# Patient Record
Sex: FEMALE | Race: WHITE | NOT HISPANIC OR LATINO | Employment: OTHER | URBAN - METROPOLITAN AREA
[De-identification: names, ages, dates, MRNs, and addresses within clinical notes are randomized per-mention and may not be internally consistent; named-entity substitution may affect disease eponyms.]

---

## 2018-01-02 ENCOUNTER — APPOINTMENT (EMERGENCY)
Dept: RADIOLOGY | Facility: HOSPITAL | Age: 78
End: 2018-01-02
Payer: MEDICARE

## 2018-01-02 ENCOUNTER — HOSPITAL ENCOUNTER (EMERGENCY)
Facility: HOSPITAL | Age: 78
Discharge: HOME | End: 2018-01-02
Attending: EMERGENCY MEDICINE | Admitting: EMERGENCY MEDICINE
Payer: MEDICARE

## 2018-01-02 VITALS
WEIGHT: 148 LBS | OXYGEN SATURATION: 97 % | DIASTOLIC BLOOD PRESSURE: 70 MMHG | HEART RATE: 65 BPM | SYSTOLIC BLOOD PRESSURE: 154 MMHG | RESPIRATION RATE: 16 BRPM | TEMPERATURE: 97.4 F

## 2018-01-02 DIAGNOSIS — R07.9 CHEST PAIN: Primary | ICD-10-CM

## 2018-01-02 LAB
ALBUMIN SERPL BCP-MCNC: 4.2 G/DL (ref 3.5–5)
ALP SERPL-CCNC: 51 U/L (ref 46–116)
ALT SERPL W P-5'-P-CCNC: 26 U/L (ref 12–78)
ANION GAP SERPL CALCULATED.3IONS-SCNC: 9 MMOL/L (ref 4–13)
AST SERPL W P-5'-P-CCNC: 22 U/L (ref 5–45)
ATRIAL RATE: 76 BPM
BASOPHILS # BLD AUTO: 0.03 THOUSANDS/ΜL (ref 0–0.1)
BASOPHILS NFR BLD AUTO: 1 % (ref 0–1)
BILIRUB DIRECT SERPL-MCNC: 0.14 MG/DL (ref 0–0.2)
BILIRUB SERPL-MCNC: 0.54 MG/DL (ref 0.2–1)
BUN SERPL-MCNC: 14 MG/DL (ref 5–25)
CALCIUM SERPL-MCNC: 9.8 MG/DL (ref 8.3–10.1)
CHLORIDE SERPL-SCNC: 94 MMOL/L (ref 100–108)
CO2 SERPL-SCNC: 29 MMOL/L (ref 21–32)
CREAT SERPL-MCNC: 0.99 MG/DL (ref 0.6–1.3)
EOSINOPHIL # BLD AUTO: 0.05 THOUSAND/ΜL (ref 0–0.61)
EOSINOPHIL NFR BLD AUTO: 1 % (ref 0–6)
ERYTHROCYTE [DISTWIDTH] IN BLOOD BY AUTOMATED COUNT: 12.8 % (ref 11.6–15.1)
GFR SERPL CREATININE-BSD FRML MDRD: 55 ML/MIN/1.73SQ M
GLUCOSE SERPL-MCNC: 98 MG/DL (ref 65–140)
HCT VFR BLD AUTO: 38.2 % (ref 34.8–46.1)
HGB BLD-MCNC: 13 G/DL (ref 11.5–15.4)
LYMPHOCYTES # BLD AUTO: 1.67 THOUSANDS/ΜL (ref 0.6–4.47)
LYMPHOCYTES NFR BLD AUTO: 26 % (ref 14–44)
MCH RBC QN AUTO: 31 PG (ref 26.8–34.3)
MCHC RBC AUTO-ENTMCNC: 34 G/DL (ref 31.4–37.4)
MCV RBC AUTO: 91 FL (ref 82–98)
MONOCYTES # BLD AUTO: 0.41 THOUSAND/ΜL (ref 0.17–1.22)
MONOCYTES NFR BLD AUTO: 7 % (ref 4–12)
NEUTROPHILS # BLD AUTO: 4.19 THOUSANDS/ΜL (ref 1.85–7.62)
NEUTS SEG NFR BLD AUTO: 65 % (ref 43–75)
NRBC BLD AUTO-RTO: 0 /100 WBCS
P AXIS: 75 DEGREES
PLATELET # BLD AUTO: 245 THOUSANDS/UL (ref 149–390)
PMV BLD AUTO: 10.5 FL (ref 8.9–12.7)
POTASSIUM SERPL-SCNC: 3.8 MMOL/L (ref 3.5–5.3)
PR INTERVAL: 178 MS
PROT SERPL-MCNC: 7.9 G/DL (ref 6.4–8.2)
QRS AXIS: 60 DEGREES
QRSD INTERVAL: 92 MS
QT INTERVAL: 382 MS
QTC INTERVAL: 429 MS
RBC # BLD AUTO: 4.2 MILLION/UL (ref 3.81–5.12)
SODIUM SERPL-SCNC: 132 MMOL/L (ref 136–145)
SPECIMEN SOURCE: NORMAL
T WAVE AXIS: 76 DEGREES
TROPONIN I BLD-MCNC: 0 NG/ML (ref 0–0.08)
TSH SERPL DL<=0.05 MIU/L-ACNC: 3.92 UIU/ML (ref 0.36–3.74)
VENTRICULAR RATE: 76 BPM
WBC # BLD AUTO: 6.35 THOUSAND/UL (ref 4.31–10.16)

## 2018-01-02 PROCEDURE — 80048 BASIC METABOLIC PNL TOTAL CA: CPT | Performed by: PODIATRIST

## 2018-01-02 PROCEDURE — 99285 EMERGENCY DEPT VISIT HI MDM: CPT

## 2018-01-02 PROCEDURE — 85025 COMPLETE CBC W/AUTO DIFF WBC: CPT | Performed by: PODIATRIST

## 2018-01-02 PROCEDURE — 84484 ASSAY OF TROPONIN QUANT: CPT

## 2018-01-02 PROCEDURE — 93005 ELECTROCARDIOGRAM TRACING: CPT

## 2018-01-02 PROCEDURE — 36415 COLL VENOUS BLD VENIPUNCTURE: CPT | Performed by: PODIATRIST

## 2018-01-02 PROCEDURE — 71046 X-RAY EXAM CHEST 2 VIEWS: CPT

## 2018-01-02 PROCEDURE — 93005 ELECTROCARDIOGRAM TRACING: CPT | Performed by: PODIATRIST

## 2018-01-02 PROCEDURE — 80076 HEPATIC FUNCTION PANEL: CPT | Performed by: PODIATRIST

## 2018-01-02 PROCEDURE — 84443 ASSAY THYROID STIM HORMONE: CPT | Performed by: PODIATRIST

## 2018-01-02 RX ORDER — ROSUVASTATIN CALCIUM 10 MG/1
10 TABLET, COATED ORAL DAILY
COMMUNITY
End: 2019-02-26 | Stop reason: SDUPTHER

## 2018-01-02 RX ORDER — AMLODIPINE BESYLATE 5 MG/1
5 TABLET ORAL DAILY
COMMUNITY
End: 2019-06-18 | Stop reason: SDUPTHER

## 2018-01-02 RX ORDER — 0.9 % SODIUM CHLORIDE 0.9 %
3 VIAL (ML) INJECTION AS NEEDED
Status: DISCONTINUED | OUTPATIENT
Start: 2018-01-02 | End: 2018-01-02 | Stop reason: HOSPADM

## 2018-01-02 RX ORDER — PANTOPRAZOLE SODIUM 40 MG/1
40 TABLET, DELAYED RELEASE ORAL DAILY
COMMUNITY
End: 2019-04-11 | Stop reason: SDUPTHER

## 2018-01-02 RX ORDER — BUSPIRONE HYDROCHLORIDE 7.5 MG/1
7.5 TABLET ORAL 2 TIMES DAILY
COMMUNITY
End: 2019-04-12 | Stop reason: ALTCHOICE

## 2018-01-02 RX ORDER — DIAZEPAM 2 MG/1
5 TABLET ORAL EVERY 6 HOURS PRN
COMMUNITY
End: 2018-11-28 | Stop reason: SDUPTHER

## 2018-01-02 NOTE — ED ATTENDING ATTESTATION
Christin Talley MD, saw and evaluated the patient  I have discussed the patient with the resident/non-physician practitioner and agree with the resident's/non-physician practitioner's findings, Plan of Care, and MDM as documented in the resident's/non-physician practitioner's note, except where noted  All available labs and Radiology studies were reviewed  At this point I agree with the current assessment done in the Emergency Department  I have conducted an independent evaluation of this patient a history and physical is as follows:    64 YO female presents with tingling over the upper and lower extremities  States ~45 minutes ago she noticed tightness in the chest  Pt feels this may be 2/2 her anxiety, recently changed to buspirone for this  She does note some aching over the anterior chest that has been present with previous anxiety episodes  Pt states this is a little more intense than previous  She denies lightheadedness, palpitations, shortness of breath  Pt denies SOB/F/C/N/V/D/C, no dysuria, burning on urination or blood in urine  Gen: Pt is in NAD  HEENT: Head is atraumatic, EOM's intact, neck has FROM  Chest: CTAB, non-tender  Heart: RRR  Abdomen: Soft, NT/ND  Musculoskeletal: FROM in all extremities  Skin: No rash, no ecchymosis  Neuro: Awake, alert, oriented x4; Cranial nerves II-XII intact  Psych: Normal affect    MDM - Pt with tingling in the upper and lower extremities B/L without focal neurologic deficit  Will obtain electrolytes and CBC, ECG and troponin  As symptoms began recently prior to presentation, pt will require a delta ECG and troponin      Critical Care Time  CritCare Time    Procedures

## 2018-01-02 NOTE — ED PROVIDER NOTES
History  Chief Complaint   Patient presents with    Tingling     Tingling down bilateral arm and legs into feet  Reports that muscles feel like they are burning  Symptoms began approximately one hour prior to arrival     Chest Pain     Reports chest pressure/ tightening began approximately 30 minutes prior to arrival  Weakness  69-year-old female presents for tingling of all 4 extremities as well as slight discomfort of the left chest area  She relates that the symptoms of tingling to the extremities the can approximately 3 hours prior to arrival in the emergency department  She states that the sensation happened equally to all of the extremities at the same time  The symptoms continued to were sent in approximately 30 minutes before she departed for the emergency department she started to feel some slight pulling and tightness in her left chest   The patient denies any cardiac history  She does relate that a long time ago she had a cardiac stress test but she cannot remember the result a wire was performed  She states that she has not followed up with any cardiologist sense  She does relate an extensive history of anxiety for many years  She is currently on buspirone which she has been on for only a few months  She states she has been switched between anti exhibit demand medications frequently  Family prior to emergency department for concern that she was having a heart attack  However patient states that the symptoms are very consistent with her previous episodes of anxiety however they have never been this severe before  She denies any fever chills nausea vomiting chest shortness of breath  She denies any recent trauma or over exertion  She denies any injuries to the head or any history of neck or back pain              None       Past Medical History:   Diagnosis Date    Anxiety     Disease of thyroid gland     Hyperlipidemia     Hypertension        Past Surgical History:   Procedure Laterality Date    CHOLECYSTECTOMY      HYSTERECTOMY         History reviewed  No pertinent family history  I have reviewed and agree with the history as documented  Social History   Substance Use Topics    Smoking status: Never Smoker    Smokeless tobacco: Never Used    Alcohol use No        Review of Systems   Constitutional: Negative  HENT: Negative  Eyes: Negative  Negative for visual disturbance  Respiratory: Positive for chest tightness  Cardiovascular: Negative  Gastrointestinal: Negative  Genitourinary: Negative  Musculoskeletal: Negative  Skin: Negative  Neurological:        Tingling and numbness to all 4 extremities   Psychiatric/Behavioral: The patient is nervous/anxious  All other systems reviewed and are negative  Physical Exam  ED Triage Vitals [01/02/18 1647]   Temperature Pulse Respirations Blood Pressure SpO2   (!) 97 4 °F (36 3 °C) 75 18 (!) 175/76 100 %      Temp Source Heart Rate Source Patient Position - Orthostatic VS BP Location FiO2 (%)   Oral Monitor Sitting Right arm --      Pain Score       4           Orthostatic Vital Signs  Vitals:    01/02/18 1647   BP: (!) 175/76   Pulse: 75   Patient Position - Orthostatic VS: Sitting       Physical Exam   Constitutional: She is oriented to person, place, and time  She appears well-developed and well-nourished  HENT:   Head: Normocephalic and atraumatic  Eyes: EOM are normal  Pupils are equal, round, and reactive to light  Neck: Normal range of motion  Neck supple  Cardiovascular: Normal rate, regular rhythm and normal heart sounds  Pulmonary/Chest: Effort normal and breath sounds normal  No respiratory distress  Abdominal: Soft  There is no tenderness  Musculoskeletal: Normal range of motion  She exhibits no edema, tenderness or deformity  Neurological: She is alert and oriented to person, place, and time  She exhibits normal muscle tone     Increased tingling of the extremities upon palpation   Skin: Skin is warm  No erythema  Psychiatric: Her mood appears anxious  ED Medications  Medications - No data to display    Diagnostic Studies  Results Reviewed     None                 No orders to display         Procedures  Procedures      Phone Consults  ED Phone Contact    ED Course  ED Course                                MDM  Number of Diagnoses or Management Options  Chest pain: new and does not require workup  Diagnosis management comments: Patient is discharged to home  Likely attributable to history of anxiety as patient now reports that the symptoms are similar to her previous episodes  All vital signs and labs within normal limits  EKG and troponins show no indication of myocardial infarction  Amount and/or Complexity of Data Reviewed  Clinical lab tests: reviewed and ordered  Tests in the radiology section of CPT®: ordered and reviewed  Tests in the medicine section of CPT®: ordered and reviewed    Risk of Complications, Morbidity, and/or Mortality  Presenting problems: moderate  Diagnostic procedures: moderate  Management options: moderate    Patient Progress  Patient progress: stable    CritCare Time    Disposition  Final diagnoses:   None     ED Disposition     None      Follow-up Information    None       Patient's Medications    No medications on file     No discharge procedures on file  ED Provider  Attending physically available and evaluated Morris Foster I managed the patient along with the ED Attending      Electronically Signed by         Leonela Elizabeth DPM  Resident  01/03/18 5666

## 2018-01-02 NOTE — ED NOTES
Pt returned from x-ray  Ambulatory to bathroom with even, steady gait       Rina Rosenbaum RN  01/02/18 9143

## 2018-01-03 LAB
ATRIAL RATE: 66 BPM
P AXIS: 60 DEGREES
PR INTERVAL: 158 MS
QRS AXIS: 72 DEGREES
QRSD INTERVAL: 92 MS
QT INTERVAL: 382 MS
QTC INTERVAL: 400 MS
SPECIMEN SOURCE: NORMAL
T WAVE AXIS: 82 DEGREES
TROPONIN I BLD-MCNC: 0.01 NG/ML (ref 0–0.08)
VENTRICULAR RATE: 66 BPM

## 2018-01-03 NOTE — DISCHARGE INSTRUCTIONS

## 2018-04-04 ENCOUNTER — CONVERSION ENCOUNTER (OUTPATIENT)
Dept: MAMMOGRAPHY | Facility: CLINIC | Age: 78
End: 2018-04-04

## 2018-06-25 DIAGNOSIS — J30.2 SEASONAL ALLERGIC RHINITIS, UNSPECIFIED TRIGGER: Primary | ICD-10-CM

## 2018-08-23 LAB
25(OH)D3 SERPL-MCNC: 38 NG/ML (ref 30–100)
ALBUMIN SERPL-MCNC: 4.4 G/DL (ref 3.6–5.1)
ALBUMIN/GLOB SERPL: 1.6 (CALC) (ref 1–2.5)
ALP SERPL-CCNC: 53 U/L (ref 33–130)
ALT SERPL-CCNC: 16 U/L (ref 6–29)
APPEARANCE UR: CLEAR
AST SERPL-CCNC: 20 U/L (ref 10–35)
BACTERIA UR QL AUTO: ABNORMAL /HPF
BASOPHILS # BLD AUTO: 51 CELLS/UL (ref 0–200)
BASOPHILS NFR BLD AUTO: 1.1 %
BILIRUB DIRECT SERPL-MCNC: 0.1 MG/DL
BILIRUB INDIRECT SERPL-MCNC: 0.6 MG/DL (CALC) (ref 0.2–1.2)
BILIRUB SERPL-MCNC: 0.7 MG/DL (ref 0.2–1.2)
BILIRUB UR QL STRIP: NEGATIVE
BUN SERPL-MCNC: 15 MG/DL (ref 7–25)
BUN/CREAT SERPL: 14 (CALC) (ref 6–22)
CALCIUM SERPL-MCNC: 9.8 MG/DL (ref 8.6–10.4)
CHLORIDE SERPL-SCNC: 98 MMOL/L (ref 98–110)
CHOLEST SERPL-MCNC: 206 MG/DL
CHOLEST/HDLC SERPL: 2.9 (CALC)
CO2 SERPL-SCNC: 31 MMOL/L (ref 20–32)
COLOR UR: YELLOW
CREAT SERPL-MCNC: 1.1 MG/DL (ref 0.6–0.93)
EOSINOPHIL # BLD AUTO: 78 CELLS/UL (ref 15–500)
EOSINOPHIL NFR BLD AUTO: 1.7 %
ERYTHROCYTE [DISTWIDTH] IN BLOOD BY AUTOMATED COUNT: 12.4 % (ref 11–15)
EST. AVERAGE GLUCOSE BLD GHB EST-MCNC: 103 (CALC)
EST. AVERAGE GLUCOSE BLD GHB EST-SCNC: 5.7 (CALC)
GLOBULIN SER CALC-MCNC: 2.8 G/DL (CALC) (ref 1.9–3.7)
GLUCOSE SERPL-MCNC: 81 MG/DL (ref 65–99)
GLUCOSE UR QL STRIP: NEGATIVE
HBA1C MFR BLD: 5.2 % OF TOTAL HGB
HCT VFR BLD AUTO: 36.7 % (ref 35–45)
HDLC SERPL-MCNC: 72 MG/DL
HGB BLD-MCNC: 12.7 G/DL (ref 11.7–15.5)
HGB UR QL STRIP: NEGATIVE
HYALINE CASTS #/AREA URNS LPF: ABNORMAL /LPF
KETONES UR QL STRIP: NEGATIVE
LDLC SERPL CALC-MCNC: 114 MG/DL (CALC)
LEUKOCYTE ESTERASE UR QL STRIP: ABNORMAL
LYMPHOCYTES # BLD AUTO: 1638 CELLS/UL (ref 850–3900)
LYMPHOCYTES NFR BLD AUTO: 35.6 %
MAGNESIUM SERPL-MCNC: 2.2 MG/DL (ref 1.5–2.5)
MCH RBC QN AUTO: 32.1 PG (ref 27–33)
MCHC RBC AUTO-ENTMCNC: 34.6 G/DL (ref 32–36)
MCV RBC AUTO: 92.7 FL (ref 80–100)
MONOCYTES # BLD AUTO: 363 CELLS/UL (ref 200–950)
MONOCYTES NFR BLD AUTO: 7.9 %
NEUTROPHILS # BLD AUTO: 2470 CELLS/UL (ref 1500–7800)
NEUTROPHILS NFR BLD AUTO: 53.7 %
NITRITE UR QL STRIP: NEGATIVE
NONHDLC SERPL-MCNC: 134 MG/DL (CALC)
PH UR STRIP: 7.5 [PH] (ref 5–8)
PLATELET # BLD AUTO: 254 THOUSAND/UL (ref 140–400)
PMV BLD REES-ECKER: 11 FL (ref 7.5–12.5)
POTASSIUM SERPL-SCNC: 4.1 MMOL/L (ref 3.5–5.3)
PROT SERPL-MCNC: 7.2 G/DL (ref 6.1–8.1)
PROT UR QL STRIP: NEGATIVE
RBC # BLD AUTO: 3.96 MILLION/UL (ref 3.8–5.1)
RBC #/AREA URNS HPF: ABNORMAL /HPF
SL AMB EGFR AFRICAN AMERICAN: 56 ML/MIN/1.73M2
SL AMB EGFR NON AFRICAN AMERICAN: 48 ML/MIN/1.73M2
SODIUM SERPL-SCNC: 136 MMOL/L (ref 135–146)
SP GR UR STRIP: 1.01 (ref 1–1.03)
SQUAMOUS #/AREA URNS HPF: ABNORMAL /HPF
T4 FREE SERPL-MCNC: 1.3 NG/DL (ref 0.8–1.8)
TRIGL SERPL-MCNC: 101 MG/DL
TSH SERPL-ACNC: 4.62 MIU/L (ref 0.4–4.5)
VIT B12 SERPL-MCNC: 755 PG/ML (ref 200–1100)
WBC # BLD AUTO: 4.6 THOUSAND/UL (ref 3.8–10.8)
WBC #/AREA URNS HPF: ABNORMAL /HPF

## 2018-08-28 ENCOUNTER — OFFICE VISIT (OUTPATIENT)
Dept: FAMILY MEDICINE CLINIC | Facility: CLINIC | Age: 78
End: 2018-08-28
Payer: MEDICARE

## 2018-08-28 VITALS
OXYGEN SATURATION: 98 % | SYSTOLIC BLOOD PRESSURE: 120 MMHG | DIASTOLIC BLOOD PRESSURE: 82 MMHG | BODY MASS INDEX: 24.32 KG/M2 | RESPIRATION RATE: 14 BRPM | HEART RATE: 72 BPM | TEMPERATURE: 98.7 F | HEIGHT: 65 IN | WEIGHT: 146 LBS

## 2018-08-28 DIAGNOSIS — E78.49 OTHER HYPERLIPIDEMIA: ICD-10-CM

## 2018-08-28 DIAGNOSIS — I10 ESSENTIAL HYPERTENSION: Primary | ICD-10-CM

## 2018-08-28 DIAGNOSIS — K21.9 GASTROESOPHAGEAL REFLUX DISEASE WITHOUT ESOPHAGITIS: ICD-10-CM

## 2018-08-28 DIAGNOSIS — E03.9 HYPOTHYROIDISM, UNSPECIFIED TYPE: ICD-10-CM

## 2018-08-28 DIAGNOSIS — F41.9 ANXIETY: ICD-10-CM

## 2018-08-28 PROCEDURE — 99214 OFFICE O/P EST MOD 30 MIN: CPT | Performed by: INTERNAL MEDICINE

## 2018-08-28 NOTE — PROGRESS NOTES
Assessment/Plan:         Diagnoses and all orders for this visit:    Essential hypertension: Stable  Continue same  RTc in 3mos w blood work  -     Basic metabolic panel; Future    Other hyperlipidemia: Stable  Continue same  RTc in 3mos    Gastroesophageal reflux disease without esophagitis: Life style mOd  Stable  Cont same    Anxiety: Stable  Cont Valium 2 mg    Hypothyroidism, unspecified type: Clinically Stable at this Time  RTc in 2-3 mos w Blood work ;  -     TSH, 3rd generation; Future  -     T4, free; Future        Subjective:      Patient ID: Chetan Tolentino is a 65 y o  female  Nice 65 Y O lady with H/O HTN,Anxiety,  Is here today for regular check Up     No new Symptoms    Med List and Blood work Reviewed w pt in Detail           The following portions of the patient's history were reviewed and updated as appropriate: allergies, current medications, past family history, past medical history, past social history, past surgical history and problem list     Review of Systems   Constitutional: Negative for chills, fatigue and fever  HENT: Negative for congestion, facial swelling, sore throat, trouble swallowing and voice change  Eyes: Negative for pain, discharge and visual disturbance  Respiratory: Negative for cough, shortness of breath and wheezing  Cardiovascular: Negative for chest pain, palpitations and leg swelling  Gastrointestinal: Negative for abdominal pain, blood in stool, constipation, diarrhea and nausea  Endocrine: Negative for polydipsia, polyphagia and polyuria  Genitourinary: Negative for difficulty urinating, hematuria and urgency  Musculoskeletal: Negative for arthralgias and myalgias  Skin: Negative for rash  Neurological: Negative for dizziness, tremors, weakness and headaches  Hematological: Negative for adenopathy  Does not bruise/bleed easily  Psychiatric/Behavioral: Negative for dysphoric mood, sleep disturbance and suicidal ideas  Objective:      /82 (BP Location: Left arm, Patient Position: Sitting, Cuff Size: Standard)   Pulse 72   Temp 98 7 °F (37 1 °C) (Oral)   Resp 14   Ht 5' 5" (1 651 m)   Wt 66 2 kg (146 lb)   SpO2 98%   BMI 24 30 kg/m²          Physical Exam   Constitutional: She is oriented to person, place, and time  She appears well-nourished  No distress  HENT:   Head: Normocephalic  Mouth/Throat: Oropharynx is clear and moist  No oropharyngeal exudate  Eyes: Conjunctivae are normal  Pupils are equal, round, and reactive to light  No scleral icterus  Neck: Neck supple  No thyromegaly present  Cardiovascular: Normal rate, regular rhythm and normal heart sounds  No murmur heard  Pulmonary/Chest: Effort normal and breath sounds normal  No respiratory distress  She has no wheezes  She has no rales  Abdominal: Soft  Bowel sounds are normal  She exhibits no distension  There is no tenderness  There is no rebound and no guarding  Musculoskeletal: She exhibits no edema or tenderness  Lymphadenopathy:     She has no cervical adenopathy  Neurological: She is alert and oriented to person, place, and time  No cranial nerve deficit  Coordination normal    Skin: No rash noted  No erythema  Psychiatric: She has a normal mood and affect

## 2018-11-20 ENCOUNTER — TRANSCRIBE ORDERS (OUTPATIENT)
Dept: ADMINISTRATIVE | Facility: HOSPITAL | Age: 78
End: 2018-11-20

## 2018-11-20 ENCOUNTER — APPOINTMENT (OUTPATIENT)
Dept: LAB | Age: 78
End: 2018-11-20
Payer: MEDICARE

## 2018-11-20 DIAGNOSIS — I10 ESSENTIAL HYPERTENSION, MALIGNANT: Primary | ICD-10-CM

## 2018-11-20 DIAGNOSIS — I10 ESSENTIAL HYPERTENSION, MALIGNANT: ICD-10-CM

## 2018-11-20 DIAGNOSIS — E03.9 HYPOTHYROIDISM, UNSPECIFIED TYPE: ICD-10-CM

## 2018-11-20 LAB
ANION GAP SERPL CALCULATED.3IONS-SCNC: 6 MMOL/L (ref 4–13)
BUN SERPL-MCNC: 15 MG/DL (ref 5–25)
CALCIUM SERPL-MCNC: 9.2 MG/DL (ref 8.3–10.1)
CHLORIDE SERPL-SCNC: 100 MMOL/L (ref 100–108)
CO2 SERPL-SCNC: 29 MMOL/L (ref 21–32)
CREAT SERPL-MCNC: 0.99 MG/DL (ref 0.6–1.3)
GFR SERPL CREATININE-BSD FRML MDRD: 54 ML/MIN/1.73SQ M
GLUCOSE P FAST SERPL-MCNC: 79 MG/DL (ref 65–99)
POTASSIUM SERPL-SCNC: 4.1 MMOL/L (ref 3.5–5.3)
SODIUM SERPL-SCNC: 135 MMOL/L (ref 136–145)
T4 FREE SERPL-MCNC: 1.01 NG/DL (ref 0.76–1.46)
TSH SERPL DL<=0.05 MIU/L-ACNC: 3.02 UIU/ML (ref 0.36–3.74)

## 2018-11-20 PROCEDURE — 84439 ASSAY OF FREE THYROXINE: CPT

## 2018-11-20 PROCEDURE — 36415 COLL VENOUS BLD VENIPUNCTURE: CPT

## 2018-11-20 PROCEDURE — 84443 ASSAY THYROID STIM HORMONE: CPT

## 2018-11-20 PROCEDURE — 80048 BASIC METABOLIC PNL TOTAL CA: CPT

## 2018-11-28 ENCOUNTER — OFFICE VISIT (OUTPATIENT)
Dept: FAMILY MEDICINE CLINIC | Facility: CLINIC | Age: 78
End: 2018-11-28
Payer: MEDICARE

## 2018-11-28 VITALS
BODY MASS INDEX: 24.04 KG/M2 | RESPIRATION RATE: 14 BRPM | OXYGEN SATURATION: 98 % | DIASTOLIC BLOOD PRESSURE: 80 MMHG | TEMPERATURE: 97.6 F | SYSTOLIC BLOOD PRESSURE: 120 MMHG | WEIGHT: 144.3 LBS | HEIGHT: 65 IN | HEART RATE: 72 BPM

## 2018-11-28 DIAGNOSIS — F41.9 ANXIETY: Primary | ICD-10-CM

## 2018-11-28 DIAGNOSIS — I10 ESSENTIAL HYPERTENSION: ICD-10-CM

## 2018-11-28 DIAGNOSIS — E78.49 OTHER HYPERLIPIDEMIA: ICD-10-CM

## 2018-11-28 PROCEDURE — 99214 OFFICE O/P EST MOD 30 MIN: CPT | Performed by: INTERNAL MEDICINE

## 2018-11-28 RX ORDER — DIAZEPAM 2 MG/1
2 TABLET ORAL
Qty: 30 TABLET | Refills: 2 | Status: SHIPPED | OUTPATIENT
Start: 2018-11-28 | End: 2018-12-19 | Stop reason: SDUPTHER

## 2018-11-28 NOTE — PROGRESS NOTES
Assessment/Plan:         Diagnoses and all orders for this visit:    Anxiety : continue Buspar    And renew :  -     diazepam (VALIUM) 2 mg tablet; Take 1 tablet (2 mg total) by mouth daily at bedtime as needed for anxiety or sedation    Other hyperlipidemia : stable  Continue same  RTC in 3mos w Blood work    Essential hypertension: stable  Continue same  RTC in 3mos w Blood work        Subjective:      Patient ID: Annie Posey is a 77 y o  [facility],Anxiety,  Is here for Regular check up, recent blood work and med list reviewed w pt in Detail    No new symptoms,  The following portions of the patient's history were reviewed and updated as appropriate: allergies, current medications, past family history, past medical history, past social history, past surgical history and problem list     Review of Systems   Constitutional: Negative for chills, fatigue and fever  HENT: Negative for congestion, facial swelling, sore throat, trouble swallowing and voice change  Eyes: Negative for pain, discharge and visual disturbance  Respiratory: Negative for cough, shortness of breath and wheezing  Cardiovascular: Negative for chest pain, palpitations and leg swelling  Gastrointestinal: Negative for abdominal pain, blood in stool, constipation, diarrhea and nausea  Endocrine: Negative for polydipsia, polyphagia and polyuria  Genitourinary: Negative for difficulty urinating, hematuria and urgency  Musculoskeletal: Negative for arthralgias and myalgias  Skin: Negative for rash  Neurological: Negative for dizziness, tremors, weakness and headaches  Hematological: Negative for adenopathy  Does not bruise/bleed easily  Psychiatric/Behavioral: Negative for dysphoric mood, sleep disturbance and suicidal ideas           Objective:      /80 (BP Location: Left arm, Patient Position: Sitting, Cuff Size: Standard)   Pulse 72   Temp 97 6 °F (36 4 °C) (Oral)   Resp 14 Ht 5' 5" (1 651 m)   Wt 65 5 kg (144 lb 4 8 oz)   SpO2 98%   BMI 24 01 kg/m²          Physical Exam   Constitutional: She is oriented to person, place, and time  She appears well-nourished  No distress  HENT:   Head: Normocephalic  Mouth/Throat: Oropharynx is clear and moist  No oropharyngeal exudate  Eyes: Pupils are equal, round, and reactive to light  Conjunctivae are normal  No scleral icterus  Neck: Neck supple  No thyromegaly present  Cardiovascular: Normal rate, regular rhythm and normal heart sounds  No murmur heard  Pulmonary/Chest: Effort normal and breath sounds normal  No respiratory distress  She has no wheezes  She has no rales  Abdominal: Soft  Bowel sounds are normal  She exhibits no distension  There is no tenderness  There is no rebound and no guarding  Musculoskeletal: She exhibits no edema or tenderness  Lymphadenopathy:     She has no cervical adenopathy  Neurological: She is alert and oriented to person, place, and time  No cranial nerve deficit  Coordination normal    Skin: No rash noted  No erythema  No pallor  Psychiatric: She has a normal mood and affect

## 2018-12-19 ENCOUNTER — OFFICE VISIT (OUTPATIENT)
Dept: FAMILY MEDICINE CLINIC | Facility: CLINIC | Age: 78
End: 2018-12-19
Payer: MEDICARE

## 2018-12-19 VITALS
WEIGHT: 142.6 LBS | HEIGHT: 65 IN | SYSTOLIC BLOOD PRESSURE: 128 MMHG | RESPIRATION RATE: 14 BRPM | BODY MASS INDEX: 23.76 KG/M2 | DIASTOLIC BLOOD PRESSURE: 70 MMHG | OXYGEN SATURATION: 93 % | TEMPERATURE: 97.5 F | HEART RATE: 76 BPM

## 2018-12-19 DIAGNOSIS — I10 ESSENTIAL HYPERTENSION: ICD-10-CM

## 2018-12-19 DIAGNOSIS — E78.49 OTHER HYPERLIPIDEMIA: Primary | ICD-10-CM

## 2018-12-19 DIAGNOSIS — Z23 NEED FOR TDAP VACCINATION: ICD-10-CM

## 2018-12-19 DIAGNOSIS — F41.9 ANXIETY: ICD-10-CM

## 2018-12-19 PROCEDURE — 96372 THER/PROPH/DIAG INJ SC/IM: CPT | Performed by: INTERNAL MEDICINE

## 2018-12-19 PROCEDURE — 90715 TDAP VACCINE 7 YRS/> IM: CPT

## 2018-12-19 PROCEDURE — 99214 OFFICE O/P EST MOD 30 MIN: CPT | Performed by: INTERNAL MEDICINE

## 2018-12-19 PROCEDURE — 90471 IMMUNIZATION ADMIN: CPT

## 2018-12-19 RX ORDER — BUSPIRONE HYDROCHLORIDE 15 MG/1
15 TABLET ORAL 2 TIMES DAILY WITH MEALS
Qty: 60 TABLET | Refills: 3 | Status: SHIPPED | OUTPATIENT
Start: 2018-12-19 | End: 2019-02-26 | Stop reason: SDUPTHER

## 2018-12-19 RX ORDER — LEVOMEFOLATE CALCIUM 15 MG
1 TABLET ORAL DAILY
Qty: 90 TABLET | Refills: 3 | Status: SHIPPED | OUTPATIENT
Start: 2018-12-19 | End: 2019-07-08 | Stop reason: SDUPTHER

## 2018-12-19 RX ORDER — DIAZEPAM 2 MG/1
2 TABLET ORAL
Qty: 30 TABLET | Refills: 1 | Status: SHIPPED | OUTPATIENT
Start: 2018-12-19 | End: 2019-04-12 | Stop reason: SDUPTHER

## 2018-12-19 NOTE — PROGRESS NOTES
Assessment/Plan:         Diagnoses and all orders for this visit:    Other hyperlipidemia: life style Mod  Continue same  RTC in 3mos w Blood work    Need for Tdap vaccination  -     TDAP VACCINE GREATER THAN OR EQUAL TO 8YO IM    Anxiety: Increase Buspar to 15 mg BID food  -     diazepam (VALIUM) 2 mg tablet; Take 1 tablet (2 mg total) by mouth daily at bedtime as needed for anxiety or sedation  -     busPIRone (BUSPAR) 15 mg tablet; Take 1 tablet (15 mg total) by mouth 2 (two) times a day with meals  Add :  -     L-Methylfolate 15 MG TABS; Take 1 tablet (15 mg total) by mouth daily  RTC in 5 weeks    Essential hypertension: Stable  Continue same  RTC in 3mos        Subjective:      Patient ID: Annie Posey is a 77 y o  female  77 Y O lady with H/O HTn,Anxiety,  Is here for Regular check up, lately she has been feeling more Anxiety, and Frequent attacks,  No homocidal Or suicidal Ideas,  Med list reviewed w pt in detail    Anxiety   Symptoms include nervous/anxious behavior  Patient reports no chest pain, dizziness, nausea, palpitations, shortness of breath or suicidal ideas  The following portions of the patient's history were reviewed and updated as appropriate: allergies, current medications, past family history, past medical history, past social history, past surgical history and problem list     Review of Systems   Constitutional: Negative for chills, fatigue and fever  HENT: Negative for congestion, facial swelling, sore throat, trouble swallowing and voice change  Eyes: Negative for pain, discharge and visual disturbance  Respiratory: Negative for cough, shortness of breath and wheezing  Cardiovascular: Negative for chest pain, palpitations and leg swelling  Gastrointestinal: Negative for abdominal pain, blood in stool, constipation, diarrhea and nausea  Endocrine: Negative for polydipsia, polyphagia and polyuria     Genitourinary: Negative for difficulty urinating, hematuria and urgency  Musculoskeletal: Negative for arthralgias and myalgias  Skin: Negative for rash  Neurological: Negative for dizziness, tremors, weakness and headaches  Hematological: Negative for adenopathy  Does not bruise/bleed easily  Psychiatric/Behavioral: Negative for dysphoric mood, sleep disturbance and suicidal ideas  The patient is nervous/anxious  Objective:      /70 (BP Location: Left arm, Patient Position: Sitting, Cuff Size: Standard)   Pulse 76   Temp 97 5 °F (36 4 °C) (Tympanic)   Resp 14   Ht 5' 5" (1 651 m)   Wt 64 7 kg (142 lb 9 6 oz)   SpO2 93%   BMI 23 73 kg/m²          Physical Exam   Constitutional: She is oriented to person, place, and time  She appears well-nourished  No distress  HENT:   Head: Normocephalic  Mouth/Throat: Oropharynx is clear and moist  No oropharyngeal exudate  Eyes: Pupils are equal, round, and reactive to light  Conjunctivae are normal  No scleral icterus  Neck: Neck supple  No thyromegaly present  Cardiovascular: Normal rate, regular rhythm and normal heart sounds  No murmur heard  Pulmonary/Chest: Effort normal and breath sounds normal  No respiratory distress  She has no wheezes  She has no rales  Abdominal: Soft  Bowel sounds are normal  She exhibits no distension  There is no tenderness  There is no rebound and no guarding  Musculoskeletal: She exhibits no edema or tenderness  Lymphadenopathy:     She has no cervical adenopathy  Neurological: She is alert and oriented to person, place, and time  No cranial nerve deficit  Coordination normal    Skin: No rash noted  No erythema  No pallor  Psychiatric: She has a normal mood and affect

## 2019-02-26 ENCOUNTER — OFFICE VISIT (OUTPATIENT)
Dept: FAMILY MEDICINE CLINIC | Facility: CLINIC | Age: 79
End: 2019-02-26
Payer: MEDICARE

## 2019-02-26 VITALS
BODY MASS INDEX: 24.16 KG/M2 | TEMPERATURE: 98.2 F | WEIGHT: 145 LBS | SYSTOLIC BLOOD PRESSURE: 122 MMHG | HEIGHT: 65 IN | HEART RATE: 72 BPM | OXYGEN SATURATION: 99 % | RESPIRATION RATE: 13 BRPM | DIASTOLIC BLOOD PRESSURE: 68 MMHG

## 2019-02-26 DIAGNOSIS — E78.49 OTHER HYPERLIPIDEMIA: ICD-10-CM

## 2019-02-26 DIAGNOSIS — Z00.01 ENCOUNTER FOR GENERAL ADULT MEDICAL EXAMINATION WITH ABNORMAL FINDINGS: Primary | ICD-10-CM

## 2019-02-26 DIAGNOSIS — F41.9 ANXIETY: ICD-10-CM

## 2019-02-26 DIAGNOSIS — I10 ESSENTIAL HYPERTENSION: ICD-10-CM

## 2019-02-26 PROCEDURE — G0439 PPPS, SUBSEQ VISIT: HCPCS | Performed by: INTERNAL MEDICINE

## 2019-02-26 PROCEDURE — 99214 OFFICE O/P EST MOD 30 MIN: CPT | Performed by: INTERNAL MEDICINE

## 2019-02-26 RX ORDER — BUSPIRONE HYDROCHLORIDE 15 MG/1
15 TABLET ORAL 2 TIMES DAILY WITH MEALS
Qty: 180 TABLET | Refills: 3 | Status: SHIPPED | OUTPATIENT
Start: 2019-02-26 | End: 2019-07-08 | Stop reason: SDUPTHER

## 2019-02-26 RX ORDER — ROSUVASTATIN CALCIUM 10 MG/1
10 TABLET, COATED ORAL DAILY
Qty: 90 TABLET | Refills: 3 | Status: SHIPPED | OUTPATIENT
Start: 2019-02-26 | End: 2019-03-06 | Stop reason: SDUPTHER

## 2019-02-26 RX ORDER — ASPIRIN 81 MG/1
81 TABLET ORAL
COMMUNITY
End: 2020-04-15 | Stop reason: SDUPTHER

## 2019-02-26 NOTE — PROGRESS NOTES
Assessment and Plan:    Problem List Items Addressed This Visit     None        Health Maintenance Due   Topic Date Due    Medicare Annual Wellness Visit (AWV)  1940         HPI:  Talisha Boothe is a 77 y o  female here for her Subsequent Wellness Visit      Patient Active Problem List   Diagnosis    Gastroesophageal reflux disease without esophagitis    Essential hypertension    Other hyperlipidemia     Past Medical History:   Diagnosis Date    Anxiety     Disease of thyroid gland     Gastroesophageal reflux disease without esophagitis 8/28/2018    Hyperlipidemia     Hypertension     Hypothyroidism      Past Surgical History:   Procedure Laterality Date    CATARACT EXTRACTION      CHOLECYSTECTOMY      HYSTERECTOMY       Family History   Problem Relation Age of Onset    No Known Problems Mother     No Known Problems Father      Social History     Tobacco Use   Smoking Status Never Smoker   Smokeless Tobacco Never Used     Social History     Substance and Sexual Activity   Alcohol Use No      Social History     Substance and Sexual Activity   Drug Use No       Current Outpatient Medications   Medication Sig Dispense Refill    amLODIPine (NORVASC) 5 mg tablet Take 5 mg by mouth daily      aspirin (ASPIRIN ADULT LOW DOSE) 81 mg EC tablet Take 81 mg by mouth      azilsartan medoxomil (EDARBI) 40 MG tablet Take 40 mg by mouth daily      busPIRone (BUSPAR) 15 mg tablet Take 1 tablet (15 mg total) by mouth 2 (two) times a day with meals 60 tablet 3    busPIRone (BUSPAR) 7 5 mg tablet Take 7 5 mg by mouth 2 (two) times a day      diazepam (VALIUM) 2 mg tablet Take 1 tablet (2 mg total) by mouth daily at bedtime as needed for anxiety or sedation 30 tablet 1    L-Methylfolate 15 MG TABS Take 1 tablet (15 mg total) by mouth daily 90 tablet 3    loratadine (CLARITIN REDITABS) 10 MG dissolvable tablet Take 1 tablet (10 mg total) by mouth daily 30 tablet 1    pantoprazole (PROTONIX) 40 mg tablet Take 40 mg by mouth daily      PROCTOZONE-HC 2 5 % rectal cream   0    rosuvastatin (CRESTOR) 10 MG tablet Take 10 mg by mouth daily       No current facility-administered medications for this visit  No Known Allergies  Immunization History   Administered Date(s) Administered    INFLUENZA 09/21/2015, 11/01/2016, 10/24/2017, 10/12/2018    Influenza Split 09/03/2013, 09/02/2014    Influenza Split High Dose Preservative Free IM 11/01/2016, 10/24/2017    Influenza TIV (IM) 09/21/2015    Pneumococcal Conjugate 13-Valent 10/24/2017    Pneumococcal Polysaccharide PPV23 12/01/2008    Td (adult), adsorbed 01/01/2004    Tdap 12/19/2018       Patient Care Team:  Germain Khoury MD as PCP - General (Internal Medicine)    Medicare Screening Tests and Risk Assessments:  Bautista Guzman is here for her Subsequent Wellness visit  Health Risk Assessment:  Patient rates overall health as good  Patient feels that their physical health rating is Same  Eyesight was rated as Same  Hearing was rated as Same  Patient feels that their emotional and mental health rating is Same  Pain experienced by patient in the last 7 days has been None  Patient states that she has experienced no weight loss or gain in last 6 months  Emotional/Mental Health:  Patient has been feeling nervous/anxious  PHQ-9 Depression Screening:    Frequency of the following problems over the past two weeks:      1  Little interest or pleasure in doing things: 0 - not at all      2  Feeling down, depressed, or hopeless: 0 - not at all  PHQ-2 Score: 0          Broken Bones/Falls: Fall Risk Assessment:    In the past year, patient has experienced: No history of falling in past year          Bladder/Bowel:  Patient has not leaked urine accidently in the last six months  Patient reports no loss of bowel control  Immunizations:  Patient has had a flu vaccination within the last year  Patient has received a pneumonia shot    Patient has not received a shingles shot  Patient has received tetanus/diphtheria shot  Date of tetanus/diphtheria shot: 12/19/2018    Home Safety:  Patient does not have trouble with stairs inside or outside of their home  Patient currently reports that there are no safety hazards present in home, working smoke alarms, working carbon monoxide detectors  Preventative Screenings:   No breast cancer screening performed, no colon cancer screen completed, cholesterol screen completed, glaucoma eye exam completed,     Nutrition:  Current diet: Regular with servings of the following:    Medications:  Patient is not currently taking any over-the-counter supplements  Patient is able to manage medications  Lifestyle Choices:  Patient reports no tobacco use  Patient has not smoked or used tobacco in the past   Patient reports no alcohol use  Patient drives a vehicle  Patient wears seat belt  Current level of exercise of physical activity described by patient as: Active  (Additional Comments: Pt does all housekeeping tasks around her house )    Activities of Daily Living:  Can get out of bed by his or her self, able to dress self, able to make own meals, able to do own shopping, able to bathe self, can do own laundry/housekeeping, can manage own money, pay bills and track expenses    Previous Hospitalizations:  No hospitalization or ED visit in past 12 months        Advanced Directives:  Patient has decided on a power of   Patient has spoken to designated power of   Patient has completed advanced directive

## 2019-02-26 NOTE — PROGRESS NOTES
Assessment/Plan:         Diagnoses and all orders for this visit:    Encounter for general adult medical examination with abnormal findings: Done in Detail    Anxiety; stable  Renew :  -     busPIRone (BUSPAR) 15 mg tablet; Take 1 tablet (15 mg total) by mouth 2 (two) times a day with meals   continue valium PRN ONLY  Other hyperlipidemia: stable  Renew :  -     rosuvastatin (CRESTOR) 10 MG tablet; Take 1 tablet (10 mg total) by mouth daily  RTC in 3mos w Blood work ;  -     Basic metabolic panel; Future  -     CBC and differential; Future  -     Hemoglobin A1C; Future  -     TSH, 3rd generation; Future  -     Lipid panel; Future  -     Hepatic function panel; Future  -     Urinalysis with reflex to microscopic    Essential hypertension; stable  Continue same  RTC in 3mos    Other orders  -     aspirin (ASPIRIN ADULT LOW DOSE) 81 mg EC tablet; Take 81 mg by mouth  -     PROCTOZONE-HC 2 5 % rectal cream        Subjective:      Patient ID: Manuel Silver is a 77 y o  female  77 Y O lady is here For AWV and Regular check up, she feels Deirdre Malady Her Anxiety is Improving slowly, No recent Blood work, Med list reviewed w  Pt in detail    The following portions of the patient's history were reviewed and updated as appropriate: allergies, current medications, past family history, past medical history, past social history, past surgical history and problem list     Review of Systems   Constitutional: Negative for chills, fatigue and fever  HENT: Negative for congestion, facial swelling, sore throat, trouble swallowing and voice change  Eyes: Negative for pain, discharge and visual disturbance  Respiratory: Negative for cough, shortness of breath and wheezing  Cardiovascular: Negative for chest pain, palpitations and leg swelling  Gastrointestinal: Negative for abdominal pain, blood in stool, constipation, diarrhea and nausea  Endocrine: Negative for polydipsia, polyphagia and polyuria     Genitourinary: Negative for difficulty urinating, hematuria and urgency  Musculoskeletal: Negative for arthralgias and myalgias  Skin: Negative for rash  Neurological: Negative for dizziness, tremors, weakness and headaches  Hematological: Negative for adenopathy  Does not bruise/bleed easily  Psychiatric/Behavioral: Negative for dysphoric mood, sleep disturbance and suicidal ideas  Objective:      /68 (BP Location: Left arm, Patient Position: Sitting, Cuff Size: Standard)   Pulse 72   Temp 98 2 °F (36 8 °C) (Tympanic)   Resp 13   Ht 5' 5" (1 651 m)   Wt 65 8 kg (145 lb)   SpO2 99%   BMI 24 13 kg/m²          Physical Exam   Constitutional: She is oriented to person, place, and time  She appears well-nourished  No distress  HENT:   Head: Normocephalic  Mouth/Throat: Oropharynx is clear and moist  No oropharyngeal exudate  Eyes: Pupils are equal, round, and reactive to light  Conjunctivae are normal  No scleral icterus  Neck: Neck supple  No thyromegaly present  Cardiovascular: Normal rate, regular rhythm and normal heart sounds  No murmur heard  Pulmonary/Chest: Effort normal and breath sounds normal  No respiratory distress  She has no wheezes  She has no rales  Abdominal: Soft  Bowel sounds are normal  She exhibits no distension  There is no tenderness  There is no rebound and no guarding  Musculoskeletal: She exhibits no edema or tenderness  Lymphadenopathy:     She has no cervical adenopathy  Neurological: She is alert and oriented to person, place, and time  Skin: No rash noted  No erythema  Psychiatric: She has a normal mood and affect         Assessment and Plan:    Problem List Items Addressed This Visit        Cardiovascular and Mediastinum    Essential hypertension       Other    Other hyperlipidemia    Relevant Medications    rosuvastatin (CRESTOR) 10 MG tablet    Other Relevant Orders    Basic metabolic panel    CBC and differential    Hemoglobin A1C    TSH, 3rd generation    Lipid panel    Hepatic function panel    Urinalysis with reflex to microscopic      Other Visit Diagnoses     Encounter for general adult medical examination with abnormal findings    -  Primary    Anxiety        Relevant Medications    busPIRone (BUSPAR) 15 mg tablet        Health Maintenance Due   Topic Date Due    Medicare Annual Wellness Visit (AWV)  1940         HPI:  Nazario Horan is a 77 y o  female here for her Subsequent Wellness Visit      Patient Active Problem List   Diagnosis    Gastroesophageal reflux disease without esophagitis    Essential hypertension    Other hyperlipidemia     Past Medical History:   Diagnosis Date    Anxiety     Disease of thyroid gland     Gastroesophageal reflux disease without esophagitis 8/28/2018    Hyperlipidemia     Hypertension     Hypothyroidism      Past Surgical History:   Procedure Laterality Date    CATARACT EXTRACTION      CHOLECYSTECTOMY      HYSTERECTOMY       Family History   Problem Relation Age of Onset    No Known Problems Mother     No Known Problems Father      Social History     Tobacco Use   Smoking Status Never Smoker   Smokeless Tobacco Never Used     Social History     Substance and Sexual Activity   Alcohol Use No      Social History     Substance and Sexual Activity   Drug Use No       Current Outpatient Medications   Medication Sig Dispense Refill    amLODIPine (NORVASC) 5 mg tablet Take 5 mg by mouth daily      aspirin (ASPIRIN ADULT LOW DOSE) 81 mg EC tablet Take 81 mg by mouth      azilsartan medoxomil (EDARBI) 40 MG tablet Take 40 mg by mouth daily      busPIRone (BUSPAR) 15 mg tablet Take 1 tablet (15 mg total) by mouth 2 (two) times a day with meals 180 tablet 3    busPIRone (BUSPAR) 7 5 mg tablet Take 7 5 mg by mouth 2 (two) times a day      diazepam (VALIUM) 2 mg tablet Take 1 tablet (2 mg total) by mouth daily at bedtime as needed for anxiety or sedation 30 tablet 1    L-Methylfolate 15 MG TABS Take 1 tablet (15 mg total) by mouth daily 90 tablet 3    loratadine (CLARITIN REDITABS) 10 MG dissolvable tablet Take 1 tablet (10 mg total) by mouth daily 30 tablet 1    pantoprazole (PROTONIX) 40 mg tablet Take 40 mg by mouth daily      PROCTOZONE-HC 2 5 % rectal cream   0    rosuvastatin (CRESTOR) 10 MG tablet Take 1 tablet (10 mg total) by mouth daily 90 tablet 3     No current facility-administered medications for this visit  No Known Allergies  Immunization History   Administered Date(s) Administered    INFLUENZA 09/21/2015, 11/01/2016, 10/24/2017, 10/12/2018    Influenza Split 09/03/2013, 09/02/2014    Influenza Split High Dose Preservative Free IM 11/01/2016, 10/24/2017    Influenza TIV (IM) 09/21/2015    Pneumococcal Conjugate 13-Valent 10/24/2017    Pneumococcal Polysaccharide PPV23 12/01/2008    Td (adult), adsorbed 01/01/2004    Tdap 12/19/2018       Patient Care Team:  Saulo Roblero MD as PCP - General (Internal Medicine)    Medicare Screening Tests and Risk Assessments:  Thor Short is here for her Subsequent Wellness visit  Last Medicare Wellness visit information reviewed, patient interviewed and updates made to the record today  Broken Bones/Falls:     Fall Risk Assessment:    In the past year, patient has experienced: visual disturbance    Preventative Screening/Counseling:      Cardiovascular:      General: Risks and Benefits Discussed          Diabetes:      General: Risks and Benefits Discussed          Colorectal Cancer:      General: Risks and Benefits Discussed          Breast Cancer:      General: Risks and Benefits Discussed          Cervical Cancer:      General: Risks and Benefits Discussed          Osteoporosis:      General: Risks and Benefits Discussed          AAA:      General: Risks and Benefits Discussed          Glaucoma:      General: Risks and Benefits Discussed          HIV:      General: Screening Not Indicated          Hepatitis C:      General: Screening Not Indicated        Advanced Directives:   Patient has no living will for healthcare, does not have durable POA for healthcare, patient does not have an advanced directive  Information on ACP and/or AD not provided  No 5 wishes given  No end of life assessment reviewed with patient  Provider does not agree with end of life deisions  Other Preventative Counseling (Non-Medicare):   Fall Prevention, Increase physical activity, Skin self-exam and Sunscreen use

## 2019-03-06 DIAGNOSIS — E78.49 OTHER HYPERLIPIDEMIA: ICD-10-CM

## 2019-03-06 DIAGNOSIS — E78.49 OTHER HYPERLIPIDEMIA: Primary | ICD-10-CM

## 2019-03-06 RX ORDER — ROSUVASTATIN CALCIUM 10 MG/1
10 TABLET, COATED ORAL DAILY
Qty: 90 TABLET | Refills: 3 | Status: SHIPPED | OUTPATIENT
Start: 2019-03-06 | End: 2019-03-06 | Stop reason: ALTCHOICE

## 2019-03-06 RX ORDER — ROSUVASTATIN CALCIUM 10 MG/1
10 TABLET, COATED ORAL DAILY
Qty: 90 TABLET | Refills: 3 | Status: SHIPPED | OUTPATIENT
Start: 2019-03-06 | End: 2019-05-13 | Stop reason: ALTCHOICE

## 2019-04-11 DIAGNOSIS — K21.9 GASTROESOPHAGEAL REFLUX DISEASE WITHOUT ESOPHAGITIS: Primary | ICD-10-CM

## 2019-04-12 ENCOUNTER — OFFICE VISIT (OUTPATIENT)
Dept: FAMILY MEDICINE CLINIC | Facility: CLINIC | Age: 79
End: 2019-04-12
Payer: MEDICARE

## 2019-04-12 VITALS
HEIGHT: 65 IN | RESPIRATION RATE: 12 BRPM | HEART RATE: 74 BPM | OXYGEN SATURATION: 97 % | DIASTOLIC BLOOD PRESSURE: 80 MMHG | SYSTOLIC BLOOD PRESSURE: 134 MMHG | WEIGHT: 143 LBS | TEMPERATURE: 97.5 F | BODY MASS INDEX: 23.82 KG/M2

## 2019-04-12 DIAGNOSIS — F41.9 ANXIETY: ICD-10-CM

## 2019-04-12 DIAGNOSIS — G47.09 OTHER INSOMNIA: Primary | ICD-10-CM

## 2019-04-12 PROCEDURE — 99213 OFFICE O/P EST LOW 20 MIN: CPT | Performed by: INTERNAL MEDICINE

## 2019-04-12 RX ORDER — PHENOL 1.4 %
1 AEROSOL, SPRAY (ML) MUCOUS MEMBRANE
Qty: 30 TABLET | Refills: 5 | Status: SHIPPED | OUTPATIENT
Start: 2019-04-12 | End: 2019-07-08 | Stop reason: SDUPTHER

## 2019-04-12 RX ORDER — PANTOPRAZOLE SODIUM 40 MG/1
TABLET, DELAYED RELEASE ORAL
Qty: 90 TABLET | Refills: 2 | Status: SHIPPED | OUTPATIENT
Start: 2019-04-12 | End: 2019-11-13 | Stop reason: SDUPTHER

## 2019-04-12 RX ORDER — DIAZEPAM 2 MG/1
2 TABLET ORAL
Qty: 30 TABLET | Refills: 1 | Status: SHIPPED | OUTPATIENT
Start: 2019-04-12 | End: 2019-06-18 | Stop reason: ALTCHOICE

## 2019-05-09 ENCOUNTER — TELEPHONE (OUTPATIENT)
Dept: FAMILY MEDICINE CLINIC | Facility: CLINIC | Age: 79
End: 2019-05-09

## 2019-05-10 ENCOUNTER — HOSPITAL ENCOUNTER (EMERGENCY)
Facility: HOSPITAL | Age: 79
Discharge: HOME | End: 2019-05-10
Attending: EMERGENCY MEDICINE
Payer: MEDICARE

## 2019-05-10 ENCOUNTER — TELEPHONE (OUTPATIENT)
Dept: FAMILY MEDICINE CLINIC | Facility: CLINIC | Age: 79
End: 2019-05-10

## 2019-05-10 VITALS
WEIGHT: 151.68 LBS | SYSTOLIC BLOOD PRESSURE: 166 MMHG | HEART RATE: 76 BPM | BODY MASS INDEX: 25.24 KG/M2 | TEMPERATURE: 97.7 F | OXYGEN SATURATION: 98 % | DIASTOLIC BLOOD PRESSURE: 76 MMHG | RESPIRATION RATE: 18 BRPM

## 2019-05-10 DIAGNOSIS — M79.604 CHRONIC PAIN OF BOTH LOWER EXTREMITIES: Primary | ICD-10-CM

## 2019-05-10 DIAGNOSIS — M79.605 CHRONIC PAIN OF BOTH LOWER EXTREMITIES: Primary | ICD-10-CM

## 2019-05-10 DIAGNOSIS — G89.29 CHRONIC PAIN OF BOTH LOWER EXTREMITIES: Primary | ICD-10-CM

## 2019-05-10 PROCEDURE — 99282 EMERGENCY DEPT VISIT SF MDM: CPT | Performed by: EMERGENCY MEDICINE

## 2019-05-10 PROCEDURE — 99283 EMERGENCY DEPT VISIT LOW MDM: CPT

## 2019-05-10 RX ORDER — IBUPROFEN 400 MG/1
400 TABLET ORAL ONCE
Status: COMPLETED | OUTPATIENT
Start: 2019-05-10 | End: 2019-05-10

## 2019-05-10 RX ADMIN — IBUPROFEN 400 MG: 400 TABLET ORAL at 23:46

## 2019-05-11 ENCOUNTER — APPOINTMENT (OUTPATIENT)
Dept: LAB | Age: 79
End: 2019-05-11
Payer: MEDICARE

## 2019-05-11 DIAGNOSIS — E78.49 OTHER HYPERLIPIDEMIA: ICD-10-CM

## 2019-05-11 LAB
ALBUMIN SERPL BCP-MCNC: 3.9 G/DL (ref 3.5–5)
ALP SERPL-CCNC: 56 U/L (ref 46–116)
ALT SERPL W P-5'-P-CCNC: 23 U/L (ref 12–78)
ANION GAP SERPL CALCULATED.3IONS-SCNC: 6 MMOL/L (ref 4–13)
AST SERPL W P-5'-P-CCNC: 16 U/L (ref 5–45)
BACTERIA UR QL AUTO: ABNORMAL /HPF
BASOPHILS # BLD AUTO: 0.04 THOUSANDS/ΜL (ref 0–0.1)
BASOPHILS NFR BLD AUTO: 1 % (ref 0–1)
BILIRUB DIRECT SERPL-MCNC: 0.16 MG/DL (ref 0–0.2)
BILIRUB SERPL-MCNC: 0.69 MG/DL (ref 0.2–1)
BILIRUB UR QL STRIP: NEGATIVE
BUN SERPL-MCNC: 14 MG/DL (ref 5–25)
CALCIUM SERPL-MCNC: 9 MG/DL (ref 8.3–10.1)
CHLORIDE SERPL-SCNC: 98 MMOL/L (ref 100–108)
CHOLEST SERPL-MCNC: 183 MG/DL (ref 50–200)
CLARITY UR: CLEAR
CO2 SERPL-SCNC: 27 MMOL/L (ref 21–32)
COLOR UR: YELLOW
CREAT SERPL-MCNC: 1.02 MG/DL (ref 0.6–1.3)
EOSINOPHIL # BLD AUTO: 0.07 THOUSAND/ΜL (ref 0–0.61)
EOSINOPHIL NFR BLD AUTO: 1 % (ref 0–6)
ERYTHROCYTE [DISTWIDTH] IN BLOOD BY AUTOMATED COUNT: 12.4 % (ref 11.6–15.1)
EST. AVERAGE GLUCOSE BLD GHB EST-MCNC: 108 MG/DL
GFR SERPL CREATININE-BSD FRML MDRD: 52 ML/MIN/1.73SQ M
GLUCOSE P FAST SERPL-MCNC: 71 MG/DL (ref 65–99)
GLUCOSE UR STRIP-MCNC: NEGATIVE MG/DL
HBA1C MFR BLD: 5.4 % (ref 4.2–6.3)
HCT VFR BLD AUTO: 38 % (ref 34.8–46.1)
HDLC SERPL-MCNC: 79 MG/DL (ref 40–60)
HGB BLD-MCNC: 12.8 G/DL (ref 11.5–15.4)
HGB UR QL STRIP.AUTO: ABNORMAL
HYALINE CASTS #/AREA URNS LPF: ABNORMAL /LPF
IMM GRANULOCYTES # BLD AUTO: 0.01 THOUSAND/UL (ref 0–0.2)
IMM GRANULOCYTES NFR BLD AUTO: 0 % (ref 0–2)
KETONES UR STRIP-MCNC: NEGATIVE MG/DL
LDLC SERPL CALC-MCNC: 91 MG/DL (ref 0–100)
LEUKOCYTE ESTERASE UR QL STRIP: ABNORMAL
LYMPHOCYTES # BLD AUTO: 1.51 THOUSANDS/ΜL (ref 0.6–4.47)
LYMPHOCYTES NFR BLD AUTO: 31 % (ref 14–44)
MCH RBC QN AUTO: 31.4 PG (ref 26.8–34.3)
MCHC RBC AUTO-ENTMCNC: 33.7 G/DL (ref 31.4–37.4)
MCV RBC AUTO: 93 FL (ref 82–98)
MONOCYTES # BLD AUTO: 0.44 THOUSAND/ΜL (ref 0.17–1.22)
MONOCYTES NFR BLD AUTO: 9 % (ref 4–12)
NEUTROPHILS # BLD AUTO: 2.79 THOUSANDS/ΜL (ref 1.85–7.62)
NEUTS SEG NFR BLD AUTO: 58 % (ref 43–75)
NITRITE UR QL STRIP: NEGATIVE
NON-SQ EPI CELLS URNS QL MICRO: ABNORMAL /HPF
NONHDLC SERPL-MCNC: 104 MG/DL
NRBC BLD AUTO-RTO: 0 /100 WBCS
PH UR STRIP.AUTO: 7 [PH]
PLATELET # BLD AUTO: 258 THOUSANDS/UL (ref 149–390)
PMV BLD AUTO: 11.7 FL (ref 8.9–12.7)
POTASSIUM SERPL-SCNC: 4.1 MMOL/L (ref 3.5–5.3)
PROT SERPL-MCNC: 7.6 G/DL (ref 6.4–8.2)
PROT UR STRIP-MCNC: NEGATIVE MG/DL
RBC # BLD AUTO: 4.07 MILLION/UL (ref 3.81–5.12)
RBC #/AREA URNS AUTO: ABNORMAL /HPF
SODIUM SERPL-SCNC: 131 MMOL/L (ref 136–145)
SP GR UR STRIP.AUTO: 1.01 (ref 1–1.03)
TRIGL SERPL-MCNC: 64 MG/DL
TSH SERPL DL<=0.05 MIU/L-ACNC: 3.3 UIU/ML (ref 0.36–3.74)
UROBILINOGEN UR QL STRIP.AUTO: 0.2 E.U./DL
WBC # BLD AUTO: 4.86 THOUSAND/UL (ref 4.31–10.16)
WBC #/AREA URNS AUTO: ABNORMAL /HPF

## 2019-05-11 PROCEDURE — 85025 COMPLETE CBC W/AUTO DIFF WBC: CPT

## 2019-05-11 PROCEDURE — 84443 ASSAY THYROID STIM HORMONE: CPT

## 2019-05-11 PROCEDURE — 83036 HEMOGLOBIN GLYCOSYLATED A1C: CPT

## 2019-05-11 PROCEDURE — 80061 LIPID PANEL: CPT

## 2019-05-11 PROCEDURE — 36415 COLL VENOUS BLD VENIPUNCTURE: CPT

## 2019-05-11 PROCEDURE — 80048 BASIC METABOLIC PNL TOTAL CA: CPT

## 2019-05-11 PROCEDURE — 80076 HEPATIC FUNCTION PANEL: CPT

## 2019-05-11 PROCEDURE — 81001 URINALYSIS AUTO W/SCOPE: CPT | Performed by: INTERNAL MEDICINE

## 2019-05-13 ENCOUNTER — OFFICE VISIT (OUTPATIENT)
Dept: FAMILY MEDICINE CLINIC | Facility: CLINIC | Age: 79
End: 2019-05-13
Payer: MEDICARE

## 2019-05-13 VITALS
WEIGHT: 149.9 LBS | HEART RATE: 78 BPM | SYSTOLIC BLOOD PRESSURE: 134 MMHG | TEMPERATURE: 98.3 F | OXYGEN SATURATION: 98 % | BODY MASS INDEX: 24.97 KG/M2 | HEIGHT: 65 IN | RESPIRATION RATE: 14 BRPM | DIASTOLIC BLOOD PRESSURE: 82 MMHG

## 2019-05-13 DIAGNOSIS — E78.49 OTHER HYPERLIPIDEMIA: ICD-10-CM

## 2019-05-13 DIAGNOSIS — N39.0 ACUTE UTI: Primary | ICD-10-CM

## 2019-05-13 DIAGNOSIS — M79.10 MYALGIA: ICD-10-CM

## 2019-05-13 DIAGNOSIS — Z12.39 BREAST CANCER SCREENING: ICD-10-CM

## 2019-05-13 DIAGNOSIS — F41.9 ANXIETY: ICD-10-CM

## 2019-05-13 PROCEDURE — 99214 OFFICE O/P EST MOD 30 MIN: CPT | Performed by: INTERNAL MEDICINE

## 2019-05-13 RX ORDER — CIPROFLOXACIN 250 MG/1
250 TABLET, FILM COATED ORAL EVERY 12 HOURS SCHEDULED
Qty: 14 TABLET | Refills: 0 | Status: SHIPPED | OUTPATIENT
Start: 2019-05-13 | End: 2019-05-20

## 2019-05-13 RX ORDER — ROSUVASTATIN CALCIUM 5 MG/1
5 TABLET, COATED ORAL DAILY
Qty: 90 TABLET | Refills: 1 | Status: SHIPPED | OUTPATIENT
Start: 2019-05-13 | End: 2019-06-18 | Stop reason: ALTCHOICE

## 2019-05-13 RX ORDER — ROSUVASTATIN CALCIUM 5 MG/1
5 TABLET, COATED ORAL DAILY
Qty: 30 TABLET | Refills: 5 | Status: SHIPPED | OUTPATIENT
Start: 2019-05-13 | End: 2019-05-13 | Stop reason: SDUPTHER

## 2019-06-13 ENCOUNTER — TRANSCRIBE ORDERS (OUTPATIENT)
Dept: ADMINISTRATIVE | Facility: HOSPITAL | Age: 79
End: 2019-06-13

## 2019-06-13 ENCOUNTER — APPOINTMENT (OUTPATIENT)
Dept: LAB | Age: 79
End: 2019-06-13
Payer: MEDICARE

## 2019-06-13 DIAGNOSIS — M79.10 MYALGIA: ICD-10-CM

## 2019-06-13 DIAGNOSIS — F45.8 ANXIETY HYPERVENTILATION: Primary | ICD-10-CM

## 2019-06-13 DIAGNOSIS — F45.8 ANXIETY HYPERVENTILATION: ICD-10-CM

## 2019-06-13 DIAGNOSIS — F41.9 ANXIETY HYPERVENTILATION: ICD-10-CM

## 2019-06-13 DIAGNOSIS — E78.49 OTHER HYPERLIPIDEMIA: ICD-10-CM

## 2019-06-13 DIAGNOSIS — F41.9 ANXIETY HYPERVENTILATION: Primary | ICD-10-CM

## 2019-06-13 LAB
CK SERPL-CCNC: 85 U/L (ref 26–192)
CRP SERPL QL: <3 MG/L
ERYTHROCYTE [SEDIMENTATION RATE] IN BLOOD: 18 MM/HOUR (ref 0–20)
TSH SERPL DL<=0.05 MIU/L-ACNC: 4.07 UIU/ML (ref 0.36–3.74)

## 2019-06-13 PROCEDURE — 84443 ASSAY THYROID STIM HORMONE: CPT

## 2019-06-13 PROCEDURE — 36415 COLL VENOUS BLD VENIPUNCTURE: CPT

## 2019-06-13 PROCEDURE — 86140 C-REACTIVE PROTEIN: CPT

## 2019-06-13 PROCEDURE — 86038 ANTINUCLEAR ANTIBODIES: CPT

## 2019-06-13 PROCEDURE — 85652 RBC SED RATE AUTOMATED: CPT

## 2019-06-13 PROCEDURE — 82550 ASSAY OF CK (CPK): CPT

## 2019-06-14 LAB — RYE IGE QN: NEGATIVE

## 2019-06-18 ENCOUNTER — OFFICE VISIT (OUTPATIENT)
Dept: FAMILY MEDICINE CLINIC | Facility: CLINIC | Age: 79
End: 2019-06-18
Payer: MEDICARE

## 2019-06-18 VITALS
OXYGEN SATURATION: 96 % | RESPIRATION RATE: 14 BRPM | TEMPERATURE: 97.5 F | BODY MASS INDEX: 24.32 KG/M2 | HEIGHT: 65 IN | DIASTOLIC BLOOD PRESSURE: 64 MMHG | SYSTOLIC BLOOD PRESSURE: 134 MMHG | HEART RATE: 72 BPM | WEIGHT: 146 LBS

## 2019-06-18 DIAGNOSIS — E78.49 OTHER HYPERLIPIDEMIA: ICD-10-CM

## 2019-06-18 DIAGNOSIS — I10 ESSENTIAL HYPERTENSION: Primary | ICD-10-CM

## 2019-06-18 DIAGNOSIS — F41.9 ANXIETY: ICD-10-CM

## 2019-06-18 PROCEDURE — 99214 OFFICE O/P EST MOD 30 MIN: CPT | Performed by: INTERNAL MEDICINE

## 2019-06-18 RX ORDER — DIAZEPAM 2 MG/1
2 TABLET ORAL
Qty: 30 TABLET | Refills: 1 | Status: CANCELLED | OUTPATIENT
Start: 2019-06-18

## 2019-06-18 RX ORDER — SERTRALINE HYDROCHLORIDE 25 MG/1
25 TABLET, FILM COATED ORAL DAILY
Qty: 30 TABLET | Refills: 5 | Status: SHIPPED | OUTPATIENT
Start: 2019-06-18 | End: 2019-07-08 | Stop reason: ALTCHOICE

## 2019-06-18 RX ORDER — SERTRALINE HYDROCHLORIDE 25 MG/1
25 TABLET, FILM COATED ORAL DAILY
Qty: 30 TABLET | Refills: 5 | Status: SHIPPED | OUTPATIENT
Start: 2019-06-18 | End: 2019-06-18 | Stop reason: SDUPTHER

## 2019-06-18 RX ORDER — AMLODIPINE BESYLATE 5 MG/1
5 TABLET ORAL DAILY
Qty: 90 TABLET | Refills: 3 | Status: SHIPPED | OUTPATIENT
Start: 2019-06-18 | End: 2019-09-25

## 2019-07-08 ENCOUNTER — OFFICE VISIT (OUTPATIENT)
Dept: FAMILY MEDICINE CLINIC | Facility: CLINIC | Age: 79
End: 2019-07-08
Payer: MEDICARE

## 2019-07-08 VITALS
DIASTOLIC BLOOD PRESSURE: 80 MMHG | RESPIRATION RATE: 14 BRPM | HEIGHT: 65 IN | TEMPERATURE: 97.8 F | BODY MASS INDEX: 24.83 KG/M2 | WEIGHT: 149 LBS | HEART RATE: 78 BPM | SYSTOLIC BLOOD PRESSURE: 134 MMHG

## 2019-07-08 DIAGNOSIS — F41.9 ANXIETY: ICD-10-CM

## 2019-07-08 DIAGNOSIS — B00.9 HERPES SIMPLEX: Primary | ICD-10-CM

## 2019-07-08 DIAGNOSIS — G47.09 OTHER INSOMNIA: ICD-10-CM

## 2019-07-08 DIAGNOSIS — B00.9 HERPES SIMPLEX: ICD-10-CM

## 2019-07-08 PROCEDURE — 99213 OFFICE O/P EST LOW 20 MIN: CPT | Performed by: INTERNAL MEDICINE

## 2019-07-08 RX ORDER — PHENOL 1.4 %
1 AEROSOL, SPRAY (ML) MUCOUS MEMBRANE
Qty: 30 TABLET | Refills: 5 | Status: SHIPPED | OUTPATIENT
Start: 2019-07-08 | End: 2020-07-07

## 2019-07-08 RX ORDER — LEVOMEFOLATE CALCIUM 15 MG
1 TABLET ORAL DAILY
Qty: 90 TABLET | Refills: 3 | Status: SHIPPED | OUTPATIENT
Start: 2019-07-08 | End: 2019-12-16 | Stop reason: SDUPTHER

## 2019-07-08 RX ORDER — BUSPIRONE HYDROCHLORIDE 15 MG/1
15 TABLET ORAL 2 TIMES DAILY WITH MEALS
Qty: 180 TABLET | Refills: 3 | Status: SHIPPED | OUTPATIENT
Start: 2019-07-08 | End: 2019-09-25

## 2019-07-08 RX ORDER — DIAZEPAM 2 MG/1
TABLET ORAL
Qty: 60 TABLET | Refills: 1 | Status: SHIPPED | OUTPATIENT
Start: 2019-07-08 | End: 2019-08-20 | Stop reason: SDUPTHER

## 2019-07-08 RX ORDER — VALACYCLOVIR HYDROCHLORIDE 1 G/1
1000 TABLET, FILM COATED ORAL 2 TIMES DAILY
Qty: 20 TABLET | Refills: 0 | Status: SHIPPED | OUTPATIENT
Start: 2019-07-08 | End: 2019-07-08 | Stop reason: SDUPTHER

## 2019-07-08 NOTE — PROGRESS NOTES
Assessment/Plan:         Diagnoses and all orders for this visit:    Herpes simplex; Try ;  -     valACYclovir (VALTREX) 1,000 mg tablet; Take 1 tablet (1,000 mg total) by mouth 2 (two) times a day for 10 days  -     nystatin (MYCOSTATIN) 500,000 units/5 mL suspension; Apply 2 mL (200,000 Units total) to the mouth or throat 4 (four) times a day for 10 days    Other insomnia; TRY :  -     Melatonin 10 MG TABS; Take 1 tablet (10 mg total) by mouth daily at bedtime  -     diazepam (VALIUM) 2 mg tablet; Take one tab at 5 or 6 AM in the Morning and take one Tab at 1 or 2 PM, Daily    Anxiety  -     L-Methylfolate 15 MG TABS; Take 1 tablet (15 mg total) by mouth daily  -     busPIRone (BUSPAR) 15 mg tablet; Take 1 tablet (15 mg total) by mouth 2 (two) times a day with meals  -     diazepam (VALIUM) 2 mg tablet; Take one tab at 5 or 6 AM in the Morning and take one Tab at 1 or 2 PM, Daily    RTC in 1mo    Subjective:      Patient ID: Suzy Bryant is a 77 y o  female  77 Y O lady is here for increasing Anxiety, No Homicidal or Suicidal ideas, and oral lesion ,  The following portions of the patient's history were reviewed and updated as appropriate: allergies, current medications, past family history, past medical history, past social history, past surgical history and problem list     Review of Systems   Constitutional: Negative for chills, fatigue and fever  HENT: Negative for congestion, facial swelling, sore throat, trouble swallowing and voice change  Eyes: Negative for pain, discharge and visual disturbance  Respiratory: Negative for cough, shortness of breath and wheezing  Cardiovascular: Negative for chest pain, palpitations and leg swelling  Gastrointestinal: Negative for abdominal pain, blood in stool, constipation, diarrhea and nausea  Endocrine: Negative for polydipsia, polyphagia and polyuria  Genitourinary: Negative for difficulty urinating, hematuria and urgency  Musculoskeletal: Negative for arthralgias and myalgias  Skin: Negative for rash  Neurological: Negative for dizziness, tremors, weakness and headaches  Hematological: Negative for adenopathy  Does not bruise/bleed easily  Psychiatric/Behavioral: Negative for dysphoric mood, sleep disturbance and suicidal ideas  The patient is nervous/anxious  Objective:      /80 (BP Location: Left arm, Patient Position: Sitting, Cuff Size: Standard)   Pulse 78   Temp 97 8 °F (36 6 °C) (Tympanic)   Resp 14   Ht 5' 5" (1 651 m)   Wt 67 6 kg (149 lb)   BMI 24 79 kg/m²          Physical Exam   Constitutional: She is oriented to person, place, and time  She appears well-nourished  No distress  HENT:   Head: Normocephalic  Mouth/Throat: No oropharyngeal exudate  H simplex inside oral acvity left side      Eyes: Pupils are equal, round, and reactive to light  Conjunctivae are normal  No scleral icterus  Neck: Neck supple  No thyromegaly present  Cardiovascular: Normal rate, regular rhythm and normal heart sounds  No murmur heard  Pulmonary/Chest: Effort normal and breath sounds normal  No respiratory distress  She has no wheezes  She has no rales  Abdominal: Soft  Bowel sounds are normal  She exhibits no distension  There is no tenderness  There is no rebound and no guarding  Musculoskeletal: She exhibits no edema or tenderness  Lymphadenopathy:     She has no cervical adenopathy  Neurological: She is alert and oriented to person, place, and time  No cranial nerve deficit  Coordination normal    Psychiatric: She has a normal mood and affect

## 2019-07-09 RX ORDER — VALACYCLOVIR HYDROCHLORIDE 1 G/1
1000 TABLET, FILM COATED ORAL 2 TIMES DAILY
Qty: 20 TABLET | Refills: 0 | Status: SHIPPED | OUTPATIENT
Start: 2019-07-09 | End: 2019-08-06

## 2019-07-29 ENCOUNTER — TELEPHONE (OUTPATIENT)
Dept: FAMILY MEDICINE CLINIC | Facility: CLINIC | Age: 79
End: 2019-07-29

## 2019-07-29 ENCOUNTER — OFFICE VISIT (OUTPATIENT)
Dept: FAMILY MEDICINE CLINIC | Facility: CLINIC | Age: 79
End: 2019-07-29
Payer: MEDICARE

## 2019-07-29 VITALS
BODY MASS INDEX: 22.99 KG/M2 | HEIGHT: 65 IN | OXYGEN SATURATION: 97 % | DIASTOLIC BLOOD PRESSURE: 78 MMHG | TEMPERATURE: 97.5 F | HEART RATE: 76 BPM | WEIGHT: 138 LBS | RESPIRATION RATE: 14 BRPM | SYSTOLIC BLOOD PRESSURE: 128 MMHG

## 2019-07-29 DIAGNOSIS — E86.0 DEHYDRATION: ICD-10-CM

## 2019-07-29 DIAGNOSIS — K52.9 ACUTE GASTROENTERITIS: Primary | ICD-10-CM

## 2019-07-29 PROCEDURE — 99213 OFFICE O/P EST LOW 20 MIN: CPT | Performed by: INTERNAL MEDICINE

## 2019-07-29 RX ORDER — CIPROFLOXACIN 500 MG/1
500 TABLET, FILM COATED ORAL EVERY 12 HOURS SCHEDULED
Qty: 10 TABLET | Refills: 0 | Status: SHIPPED | OUTPATIENT
Start: 2019-07-29 | End: 2019-08-03

## 2019-07-29 NOTE — PROGRESS NOTES
Assessment/Plan:         Diagnoses and all orders for this visit:    Acute gastroenteritis ; Bed rest, increasing po fluids, Start ;  -     ciprofloxacin (CIPRO) 500 mg tablet; Take 1 tablet (500 mg total) by mouth every 12 (twelve) hours for 5 days With food  RTc in 3-4 days  Dehydration; as above  Use Immodium PRN        Subjective:      Patient ID: Katie Rosen is a 77 y o  female  77 Y O lady is here for Increasing abd pain generalized and Diarrhea 4-5 times daily since 3 days ago, No other issues,    The following portions of the patient's history were reviewed and updated as appropriate: allergies, current medications, past family history, past medical history, past social history, past surgical history and problem list     Review of Systems   Constitutional: Positive for fatigue  Negative for chills and fever  HENT: Negative for congestion, facial swelling, sore throat, trouble swallowing and voice change  Eyes: Negative for pain, discharge and visual disturbance  Respiratory: Negative for cough, shortness of breath and wheezing  Cardiovascular: Negative for chest pain, palpitations and leg swelling  Gastrointestinal: Positive for abdominal pain and diarrhea  Negative for blood in stool, constipation and nausea  Endocrine: Negative for polydipsia, polyphagia and polyuria  Genitourinary: Negative for difficulty urinating, hematuria and urgency  Musculoskeletal: Negative for arthralgias and myalgias  Skin: Negative for rash  Neurological: Negative for dizziness, tremors, weakness and headaches  Hematological: Negative for adenopathy  Does not bruise/bleed easily  Psychiatric/Behavioral: Negative for dysphoric mood, sleep disturbance and suicidal ideas           Objective:      /78 (BP Location: Left arm, Patient Position: Sitting, Cuff Size: Standard)   Pulse 76   Temp 97 5 °F (36 4 °C) (Tympanic)   Resp 14   Ht 5' 5" (1 651 m)   Wt 62 6 kg (138 lb)   SpO2 97% BMI 22 96 kg/m²          Physical Exam   Constitutional: She is oriented to person, place, and time  She appears well-nourished  No distress  HENT:   Head: Normocephalic  Mouth/Throat: Oropharynx is clear and moist  No oropharyngeal exudate  Eyes: Pupils are equal, round, and reactive to light  Conjunctivae are normal  No scleral icterus  Neck: Neck supple  No thyromegaly present  Cardiovascular: Normal rate, regular rhythm and normal heart sounds  No murmur heard  Pulmonary/Chest: Effort normal and breath sounds normal  No respiratory distress  She has no wheezes  She has no rales  Abdominal: Soft  Bowel sounds are normal  She exhibits no distension  There is tenderness  There is no rebound and no guarding  Musculoskeletal: She exhibits no edema or tenderness  Lymphadenopathy:     She has no cervical adenopathy  Neurological: She is alert and oriented to person, place, and time  Skin: No erythema  Psychiatric: She has a normal mood and affect

## 2019-08-05 ENCOUNTER — TELEPHONE (OUTPATIENT)
Dept: FAMILY MEDICINE CLINIC | Facility: CLINIC | Age: 79
End: 2019-08-05

## 2019-08-06 ENCOUNTER — OFFICE VISIT (OUTPATIENT)
Dept: FAMILY MEDICINE CLINIC | Facility: CLINIC | Age: 79
End: 2019-08-06
Payer: MEDICARE

## 2019-08-06 VITALS
RESPIRATION RATE: 14 BRPM | HEIGHT: 65 IN | BODY MASS INDEX: 22.42 KG/M2 | WEIGHT: 134.6 LBS | HEART RATE: 78 BPM | OXYGEN SATURATION: 97 % | SYSTOLIC BLOOD PRESSURE: 128 MMHG | DIASTOLIC BLOOD PRESSURE: 78 MMHG | TEMPERATURE: 98.2 F

## 2019-08-06 DIAGNOSIS — R63.4 WEIGHT LOSS: ICD-10-CM

## 2019-08-06 DIAGNOSIS — R63.0 LOSS OF APPETITE: ICD-10-CM

## 2019-08-06 DIAGNOSIS — R10.84 GENERALIZED ABDOMINAL PAIN: Primary | ICD-10-CM

## 2019-08-06 DIAGNOSIS — R19.7 DIARRHEA, UNSPECIFIED TYPE: ICD-10-CM

## 2019-08-06 PROCEDURE — 99214 OFFICE O/P EST MOD 30 MIN: CPT | Performed by: INTERNAL MEDICINE

## 2019-08-06 NOTE — PROGRESS NOTES
Assessment/Plan:         Diagnoses and all orders for this visit:    Generalized abdominal pain ; Cause ? ? : RTC in 2-3 weeks w :  -     Occult Blood, Fecal Immunochemical; Future  -     Stool Enteric Bacterial Panel by PCR; Future  -     Giardia antigen; Future  -     White Blood Cells, Stool by Gram Stain; Future  -     IgE; Future  -     Gluten IgE; Future  -     IgA; Future  -     Celiac Disease Panel; Future  -     Sedimentation rate, automated; Future  -     C-reactive protein; Future  -     Food Allergy Profile; Future  -     [facility] Panel, Adult; Future  -     CT abdomen pelvis w wo contrast; Future  -     Amylase; Future  -     Comprehensive metabolic panel; Future  -     Lipase; Future  TRY :  -     rifaximin (XIFAXAN) 550 mg tablet; Take 1 tablet (550 mg total) by mouth every 12 (twelve) hours for 10 days With food  Continue Probiotics     Diarrhea, unspecified type; as above,;  -     Occult Blood, Fecal Immunochemical; Future  -     Stool Enteric Bacterial Panel by PCR; Future  -     Giardia antigen; Future  -     White Blood Cells, Stool by Gram Stain; Future  -     IgE; Future  -     Gluten IgE; Future  -     IgA; Future  -     Celiac Disease Panel; Future  -     Sedimentation rate, automated; Future  -     C-reactive protein; Future  -     Food Allergy Profile; Future  -     [facility] Panel, Adult;   -     Amylase; Future  -     Comprehensive metabolic panel; Future  -     Lipase; Future  -     rifaximin (XIFAXAN) 550 mg tablet; Take 1 tablet (550 mg total) by mouth every 12 (twelve) hours for 10 days With food    Weight loss; R/O Malignancy, rtc in 3 weeks w ;  -     Gluten IgE; Future  -     IgA; Future  -     Celiac Disease Panel; Future  -     Sedimentation rate, automated; Future  -     C-reactive protein; Future  -     Food Allergy Profile; Future  -     [facility] Panel, Adult; Future  -     CT abdomen pelvis w wo contrast; Future  -     Amylase;  Future  - Comprehensive metabolic panel; Future  -     Lipase; Future    Loss of appetite; as above,   -     CT abdomen pelvis w wo contrast; Future        Subjective:      Patient ID: Gloria Mathias is a 77 y o  female  77 Y O lady is here for Re Check on her Diarrhea, which is Not Improving, and now has other symptoms, as per R O S ,         The following portions of the patient's history were reviewed and updated as appropriate: allergies, current medications, past family history, past medical history, past social history, past surgical history and problem list     Review of Systems   Constitutional: Positive for appetite change, fatigue and unexpected weight change  Negative for chills and fever  HENT: Negative for congestion, facial swelling, sore throat, trouble swallowing and voice change  Eyes: Negative for pain, discharge and visual disturbance  Respiratory: Negative for cough, shortness of breath and wheezing  Cardiovascular: Negative for chest pain, palpitations and leg swelling  Gastrointestinal: Positive for abdominal pain and diarrhea  Negative for blood in stool, constipation and nausea  Endocrine: Negative for polydipsia, polyphagia and polyuria  Genitourinary: Negative for difficulty urinating, hematuria and urgency  Musculoskeletal: Negative for arthralgias and myalgias  Skin: Negative for rash  Neurological: Negative for dizziness, tremors, weakness and headaches  Hematological: Negative for adenopathy  Does not bruise/bleed easily  Psychiatric/Behavioral: Negative for dysphoric mood, sleep disturbance and suicidal ideas  Objective:      /78 (BP Location: Right arm, Patient Position: Standing, Cuff Size: Standard)   Pulse 78   Temp 98 2 °F (36 8 °C) (Oral)   Resp 14   Ht 5' 5" (1 651 m)   Wt 61 1 kg (134 lb 9 6 oz)   SpO2 97%   BMI 22 40 kg/m²          Physical Exam   Constitutional: She is oriented to person, place, and time  She appears well-nourished  No distress  HENT:   Head: Normocephalic  Mouth/Throat: Oropharynx is clear and moist  No oropharyngeal exudate  Eyes: Pupils are equal, round, and reactive to light  Conjunctivae are normal  No scleral icterus  Neck: Neck supple  No thyromegaly present  Cardiovascular: Normal rate, regular rhythm and normal heart sounds  No murmur heard  Pulmonary/Chest: Effort normal and breath sounds normal  No respiratory distress  She has no wheezes  She has no rales  Abdominal: Soft  Bowel sounds are normal  She exhibits no distension  There is tenderness  There is no rebound and no guarding  Musculoskeletal: She exhibits no edema or tenderness  Lymphadenopathy:     She has no cervical adenopathy  Neurological: She is alert and oriented to person, place, and time  No cranial nerve deficit  Coordination normal    Skin: No erythema  Psychiatric: She has a normal mood and affect

## 2019-08-07 ENCOUNTER — APPOINTMENT (OUTPATIENT)
Dept: LAB | Age: 79
End: 2019-08-07
Payer: MEDICARE

## 2019-08-07 ENCOUNTER — TELEPHONE (OUTPATIENT)
Dept: FAMILY MEDICINE CLINIC | Facility: CLINIC | Age: 79
End: 2019-08-07

## 2019-08-07 DIAGNOSIS — R10.84 GENERALIZED ABDOMINAL PAIN: ICD-10-CM

## 2019-08-07 DIAGNOSIS — R63.4 WEIGHT LOSS: ICD-10-CM

## 2019-08-07 DIAGNOSIS — E87.6 HYPOKALEMIA: Primary | ICD-10-CM

## 2019-08-07 DIAGNOSIS — R19.7 DIARRHEA, UNSPECIFIED TYPE: ICD-10-CM

## 2019-08-07 DIAGNOSIS — E87.6 HYPOKALEMIA: ICD-10-CM

## 2019-08-07 LAB
ALBUMIN SERPL BCP-MCNC: 3.7 G/DL (ref 3.5–5)
ALP SERPL-CCNC: 48 U/L (ref 46–116)
ALT SERPL W P-5'-P-CCNC: 18 U/L (ref 12–78)
AMYLASE SERPL-CCNC: 39 IU/L (ref 25–115)
ANION GAP SERPL CALCULATED.3IONS-SCNC: 4 MMOL/L (ref 4–13)
AST SERPL W P-5'-P-CCNC: 13 U/L (ref 5–45)
BILIRUB SERPL-MCNC: 0.68 MG/DL (ref 0.2–1)
BUN SERPL-MCNC: 10 MG/DL (ref 5–25)
CALCIUM SERPL-MCNC: 9.2 MG/DL (ref 8.3–10.1)
CHLORIDE SERPL-SCNC: 103 MMOL/L (ref 100–108)
CO2 SERPL-SCNC: 29 MMOL/L (ref 21–32)
CREAT SERPL-MCNC: 1.06 MG/DL (ref 0.6–1.3)
CRP SERPL QL: <3 MG/L
ERYTHROCYTE [SEDIMENTATION RATE] IN BLOOD: 17 MM/HOUR (ref 0–20)
GFR SERPL CREATININE-BSD FRML MDRD: 50 ML/MIN/1.73SQ M
GLUCOSE P FAST SERPL-MCNC: 80 MG/DL (ref 65–99)
IGA SERPL-MCNC: 174 MG/DL (ref 70–400)
LIPASE SERPL-CCNC: 66 U/L (ref 73–393)
POTASSIUM SERPL-SCNC: 3.3 MMOL/L (ref 3.5–5.3)
PROT SERPL-MCNC: 7.1 G/DL (ref 6.4–8.2)
SODIUM SERPL-SCNC: 136 MMOL/L (ref 136–145)

## 2019-08-07 PROCEDURE — 85652 RBC SED RATE AUTOMATED: CPT

## 2019-08-07 PROCEDURE — 82785 ASSAY OF IGE: CPT

## 2019-08-07 PROCEDURE — 36415 COLL VENOUS BLD VENIPUNCTURE: CPT

## 2019-08-07 PROCEDURE — 82150 ASSAY OF AMYLASE: CPT

## 2019-08-07 PROCEDURE — 86140 C-REACTIVE PROTEIN: CPT

## 2019-08-07 PROCEDURE — 86255 FLUORESCENT ANTIBODY SCREEN: CPT

## 2019-08-07 PROCEDURE — 82784 ASSAY IGA/IGD/IGG/IGM EACH: CPT

## 2019-08-07 PROCEDURE — 80053 COMPREHEN METABOLIC PANEL: CPT

## 2019-08-07 PROCEDURE — 86003 ALLG SPEC IGE CRUDE XTRC EA: CPT

## 2019-08-07 PROCEDURE — 83690 ASSAY OF LIPASE: CPT

## 2019-08-07 PROCEDURE — 83516 IMMUNOASSAY NONANTIBODY: CPT

## 2019-08-07 RX ORDER — POTASSIUM CHLORIDE 750 MG/1
10 CAPSULE, EXTENDED RELEASE ORAL 2 TIMES DAILY
Qty: 20 CAPSULE | Refills: 0 | Status: SHIPPED | OUTPATIENT
Start: 2019-08-07 | End: 2019-09-25

## 2019-08-07 RX ORDER — POTASSIUM CHLORIDE 750 MG/1
10 CAPSULE, EXTENDED RELEASE ORAL 2 TIMES DAILY
Qty: 20 CAPSULE | Refills: 0 | Status: SHIPPED | OUTPATIENT
Start: 2019-08-07 | End: 2019-08-07 | Stop reason: SDUPTHER

## 2019-08-08 ENCOUNTER — APPOINTMENT (OUTPATIENT)
Dept: LAB | Age: 79
End: 2019-08-08
Payer: MEDICARE

## 2019-08-08 DIAGNOSIS — R19.7 DIARRHEA, UNSPECIFIED TYPE: ICD-10-CM

## 2019-08-08 DIAGNOSIS — R10.84 GENERALIZED ABDOMINAL PAIN: ICD-10-CM

## 2019-08-08 LAB
A ALTERNATA IGE QN: <0.1 KUA/I
A FUMIGATUS IGE QN: <0.1 KUA/I
ALLERGEN COMMENT: ABNORMAL
ALLERGEN COMMENT: NORMAL
ALMOND IGE QN: <0.1 KUA/I
BERMUDA GRASS IGE QN: <0.1 KUA/I
BOXELDER IGE QN: <0.1 KUA/I
C HERBARUM IGE QN: <0.1 KUA/I
CAMPYLOBACTER DNA SPEC NAA+PROBE: NORMAL
CASHEW NUT IGE QN: <0.1 KUA/I
CAT DANDER IGE QN: <0.1 KUA/I
CMN PIGWEED IGE QN: <0.1 KUA/I
CODFISH IGE QN: <0.1 KUA/I
COMMON RAGWEED IGE QN: <0.1 KUA/I
COTTONWOOD IGE QN: <0.1 KUA/I
D FARINAE IGE QN: 0.6 KUA/I
D PTERONYSS IGE QN: 0.16 KUA/I
DOG DANDER IGE QN: <0.1 KUA/I
EGG WHITE IGE QN: <0.1 KUA/I
ENDOMYSIUM IGA SER QL: NEGATIVE
GLIADIN PEPTIDE IGA SER-ACNC: 3 UNITS (ref 0–19)
GLIADIN PEPTIDE IGG SER-ACNC: 1 UNITS (ref 0–19)
GLUTEN IGE QN: <0.1 KUA/I
HAZELNUT IGE QN: <0.1 KUA/L
HEMOCCULT STL QL IA: POSITIVE
IGA SERPL-MCNC: 178 MG/DL (ref 64–422)
LONDON PLANE IGE QN: <0.1 KUA/I
MILK IGE QN: <0.1 KUA/I
MOUSE URINE PROT IGE QN: <0.1 KUA/I
MT JUNIPER IGE QN: <0.1 KUA/I
MUGWORT IGE QN: <0.1 KUA/I
P NOTATUM IGE QN: <0.1 KUA/I
PEANUT IGE QN: <0.1 KUA/I
ROACH IGE QN: <0.1 KUA/I
SALMON IGE QN: <0.1 KUA/I
SALMONELLA DNA SPEC QL NAA+PROBE: NORMAL
SCALLOP IGE QN: <0.1 KUA/L
SESAME SEED IGE QN: <0.1 KUA/I
SHEEP SORREL IGE QN: <0.1 KUA/I
SHIGA TOXIN STX GENE SPEC NAA+PROBE: NORMAL
SHIGELLA DNA SPEC QL NAA+PROBE: NORMAL
SHRIMP IGE QN: <0.1 KUA/L
SILVER BIRCH IGE QN: <0.1 KUA/I
SOYBEAN IGE QN: <0.1 KUA/I
TIMOTHY IGE QN: <0.1 KUA/I
TOTAL IGE SMQN RAST: 29.1 KU/L (ref 0–113)
TOTAL IGE SMQN RAST: 30.1 KU/L (ref 0–113)
TTG IGA SER-ACNC: <2 U/ML (ref 0–3)
TTG IGG SER-ACNC: <2 U/ML (ref 0–5)
TUNA IGE QN: <0.1 KUA/I
WALNUT IGE QN: <0.1 KUA/I
WALNUT IGE QN: <0.1 KUA/I
WBC STL QL MICRO: NORMAL
WHEAT IGE QN: <0.1 KUA/I
WHITE ASH IGE QN: <0.1 KUA/I
WHITE ELM IGE QN: <0.1 KUA/I
WHITE MULBERRY IGE QN: <0.1 KUA/I
WHITE OAK IGE QN: <0.1 KUA/I

## 2019-08-08 PROCEDURE — 87505 NFCT AGENT DETECTION GI: CPT

## 2019-08-08 PROCEDURE — 87329 GIARDIA AG IA: CPT

## 2019-08-08 PROCEDURE — 87205 SMEAR GRAM STAIN: CPT

## 2019-08-08 PROCEDURE — G0328 FECAL BLOOD SCRN IMMUNOASSAY: HCPCS

## 2019-08-09 ENCOUNTER — TELEPHONE (OUTPATIENT)
Dept: FAMILY MEDICINE CLINIC | Facility: CLINIC | Age: 79
End: 2019-08-09

## 2019-08-09 ENCOUNTER — HOSPITAL ENCOUNTER (OUTPATIENT)
Dept: CT IMAGING | Facility: HOSPITAL | Age: 79
Discharge: HOME | End: 2019-08-09
Payer: MEDICARE

## 2019-08-09 DIAGNOSIS — R19.7 DIARRHEA, UNSPECIFIED TYPE: Primary | ICD-10-CM

## 2019-08-09 DIAGNOSIS — R10.84 GENERALIZED ABDOMINAL PAIN: ICD-10-CM

## 2019-08-09 DIAGNOSIS — R63.0 LOSS OF APPETITE: ICD-10-CM

## 2019-08-09 DIAGNOSIS — R63.4 WEIGHT LOSS: ICD-10-CM

## 2019-08-09 PROCEDURE — 74177 CT ABD & PELVIS W/CONTRAST: CPT

## 2019-08-09 RX ADMIN — IOHEXOL 100 ML: 350 INJECTION, SOLUTION INTRAVENOUS at 10:41

## 2019-08-10 LAB — G LAMBLIA AG STL QL IA: NEGATIVE

## 2019-08-13 ENCOUNTER — TELEPHONE (OUTPATIENT)
Dept: FAMILY MEDICINE CLINIC | Facility: CLINIC | Age: 79
End: 2019-08-13

## 2019-08-13 DIAGNOSIS — K57.92 ACUTE DIVERTICULITIS: ICD-10-CM

## 2019-08-13 DIAGNOSIS — K57.92 ACUTE DIVERTICULITIS: Primary | ICD-10-CM

## 2019-08-13 DIAGNOSIS — R10.84 GENERALIZED ABDOMINAL PAIN: ICD-10-CM

## 2019-08-13 DIAGNOSIS — R19.7 DIARRHEA, UNSPECIFIED TYPE: ICD-10-CM

## 2019-08-13 RX ORDER — CIPROFLOXACIN 500 MG/1
500 TABLET, FILM COATED ORAL EVERY 12 HOURS SCHEDULED
Qty: 14 TABLET | Refills: 0 | Status: SHIPPED | OUTPATIENT
Start: 2019-08-13 | End: 2019-08-13 | Stop reason: SDUPTHER

## 2019-08-13 RX ORDER — CIPROFLOXACIN 500 MG/1
500 TABLET, FILM COATED ORAL EVERY 12 HOURS SCHEDULED
Qty: 14 TABLET | Refills: 0 | Status: SHIPPED | OUTPATIENT
Start: 2019-08-13 | End: 2019-08-20

## 2019-08-13 RX ORDER — METRONIDAZOLE 250 MG/1
250 TABLET ORAL EVERY 8 HOURS SCHEDULED
Qty: 21 TABLET | Refills: 0 | Status: SHIPPED | OUTPATIENT
Start: 2019-08-13 | End: 2019-08-13 | Stop reason: SDUPTHER

## 2019-08-13 RX ORDER — METRONIDAZOLE 250 MG/1
250 TABLET ORAL EVERY 8 HOURS SCHEDULED
Qty: 21 TABLET | Refills: 0 | Status: SHIPPED | OUTPATIENT
Start: 2019-08-13 | End: 2019-08-20

## 2019-08-14 ENCOUNTER — APPOINTMENT (OUTPATIENT)
Dept: LAB | Age: 79
End: 2019-08-14
Payer: MEDICARE

## 2019-08-14 DIAGNOSIS — F41.9 ANXIETY: ICD-10-CM

## 2019-08-14 DIAGNOSIS — E78.49 OTHER HYPERLIPIDEMIA: ICD-10-CM

## 2019-08-14 LAB
ALBUMIN SERPL BCP-MCNC: 3.9 G/DL (ref 3.5–5)
ALP SERPL-CCNC: 49 U/L (ref 46–116)
ALT SERPL W P-5'-P-CCNC: 21 U/L (ref 12–78)
AST SERPL W P-5'-P-CCNC: 12 U/L (ref 5–45)
BILIRUB DIRECT SERPL-MCNC: 0.15 MG/DL (ref 0–0.2)
BILIRUB SERPL-MCNC: 0.63 MG/DL (ref 0.2–1)
CHOLEST SERPL-MCNC: 290 MG/DL (ref 50–200)
HDLC SERPL-MCNC: 69 MG/DL (ref 40–60)
LDLC SERPL CALC-MCNC: 194 MG/DL (ref 0–100)
NONHDLC SERPL-MCNC: 221 MG/DL
PROT SERPL-MCNC: 7.6 G/DL (ref 6.4–8.2)
T4 FREE SERPL-MCNC: 1.13 NG/DL (ref 0.76–1.46)
TRIGL SERPL-MCNC: 135 MG/DL
TSH SERPL DL<=0.05 MIU/L-ACNC: 4.66 UIU/ML (ref 0.36–3.74)

## 2019-08-14 PROCEDURE — 86800 THYROGLOBULIN ANTIBODY: CPT

## 2019-08-14 PROCEDURE — 86376 MICROSOMAL ANTIBODY EACH: CPT

## 2019-08-14 PROCEDURE — 84439 ASSAY OF FREE THYROXINE: CPT

## 2019-08-14 PROCEDURE — 80076 HEPATIC FUNCTION PANEL: CPT

## 2019-08-14 PROCEDURE — 36415 COLL VENOUS BLD VENIPUNCTURE: CPT

## 2019-08-14 PROCEDURE — 84443 ASSAY THYROID STIM HORMONE: CPT

## 2019-08-14 PROCEDURE — 80061 LIPID PANEL: CPT

## 2019-08-15 LAB
THYROGLOB AB SERPL-ACNC: 46.6 IU/ML (ref 0–0.9)
THYROPEROXIDASE AB SERPL-ACNC: 14 IU/ML (ref 0–34)

## 2019-08-20 ENCOUNTER — OFFICE VISIT (OUTPATIENT)
Dept: FAMILY MEDICINE CLINIC | Facility: CLINIC | Age: 79
End: 2019-08-20
Payer: MEDICARE

## 2019-08-20 VITALS
HEART RATE: 78 BPM | BODY MASS INDEX: 21.81 KG/M2 | SYSTOLIC BLOOD PRESSURE: 128 MMHG | DIASTOLIC BLOOD PRESSURE: 76 MMHG | TEMPERATURE: 98.2 F | WEIGHT: 130.9 LBS | OXYGEN SATURATION: 98 % | HEIGHT: 65 IN | RESPIRATION RATE: 14 BRPM

## 2019-08-20 DIAGNOSIS — F41.9 ANXIETY: ICD-10-CM

## 2019-08-20 DIAGNOSIS — R63.4 WEIGHT LOSS: ICD-10-CM

## 2019-08-20 DIAGNOSIS — K57.92 ACUTE DIVERTICULITIS: Primary | ICD-10-CM

## 2019-08-20 DIAGNOSIS — G47.09 OTHER INSOMNIA: ICD-10-CM

## 2019-08-20 PROCEDURE — 99214 OFFICE O/P EST MOD 30 MIN: CPT | Performed by: INTERNAL MEDICINE

## 2019-08-20 RX ORDER — DIAZEPAM 2 MG/1
TABLET ORAL
Qty: 60 TABLET | Refills: 2 | Status: SHIPPED | OUTPATIENT
Start: 2019-08-20 | End: 2019-09-25

## 2019-08-20 RX ORDER — SODIUM, POTASSIUM,MAG SULFATES 17.5-3.13G
SOLUTION, RECONSTITUTED, ORAL ORAL
Refills: 0 | COMMUNITY
Start: 2019-08-19 | End: 2019-11-13

## 2019-08-20 RX ORDER — LACTOBACIL 2/BIFIDO 1/S.THERMO 450B CELL
1 PACKET (EA) ORAL 2 TIMES DAILY
Qty: 60 CAPSULE | Refills: 5 | Status: SHIPPED | OUTPATIENT
Start: 2019-08-20 | End: 2020-09-09

## 2019-08-21 ENCOUNTER — APPOINTMENT (EMERGENCY)
Dept: CT IMAGING | Facility: HOSPITAL | Age: 79
End: 2019-08-21
Payer: MEDICARE

## 2019-08-21 ENCOUNTER — HOSPITAL ENCOUNTER (EMERGENCY)
Facility: HOSPITAL | Age: 79
Discharge: HOME | End: 2019-08-21
Attending: EMERGENCY MEDICINE | Admitting: EMERGENCY MEDICINE
Payer: MEDICARE

## 2019-08-21 VITALS
SYSTOLIC BLOOD PRESSURE: 126 MMHG | DIASTOLIC BLOOD PRESSURE: 58 MMHG | RESPIRATION RATE: 18 BRPM | BODY MASS INDEX: 21.76 KG/M2 | OXYGEN SATURATION: 100 % | HEART RATE: 72 BPM | TEMPERATURE: 98.1 F | WEIGHT: 130.73 LBS

## 2019-08-21 DIAGNOSIS — R19.7 DIARRHEA: Primary | ICD-10-CM

## 2019-08-21 DIAGNOSIS — R10.9 ABDOMINAL PAIN: ICD-10-CM

## 2019-08-21 LAB
ALBUMIN SERPL BCP-MCNC: 3.6 G/DL (ref 3.5–5)
ALP SERPL-CCNC: 46 U/L (ref 46–116)
ALT SERPL W P-5'-P-CCNC: 21 U/L (ref 12–78)
ANION GAP SERPL CALCULATED.3IONS-SCNC: 8 MMOL/L (ref 4–13)
AST SERPL W P-5'-P-CCNC: 24 U/L (ref 5–45)
BACTERIA UR QL AUTO: ABNORMAL /HPF
BASOPHILS # BLD AUTO: 0.05 THOUSANDS/ΜL (ref 0–0.1)
BASOPHILS NFR BLD AUTO: 1 % (ref 0–1)
BILIRUB SERPL-MCNC: 0.51 MG/DL (ref 0.2–1)
BILIRUB UR QL STRIP: NEGATIVE
BUN SERPL-MCNC: 6 MG/DL (ref 5–25)
CALCIUM SERPL-MCNC: 9.1 MG/DL (ref 8.3–10.1)
CHLORIDE SERPL-SCNC: 99 MMOL/L (ref 100–108)
CLARITY UR: CLEAR
CO2 SERPL-SCNC: 27 MMOL/L (ref 21–32)
COLOR UR: YELLOW
COLOR, POC: YELLOW
CREAT SERPL-MCNC: 1.23 MG/DL (ref 0.6–1.3)
EOSINOPHIL # BLD AUTO: 0.03 THOUSAND/ΜL (ref 0–0.61)
EOSINOPHIL NFR BLD AUTO: 1 % (ref 0–6)
ERYTHROCYTE [DISTWIDTH] IN BLOOD BY AUTOMATED COUNT: 13.6 % (ref 11.6–15.1)
GFR SERPL CREATININE-BSD FRML MDRD: 42 ML/MIN/1.73SQ M
GLUCOSE SERPL-MCNC: 103 MG/DL (ref 65–140)
GLUCOSE UR STRIP-MCNC: NEGATIVE MG/DL
HCT VFR BLD AUTO: 37.1 % (ref 34.8–46.1)
HGB BLD-MCNC: 12.4 G/DL (ref 11.5–15.4)
HGB UR QL STRIP.AUTO: ABNORMAL
IMM GRANULOCYTES # BLD AUTO: 0.01 THOUSAND/UL (ref 0–0.2)
IMM GRANULOCYTES NFR BLD AUTO: 0 % (ref 0–2)
KETONES UR STRIP-MCNC: NEGATIVE MG/DL
LEUKOCYTE ESTERASE UR QL STRIP: ABNORMAL
LYMPHOCYTES # BLD AUTO: 0.91 THOUSANDS/ΜL (ref 0.6–4.47)
LYMPHOCYTES NFR BLD AUTO: 23 % (ref 14–44)
MCH RBC QN AUTO: 31.2 PG (ref 26.8–34.3)
MCHC RBC AUTO-ENTMCNC: 33.4 G/DL (ref 31.4–37.4)
MCV RBC AUTO: 93 FL (ref 82–98)
MONOCYTES # BLD AUTO: 0.48 THOUSAND/ΜL (ref 0.17–1.22)
MONOCYTES NFR BLD AUTO: 12 % (ref 4–12)
NEUTROPHILS # BLD AUTO: 2.43 THOUSANDS/ΜL (ref 1.85–7.62)
NEUTS SEG NFR BLD AUTO: 63 % (ref 43–75)
NITRITE UR QL STRIP: NEGATIVE
NON-SQ EPI CELLS URNS QL MICRO: ABNORMAL /HPF
NRBC BLD AUTO-RTO: 0 /100 WBCS
PH UR STRIP.AUTO: 6.5 [PH] (ref 4.5–8)
PLATELET # BLD AUTO: 286 THOUSANDS/UL (ref 149–390)
PMV BLD AUTO: 9.9 FL (ref 8.9–12.7)
POTASSIUM SERPL-SCNC: 3.3 MMOL/L (ref 3.5–5.3)
PROT SERPL-MCNC: 7 G/DL (ref 6.4–8.2)
PROT UR STRIP-MCNC: NEGATIVE MG/DL
RBC # BLD AUTO: 3.98 MILLION/UL (ref 3.81–5.12)
RBC #/AREA URNS AUTO: ABNORMAL /HPF
SODIUM SERPL-SCNC: 134 MMOL/L (ref 136–145)
SP GR UR STRIP.AUTO: 1.01 (ref 1–1.03)
UROBILINOGEN UR QL STRIP.AUTO: 0.2 E.U./DL
WBC # BLD AUTO: 3.91 THOUSAND/UL (ref 4.31–10.16)
WBC #/AREA URNS AUTO: ABNORMAL /HPF

## 2019-08-21 PROCEDURE — 36415 COLL VENOUS BLD VENIPUNCTURE: CPT | Performed by: EMERGENCY MEDICINE

## 2019-08-21 PROCEDURE — 99284 EMERGENCY DEPT VISIT MOD MDM: CPT | Performed by: EMERGENCY MEDICINE

## 2019-08-21 PROCEDURE — 81001 URINALYSIS AUTO W/SCOPE: CPT

## 2019-08-21 PROCEDURE — 74177 CT ABD & PELVIS W/CONTRAST: CPT

## 2019-08-21 PROCEDURE — 80053 COMPREHEN METABOLIC PANEL: CPT | Performed by: EMERGENCY MEDICINE

## 2019-08-21 PROCEDURE — 96361 HYDRATE IV INFUSION ADD-ON: CPT

## 2019-08-21 PROCEDURE — 96360 HYDRATION IV INFUSION INIT: CPT

## 2019-08-21 PROCEDURE — 99284 EMERGENCY DEPT VISIT MOD MDM: CPT

## 2019-08-21 PROCEDURE — 85025 COMPLETE CBC W/AUTO DIFF WBC: CPT | Performed by: EMERGENCY MEDICINE

## 2019-08-21 RX ADMIN — IOHEXOL 100 ML: 350 INJECTION, SOLUTION INTRAVENOUS at 10:32

## 2019-08-21 RX ADMIN — SODIUM CHLORIDE 1000 ML: 0.9 INJECTION, SOLUTION INTRAVENOUS at 09:51

## 2019-08-21 NOTE — DISCHARGE INSTRUCTIONS
ED testing showed the diverticulitis has resolved  Use at least 1 diet supplement shake per meal until you are taking in normal calories  Finish the antibiotics as prescribed, and continue the probiotic until you have adequate formed stools  Return to the for new concerns

## 2019-08-21 NOTE — ED PROVIDER NOTES
History  Chief Complaint   Patient presents with    Abdominal Pain     pt c/o RLQ abd  pain for the past x12 hrs; pt states she has diarrhea but denies n/v; pt denies cp/sob     76 yo female c/o ongoing constant abdominal pain, most localizing to lower abdomen, described as soreness, and associated with frequent, nonbloody loose stools, no fever, no N/V  Onset was about 6 weeks at this point, and she followed up with PCP near the onset, diagnosed with sigmoid diverticulitis by CT 8/9/19, prescribed Cipro/Flagyl, which she has been on nearly continuously since then  She was referred to GI, added a probiotic, with the goal of follow up colonoscopy when this resolves  However, she admits her po intake has been poor and she is down about 14# over the past several weeks, and is not resolving the abdominal pain  That is why they decided to come to the ED today  History provided by:  Patient  Abdominal Pain   Pain location:  Generalized  Duration:  6 weeks  Timing:  Constant  Progression:  Waxing and waning  Chronicity:  New  Context: recent illness    Relieved by:  Nothing  Worsened by:  Eating  Ineffective treatments: outpatient abx  Associated symptoms: diarrhea    Associated symptoms: no chest pain, no chills, no cough, no dysuria, no fever, no hematuria, no nausea, no shortness of breath, no sore throat and no vomiting    Risk factors: multiple surgeries (previous h/o appendectomy, cholecystectomy)        Prior to Admission Medications   Prescriptions Last Dose Informant Patient Reported? Taking?    L-Methylfolate 15 MG TABS   No No   Sig: Take 1 tablet (15 mg total) by mouth daily   Melatonin 10 MG TABS   No No   Sig: Take 1 tablet (10 mg total) by mouth daily at bedtime   Probiotic Product (VSL#3) CAPS   No No   Sig: Take 1 capsule by mouth 2 (two) times a day   SUPREP BOWEL PREP KIT 17 5-3 13-1 6 GM/177ML SOLN   Yes No   Sig: Take as directed by prescriber   amLODIPine (NORVASC) 5 mg tablet   No No Sig: Take 1 tablet (5 mg total) by mouth daily   aspirin (ASPIRIN ADULT LOW DOSE) 81 mg EC tablet   Yes No   Sig: Take 81 mg by mouth   azilsartan medoxomil (EDARBI) 40 MG tablet   No No   Sig: Take 1 tablet (40 mg total) by mouth daily   busPIRone (BUSPAR) 15 mg tablet   No No   Sig: Take 1 tablet (15 mg total) by mouth 2 (two) times a day with meals   diazepam (VALIUM) 2 mg tablet   No No   Sig: Take one tab at 5 or 6 AM in the Morning and take one Tab at 1 or 2 PM, Daily   loratadine (CLARITIN REDITABS) 10 MG dissolvable tablet   No No   Sig: Take 1 tablet (10 mg total) by mouth daily   pantoprazole (PROTONIX) 40 mg tablet   No No   Sig: TAKE ONE BY MOUTH EVERY MORNING   potassium chloride (MICRO-K) 10 MEQ CR capsule   No No   Sig: Take 1 capsule (10 mEq total) by mouth 2 (two) times a day for 10 days      Facility-Administered Medications: None       Past Medical History:   Diagnosis Date    Anxiety     Disease of thyroid gland     Gastroesophageal reflux disease without esophagitis 8/28/2018    Hyperlipidemia     Hypertension     Hypothyroidism        Past Surgical History:   Procedure Laterality Date    CATARACT EXTRACTION      CHOLECYSTECTOMY      HYSTERECTOMY         Family History   Problem Relation Age of Onset    No Known Problems Mother     No Known Problems Father      I have reviewed and agree with the history as documented  Social History     Tobacco Use    Smoking status: Never Smoker    Smokeless tobacco: Never Used   Substance Use Topics    Alcohol use: No    Drug use: No        Review of Systems   Constitutional: Positive for appetite change  Negative for chills and fever  HENT: Negative for sore throat  Respiratory: Negative for cough, shortness of breath and wheezing  Cardiovascular: Negative for chest pain and palpitations  Gastrointestinal: Positive for abdominal pain and diarrhea  Negative for nausea and vomiting     Genitourinary: Negative for dysuria and hematuria  Musculoskeletal: Negative for neck pain  Skin: Negative for rash  Neurological: Negative for dizziness, weakness and headaches  Psychiatric/Behavioral: Negative for suicidal ideas  All other systems reviewed and are negative  Physical Exam  Physical Exam   Constitutional: She is oriented to person, place, and time  Vital signs are normal  She appears well-developed and well-nourished  Non-toxic appearance  HENT:   Head: Normocephalic and atraumatic  Right Ear: Tympanic membrane and external ear normal    Left Ear: Tympanic membrane and external ear normal    Nose: Nose normal    Mouth/Throat: Oropharynx is clear and moist    Eyes: Pupils are equal, round, and reactive to light  Conjunctivae and EOM are normal    Neck: Normal range of motion and full passive range of motion without pain  Neck supple  No Brudzinski's sign and no Kernig's sign noted  Cardiovascular: Normal rate, regular rhythm, normal heart sounds, intact distal pulses and normal pulses  No murmur heard  Pulmonary/Chest: Effort normal and breath sounds normal  No tachypnea  No respiratory distress  She has no wheezes  Abdominal: Soft  She exhibits no distension  Bowel sounds are increased  There is generalized tenderness  There is no rigidity, no rebound and no guarding  Musculoskeletal: Normal range of motion  Right lower leg: She exhibits no swelling  Left lower leg: She exhibits no swelling  Lymphadenopathy:     She has no cervical adenopathy  Neurological: She is alert and oriented to person, place, and time  She has normal strength and normal reflexes  No cranial nerve deficit or sensory deficit  Coordination and gait normal  GCS eye subscore is 4  GCS verbal subscore is 5  GCS motor subscore is 6  Skin: Skin is warm and dry  No rash noted  She is not diaphoretic  No pallor  Psychiatric: She has a normal mood and affect   Her speech is normal and behavior is normal  Judgment and thought content normal  Cognition and memory are normal    Nursing note and vitals reviewed  Vital Signs  ED Triage Vitals [08/21/19 0825]   Temperature Pulse Respirations Blood Pressure SpO2   98 1 °F (36 7 °C) 82 18 139/66 98 %      Temp Source Heart Rate Source Patient Position - Orthostatic VS BP Location FiO2 (%)   Oral Monitor Sitting Right arm --      Pain Score       6           Vitals:    08/21/19 0825   BP: 139/66   Pulse: 82   Patient Position - Orthostatic VS: Sitting         Visual Acuity      ED Medications  Medications - No data to display    Diagnostic Studies  Results Reviewed     None                 No orders to display              Procedures  Procedures       ED Course  ED Course as of Aug 21 1433   Wed Aug 21, 2019   1047 Previously seen diverticulitis is resolved from 8/9/19   CT abdomen pelvis with contrast   1103 Reviewed results with patient at bedside and updated on the plan  With reassuring ED workup, her symptoms of ongoing diarrhea, abdominal soreness, most likely this is the resolving diverticuitis, and antibiotic effects  She finishes abx in a few days, has been put on probiotic, so we went over diet and supplementation with Ensure shakes, GI follow up as planned  MDM    Disposition  Final diagnoses:   None     ED Disposition     None      Follow-up Information    None         Patient's Medications   Discharge Prescriptions    No medications on file     No discharge procedures on file      ED Provider  Electronically Signed by           Ayana De La Rosa MD  08/21/19 9735

## 2019-09-10 DIAGNOSIS — F41.9 ANXIETY: ICD-10-CM

## 2019-09-10 DIAGNOSIS — G47.09 OTHER INSOMNIA: ICD-10-CM

## 2019-09-10 RX ORDER — DIAZEPAM 2 MG/1
TABLET ORAL
Qty: 60 TABLET | Refills: 2 | Status: CANCELLED | OUTPATIENT
Start: 2019-09-10

## 2019-09-10 NOTE — TELEPHONE ENCOUNTER
Patient is still having some discomfort from her diverticulitis  She is maintaining weight, but not gaining  She comes back to see you in 2 weeks  Is there anything you would want to do before she comes back?

## 2019-09-18 ENCOUNTER — APPOINTMENT (OUTPATIENT)
Dept: LAB | Age: 79
End: 2019-09-18
Payer: MEDICARE

## 2019-09-18 DIAGNOSIS — R63.4 WEIGHT LOSS: ICD-10-CM

## 2019-09-18 DIAGNOSIS — K57.92 ACUTE DIVERTICULITIS: ICD-10-CM

## 2019-09-18 LAB
ALBUMIN SERPL BCP-MCNC: 4.2 G/DL (ref 3.5–5)
ALP SERPL-CCNC: 52 U/L (ref 46–116)
ALT SERPL W P-5'-P-CCNC: 17 U/L (ref 12–78)
ANION GAP SERPL CALCULATED.3IONS-SCNC: 6 MMOL/L (ref 4–13)
AST SERPL W P-5'-P-CCNC: 11 U/L (ref 5–45)
BASOPHILS # BLD AUTO: 0.05 THOUSANDS/ΜL (ref 0–0.1)
BASOPHILS NFR BLD AUTO: 1 % (ref 0–1)
BILIRUB SERPL-MCNC: 0.68 MG/DL (ref 0.2–1)
BUN SERPL-MCNC: 12 MG/DL (ref 5–25)
CALCIUM SERPL-MCNC: 10 MG/DL (ref 8.3–10.1)
CHLORIDE SERPL-SCNC: 99 MMOL/L (ref 100–108)
CO2 SERPL-SCNC: 29 MMOL/L (ref 21–32)
CREAT SERPL-MCNC: 1.05 MG/DL (ref 0.6–1.3)
CRP SERPL QL: <3 MG/L
EOSINOPHIL # BLD AUTO: 0.06 THOUSAND/ΜL (ref 0–0.61)
EOSINOPHIL NFR BLD AUTO: 1 % (ref 0–6)
ERYTHROCYTE [DISTWIDTH] IN BLOOD BY AUTOMATED COUNT: 13.2 % (ref 11.6–15.1)
ERYTHROCYTE [SEDIMENTATION RATE] IN BLOOD: 14 MM/HOUR (ref 0–20)
GFR SERPL CREATININE-BSD FRML MDRD: 50 ML/MIN/1.73SQ M
GLUCOSE P FAST SERPL-MCNC: 81 MG/DL (ref 65–99)
HCT VFR BLD AUTO: 40 % (ref 34.8–46.1)
HGB BLD-MCNC: 13.3 G/DL (ref 11.5–15.4)
IMM GRANULOCYTES # BLD AUTO: 0.02 THOUSAND/UL (ref 0–0.2)
IMM GRANULOCYTES NFR BLD AUTO: 0 % (ref 0–2)
LYMPHOCYTES # BLD AUTO: 1.89 THOUSANDS/ΜL (ref 0.6–4.47)
LYMPHOCYTES NFR BLD AUTO: 27 % (ref 14–44)
MCH RBC QN AUTO: 31.4 PG (ref 26.8–34.3)
MCHC RBC AUTO-ENTMCNC: 33.3 G/DL (ref 31.4–37.4)
MCV RBC AUTO: 94 FL (ref 82–98)
MONOCYTES # BLD AUTO: 0.65 THOUSAND/ΜL (ref 0.17–1.22)
MONOCYTES NFR BLD AUTO: 9 % (ref 4–12)
NEUTROPHILS # BLD AUTO: 4.33 THOUSANDS/ΜL (ref 1.85–7.62)
NEUTS SEG NFR BLD AUTO: 62 % (ref 43–75)
NRBC BLD AUTO-RTO: 0 /100 WBCS
PLATELET # BLD AUTO: 325 THOUSANDS/UL (ref 149–390)
PMV BLD AUTO: 11.4 FL (ref 8.9–12.7)
POTASSIUM SERPL-SCNC: 4 MMOL/L (ref 3.5–5.3)
PROT SERPL-MCNC: 7.8 G/DL (ref 6.4–8.2)
RBC # BLD AUTO: 4.24 MILLION/UL (ref 3.81–5.12)
SODIUM SERPL-SCNC: 134 MMOL/L (ref 136–145)
WBC # BLD AUTO: 7 THOUSAND/UL (ref 4.31–10.16)

## 2019-09-18 PROCEDURE — 80053 COMPREHEN METABOLIC PANEL: CPT

## 2019-09-18 PROCEDURE — 86140 C-REACTIVE PROTEIN: CPT

## 2019-09-18 PROCEDURE — 85025 COMPLETE CBC W/AUTO DIFF WBC: CPT

## 2019-09-18 PROCEDURE — 36415 COLL VENOUS BLD VENIPUNCTURE: CPT

## 2019-09-18 PROCEDURE — 85652 RBC SED RATE AUTOMATED: CPT

## 2019-09-25 ENCOUNTER — OFFICE VISIT (OUTPATIENT)
Dept: FAMILY MEDICINE CLINIC | Facility: CLINIC | Age: 79
End: 2019-09-25
Payer: MEDICARE

## 2019-09-25 VITALS
HEIGHT: 65 IN | DIASTOLIC BLOOD PRESSURE: 76 MMHG | RESPIRATION RATE: 16 BRPM | TEMPERATURE: 97.9 F | WEIGHT: 130.4 LBS | SYSTOLIC BLOOD PRESSURE: 110 MMHG | HEART RATE: 54 BPM | OXYGEN SATURATION: 98 % | BODY MASS INDEX: 21.73 KG/M2

## 2019-09-25 DIAGNOSIS — R00.2 PALPITATIONS: ICD-10-CM

## 2019-09-25 DIAGNOSIS — I10 ESSENTIAL HYPERTENSION: Primary | ICD-10-CM

## 2019-09-25 DIAGNOSIS — F41.9 ANXIETY: ICD-10-CM

## 2019-09-25 DIAGNOSIS — Z23 NEED FOR INFLUENZA VACCINATION: ICD-10-CM

## 2019-09-25 PROCEDURE — 93000 ELECTROCARDIOGRAM COMPLETE: CPT | Performed by: INTERNAL MEDICINE

## 2019-09-25 PROCEDURE — G0008 ADMIN INFLUENZA VIRUS VAC: HCPCS

## 2019-09-25 PROCEDURE — 90662 IIV NO PRSV INCREASED AG IM: CPT

## 2019-09-25 PROCEDURE — 99214 OFFICE O/P EST MOD 30 MIN: CPT | Performed by: INTERNAL MEDICINE

## 2019-09-25 RX ORDER — PROPRANOLOL HCL 60 MG
60 CAPSULE, EXTENDED RELEASE 24HR ORAL DAILY
Qty: 90 CAPSULE | Refills: 3 | Status: SHIPPED | OUTPATIENT
Start: 2019-09-25 | End: 2019-11-13 | Stop reason: SDUPTHER

## 2019-09-25 RX ORDER — CLONAZEPAM 0.25 MG/1
0.25 TABLET, ORALLY DISINTEGRATING ORAL 2 TIMES DAILY
Qty: 60 TABLET | Refills: 1 | Status: SHIPPED | OUTPATIENT
Start: 2019-09-25 | End: 2019-11-13 | Stop reason: SDUPTHER

## 2019-09-25 NOTE — PROGRESS NOTES
Assessment/Plan:         Diagnoses and all orders for this visit:    Essential hypertension; TRY :  -     azilsartan medoxomil (EDARBI) 40 MG tablet; Take 1 tablet (40 mg total) by mouth daily  Add :  -     propranolol (INDERAL LA) 60 mg 24 hr capsule; Take 1 capsule (60 mg total) by mouth daily Please stop Norvasc    RTC in 1mo w :  -     Catecholamines, Fractionated, and VMA, 24-Hour Urine  -     Cortisol, Free 24-Hour Urine LC/MS/MS  -     5 HIAA, urine, quantitative, 24 hour; Future  -     clonazePAM (KlonoPIN) 0 25 MG disintegrating tablet; Take 1 tablet (0 25 mg total) by mouth 2 (two) times a day As needed, stop Valium and stop  Buspar Please     -     Holter monitor - 48 hour; Future  -     Echo complete with contrast if indicated; Future  -     POCT ECG    Need for influenza vaccination  -     influenza vaccine, 7159-2864, high-dose, PF 0 5 mL (FLUZONE HIGH-DOSE)    Palpitations; TRY :  -     propranolol (INDERAL LA) 60 mg 24 hr capsule; Take 1 capsule (60 mg total) by mouth daily Please stop Norvasc     -     Catecholamines, Fractionated, and VMA, 24-Hour Urine  -     Cortisol, Free 24-Hour Urine LC/MS/MS  -     5 HIAA, urine, quantitative, 24 hour; Future  -     clonazePAM (KlonoPIN) 0 25 MG disintegrating tablet; Take 1 tablet (0 25 mg total) by mouth 2 (two) times a day As needed, stop Valium and stop  Buspar Please     -     Holter monitor - 48 hour; Future  -     Echo complete with contrast if indicated; Future  -     Thyroid Antibodies Panel; Future  -     TSH, 3rd generation; Future  -     T4, free; Future  -     POCT ECG    Anxiety; TRY :  -     clonazePAM (KlonoPIN) 0 25 MG disintegrating tablet; Take 1 tablet (0 25 mg total) by mouth 2 (two) times a day As needed, stop Valium and stop  Buspar Please     And : Inderal LA 60 mg daily PM  RTC in 1mo w :   -     Thyroid Antibodies Panel; Future  -     TSH, 3rd generation; Future  -     T4, free;  Future        Subjective:      Patient ID: Lateshamansoor Joyce Sudhakar Kilpatrick is a [de-identified] y o  female  Nomi Tanner 53 is here for Regular check up,she still Not feeling well with her Anxiety Mostly, and still refusing psychiatry Consult, No Homicidal nor Suicidal ideas,    The following portions of the patient's history were reviewed and updated as appropriate: allergies, current medications, past family history, past medical history, past social history, past surgical history and problem list     Review of Systems   Constitutional: Negative for chills, fatigue and fever  HENT: Negative for congestion, facial swelling, sore throat, trouble swallowing and voice change  Eyes: Negative for pain, discharge and visual disturbance  Respiratory: Negative for cough, shortness of breath and wheezing  Cardiovascular: Positive for palpitations  Negative for chest pain and leg swelling  Gastrointestinal: Negative for abdominal pain, blood in stool, constipation, diarrhea and nausea  Endocrine: Negative for polydipsia, polyphagia and polyuria  Genitourinary: Negative for difficulty urinating, hematuria and urgency  Musculoskeletal: Negative for arthralgias and myalgias  Skin: Negative for rash  Neurological: Negative for dizziness, tremors, weakness and headaches  Hematological: Negative for adenopathy  Does not bruise/bleed easily  Psychiatric/Behavioral: Negative for dysphoric mood, sleep disturbance and suicidal ideas  The patient is nervous/anxious  Objective:      /76 (BP Location: Right arm, Patient Position: Standing, Cuff Size: Standard)   Pulse (!) 54   Temp 97 9 °F (36 6 °C) (Oral)   Resp 16   Ht 5' 5" (1 651 m)   Wt 59 1 kg (130 lb 6 4 oz)   SpO2 98%   BMI 21 70 kg/m²          Physical Exam   Constitutional: She is oriented to person, place, and time  She appears well-nourished  No distress  HENT:   Head: Normocephalic  Mouth/Throat: Oropharynx is clear and moist  No oropharyngeal exudate     Eyes: Pupils are equal, round, and reactive to light  Conjunctivae are normal  No scleral icterus  Neck: Neck supple  No thyromegaly present  Cardiovascular: Normal rate and regular rhythm  Murmur heard  Pulmonary/Chest: Effort normal and breath sounds normal  No respiratory distress  She has no wheezes  She has no rales  Abdominal: Soft  Bowel sounds are normal  She exhibits no distension  There is no tenderness  There is no rebound and no guarding  Musculoskeletal: She exhibits no edema or tenderness  Lymphadenopathy:     She has no cervical adenopathy  Neurological: She is alert and oriented to person, place, and time  No cranial nerve deficit  Coordination normal    Skin: No erythema  Psychiatric: She has a normal mood and affect

## 2019-10-10 ENCOUNTER — OFFICE VISIT (OUTPATIENT)
Dept: FAMILY MEDICINE CLINIC | Facility: CLINIC | Age: 79
End: 2019-10-10
Payer: MEDICARE

## 2019-10-10 VITALS
OXYGEN SATURATION: 97 % | TEMPERATURE: 98.6 F | WEIGHT: 134.2 LBS | DIASTOLIC BLOOD PRESSURE: 78 MMHG | HEART RATE: 66 BPM | SYSTOLIC BLOOD PRESSURE: 140 MMHG | HEIGHT: 65 IN | BODY MASS INDEX: 22.36 KG/M2 | RESPIRATION RATE: 16 BRPM

## 2019-10-10 DIAGNOSIS — R21 RASH: Primary | ICD-10-CM

## 2019-10-10 DIAGNOSIS — F41.9 ANXIETY: ICD-10-CM

## 2019-10-10 DIAGNOSIS — I10 ESSENTIAL HYPERTENSION: ICD-10-CM

## 2019-10-10 PROCEDURE — 99213 OFFICE O/P EST LOW 20 MIN: CPT | Performed by: INTERNAL MEDICINE

## 2019-10-10 RX ORDER — TRIAMCINOLONE ACETONIDE 1 MG/G
CREAM TOPICAL 2 TIMES DAILY
Qty: 30 G | Refills: 1 | Status: SHIPPED | OUTPATIENT
Start: 2019-10-10 | End: 2019-10-10 | Stop reason: SDUPTHER

## 2019-10-10 RX ORDER — TRIAMCINOLONE ACETONIDE 1 MG/G
CREAM TOPICAL 2 TIMES DAILY
Qty: 30 G | Refills: 1 | Status: SHIPPED | OUTPATIENT
Start: 2019-10-10 | End: 2019-11-13

## 2019-10-10 NOTE — PROGRESS NOTES
Assessment/Plan:         Diagnoses and all orders for this visit:    Rash : right thigh : ??cause ? ? :   -   TRY :  -     triamcinolone (KENALOG) 0 1 % cream; Apply topically 2 (two) times a day    Essential hypertension; Improving Nicely  RTC in 1-2 mos w Blood work    Anxiety; stable  Continue same        Subjective:      Patient ID: Yanelis Callahan is a [de-identified] y o  female  [de-identified] Y O lady is here for Rash Right leg/thigh since Sunday 4 days ago, No Other issues, No Other symptoms,  Otherwise she has been doing better,  The following portions of the patient's history were reviewed and updated as appropriate: allergies, current medications, past family history, past medical history, past social history, past surgical history and problem list     Review of Systems   Constitutional: Negative for chills, fatigue and fever  HENT: Negative for congestion, facial swelling, sore throat, trouble swallowing and voice change  Eyes: Negative for pain, discharge and visual disturbance  Respiratory: Negative for cough, shortness of breath and wheezing  Cardiovascular: Negative for chest pain, palpitations and leg swelling  Gastrointestinal: Negative for abdominal pain, blood in stool, constipation, diarrhea and nausea  Endocrine: Negative for polydipsia, polyphagia and polyuria  Genitourinary: Negative for difficulty urinating, hematuria and urgency  Musculoskeletal: Negative for arthralgias and myalgias  Skin: Positive for rash  Neurological: Negative for dizziness, tremors, weakness and headaches  Hematological: Negative for adenopathy  Does not bruise/bleed easily  Psychiatric/Behavioral: Negative for dysphoric mood, sleep disturbance and suicidal ideas           Objective:      /78 (BP Location: Left arm, Patient Position: Sitting, Cuff Size: Standard)   Pulse 66   Temp 98 6 °F (37 °C) (Oral)   Resp 16   Ht 5' 5" (1 651 m)   Wt 60 9 kg (134 lb 3 2 oz)   SpO2 97%   BMI 22 33 kg/m² Physical Exam   Constitutional: She is oriented to person, place, and time  She appears well-nourished  No distress  HENT:   Head: Normocephalic  Mouth/Throat: Oropharynx is clear and moist  No oropharyngeal exudate  Eyes: Pupils are equal, round, and reactive to light  Conjunctivae are normal  No scleral icterus  Neck: Neck supple  No thyromegaly present  Cardiovascular: Normal rate and regular rhythm  Murmur heard  BP : 138/84 Improved Nicely   Pulmonary/Chest: Effort normal and breath sounds normal  No respiratory distress  She has no wheezes  She has no rales  Abdominal: Soft  Bowel sounds are normal  She exhibits no distension  There is no tenderness  There is no rebound and no guarding  Musculoskeletal: She exhibits no edema or tenderness  Lymphadenopathy:     She has no cervical adenopathy  Neurological: She is alert and oriented to person, place, and time  No cranial nerve deficit  Coordination normal    Skin: No erythema  Psychiatric: She has a normal mood and affect

## 2019-10-18 ENCOUNTER — APPOINTMENT (OUTPATIENT)
Dept: LAB | Age: 79
End: 2019-10-18
Payer: MEDICARE

## 2019-10-18 DIAGNOSIS — F41.9 ANXIETY: ICD-10-CM

## 2019-10-18 DIAGNOSIS — R00.2 PALPITATIONS: ICD-10-CM

## 2019-10-18 LAB
T4 FREE SERPL-MCNC: 1.14 NG/DL (ref 0.76–1.46)
TSH SERPL DL<=0.05 MIU/L-ACNC: 3.75 UIU/ML (ref 0.36–3.74)

## 2019-10-18 PROCEDURE — 86800 THYROGLOBULIN ANTIBODY: CPT

## 2019-10-18 PROCEDURE — 84439 ASSAY OF FREE THYROXINE: CPT

## 2019-10-18 PROCEDURE — 84443 ASSAY THYROID STIM HORMONE: CPT

## 2019-10-18 PROCEDURE — 36415 COLL VENOUS BLD VENIPUNCTURE: CPT

## 2019-10-18 PROCEDURE — 86376 MICROSOMAL ANTIBODY EACH: CPT

## 2019-10-19 LAB
THYROGLOB AB SERPL-ACNC: 78.2 IU/ML (ref 0–0.9)
THYROPEROXIDASE AB SERPL-ACNC: 11 IU/ML (ref 0–34)

## 2019-10-23 ENCOUNTER — APPOINTMENT (OUTPATIENT)
Dept: LAB | Age: 79
End: 2019-10-23
Payer: MEDICARE

## 2019-10-23 ENCOUNTER — OFFICE VISIT (OUTPATIENT)
Dept: FAMILY MEDICINE CLINIC | Facility: CLINIC | Age: 79
End: 2019-10-23
Payer: MEDICARE

## 2019-10-23 VITALS
BODY MASS INDEX: 22.16 KG/M2 | WEIGHT: 133 LBS | HEIGHT: 65 IN | OXYGEN SATURATION: 98 % | SYSTOLIC BLOOD PRESSURE: 136 MMHG | TEMPERATURE: 97.7 F | DIASTOLIC BLOOD PRESSURE: 72 MMHG | HEART RATE: 62 BPM | RESPIRATION RATE: 14 BRPM

## 2019-10-23 DIAGNOSIS — F41.9 ANXIETY: ICD-10-CM

## 2019-10-23 DIAGNOSIS — R21 RASH: ICD-10-CM

## 2019-10-23 DIAGNOSIS — J01.90 ACUTE SINUSITIS, RECURRENCE NOT SPECIFIED, UNSPECIFIED LOCATION: Primary | ICD-10-CM

## 2019-10-23 DIAGNOSIS — I10 ESSENTIAL HYPERTENSION: ICD-10-CM

## 2019-10-23 DIAGNOSIS — R00.2 PALPITATIONS: ICD-10-CM

## 2019-10-23 DIAGNOSIS — E06.9 THYROIDITIS: ICD-10-CM

## 2019-10-23 PROCEDURE — 82530 CORTISOL FREE: CPT

## 2019-10-23 PROCEDURE — 84585 ASSAY OF URINE VMA: CPT

## 2019-10-23 PROCEDURE — 83497 ASSAY OF 5-HIAA: CPT

## 2019-10-23 PROCEDURE — 99214 OFFICE O/P EST MOD 30 MIN: CPT | Performed by: INTERNAL MEDICINE

## 2019-10-23 PROCEDURE — 82384 ASSAY THREE CATECHOLAMINES: CPT

## 2019-10-23 RX ORDER — GUAIFENESIN/DEXTROMETHORPHAN 100-10MG/5
5 SYRUP ORAL 3 TIMES DAILY PRN
Qty: 118 ML | Refills: 1 | Status: SHIPPED | OUTPATIENT
Start: 2019-10-23 | End: 2019-11-13

## 2019-10-23 RX ORDER — AMOXICILLIN 500 MG/1
500 CAPSULE ORAL EVERY 8 HOURS SCHEDULED
Qty: 30 CAPSULE | Refills: 0 | Status: SHIPPED | OUTPATIENT
Start: 2019-10-23 | End: 2019-11-02

## 2019-10-23 RX ORDER — FLUTICASONE PROPIONATE 50 MCG
2 SPRAY, SUSPENSION (ML) NASAL DAILY
Qty: 1 BOTTLE | Refills: 3 | Status: SHIPPED | OUTPATIENT
Start: 2019-10-23 | End: 2020-09-09

## 2019-10-23 NOTE — PROGRESS NOTES
Assessment/Plan:         Diagnoses and all orders for this visit:    Acute sinusitis, recurrence not specified, unspecified location; Bed Rest, Increase Po Fluids, TRY :  -     amoxicillin (AMOXIL) 500 mg capsule; Take 1 capsule (500 mg total) by mouth every 8 (eight) hours for 10 days  -     dextromethorphan-guaiFENesin (ROBITUSSIN DM)  mg/5 mL syrup; Take 5 mL by mouth 3 (three) times a day as needed for cough or congestion  -     fluticasone (FLONASE) 50 mcg/act nasal spray; 2 sprays into each nostril daily    Rash; Improving  Continue Triamcinolone 0 1% c  RTC in 1mo    Thyroiditis; RTC in 1-2 mos w :  -     Thyroid Antibodies Panel; Future  -     T4, free; Future  -     TSH, 3rd generation; Future  -     US thyroid; Future    Anxiety; Improving   Continue same    Essential hypertension; stable  Continue same  RTC in 2mos        Subjective:      Patient ID: Mamie Mitchell is a [de-identified] y o  female  [de-identified] Y O lady is here for Increasing Cold symptoms for 1-2 weeks and regular Check up, recent Blood work and med list reviewed w  Pt in detail         The following portions of the patient's history were reviewed and updated as appropriate: allergies, current medications, past family history, past medical history, past social history, past surgical history and problem list     Review of Systems   Constitutional: Negative for chills, fatigue and fever  HENT: Positive for postnasal drip, sinus pressure, sinus pain and sore throat  Negative for congestion, facial swelling, trouble swallowing and voice change  Eyes: Negative for pain, discharge and visual disturbance  Respiratory: Negative for cough, shortness of breath and wheezing  Cardiovascular: Negative for chest pain, palpitations and leg swelling  Gastrointestinal: Negative for abdominal pain, blood in stool, constipation, diarrhea and nausea  Endocrine: Negative for polydipsia, polyphagia and polyuria     Genitourinary: Negative for difficulty urinating, hematuria and urgency  Musculoskeletal: Negative for arthralgias and myalgias  Skin: Positive for rash  Neurological: Negative for dizziness, tremors, weakness and headaches  Hematological: Negative for adenopathy  Does not bruise/bleed easily  Psychiatric/Behavioral: Negative for dysphoric mood, sleep disturbance and suicidal ideas  Objective:      /72 (BP Location: Left arm, Patient Position: Sitting, Cuff Size: Standard)   Pulse 62   Temp 97 7 °F (36 5 °C) (Tympanic)   Resp 14   Ht 5' 5" (1 651 m)   Wt 60 3 kg (133 lb)   SpO2 98%   BMI 22 13 kg/m²          Physical Exam   Constitutional: She is oriented to person, place, and time  She appears well-nourished  No distress  HENT:   Head: Normocephalic  Mouth/Throat: No oropharyngeal exudate  Acute sinusitis/URI   Eyes: Pupils are equal, round, and reactive to light  Conjunctivae are normal  No scleral icterus  Neck: Neck supple  Thyromegaly present  Cardiovascular: Normal rate and regular rhythm  Murmur heard  Pulmonary/Chest: Effort normal and breath sounds normal  No respiratory distress  She has no wheezes  She has no rales  Abdominal: Soft  Bowel sounds are normal  She exhibits no distension  There is no tenderness  There is no rebound and no guarding  Musculoskeletal: She exhibits no edema or tenderness  Lymphadenopathy:     She has no cervical adenopathy  Neurological: She is alert and oriented to person, place, and time  No cranial nerve deficit  Coordination normal    Skin: Rash noted  No erythema  Improving itchy rash lower exts    Psychiatric: She has a normal mood and affect

## 2019-10-26 LAB
CORTIS F 24H UR-MRATE: 13 UG/24 HR (ref 6–42)
CORTIS F UR-MCNC: 7 UG/L

## 2019-10-28 LAB
5OH-INDOLEACETATE 24H UR-MCNC: 1.2 MG/L
5OH-INDOLEACETATE 24H UR-MRATE: 2.2 MG/24 HR (ref 0–14.9)
VMA 24H UR-MRATE: 1.8 MG/24 HR (ref 0–7.5)
VMA UR-MCNC: 1 MG/L

## 2019-10-29 LAB
DOPAMINE 24H UR-MRATE: 61 UG/24 HR (ref 0–510)
DOPAMINE UR-MCNC: 34 UG/L
EPINEPH 24H UR-MRATE: <2 UG/24 HR (ref 0–20)
EPINEPH UR-MCNC: <1 UG/L
NOREPINEPH 24H UR-MRATE: <9 UG/24 HR (ref 0–135)
NOREPINEPH UR-MCNC: <5 UG/L

## 2019-10-30 ENCOUNTER — HOSPITAL ENCOUNTER (OUTPATIENT)
Dept: ULTRASOUND IMAGING | Facility: MEDICAL CENTER | Age: 79
Discharge: HOME | End: 2019-10-30
Payer: MEDICARE

## 2019-10-30 DIAGNOSIS — E06.9 THYROIDITIS: ICD-10-CM

## 2019-10-30 PROCEDURE — 76536 US EXAM OF HEAD AND NECK: CPT

## 2019-10-31 ENCOUNTER — TELEPHONE (OUTPATIENT)
Dept: FAMILY MEDICINE CLINIC | Facility: CLINIC | Age: 79
End: 2019-10-31

## 2019-10-31 NOTE — TELEPHONE ENCOUNTER
Patient's daughter called asking if she can take Tylenol, as she still has a pain in the back of her neck  Also, she's feeling better, and has 2 days of antibiotics left, but she wants to know if she can have a refill  She still is a little congested  Per Dr Armaan Salazar, spoke to Nelida, and told her she can take Tylenol and use Ocean Nasal spray if she needs to  Finish the course of antibiotics, and she how she feels

## 2019-11-03 DIAGNOSIS — E05.90 HYPERTHYROIDISM: Primary | ICD-10-CM

## 2019-11-13 ENCOUNTER — OFFICE VISIT (OUTPATIENT)
Dept: FAMILY MEDICINE CLINIC | Facility: CLINIC | Age: 79
End: 2019-11-13
Payer: MEDICARE

## 2019-11-13 VITALS
HEART RATE: 58 BPM | SYSTOLIC BLOOD PRESSURE: 112 MMHG | DIASTOLIC BLOOD PRESSURE: 84 MMHG | TEMPERATURE: 97.7 F | OXYGEN SATURATION: 98 % | HEIGHT: 65 IN | WEIGHT: 133 LBS | RESPIRATION RATE: 14 BRPM | BODY MASS INDEX: 22.16 KG/M2

## 2019-11-13 DIAGNOSIS — M25.552 PAIN IN JOINT OF LEFT HIP: ICD-10-CM

## 2019-11-13 DIAGNOSIS — R00.2 PALPITATIONS: ICD-10-CM

## 2019-11-13 DIAGNOSIS — E06.3 HASHIMOTO'S THYROIDITIS: ICD-10-CM

## 2019-11-13 DIAGNOSIS — E04.2 MULTIPLE THYROID NODULES: ICD-10-CM

## 2019-11-13 DIAGNOSIS — F41.9 ANXIETY: ICD-10-CM

## 2019-11-13 DIAGNOSIS — I10 ESSENTIAL HYPERTENSION: ICD-10-CM

## 2019-11-13 DIAGNOSIS — K21.9 GASTROESOPHAGEAL REFLUX DISEASE WITHOUT ESOPHAGITIS: ICD-10-CM

## 2019-11-13 DIAGNOSIS — G47.09 OTHER INSOMNIA: Primary | ICD-10-CM

## 2019-11-13 PROCEDURE — 99214 OFFICE O/P EST MOD 30 MIN: CPT | Performed by: INTERNAL MEDICINE

## 2019-11-13 RX ORDER — AMITRIPTYLINE HYDROCHLORIDE 10 MG/1
10 TABLET, FILM COATED ORAL
Qty: 30 TABLET | Refills: 2 | Status: SHIPPED | OUTPATIENT
Start: 2019-11-13 | End: 2020-07-07

## 2019-11-13 RX ORDER — PANTOPRAZOLE SODIUM 40 MG/1
40 TABLET, DELAYED RELEASE ORAL EVERY MORNING
Qty: 30 TABLET | Refills: 3 | Status: SHIPPED | OUTPATIENT
Start: 2019-11-13 | End: 2019-12-16 | Stop reason: SDUPTHER

## 2019-11-13 RX ORDER — PROPRANOLOL HCL 60 MG
60 CAPSULE, EXTENDED RELEASE 24HR ORAL DAILY
Qty: 90 CAPSULE | Refills: 3 | Status: SHIPPED | OUTPATIENT
Start: 2019-11-13 | End: 2020-09-09 | Stop reason: SDUPTHER

## 2019-11-13 NOTE — TELEPHONE ENCOUNTER
Dr Karo Rangel wants pt to do Xrays asap  I called pt and she said she'll go tomorrow to [facility]  She seemed a little confused but said that she'll go tomorrow with her daughter for Xrays

## 2019-11-13 NOTE — PROGRESS NOTES
Assessment/Plan:         Diagnoses and all orders for this visit:    Other insomnia; Try :  -     amitriptyline (ELAVIL) 10 mg tablet; Take 1 tablet (10 mg total) by mouth daily at bedtime  Continue melatonin  RTC in 2-3 mos  Essential hypertension; stable On :  -     propranolol (INDERAL LA) 60 mg 24 hr capsule; Take 1 capsule (60 mg total) by mouth daily Please stop Norvasc     -     azilsartan medoxomil (EDARBI) 40 MG tablet; Take 1 tablet (40 mg total) by mouth daily    Palpitations; Improving on :  -     propranolol (INDERAL LA) 60 mg 24 hr capsule; Take 1 capsule (60 mg total) by mouth daily Please stop Norvasc    Gastroesophageal reflux disease without esophagitis  -     pantoprazole (PROTONIX) 40 mg tablet; Take 1 tablet (40 mg total) by mouth every morning    Multiple thyroid nodules; Thyroid Scan    Hashimoto's thyroiditis; RTc in 2mos w blood work    Pain in joint of left hip;Moist heat, ASAP ;  -     XR hip/pelv 2-3 vws left if performed; Future  -     XR femur 2 vw left; Future        Subjective:      Patient ID: Ashlee Gillespie is a [de-identified] y o  female  [facility] 53 is here for Regular Check up, and increasing left hip pain , her anxiety and palpitation are getting better,  Recent blood work and med List reviewed w pt in Detail    The following portions of the patient's history were reviewed and updated as appropriate: allergies, current medications, past family history, past medical history, past social history, past surgical history and problem list     Review of Systems   Constitutional: Negative for chills, fatigue and fever  HENT: Negative for congestion, facial swelling, sore throat, trouble swallowing and voice change  Eyes: Negative for pain, discharge and visual disturbance  Respiratory: Negative for cough, shortness of breath and wheezing  Cardiovascular: Positive for palpitations  Negative for chest pain and leg swelling     Gastrointestinal: Negative for abdominal pain, blood in stool, constipation, diarrhea and nausea  Endocrine: Negative for polydipsia, polyphagia and polyuria  Genitourinary: Negative for difficulty urinating, hematuria and urgency  Musculoskeletal: Positive for arthralgias  Negative for myalgias  Skin: Negative for rash  Neurological: Negative for dizziness, tremors, weakness and headaches  Hematological: Negative for adenopathy  Does not bruise/bleed easily  Psychiatric/Behavioral: Positive for sleep disturbance  Negative for dysphoric mood and suicidal ideas  The patient is nervous/anxious  Objective:      /84 (BP Location: Left arm, Patient Position: Sitting, Cuff Size: Standard)   Pulse 58   Temp 97 7 °F (36 5 °C) (Tympanic)   Resp 14   Ht 5' 5" (1 651 m)   Wt 60 3 kg (133 lb)   SpO2 98%   BMI 22 13 kg/m²          Physical Exam   Constitutional: She is oriented to person, place, and time  She appears well-nourished  No distress  HENT:   Head: Normocephalic  Mouth/Throat: Oropharynx is clear and moist  No oropharyngeal exudate  Eyes: Pupils are equal, round, and reactive to light  Conjunctivae are normal  No scleral icterus  Neck: Neck supple  Thyromegaly present  Cardiovascular: Normal rate and regular rhythm  Murmur heard  Pulmonary/Chest: Effort normal and breath sounds normal  No respiratory distress  She has no wheezes  She has no rales  Abdominal: Soft  Bowel sounds are normal  She exhibits no distension  There is no tenderness  There is no rebound and no guarding  Musculoskeletal: She exhibits tenderness  She exhibits no edema  Left hip Tenderness with LOM   Lymphadenopathy:     She has no cervical adenopathy  Neurological: She is alert and oriented to person, place, and time  No cranial nerve deficit  Coordination normal    Skin: No rash noted  No erythema  Psychiatric: She has a normal mood and affect

## 2019-11-14 ENCOUNTER — HOSPITAL ENCOUNTER (OUTPATIENT)
Dept: RADIOLOGY | Facility: HOSPITAL | Age: 79
Discharge: HOME | End: 2019-11-14
Payer: MEDICARE

## 2019-11-14 DIAGNOSIS — M25.552 PAIN IN JOINT OF LEFT HIP: ICD-10-CM

## 2019-11-14 PROCEDURE — 73502 X-RAY EXAM HIP UNI 2-3 VIEWS: CPT

## 2019-11-14 PROCEDURE — 73552 X-RAY EXAM OF FEMUR 2/>: CPT

## 2019-11-15 RX ORDER — CLONAZEPAM 0.25 MG/1
0.25 TABLET, ORALLY DISINTEGRATING ORAL 2 TIMES DAILY
Qty: 60 TABLET | Refills: 1 | Status: SHIPPED | OUTPATIENT
Start: 2019-11-15 | End: 2020-01-08 | Stop reason: SDUPTHER

## 2019-11-18 ENCOUNTER — HOSPITAL ENCOUNTER (OUTPATIENT)
Dept: NON INVASIVE DIAGNOSTICS | Facility: CLINIC | Age: 79
Discharge: HOME | End: 2019-11-18
Payer: MEDICARE

## 2019-11-18 DIAGNOSIS — R00.2 PALPITATIONS: ICD-10-CM

## 2019-11-18 DIAGNOSIS — I10 ESSENTIAL HYPERTENSION: ICD-10-CM

## 2019-11-18 PROCEDURE — 93306 TTE W/DOPPLER COMPLETE: CPT | Performed by: INTERNAL MEDICINE

## 2019-11-18 PROCEDURE — 93306 TTE W/DOPPLER COMPLETE: CPT

## 2019-11-18 PROCEDURE — 93225 XTRNL ECG REC<48 HRS REC: CPT

## 2019-11-18 PROCEDURE — 93226 XTRNL ECG REC<48 HR SCAN A/R: CPT

## 2019-11-25 PROCEDURE — 93227 XTRNL ECG REC<48 HR R&I: CPT

## 2019-12-12 ENCOUNTER — APPOINTMENT (OUTPATIENT)
Dept: LAB | Age: 79
End: 2019-12-12
Payer: MEDICARE

## 2019-12-12 DIAGNOSIS — E06.9 THYROIDITIS: ICD-10-CM

## 2019-12-12 LAB
T4 FREE SERPL-MCNC: 1.37 NG/DL (ref 0.76–1.46)
TSH SERPL DL<=0.05 MIU/L-ACNC: 3.98 UIU/ML (ref 0.36–3.74)

## 2019-12-12 PROCEDURE — 84439 ASSAY OF FREE THYROXINE: CPT

## 2019-12-12 PROCEDURE — 36415 COLL VENOUS BLD VENIPUNCTURE: CPT

## 2019-12-12 PROCEDURE — 86376 MICROSOMAL ANTIBODY EACH: CPT

## 2019-12-12 PROCEDURE — 84443 ASSAY THYROID STIM HORMONE: CPT

## 2019-12-12 PROCEDURE — 86800 THYROGLOBULIN ANTIBODY: CPT

## 2019-12-13 LAB
THYROGLOB AB SERPL-ACNC: 191.8 IU/ML (ref 0–0.9)
THYROPEROXIDASE AB SERPL-ACNC: 12 IU/ML (ref 0–34)

## 2019-12-16 ENCOUNTER — OFFICE VISIT (OUTPATIENT)
Dept: FAMILY MEDICINE CLINIC | Facility: CLINIC | Age: 79
End: 2019-12-16
Payer: MEDICARE

## 2019-12-16 ENCOUNTER — APPOINTMENT (OUTPATIENT)
Dept: LAB | Age: 79
End: 2019-12-16
Payer: MEDICARE

## 2019-12-16 VITALS
SYSTOLIC BLOOD PRESSURE: 130 MMHG | OXYGEN SATURATION: 99 % | HEIGHT: 65 IN | TEMPERATURE: 97.9 F | HEART RATE: 64 BPM | BODY MASS INDEX: 21.86 KG/M2 | WEIGHT: 131.2 LBS | DIASTOLIC BLOOD PRESSURE: 72 MMHG | RESPIRATION RATE: 14 BRPM

## 2019-12-16 DIAGNOSIS — K21.9 GASTROESOPHAGEAL REFLUX DISEASE WITHOUT ESOPHAGITIS: ICD-10-CM

## 2019-12-16 DIAGNOSIS — F41.9 ANXIETY: ICD-10-CM

## 2019-12-16 DIAGNOSIS — M35.3 PMR (POLYMYALGIA RHEUMATICA) (HCC): ICD-10-CM

## 2019-12-16 DIAGNOSIS — M31.6 TEMPORAL ARTERITIS (HCC): Primary | ICD-10-CM

## 2019-12-16 DIAGNOSIS — M35.3 PMR (POLYMYALGIA RHEUMATICA) (HCC): Primary | ICD-10-CM

## 2019-12-16 LAB
CRP SERPL QL: 54.4 MG/L
ERYTHROCYTE [SEDIMENTATION RATE] IN BLOOD: 92 MM/HOUR (ref 0–20)

## 2019-12-16 PROCEDURE — 86140 C-REACTIVE PROTEIN: CPT

## 2019-12-16 PROCEDURE — 85652 RBC SED RATE AUTOMATED: CPT

## 2019-12-16 PROCEDURE — 36415 COLL VENOUS BLD VENIPUNCTURE: CPT

## 2019-12-16 PROCEDURE — 99213 OFFICE O/P EST LOW 20 MIN: CPT | Performed by: INTERNAL MEDICINE

## 2019-12-16 RX ORDER — PREDNISONE 1 MG/1
5 TABLET ORAL DAILY
Qty: 21 TABLET | Refills: 0 | Status: SHIPPED | OUTPATIENT
Start: 2019-12-16 | End: 2019-12-16

## 2019-12-16 RX ORDER — PREDNISONE 10 MG/1
TABLET ORAL
Qty: 40 TABLET | Refills: 0 | Status: SHIPPED | OUTPATIENT
Start: 2019-12-16 | End: 2020-01-07

## 2019-12-16 RX ORDER — PANTOPRAZOLE SODIUM 40 MG/1
40 TABLET, DELAYED RELEASE ORAL EVERY MORNING
Qty: 90 TABLET | Refills: 3 | Status: SHIPPED | OUTPATIENT
Start: 2019-12-16 | End: 2020-11-10 | Stop reason: SDUPTHER

## 2019-12-16 RX ORDER — LEVOMEFOLATE CALCIUM 15 MG
1 TABLET ORAL DAILY
Qty: 90 TABLET | Refills: 3 | Status: SHIPPED | OUTPATIENT
Start: 2019-12-16 | End: 2020-11-10

## 2019-12-17 NOTE — PROGRESS NOTES
Assessment/Plan:         Diagnoses and all orders for this visit:    PMR (polymyalgia rheumatica) ([facility] [address] ); TRY : R/O Temporal Artritis:  -     predniSONE 5 mg tablet; Take 1 tablet (5 mg total) by mouth daily With food/lunch 21/0  -     Sedimentation rate, automated;STAT  Darletta Pac Future  -     C-reactive protein; Future  RTC in 2-3 weeks  Gastroesophageal reflux disease without esophagitis  -     pantoprazole (PROTONIX) 40 mg tablet; Take 1 tablet (40 mg total) by mouth every morning    Anxiety; stable,    -     L-Methylfolate 15 MG TABS; Take 1 tablet (15 mg total) by mouth daily        Subjective:      Patient ID: Anny Delgado is a [de-identified] y o  female  [de-identified] Y O lady is here for worsening joint ache and stiffness, for 2-3 weeks, No other issues,    No recent Blood work, Med list reviewed w pt in Detail    The following portions of the patient's history were reviewed and updated as appropriate: allergies, current medications, past family history, past social history, past surgical history and problem list     Review of Systems   Constitutional: Negative for chills, fatigue and fever  HENT: Negative for congestion, facial swelling, sore throat, trouble swallowing and voice change  Eyes: Negative for pain, discharge and visual disturbance  Respiratory: Negative for cough, shortness of breath and wheezing  Cardiovascular: Negative for chest pain, palpitations and leg swelling  Gastrointestinal: Negative for abdominal pain, blood in stool, constipation, diarrhea and nausea  Endocrine: Negative for polydipsia, polyphagia and polyuria  Genitourinary: Negative for difficulty urinating, hematuria and urgency  Musculoskeletal: Positive for arthralgias, back pain, myalgias and neck pain  Skin: Negative for rash  Neurological: Negative for dizziness, tremors, weakness and headaches  Hematological: Negative for adenopathy  Does not bruise/bleed easily     Psychiatric/Behavioral: Negative for dysphoric mood, sleep disturbance and suicidal ideas  Objective:      /72 (BP Location: Left arm, Patient Position: Sitting, Cuff Size: Standard)   Pulse 64   Temp 97 9 °F (36 6 °C) (Probe)   Resp 14   Ht 5' 5" (1 651 m)   Wt 59 5 kg (131 lb 3 2 oz)   SpO2 99%   BMI 21 83 kg/m²          Physical Exam   Constitutional: She is oriented to person, place, and time  She appears well-nourished  No distress  HENT:   Head: Normocephalic  Mouth/Throat: Oropharynx is clear and moist  No oropharyngeal exudate  Eyes: Pupils are equal, round, and reactive to light  Conjunctivae are normal  No scleral icterus  Neck: Neck supple  No thyromegaly present  Cardiovascular: Normal rate, regular rhythm and normal heart sounds  No murmur heard  Pulmonary/Chest: Effort normal and breath sounds normal  No respiratory distress  She has no wheezes  She has no rales  Abdominal: Soft  Bowel sounds are normal  She exhibits no distension  There is no tenderness  There is no rebound and no guarding  Musculoskeletal: She exhibits no edema or tenderness  Lymphadenopathy:     She has no cervical adenopathy  Neurological: She is alert and oriented to person, place, and time  Skin: No rash noted  No erythema  Psychiatric: She has a normal mood and affect

## 2020-01-08 ENCOUNTER — OFFICE VISIT (OUTPATIENT)
Dept: FAMILY MEDICINE CLINIC | Facility: CLINIC | Age: 80
End: 2020-01-08
Payer: COMMERCIAL

## 2020-01-08 VITALS
SYSTOLIC BLOOD PRESSURE: 134 MMHG | BODY MASS INDEX: 22.16 KG/M2 | WEIGHT: 133 LBS | OXYGEN SATURATION: 96 % | DIASTOLIC BLOOD PRESSURE: 70 MMHG | HEIGHT: 65 IN | RESPIRATION RATE: 14 BRPM | TEMPERATURE: 97.5 F | HEART RATE: 64 BPM

## 2020-01-08 DIAGNOSIS — I10 ESSENTIAL HYPERTENSION: ICD-10-CM

## 2020-01-08 DIAGNOSIS — F41.9 ANXIETY: ICD-10-CM

## 2020-01-08 DIAGNOSIS — R00.2 PALPITATIONS: ICD-10-CM

## 2020-01-08 DIAGNOSIS — M31.6 TEMPORAL ARTERITIS (HCC): Primary | ICD-10-CM

## 2020-01-08 LAB
CRP SERPL QL: 13 MG/L
ERYTHROCYTE [SEDIMENTATION RATE] IN BLOOD: 48 MM/HOUR (ref 0–20)

## 2020-01-08 PROCEDURE — 85652 RBC SED RATE AUTOMATED: CPT | Performed by: INTERNAL MEDICINE

## 2020-01-08 PROCEDURE — 3078F DIAST BP <80 MM HG: CPT | Performed by: INTERNAL MEDICINE

## 2020-01-08 PROCEDURE — 3075F SYST BP GE 130 - 139MM HG: CPT | Performed by: INTERNAL MEDICINE

## 2020-01-08 PROCEDURE — 1160F RVW MEDS BY RX/DR IN RCRD: CPT | Performed by: INTERNAL MEDICINE

## 2020-01-08 PROCEDURE — 99214 OFFICE O/P EST MOD 30 MIN: CPT | Performed by: INTERNAL MEDICINE

## 2020-01-08 PROCEDURE — 36415 COLL VENOUS BLD VENIPUNCTURE: CPT | Performed by: INTERNAL MEDICINE

## 2020-01-08 PROCEDURE — 86140 C-REACTIVE PROTEIN: CPT | Performed by: INTERNAL MEDICINE

## 2020-01-08 RX ORDER — CLONAZEPAM 0.25 MG/1
0.25 TABLET, ORALLY DISINTEGRATING ORAL 2 TIMES DAILY
Qty: 60 TABLET | Refills: 1 | Status: SHIPPED | OUTPATIENT
Start: 2020-01-08 | End: 2020-03-19

## 2020-01-08 RX ORDER — PREDNISONE 1 MG/1
5 TABLET ORAL DAILY
Qty: 30 TABLET | Refills: 0 | Status: SHIPPED | OUTPATIENT
Start: 2020-01-08 | End: 2020-02-12

## 2020-01-08 NOTE — PROGRESS NOTES
Assessment/Plan:         Diagnoses and all orders for this visit:    Temporal arteritis ([facility] [address] ); Improving Nicely  RTC in 2mos w :  -     Sedimentation rate, automated  -     C-reactive protein  Reduce :  -     predniSONE TO  5 mg tablet; Take 1 tablet (5 mg total) by mouth daily With food/Breakfast 30/1    Essential hypertension; Improving nicely  Continue Inderal LA and Renew :  -     clonazePAM (KlonoPIN) 0 25 MG disintegrating tablet; Take 1 tablet (0 25 mg total) by mouth 2 (two) times a day As needed, stop Valium and stop  Buspar Please    RTC in 1-2 mos  Palpitations; as above,  -     clonazePAM (KlonoPIN) 0 25 MG disintegrating tablet; Take 1 tablet (0 25 mg total) by mouth 2 (two) times a day As needed, stop Valium and stop  Buspar Please    PDMP was reviewed    Anxiety  -     clonazePAM (KlonoPIN) 0 25 MG disintegrating tablet; Take 1 tablet (0 25 mg total) by mouth 2 (two) times a day As needed, stop Valium and stop  Buspar Please    Subjective:      Patient ID: Fadia Brownlee is a [de-identified] y o  female  Moreno Jackman is here for Regular Check up and re check from last visit, she feels better, No more arthritis, her ESR is almost Normal now, and CRP is Improving nicely, Med list reviewed w pt in detail    The following portions of the patient's history were reviewed and updated as appropriate: allergies, current medications, past family history, past medical history, past surgical history and problem list     Review of Systems   Constitutional: Negative for chills, fatigue and fever  HENT: Negative for congestion, facial swelling, sore throat, trouble swallowing and voice change  Eyes: Negative for pain, discharge and visual disturbance  Respiratory: Negative for cough, shortness of breath and wheezing  Cardiovascular: Negative for chest pain, palpitations and leg swelling  Gastrointestinal: Negative for abdominal pain, blood in stool, constipation, diarrhea and nausea     Endocrine: Negative for polydipsia, polyphagia and polyuria  Genitourinary: Negative for difficulty urinating, hematuria and urgency  Musculoskeletal: Negative for arthralgias and myalgias  Skin: Negative for rash  Neurological: Negative for dizziness, tremors, weakness and headaches  Hematological: Negative for adenopathy  Does not bruise/bleed easily  Psychiatric/Behavioral: Negative for dysphoric mood, sleep disturbance and suicidal ideas  Objective:      /70 (BP Location: Left arm, Patient Position: Sitting, Cuff Size: Standard)   Pulse 64   Temp 97 5 °F (36 4 °C) (Oral)   Resp 14   Ht 5' 5" (1 651 m)   Wt 60 3 kg (133 lb)   SpO2 96%   BMI 22 13 kg/m²          Physical Exam   Constitutional: She is oriented to person, place, and time  She appears well-nourished  No distress  HENT:   Head: Normocephalic  Mouth/Throat: Oropharynx is clear and moist  No oropharyngeal exudate  Eyes: Pupils are equal, round, and reactive to light  Conjunctivae are normal  No scleral icterus  Neck: Neck supple  No thyromegaly present  Cardiovascular: Normal rate and regular rhythm  Murmur heard  Pulmonary/Chest: Effort normal and breath sounds normal  No respiratory distress  She has no wheezes  She has no rales  Abdominal: Soft  Bowel sounds are normal  She exhibits no distension  There is no tenderness  There is no rebound and no guarding  Musculoskeletal: She exhibits no edema or tenderness  Lymphadenopathy:     She has no cervical adenopathy  Neurological: She is alert and oriented to person, place, and time  No cranial nerve deficit  Coordination normal    Skin: No rash noted  No erythema  Psychiatric: She has a normal mood and affect

## 2020-02-12 ENCOUNTER — OFFICE VISIT (OUTPATIENT)
Dept: FAMILY MEDICINE CLINIC | Facility: CLINIC | Age: 80
End: 2020-02-12
Payer: COMMERCIAL

## 2020-02-12 VITALS
SYSTOLIC BLOOD PRESSURE: 140 MMHG | WEIGHT: 133.6 LBS | HEART RATE: 74 BPM | RESPIRATION RATE: 14 BRPM | OXYGEN SATURATION: 96 % | HEIGHT: 65 IN | DIASTOLIC BLOOD PRESSURE: 88 MMHG | BODY MASS INDEX: 22.26 KG/M2 | TEMPERATURE: 98 F

## 2020-02-12 DIAGNOSIS — I10 ESSENTIAL HYPERTENSION: ICD-10-CM

## 2020-02-12 DIAGNOSIS — F41.9 ANXIETY: ICD-10-CM

## 2020-02-12 DIAGNOSIS — M35.3 PMR (POLYMYALGIA RHEUMATICA) (HCC): Primary | ICD-10-CM

## 2020-02-12 DIAGNOSIS — E04.2 MULTIPLE THYROID NODULES: ICD-10-CM

## 2020-02-12 PROBLEM — R93.0 ABNORMAL CT SCAN, HEAD: Status: ACTIVE | Noted: 2018-01-05

## 2020-02-12 PROCEDURE — 1036F TOBACCO NON-USER: CPT | Performed by: INTERNAL MEDICINE

## 2020-02-12 PROCEDURE — 4040F PNEUMOC VAC/ADMIN/RCVD: CPT | Performed by: INTERNAL MEDICINE

## 2020-02-12 PROCEDURE — 1160F RVW MEDS BY RX/DR IN RCRD: CPT | Performed by: INTERNAL MEDICINE

## 2020-02-12 PROCEDURE — 3008F BODY MASS INDEX DOCD: CPT | Performed by: INTERNAL MEDICINE

## 2020-02-12 PROCEDURE — 3079F DIAST BP 80-89 MM HG: CPT | Performed by: INTERNAL MEDICINE

## 2020-02-12 PROCEDURE — 3077F SYST BP >= 140 MM HG: CPT | Performed by: INTERNAL MEDICINE

## 2020-02-12 PROCEDURE — 99214 OFFICE O/P EST MOD 30 MIN: CPT | Performed by: INTERNAL MEDICINE

## 2020-02-12 RX ORDER — PREDNISONE 2.5 MG
2.5 TABLET ORAL DAILY
Qty: 30 TABLET | Refills: 1 | Status: SHIPPED | OUTPATIENT
Start: 2020-02-12 | End: 2020-03-30 | Stop reason: SDUPTHER

## 2020-02-12 NOTE — PROGRESS NOTES
Assessment/Plan:         Diagnoses and all orders for this visit:    PMR (polymyalgia rheumatica) ([facility] [address] ); Improving Nicely  Reduce :  -     predniSONE 2 5 mg tablet; Take 1 tablet (2 5 mg total) by mouth daily With food/Breakfast 30/1  RTC in 2 mos w :  -     Sedimentation rate, automated; Future  -     C-reactive protein; Future  -     Comprehensive metabolic panel; Future  -     CBC and differential; Future  -     Magnesium; Future  -     UA (URINE) with reflex to Scope    Essential hypertension; stable on Propranolol  RTC in 2-3 mos    Anxiety; stable    Multiple thyroid nodules; RTC in 2-3 mosw  :  -     Thyroid Antibodies Panel; Future  -     T4, free; Future  -     TSH, 3rd generation; Future        Subjective:      Patient ID: Gloria Grossman is a [de-identified] y o  female  [de-identified] Y O lady is here for Regular check up, she feels a Lot better, No new symptoms, her ESR and CRP have Improved Nicely    The following portions of the patient's history were reviewed and updated as appropriate: allergies, current medications, past family history, past medical history, past social history, past surgical history and problem list     Review of Systems   Constitutional: Negative for chills, fatigue and fever  HENT: Negative for congestion, facial swelling, sore throat, trouble swallowing and voice change  Eyes: Negative for pain, discharge and visual disturbance  Respiratory: Negative for cough, shortness of breath and wheezing  Cardiovascular: Negative for chest pain, palpitations and leg swelling  Gastrointestinal: Negative for abdominal pain, blood in stool, constipation, diarrhea and nausea  Endocrine: Negative for polydipsia, polyphagia and polyuria  Genitourinary: Negative for difficulty urinating, hematuria and urgency  Musculoskeletal: Positive for arthralgias  Negative for myalgias  Skin: Negative for rash  Neurological: Negative for dizziness, tremors, weakness and headaches     Hematological: Negative for adenopathy  Does not bruise/bleed easily  Psychiatric/Behavioral: Negative for dysphoric mood, sleep disturbance and suicidal ideas  Objective:      /88 (BP Location: Left arm, Patient Position: Sitting, Cuff Size: Standard)   Pulse 74   Temp 98 °F (36 7 °C) (Probe)   Resp 14   Ht 5' 5" (1 651 m)   Wt 60 6 kg (133 lb 9 6 oz)   SpO2 96%   BMI 22 23 kg/m²          Physical Exam   Constitutional: She is oriented to person, place, and time  She appears well-nourished  No distress  HENT:   Head: Normocephalic  Mouth/Throat: Oropharynx is clear and moist  No oropharyngeal exudate  Eyes: Pupils are equal, round, and reactive to light  Conjunctivae are normal  No scleral icterus  Neck: Neck supple  Thyromegaly present  Cardiovascular: Normal rate and regular rhythm  Murmur heard  Pulmonary/Chest: Effort normal and breath sounds normal  No respiratory distress  She has no wheezes  She has no rales  Abdominal: Soft  Bowel sounds are normal  She exhibits no distension  There is no tenderness  There is no rebound and no guarding  Musculoskeletal: She exhibits tenderness  She exhibits no edema  Pt describes her Arthritis pain improved at least 90%    Lymphadenopathy:     She has no cervical adenopathy  Neurological: She is alert and oriented to person, place, and time  No cranial nerve deficit  Coordination normal    Skin: No rash noted  No erythema  Psychiatric: She has a normal mood and affect

## 2020-03-19 DIAGNOSIS — I10 ESSENTIAL HYPERTENSION: ICD-10-CM

## 2020-03-19 DIAGNOSIS — R00.2 PALPITATIONS: ICD-10-CM

## 2020-03-19 DIAGNOSIS — F41.9 ANXIETY: ICD-10-CM

## 2020-03-19 RX ORDER — CLONAZEPAM 0.25 MG/1
TABLET, ORALLY DISINTEGRATING ORAL
Qty: 60 TABLET | Refills: 1 | Status: SHIPPED | OUTPATIENT
Start: 2020-03-19 | End: 2020-05-20 | Stop reason: SDUPTHER

## 2020-03-30 DIAGNOSIS — M35.3 PMR (POLYMYALGIA RHEUMATICA) (HCC): ICD-10-CM

## 2020-03-30 RX ORDER — PREDNISONE 2.5 MG
2.5 TABLET ORAL DAILY
Qty: 30 TABLET | Refills: 1 | Status: SHIPPED | OUTPATIENT
Start: 2020-03-30 | End: 2020-04-15

## 2020-04-15 ENCOUNTER — TELEMEDICINE (OUTPATIENT)
Dept: FAMILY MEDICINE CLINIC | Facility: CLINIC | Age: 80
End: 2020-04-15
Payer: COMMERCIAL

## 2020-04-15 DIAGNOSIS — M31.6 TEMPORAL ARTERITIS (HCC): Primary | ICD-10-CM

## 2020-04-15 DIAGNOSIS — E78.49 OTHER HYPERLIPIDEMIA: ICD-10-CM

## 2020-04-15 DIAGNOSIS — E04.2 MULTIPLE THYROID NODULES: ICD-10-CM

## 2020-04-15 DIAGNOSIS — I10 ESSENTIAL HYPERTENSION: ICD-10-CM

## 2020-04-15 PROCEDURE — 1036F TOBACCO NON-USER: CPT | Performed by: INTERNAL MEDICINE

## 2020-04-15 PROCEDURE — 3079F DIAST BP 80-89 MM HG: CPT | Performed by: INTERNAL MEDICINE

## 2020-04-15 PROCEDURE — 4040F PNEUMOC VAC/ADMIN/RCVD: CPT | Performed by: INTERNAL MEDICINE

## 2020-04-15 PROCEDURE — 99214 OFFICE O/P EST MOD 30 MIN: CPT | Performed by: INTERNAL MEDICINE

## 2020-04-15 PROCEDURE — 3077F SYST BP >= 140 MM HG: CPT | Performed by: INTERNAL MEDICINE

## 2020-04-15 PROCEDURE — 1160F RVW MEDS BY RX/DR IN RCRD: CPT | Performed by: INTERNAL MEDICINE

## 2020-04-15 RX ORDER — ASPIRIN 81 MG/1
81 TABLET ORAL EVERY OTHER DAY
Qty: 45 TABLET | Refills: 3 | Status: SHIPPED | OUTPATIENT
Start: 2020-04-15 | End: 2021-03-23

## 2020-04-15 RX ORDER — PREDNISONE 1 MG/1
5 TABLET ORAL DAILY
Qty: 30 TABLET | Refills: 1 | Status: SHIPPED | OUTPATIENT
Start: 2020-04-15 | End: 2020-05-20

## 2020-05-14 ENCOUNTER — TRANSCRIBE ORDERS (OUTPATIENT)
Dept: ADMINISTRATIVE | Facility: HOSPITAL | Age: 80
End: 2020-05-14

## 2020-05-14 ENCOUNTER — APPOINTMENT (OUTPATIENT)
Dept: LAB | Age: 80
End: 2020-05-14
Payer: COMMERCIAL

## 2020-05-14 DIAGNOSIS — E04.2 MULTIPLE THYROID NODULES: ICD-10-CM

## 2020-05-14 DIAGNOSIS — M35.3 POLYMYALGIA RHEUMATICA (HCC): ICD-10-CM

## 2020-05-14 DIAGNOSIS — M35.3 PMR (POLYMYALGIA RHEUMATICA) (HCC): ICD-10-CM

## 2020-05-14 DIAGNOSIS — M35.3 POLYMYALGIA RHEUMATICA (HCC): Primary | ICD-10-CM

## 2020-05-14 LAB
ALBUMIN SERPL BCP-MCNC: 3.7 G/DL (ref 3.5–5)
ALP SERPL-CCNC: 52 U/L (ref 46–116)
ALT SERPL W P-5'-P-CCNC: 20 U/L (ref 12–78)
ANION GAP SERPL CALCULATED.3IONS-SCNC: 5 MMOL/L (ref 4–13)
AST SERPL W P-5'-P-CCNC: 13 U/L (ref 5–45)
BACTERIA UR QL AUTO: ABNORMAL /HPF
BASOPHILS # BLD AUTO: 0.07 THOUSANDS/ΜL (ref 0–0.1)
BASOPHILS NFR BLD AUTO: 1 % (ref 0–1)
BILIRUB SERPL-MCNC: 0.69 MG/DL (ref 0.2–1)
BILIRUB UR QL STRIP: NEGATIVE
BUN SERPL-MCNC: 18 MG/DL (ref 5–25)
CALCIUM SERPL-MCNC: 9.3 MG/DL (ref 8.3–10.1)
CHLORIDE SERPL-SCNC: 99 MMOL/L (ref 100–108)
CLARITY UR: CLEAR
CO2 SERPL-SCNC: 29 MMOL/L (ref 21–32)
COLOR UR: YELLOW
CREAT SERPL-MCNC: 1.02 MG/DL (ref 0.6–1.3)
CRP SERPL QL: <3 MG/L
EOSINOPHIL # BLD AUTO: 0.07 THOUSAND/ΜL (ref 0–0.61)
EOSINOPHIL NFR BLD AUTO: 1 % (ref 0–6)
ERYTHROCYTE [DISTWIDTH] IN BLOOD BY AUTOMATED COUNT: 14 % (ref 11.6–15.1)
ERYTHROCYTE [SEDIMENTATION RATE] IN BLOOD: 22 MM/HOUR (ref 0–20)
GFR SERPL CREATININE-BSD FRML MDRD: 52 ML/MIN/1.73SQ M
GLUCOSE P FAST SERPL-MCNC: 80 MG/DL (ref 65–99)
GLUCOSE UR STRIP-MCNC: NEGATIVE MG/DL
HCT VFR BLD AUTO: 39.2 % (ref 34.8–46.1)
HGB BLD-MCNC: 12.7 G/DL (ref 11.5–15.4)
HGB UR QL STRIP.AUTO: ABNORMAL
HYALINE CASTS #/AREA URNS LPF: ABNORMAL /LPF
IMM GRANULOCYTES # BLD AUTO: 0.01 THOUSAND/UL (ref 0–0.2)
IMM GRANULOCYTES NFR BLD AUTO: 0 % (ref 0–2)
KETONES UR STRIP-MCNC: NEGATIVE MG/DL
LEUKOCYTE ESTERASE UR QL STRIP: ABNORMAL
LYMPHOCYTES # BLD AUTO: 2.23 THOUSANDS/ΜL (ref 0.6–4.47)
LYMPHOCYTES NFR BLD AUTO: 31 % (ref 14–44)
MAGNESIUM SERPL-MCNC: 2.3 MG/DL (ref 1.6–2.6)
MCH RBC QN AUTO: 30.8 PG (ref 26.8–34.3)
MCHC RBC AUTO-ENTMCNC: 32.4 G/DL (ref 31.4–37.4)
MCV RBC AUTO: 95 FL (ref 82–98)
MONOCYTES # BLD AUTO: 0.57 THOUSAND/ΜL (ref 0.17–1.22)
MONOCYTES NFR BLD AUTO: 8 % (ref 4–12)
NEUTROPHILS # BLD AUTO: 4.18 THOUSANDS/ΜL (ref 1.85–7.62)
NEUTS SEG NFR BLD AUTO: 59 % (ref 43–75)
NITRITE UR QL STRIP: NEGATIVE
NON-SQ EPI CELLS URNS QL MICRO: ABNORMAL /HPF
NRBC BLD AUTO-RTO: 0 /100 WBCS
PH UR STRIP.AUTO: 7 [PH]
PLATELET # BLD AUTO: 304 THOUSANDS/UL (ref 149–390)
PMV BLD AUTO: 11.5 FL (ref 8.9–12.7)
POTASSIUM SERPL-SCNC: 3.9 MMOL/L (ref 3.5–5.3)
PROT SERPL-MCNC: 7.6 G/DL (ref 6.4–8.2)
PROT UR STRIP-MCNC: NEGATIVE MG/DL
RBC # BLD AUTO: 4.12 MILLION/UL (ref 3.81–5.12)
RBC #/AREA URNS AUTO: ABNORMAL /HPF
SODIUM SERPL-SCNC: 133 MMOL/L (ref 136–145)
SP GR UR STRIP.AUTO: 1.01 (ref 1–1.03)
T4 FREE SERPL-MCNC: 1.13 NG/DL (ref 0.76–1.46)
TSH SERPL DL<=0.05 MIU/L-ACNC: 3.17 UIU/ML (ref 0.36–3.74)
UROBILINOGEN UR QL STRIP.AUTO: 0.2 E.U./DL
WBC # BLD AUTO: 7.13 THOUSAND/UL (ref 4.31–10.16)
WBC #/AREA URNS AUTO: ABNORMAL /HPF

## 2020-05-14 PROCEDURE — 81001 URINALYSIS AUTO W/SCOPE: CPT | Performed by: INTERNAL MEDICINE

## 2020-05-14 PROCEDURE — 80053 COMPREHEN METABOLIC PANEL: CPT

## 2020-05-14 PROCEDURE — 84439 ASSAY OF FREE THYROXINE: CPT

## 2020-05-14 PROCEDURE — 86800 THYROGLOBULIN ANTIBODY: CPT

## 2020-05-14 PROCEDURE — 86140 C-REACTIVE PROTEIN: CPT

## 2020-05-14 PROCEDURE — 86376 MICROSOMAL ANTIBODY EACH: CPT

## 2020-05-14 PROCEDURE — 85025 COMPLETE CBC W/AUTO DIFF WBC: CPT

## 2020-05-14 PROCEDURE — 84443 ASSAY THYROID STIM HORMONE: CPT

## 2020-05-14 PROCEDURE — 83735 ASSAY OF MAGNESIUM: CPT

## 2020-05-14 PROCEDURE — 85652 RBC SED RATE AUTOMATED: CPT

## 2020-05-14 PROCEDURE — 36415 COLL VENOUS BLD VENIPUNCTURE: CPT

## 2020-05-15 LAB
THYROGLOB AB SERPL-ACNC: 35.1 IU/ML (ref 0–0.9)
THYROPEROXIDASE AB SERPL-ACNC: <9 IU/ML (ref 0–34)

## 2020-05-20 ENCOUNTER — OFFICE VISIT (OUTPATIENT)
Dept: FAMILY MEDICINE CLINIC | Facility: CLINIC | Age: 80
End: 2020-05-20
Payer: COMMERCIAL

## 2020-05-20 VITALS
BODY MASS INDEX: 22.66 KG/M2 | SYSTOLIC BLOOD PRESSURE: 138 MMHG | DIASTOLIC BLOOD PRESSURE: 82 MMHG | RESPIRATION RATE: 14 BRPM | HEART RATE: 76 BPM | TEMPERATURE: 97.6 F | OXYGEN SATURATION: 98 % | WEIGHT: 136 LBS | HEIGHT: 65 IN

## 2020-05-20 DIAGNOSIS — F41.9 ANXIETY: ICD-10-CM

## 2020-05-20 DIAGNOSIS — R00.2 PALPITATIONS: ICD-10-CM

## 2020-05-20 DIAGNOSIS — Z00.01 ENCOUNTER FOR GENERAL ADULT MEDICAL EXAMINATION WITH ABNORMAL FINDINGS: Primary | ICD-10-CM

## 2020-05-20 DIAGNOSIS — I10 ESSENTIAL HYPERTENSION: ICD-10-CM

## 2020-05-20 DIAGNOSIS — M31.6 TEMPORAL ARTERITIS (HCC): ICD-10-CM

## 2020-05-20 PROCEDURE — 3075F SYST BP GE 130 - 139MM HG: CPT | Performed by: INTERNAL MEDICINE

## 2020-05-20 PROCEDURE — 3079F DIAST BP 80-89 MM HG: CPT | Performed by: INTERNAL MEDICINE

## 2020-05-20 PROCEDURE — G0439 PPPS, SUBSEQ VISIT: HCPCS | Performed by: INTERNAL MEDICINE

## 2020-05-20 PROCEDURE — 1160F RVW MEDS BY RX/DR IN RCRD: CPT | Performed by: INTERNAL MEDICINE

## 2020-05-20 PROCEDURE — 4040F PNEUMOC VAC/ADMIN/RCVD: CPT | Performed by: INTERNAL MEDICINE

## 2020-05-20 PROCEDURE — 3288F FALL RISK ASSESSMENT DOCD: CPT | Performed by: INTERNAL MEDICINE

## 2020-05-20 PROCEDURE — 1036F TOBACCO NON-USER: CPT | Performed by: INTERNAL MEDICINE

## 2020-05-20 PROCEDURE — 1101F PT FALLS ASSESS-DOCD LE1/YR: CPT | Performed by: INTERNAL MEDICINE

## 2020-05-20 PROCEDURE — 1125F AMNT PAIN NOTED PAIN PRSNT: CPT | Performed by: INTERNAL MEDICINE

## 2020-05-20 PROCEDURE — 1170F FXNL STATUS ASSESSED: CPT | Performed by: INTERNAL MEDICINE

## 2020-05-20 PROCEDURE — 3008F BODY MASS INDEX DOCD: CPT | Performed by: INTERNAL MEDICINE

## 2020-05-20 PROCEDURE — 99214 OFFICE O/P EST MOD 30 MIN: CPT | Performed by: INTERNAL MEDICINE

## 2020-05-20 RX ORDER — PREDNISONE 1 MG/1
5 TABLET ORAL DAILY
Qty: 30 TABLET | Refills: 1 | Status: CANCELLED | OUTPATIENT
Start: 2020-05-20

## 2020-05-20 RX ORDER — CLONAZEPAM 0.25 MG/1
0.25 TABLET, ORALLY DISINTEGRATING ORAL 2 TIMES DAILY
Qty: 60 TABLET | Refills: 1 | Status: SHIPPED | OUTPATIENT
Start: 2020-05-20 | End: 2020-07-07 | Stop reason: SDUPTHER

## 2020-05-20 RX ORDER — PREDNISONE 2.5 MG
2.5 TABLET ORAL DAILY
Qty: 30 TABLET | Refills: 2 | Status: SHIPPED | OUTPATIENT
Start: 2020-05-20 | End: 2020-07-07

## 2020-06-23 ENCOUNTER — TELEPHONE (OUTPATIENT)
Dept: FAMILY MEDICINE CLINIC | Facility: CLINIC | Age: 80
End: 2020-06-23

## 2020-06-24 ENCOUNTER — APPOINTMENT (EMERGENCY)
Dept: CT IMAGING | Facility: HOSPITAL | Age: 80
End: 2020-06-24
Payer: COMMERCIAL

## 2020-06-24 ENCOUNTER — APPOINTMENT (EMERGENCY)
Dept: RADIOLOGY | Facility: HOSPITAL | Age: 80
End: 2020-06-24
Payer: COMMERCIAL

## 2020-06-24 ENCOUNTER — TELEPHONE (OUTPATIENT)
Dept: FAMILY MEDICINE CLINIC | Facility: CLINIC | Age: 80
End: 2020-06-24

## 2020-06-24 ENCOUNTER — HOSPITAL ENCOUNTER (EMERGENCY)
Facility: HOSPITAL | Age: 80
Discharge: HOME | End: 2020-06-24
Attending: EMERGENCY MEDICINE
Payer: COMMERCIAL

## 2020-06-24 VITALS
OXYGEN SATURATION: 98 % | RESPIRATION RATE: 18 BRPM | BODY MASS INDEX: 23.48 KG/M2 | DIASTOLIC BLOOD PRESSURE: 86 MMHG | HEART RATE: 64 BPM | WEIGHT: 141.09 LBS | SYSTOLIC BLOOD PRESSURE: 180 MMHG | TEMPERATURE: 97.4 F

## 2020-06-24 DIAGNOSIS — I10 HYPERTENSION: ICD-10-CM

## 2020-06-24 DIAGNOSIS — R41.3 MEMORY LOSS: Primary | ICD-10-CM

## 2020-06-24 DIAGNOSIS — E87.1 HYPONATREMIA: ICD-10-CM

## 2020-06-24 LAB
ALBUMIN SERPL BCP-MCNC: 3.9 G/DL (ref 3.5–5)
ALP SERPL-CCNC: 56 U/L (ref 46–116)
ALT SERPL W P-5'-P-CCNC: 21 U/L (ref 12–78)
ANION GAP SERPL CALCULATED.3IONS-SCNC: 4 MMOL/L (ref 4–13)
APTT PPP: 28 SECONDS (ref 23–37)
AST SERPL W P-5'-P-CCNC: 16 U/L (ref 5–45)
BACTERIA UR QL AUTO: ABNORMAL /HPF
BASOPHILS # BLD AUTO: 0.04 THOUSANDS/ΜL (ref 0–0.1)
BASOPHILS NFR BLD AUTO: 1 % (ref 0–1)
BILIRUB SERPL-MCNC: 0.48 MG/DL (ref 0.2–1)
BILIRUB UR QL STRIP: NEGATIVE
BUN SERPL-MCNC: 16 MG/DL (ref 5–25)
CALCIUM SERPL-MCNC: 9.3 MG/DL (ref 8.3–10.1)
CHLORIDE SERPL-SCNC: 95 MMOL/L (ref 100–108)
CLARITY UR: CLEAR
CO2 SERPL-SCNC: 33 MMOL/L (ref 21–32)
COLOR UR: YELLOW
CREAT SERPL-MCNC: 1.01 MG/DL (ref 0.6–1.3)
EOSINOPHIL # BLD AUTO: 0.06 THOUSAND/ΜL (ref 0–0.61)
EOSINOPHIL NFR BLD AUTO: 1 % (ref 0–6)
ERYTHROCYTE [DISTWIDTH] IN BLOOD BY AUTOMATED COUNT: 13.3 % (ref 11.6–15.1)
GFR SERPL CREATININE-BSD FRML MDRD: 53 ML/MIN/1.73SQ M
GLUCOSE SERPL-MCNC: 99 MG/DL (ref 65–140)
GLUCOSE UR STRIP-MCNC: NEGATIVE MG/DL
HCT VFR BLD AUTO: 39.2 % (ref 34.8–46.1)
HGB BLD-MCNC: 12.9 G/DL (ref 11.5–15.4)
HGB UR QL STRIP.AUTO: ABNORMAL
IMM GRANULOCYTES # BLD AUTO: 0.03 THOUSAND/UL (ref 0–0.2)
IMM GRANULOCYTES NFR BLD AUTO: 0 % (ref 0–2)
INR PPP: 0.96 (ref 0.84–1.19)
KETONES UR STRIP-MCNC: NEGATIVE MG/DL
LEUKOCYTE ESTERASE UR QL STRIP: ABNORMAL
LYMPHOCYTES # BLD AUTO: 1.1 THOUSANDS/ΜL (ref 0.6–4.47)
LYMPHOCYTES NFR BLD AUTO: 14 % (ref 14–44)
MAGNESIUM SERPL-MCNC: 1.9 MG/DL (ref 1.6–2.6)
MCH RBC QN AUTO: 31.3 PG (ref 26.8–34.3)
MCHC RBC AUTO-ENTMCNC: 32.9 G/DL (ref 31.4–37.4)
MCV RBC AUTO: 95 FL (ref 82–98)
MONOCYTES # BLD AUTO: 0.41 THOUSAND/ΜL (ref 0.17–1.22)
MONOCYTES NFR BLD AUTO: 5 % (ref 4–12)
NEUTROPHILS # BLD AUTO: 6.33 THOUSANDS/ΜL (ref 1.85–7.62)
NEUTS SEG NFR BLD AUTO: 79 % (ref 43–75)
NITRITE UR QL STRIP: NEGATIVE
NON-SQ EPI CELLS URNS QL MICRO: ABNORMAL /HPF
NRBC BLD AUTO-RTO: 0 /100 WBCS
NT-PROBNP SERPL-MCNC: 369 PG/ML
PH UR STRIP.AUTO: 7.5 [PH] (ref 4.5–8)
PLATELET # BLD AUTO: 285 THOUSANDS/UL (ref 149–390)
PMV BLD AUTO: 9.9 FL (ref 8.9–12.7)
POTASSIUM SERPL-SCNC: 3.5 MMOL/L (ref 3.5–5.3)
PROT SERPL-MCNC: 7.8 G/DL (ref 6.4–8.2)
PROT UR STRIP-MCNC: NEGATIVE MG/DL
PROTHROMBIN TIME: 12.9 SECONDS (ref 11.6–14.5)
RBC # BLD AUTO: 4.12 MILLION/UL (ref 3.81–5.12)
RBC #/AREA URNS AUTO: ABNORMAL /HPF
SODIUM SERPL-SCNC: 132 MMOL/L (ref 136–145)
SP GR UR STRIP.AUTO: 1.01 (ref 1–1.03)
TROPONIN I SERPL-MCNC: <0.02 NG/ML
TSH SERPL DL<=0.05 MIU/L-ACNC: 2 UIU/ML (ref 0.36–3.74)
UROBILINOGEN UR QL STRIP.AUTO: 0.2 E.U./DL
WBC # BLD AUTO: 7.97 THOUSAND/UL (ref 4.31–10.16)
WBC #/AREA URNS AUTO: ABNORMAL /HPF

## 2020-06-24 PROCEDURE — 71045 X-RAY EXAM CHEST 1 VIEW: CPT

## 2020-06-24 PROCEDURE — 85610 PROTHROMBIN TIME: CPT | Performed by: EMERGENCY MEDICINE

## 2020-06-24 PROCEDURE — 83880 ASSAY OF NATRIURETIC PEPTIDE: CPT | Performed by: EMERGENCY MEDICINE

## 2020-06-24 PROCEDURE — 85730 THROMBOPLASTIN TIME PARTIAL: CPT | Performed by: EMERGENCY MEDICINE

## 2020-06-24 PROCEDURE — 99285 EMERGENCY DEPT VISIT HI MDM: CPT

## 2020-06-24 PROCEDURE — 93005 ELECTROCARDIOGRAM TRACING: CPT

## 2020-06-24 PROCEDURE — 84443 ASSAY THYROID STIM HORMONE: CPT | Performed by: EMERGENCY MEDICINE

## 2020-06-24 PROCEDURE — 36415 COLL VENOUS BLD VENIPUNCTURE: CPT | Performed by: EMERGENCY MEDICINE

## 2020-06-24 PROCEDURE — 99285 EMERGENCY DEPT VISIT HI MDM: CPT | Performed by: EMERGENCY MEDICINE

## 2020-06-24 PROCEDURE — 81001 URINALYSIS AUTO W/SCOPE: CPT

## 2020-06-24 PROCEDURE — 70450 CT HEAD/BRAIN W/O DYE: CPT

## 2020-06-24 PROCEDURE — 85025 COMPLETE CBC W/AUTO DIFF WBC: CPT | Performed by: EMERGENCY MEDICINE

## 2020-06-24 PROCEDURE — 84484 ASSAY OF TROPONIN QUANT: CPT | Performed by: EMERGENCY MEDICINE

## 2020-06-24 PROCEDURE — 80053 COMPREHEN METABOLIC PANEL: CPT | Performed by: EMERGENCY MEDICINE

## 2020-06-24 PROCEDURE — 83735 ASSAY OF MAGNESIUM: CPT | Performed by: EMERGENCY MEDICINE

## 2020-06-25 LAB
ATRIAL RATE: 61 BPM
P AXIS: 62 DEGREES
PR INTERVAL: 140 MS
QRS AXIS: 77 DEGREES
QRSD INTERVAL: 88 MS
QT INTERVAL: 382 MS
QTC INTERVAL: 384 MS
T WAVE AXIS: 90 DEGREES
VENTRICULAR RATE: 61 BPM

## 2020-06-25 PROCEDURE — 93010 ELECTROCARDIOGRAM REPORT: CPT | Performed by: INTERNAL MEDICINE

## 2020-07-07 ENCOUNTER — OFFICE VISIT (OUTPATIENT)
Dept: FAMILY MEDICINE CLINIC | Facility: CLINIC | Age: 80
End: 2020-07-07
Payer: COMMERCIAL

## 2020-07-07 ENCOUNTER — TELEPHONE (OUTPATIENT)
Dept: NEUROLOGY | Facility: CLINIC | Age: 80
End: 2020-07-07

## 2020-07-07 VITALS
WEIGHT: 140.2 LBS | DIASTOLIC BLOOD PRESSURE: 82 MMHG | OXYGEN SATURATION: 98 % | SYSTOLIC BLOOD PRESSURE: 136 MMHG | HEIGHT: 65 IN | RESPIRATION RATE: 14 BRPM | HEART RATE: 68 BPM | BODY MASS INDEX: 23.36 KG/M2 | TEMPERATURE: 98.2 F

## 2020-07-07 DIAGNOSIS — F41.9 ANXIETY: ICD-10-CM

## 2020-07-07 DIAGNOSIS — R00.2 PALPITATIONS: ICD-10-CM

## 2020-07-07 DIAGNOSIS — R41.3 MEMORY LOSS: Primary | ICD-10-CM

## 2020-07-07 DIAGNOSIS — M31.6 TEMPORAL ARTERITIS (HCC): ICD-10-CM

## 2020-07-07 DIAGNOSIS — I10 ESSENTIAL HYPERTENSION: ICD-10-CM

## 2020-07-07 PROCEDURE — 3079F DIAST BP 80-89 MM HG: CPT | Performed by: INTERNAL MEDICINE

## 2020-07-07 PROCEDURE — 3008F BODY MASS INDEX DOCD: CPT | Performed by: INTERNAL MEDICINE

## 2020-07-07 PROCEDURE — 1036F TOBACCO NON-USER: CPT | Performed by: INTERNAL MEDICINE

## 2020-07-07 PROCEDURE — 3075F SYST BP GE 130 - 139MM HG: CPT | Performed by: INTERNAL MEDICINE

## 2020-07-07 PROCEDURE — 1160F RVW MEDS BY RX/DR IN RCRD: CPT | Performed by: INTERNAL MEDICINE

## 2020-07-07 PROCEDURE — 4040F PNEUMOC VAC/ADMIN/RCVD: CPT | Performed by: INTERNAL MEDICINE

## 2020-07-07 PROCEDURE — 99214 OFFICE O/P EST MOD 30 MIN: CPT | Performed by: INTERNAL MEDICINE

## 2020-07-07 RX ORDER — CLONAZEPAM 0.25 MG/1
0.25 TABLET, ORALLY DISINTEGRATING ORAL 2 TIMES DAILY
Qty: 60 TABLET | Refills: 1 | Status: SHIPPED | OUTPATIENT
Start: 2020-07-07 | End: 2020-07-23

## 2020-07-07 RX ORDER — PREDNISONE 2.5 MG
2.5 TABLET ORAL DAILY
Qty: 30 TABLET | Refills: 2 | Status: SHIPPED | OUTPATIENT
Start: 2020-07-07 | End: 2020-08-06 | Stop reason: SDUPTHER

## 2020-07-07 NOTE — PROGRESS NOTES
Assessment/Plan:         Diagnoses and all orders for this visit:    Memory loss;   -     Ambulatory referral to Neurology; ASAP  RTC in 1-2 mos w :  -     Comprehensive metabolic panel; Future  -     RPR; Future  -     Folate; Future  -     Sedimentation rate, automated; Future  -     C-reactive protein; Future  -     Lipid panel; Future  -     CBC and differential; Future  -     Ferritin; Future  -     Vitamin B12; Future    Temporal arteritis ([address] ); stable on ;  -     predniSONE 2 5 mg tablet; Take 1 tablet (2 5 mg total) by mouth daily    Essential hypertension; Improved  Continue same  Renew :  -     clonazePAM (KlonoPIN) 0 25 MG disintegrating tablet; Take 1 tablet (0 25 mg total) by mouth 2 (two) times a day    Palpitations; as above     -     clonazePAM (KlonoPIN) 0 25 MG disintegrating tablet; Take 1 tablet (0 25 mg total) by mouth 2 (two) times a day  PDMP was reviewed    Anxiety; renew :  -     clonazePAM (KlonoPIN) 0 25 MG disintegrating tablet; Take 1 tablet (0 25 mg total) by mouth 2 (two) times a day        Subjective:      Patient ID: Yonatan Wahl is a [de-identified] y o  female  [de-identified] Y O lady is here for Regular check up, her daughter is concerned about her memory, short term, recent Blood work, [employer], and Med list all reviewed w pt in detail    The following portions of the patient's history were reviewed and updated as appropriate: allergies, current medications, past family history, past medical history, past social history, past surgical history and problem list     Review of Systems   Constitutional: Negative for chills, fatigue and fever  HENT: Negative for congestion, facial swelling, sore throat, trouble swallowing and voice change  Eyes: Negative for pain, discharge and visual disturbance  Respiratory: Negative for cough, shortness of breath and wheezing  Cardiovascular: Negative for chest pain, palpitations and leg swelling     Gastrointestinal: Negative for abdominal pain, blood in stool, constipation, diarrhea and nausea  Endocrine: Negative for polydipsia, polyphagia and polyuria  Genitourinary: Negative for difficulty urinating, hematuria and urgency  Musculoskeletal: Negative for arthralgias and myalgias  Skin: Negative for rash  Neurological: Negative for dizziness, tremors, weakness and headaches  Hematological: Negative for adenopathy  Does not bruise/bleed easily  Psychiatric/Behavioral: Negative for dysphoric mood, sleep disturbance and suicidal ideas  The patient is nervous/anxious  Objective:      /82 (BP Location: Left arm, Patient Position: Sitting, Cuff Size: Standard)   Pulse 68   Temp 98 2 °F (36 8 °C) (Probe)   Resp 14   Ht 5' 5" (1 651 m)   Wt 63 6 kg (140 lb 3 2 oz)   SpO2 98%   BMI 23 33 kg/m²          Physical Exam   Constitutional: She is oriented to person, place, and time  She appears well-nourished  No distress  HENT:   Head: Normocephalic  Mouth/Throat: Oropharynx is clear and moist  No oropharyngeal exudate  Eyes: Pupils are equal, round, and reactive to light  Conjunctivae are normal  No scleral icterus  Neck: Neck supple  No thyromegaly present  Cardiovascular: Normal rate and regular rhythm  Murmur heard  Pulmonary/Chest: Effort normal and breath sounds normal  No respiratory distress  She has no wheezes  She has no rales  Abdominal: Soft  Bowel sounds are normal  She exhibits no distension  There is no tenderness  There is no rebound and no guarding  Musculoskeletal: She exhibits no edema or tenderness  Lymphadenopathy:     She has no cervical adenopathy  Neurological: She is alert and oriented to person, place, and time  No cranial nerve deficit  Coordination normal    Skin: No rash noted  No erythema  Psychiatric: She has a normal mood and affect

## 2020-07-07 NOTE — TELEPHONE ENCOUNTER
Best contact number for [facility]:[phone]    Emergency Contact name and number:[phone]    Referring provider and telephone number: Oskar Guzmán     Primary Care Provider Name and if affiliated with [facility]: Parag Nick     Reason for Appointment/Dx:Memory     Neurology Location patient would like to be seen:[place]    Order received? Yes                                                Records Received? Yes in Epic [facility]    Have you ever seen another Neurologist?       [address] [facility] med comp    ID/Policy #:    Secondary Insurance:    ID/Policy#:       Workman's Comp/ Accident/ School  Information      Workman's Comp/Accident/School related?       no    If yes name of Insurance company:    Date of Injury:    Type of Injury:    [address] Name and Telephone Number:    Notes:Appointment schedule with patient daughter new patient pack sent                    Appointment date: 10-1-2020 11:00am with Dr Nkechi Johnson

## 2020-07-14 ENCOUNTER — TELEPHONE (OUTPATIENT)
Dept: FAMILY MEDICINE CLINIC | Facility: CLINIC | Age: 80
End: 2020-07-14

## 2020-07-14 ENCOUNTER — HOSPITAL ENCOUNTER (OUTPATIENT)
Facility: HOSPITAL | Age: 80
Setting detail: OBSERVATION
Discharge: HOME | End: 2020-07-15
Attending: EMERGENCY MEDICINE | Admitting: HOSPITALIST
Payer: COMMERCIAL

## 2020-07-14 ENCOUNTER — APPOINTMENT (EMERGENCY)
Dept: RADIOLOGY | Facility: HOSPITAL | Age: 80
End: 2020-07-14
Payer: COMMERCIAL

## 2020-07-14 DIAGNOSIS — R07.9 CHEST PAIN: Primary | ICD-10-CM

## 2020-07-14 PROBLEM — R07.89 CHEST PRESSURE: Status: ACTIVE | Noted: 2020-07-14

## 2020-07-14 PROBLEM — I16.0 HYPERTENSIVE URGENCY: Status: ACTIVE | Noted: 2020-07-14

## 2020-07-14 PROBLEM — E87.1 HYPONATREMIA: Status: ACTIVE | Noted: 2020-07-14

## 2020-07-14 PROBLEM — M35.3 POLYMYALGIA RHEUMATICA (HCC): Status: ACTIVE | Noted: 2020-07-14

## 2020-07-14 LAB
ALBUMIN SERPL BCP-MCNC: 3.9 G/DL (ref 3.5–5)
ALP SERPL-CCNC: 51 U/L (ref 46–116)
ALT SERPL W P-5'-P-CCNC: 18 U/L (ref 12–78)
ANION GAP SERPL CALCULATED.3IONS-SCNC: 7 MMOL/L (ref 4–13)
AST SERPL W P-5'-P-CCNC: 13 U/L (ref 5–45)
ATRIAL RATE: 64 BPM
BASOPHILS # BLD AUTO: 0.04 THOUSANDS/ΜL (ref 0–0.1)
BASOPHILS NFR BLD AUTO: 1 % (ref 0–1)
BILIRUB SERPL-MCNC: 0.44 MG/DL (ref 0.2–1)
BUN SERPL-MCNC: 17 MG/DL (ref 5–25)
CALCIUM SERPL-MCNC: 9.1 MG/DL (ref 8.3–10.1)
CHLORIDE SERPL-SCNC: 95 MMOL/L (ref 100–108)
CO2 SERPL-SCNC: 31 MMOL/L (ref 21–32)
CREAT SERPL-MCNC: 0.9 MG/DL (ref 0.6–1.3)
EOSINOPHIL # BLD AUTO: 0.03 THOUSAND/ΜL (ref 0–0.61)
EOSINOPHIL NFR BLD AUTO: 0 % (ref 0–6)
ERYTHROCYTE [DISTWIDTH] IN BLOOD BY AUTOMATED COUNT: 13.1 % (ref 11.6–15.1)
GFR SERPL CREATININE-BSD FRML MDRD: 61 ML/MIN/1.73SQ M
GLUCOSE SERPL-MCNC: 115 MG/DL (ref 65–140)
HCT VFR BLD AUTO: 39 % (ref 34.8–46.1)
HGB BLD-MCNC: 12.9 G/DL (ref 11.5–15.4)
IMM GRANULOCYTES # BLD AUTO: 0.02 THOUSAND/UL (ref 0–0.2)
IMM GRANULOCYTES NFR BLD AUTO: 0 % (ref 0–2)
LYMPHOCYTES # BLD AUTO: 1.43 THOUSANDS/ΜL (ref 0.6–4.47)
LYMPHOCYTES NFR BLD AUTO: 21 % (ref 14–44)
MCH RBC QN AUTO: 31.5 PG (ref 26.8–34.3)
MCHC RBC AUTO-ENTMCNC: 33.1 G/DL (ref 31.4–37.4)
MCV RBC AUTO: 95 FL (ref 82–98)
MONOCYTES # BLD AUTO: 0.5 THOUSAND/ΜL (ref 0.17–1.22)
MONOCYTES NFR BLD AUTO: 7 % (ref 4–12)
NEUTROPHILS # BLD AUTO: 4.82 THOUSANDS/ΜL (ref 1.85–7.62)
NEUTS SEG NFR BLD AUTO: 71 % (ref 43–75)
NRBC BLD AUTO-RTO: 0 /100 WBCS
P AXIS: 51 DEGREES
PLATELET # BLD AUTO: 298 THOUSANDS/UL (ref 149–390)
PMV BLD AUTO: 10.1 FL (ref 8.9–12.7)
POTASSIUM SERPL-SCNC: 4.1 MMOL/L (ref 3.5–5.3)
PR INTERVAL: 152 MS
PROT SERPL-MCNC: 7.8 G/DL (ref 6.4–8.2)
QRS AXIS: 58 DEGREES
QRSD INTERVAL: 84 MS
QT INTERVAL: 380 MS
QTC INTERVAL: 392 MS
RBC # BLD AUTO: 4.1 MILLION/UL (ref 3.81–5.12)
SODIUM SERPL-SCNC: 133 MMOL/L (ref 136–145)
T WAVE AXIS: 83 DEGREES
TROPONIN I SERPL-MCNC: <0.02 NG/ML
VENTRICULAR RATE: 64 BPM
WBC # BLD AUTO: 6.84 THOUSAND/UL (ref 4.31–10.16)

## 2020-07-14 PROCEDURE — 80053 COMPREHEN METABOLIC PANEL: CPT | Performed by: EMERGENCY MEDICINE

## 2020-07-14 PROCEDURE — 71046 X-RAY EXAM CHEST 2 VIEWS: CPT

## 2020-07-14 PROCEDURE — 99285 EMERGENCY DEPT VISIT HI MDM: CPT

## 2020-07-14 PROCEDURE — 99285 EMERGENCY DEPT VISIT HI MDM: CPT | Performed by: EMERGENCY MEDICINE

## 2020-07-14 PROCEDURE — 84484 ASSAY OF TROPONIN QUANT: CPT | Performed by: PHYSICIAN ASSISTANT

## 2020-07-14 PROCEDURE — 96374 THER/PROPH/DIAG INJ IV PUSH: CPT

## 2020-07-14 PROCEDURE — 93010 ELECTROCARDIOGRAM REPORT: CPT | Performed by: INTERNAL MEDICINE

## 2020-07-14 PROCEDURE — 99220 PR INITIAL OBSERVATION CARE/DAY 70 MINUTES: CPT | Performed by: INTERNAL MEDICINE

## 2020-07-14 PROCEDURE — 84484 ASSAY OF TROPONIN QUANT: CPT | Performed by: EMERGENCY MEDICINE

## 2020-07-14 PROCEDURE — 36415 COLL VENOUS BLD VENIPUNCTURE: CPT | Performed by: EMERGENCY MEDICINE

## 2020-07-14 PROCEDURE — 85025 COMPLETE CBC W/AUTO DIFF WBC: CPT | Performed by: EMERGENCY MEDICINE

## 2020-07-14 PROCEDURE — 93005 ELECTROCARDIOGRAM TRACING: CPT

## 2020-07-14 RX ORDER — LOSARTAN POTASSIUM 50 MG/1
50 TABLET ORAL DAILY
Status: DISCONTINUED | OUTPATIENT
Start: 2020-07-15 | End: 2020-07-15 | Stop reason: HOSPADM

## 2020-07-14 RX ORDER — HYDRALAZINE HYDROCHLORIDE 20 MG/ML
5 INJECTION INTRAMUSCULAR; INTRAVENOUS ONCE
Status: DISCONTINUED | OUTPATIENT
Start: 2020-07-14 | End: 2020-07-14

## 2020-07-14 RX ORDER — ASPIRIN 325 MG
325 TABLET ORAL ONCE
Status: DISCONTINUED | OUTPATIENT
Start: 2020-07-14 | End: 2020-07-15 | Stop reason: HOSPADM

## 2020-07-14 RX ORDER — PROPRANOLOL HCL 60 MG
60 CAPSULE, EXTENDED RELEASE 24HR ORAL DAILY
Status: DISCONTINUED | OUTPATIENT
Start: 2020-07-15 | End: 2020-07-15 | Stop reason: HOSPADM

## 2020-07-14 RX ORDER — HYDRALAZINE HYDROCHLORIDE 20 MG/ML
5 INJECTION INTRAMUSCULAR; INTRAVENOUS ONCE AS NEEDED
Status: DISCONTINUED | OUTPATIENT
Start: 2020-07-14 | End: 2020-07-15 | Stop reason: HOSPADM

## 2020-07-14 RX ORDER — PANTOPRAZOLE SODIUM 40 MG/1
40 TABLET, DELAYED RELEASE ORAL
Status: DISCONTINUED | OUTPATIENT
Start: 2020-07-15 | End: 2020-07-15 | Stop reason: HOSPADM

## 2020-07-14 RX ORDER — CLONAZEPAM 0.5 MG/1
0.25 TABLET ORAL 2 TIMES DAILY
Status: DISCONTINUED | OUTPATIENT
Start: 2020-07-14 | End: 2020-07-15 | Stop reason: HOSPADM

## 2020-07-14 RX ORDER — PREDNISONE 2.5 MG
2.5 TABLET ORAL DAILY
Status: DISCONTINUED | OUTPATIENT
Start: 2020-07-15 | End: 2020-07-15 | Stop reason: HOSPADM

## 2020-07-14 RX ORDER — ONDANSETRON 2 MG/ML
4 INJECTION INTRAMUSCULAR; INTRAVENOUS EVERY 6 HOURS PRN
Status: DISCONTINUED | OUTPATIENT
Start: 2020-07-14 | End: 2020-07-15 | Stop reason: HOSPADM

## 2020-07-14 RX ORDER — ACETAMINOPHEN 325 MG/1
650 TABLET ORAL EVERY 4 HOURS PRN
Status: DISCONTINUED | OUTPATIENT
Start: 2020-07-14 | End: 2020-07-15 | Stop reason: HOSPADM

## 2020-07-14 RX ORDER — ASPIRIN 81 MG/1
81 TABLET ORAL EVERY OTHER DAY
Status: DISCONTINUED | OUTPATIENT
Start: 2020-07-15 | End: 2020-07-15 | Stop reason: HOSPADM

## 2020-07-14 RX ORDER — HYDRALAZINE HYDROCHLORIDE 20 MG/ML
5 INJECTION INTRAMUSCULAR; INTRAVENOUS ONCE AS NEEDED
Status: COMPLETED | OUTPATIENT
Start: 2020-07-14 | End: 2020-07-14

## 2020-07-14 RX ADMIN — CLONAZEPAM 0.25 MG: 0.5 TABLET ORAL at 20:42

## 2020-07-14 RX ADMIN — HYDRALAZINE HYDROCHLORIDE 5 MG: 20 INJECTION INTRAMUSCULAR; INTRAVENOUS at 16:48

## 2020-07-14 NOTE — ASSESSMENT & PLAN NOTE
Presenting with a complaint of left-sided chest pressure and nausea  Denies history of CAD, stents, prior cardiac catheterization  Initial troponin less than 0 02  Trend x3  Initial EKG sinus rhythm with inverted T-waves in leads AVR, aVL, V1, V2  CXR without widened mediastinum  Positive family history- father with MI at age 52  No hx of CAD or prior cardiac caths  Give  now   Continue ASA 81 daily  If trops remain negative would benefit from inpatient stress test

## 2020-07-14 NOTE — ED ATTENDING ATTESTATION
7/14/2020  I, Sean Maldonado MD, saw and evaluated the patient  I have discussed the patient with the resident/non-physician practitioner and agree with the resident's/non-physician practitioner's findings, Plan of Care, and MDM as documented in the resident's/non-physician practitioner's note, except where noted  All available labs and Radiology studies were reviewed  I was present for key portions of any procedure(s) performed by the resident/non-physician practitioner and I was immediately available to provide assistance  At this point I agree with the current assessment done in the Emergency Department  I have conducted an independent evaluation of this patient a history and physical is as follows:  Patient with left sided chest pain, started about 1 hour back, nonradiating, intermittent, has had similar symptoms in past intermittently, on [address]; no fever or cough, no sick contacts, no travel history  No acute distress, VSS, Lungs CTA, CVA, Abd soft, NTND, no calf tenderness or swelling  Impression: Nonspecific left sided chest pain, non-radiating, no hx of prior DVT/PE, we will check cardiac labs, EKG, CXR  ED Course  ED Course as of Jul 14 2150 Tue Jul 14, 2020   1640 WBC: 6 84   1640 Hemoglobin: 12 9   1640 Platelet Count: 337   1640 Sodium(!): 133   1640 Potassium: 4 1   1640 BUN: 17   1640 Creatinine: 0 90   1640 Labs, EKG, CXR reviewed, no significant acute abnormality  Troponin I: <0 02   1641 Blood Pressure(!): 208/97   1641 BP checked in both arms, no significant acute abnormality, will give order dose of Hydralazine PRN for SBP more than 180  Blood Pressure(!): 205/91     Note:  Patient admitted for ACS rule out, and accelerated hypertension requiring IV meds      Critical Care Time  CriticalCare Time  Performed by: Sean Maldonado MD  Authorized by: Sean Maldonado MD     Critical care provider statement:     Critical care time (minutes):  30    Critical care time was exclusive of: Separately billable procedures and treating other patients and teaching time    Critical care was necessary to treat or prevent imminent or life-threatening deterioration of the following conditions:  Circulatory failure (Accelerated hypertension, requiring IV meds)    Critical care was time spent personally by me on the following activities:  Blood draw for specimens, obtaining history from patient or surrogate, development of treatment plan with patient or surrogate, discussions with consultants, evaluation of patient's response to treatment, examination of patient, interpretation of cardiac output measurements, ordering and performing treatments and interventions, ordering and review of laboratory studies, ordering and review of radiographic studies, re-evaluation of patient's condition and review of old charts    I assumed direction of critical care for this patient from another provider in my specialty: no

## 2020-07-14 NOTE — ASSESSMENT & PLAN NOTE
Lipid panel from 08/14/2019:  Cholesterol 290, triglycerides 135, HDL 69,   History of hyperlipidemia but was taken off of statin a few months ago  Recheck here in the setting of ACS rule out

## 2020-07-14 NOTE — ASSESSMENT & PLAN NOTE
Symptomatic with left sided chest pressure and nausea  Blood pressures initially 222/101  Received IV hydralazine 5 mg in ED  Most recent blood pressure 192/87  Caution not to drop the blood pressure too quickly  Home regimen propanolol 60 mg daily, azilsartan medoxomil 40 mg daily  Continue propanolol and provide formulary ARB - losartan 50 mg daily  Add IV hydralazine 5 mg for SBP greater than 180

## 2020-07-14 NOTE — H&P
H&P- [address]ni [address] 1940, [de-identified] y o  female MRN: [id number]    Unit/Bed#: E4 -01 Encounter: [id number]    Primary Care Provider: Ap Felipe MD   Date and time admitted to hospital: 7/14/2020  3:15 PM        * Hypertensive urgency  Assessment & Plan  Symptomatic with left sided chest pressure and nausea  Blood pressures initially 222/101  Received IV hydralazine 5 mg in ED  Most recent blood pressure 192/87  Caution not to drop the blood pressure too quickly  Home regimen propanolol 60 mg daily, azilsartan medoxomil 40 mg daily  Continue propanolol and provide formulary ARB - losartan 50 mg daily  Add IV hydralazine 5 mg for SBP greater than 180  Chest pressure  Assessment & Plan  Presenting with a complaint of left-sided chest pressure and nausea  Denies history of CAD, stents, prior cardiac catheterization  Initial troponin less than 0 02  Trend x3  Initial EKG sinus rhythm with inverted T-waves in leads AVR, aVL, V1, V2  CXR without widened mediastinum  Positive family history- father with MI at age 52  No hx of CAD or prior cardiac caths  Give  now  Continue ASA 81 daily  If trops remain negative would benefit from inpatient stress test      Polymyalgia rheumatica (HCC)  Assessment & Plan  Maintained on prednisone 2 5 mg daily    Hyponatremia  Assessment & Plan  Sodium level at 133  Continue to trend    Hyperlipidemia  Assessment & Plan  Lipid panel from 08/14/2019:  Cholesterol 290, triglycerides 135, HDL 69,   History of hyperlipidemia but was taken off of statin a few months ago  Recheck here in the setting of ACS rule out    Gastroesophageal reflux disease without esophagitis  Assessment & Plan  Continue Protonix      VTE Prophylaxis: Enoxaparin (Lovenox)  / sequential compression device   Code Status:  Full code  Anticipated Length of Stay:  Patient will be admitted on an Observation basis with an anticipated length of stay of  less than 2 midnights  Justification for Hospital Stay:  ACS rule out / hypertensive urgency with need for further blood pressure management    Chief Complaint:   Chest pressure    History of Present Illness:    Jaylen Sewell is a [de-identified] y o  female past medical history of hypertension, hyperlipidemia, GERD  Pt is a poor historian and has trouble remembering details  She presents with the complaint of left sided chest discomfort  Pt was at home with her   Ate cheeze-its for lunch and then sat outside  Shortly after she complained of not feeling well  Decided to go inside to lie down   states she returned 20 mins later with chest pain  She describes it as an achy sensation and felt nauseous  Denies associated shortness of breath, diaphoresis, jaw, neck, arm pain  States it felt like indegestion however this was worse than what she normally experiences  Denies any lightheadedness, dizziness  At that time  states they attempted to take her blood pressure however cuff was not functioning and batteries needed to be changed  Initial reading was 187/102   called son and then pt's PCP who advised to come to the hospital  Denies noncompliance with BP medications at home- takes     Patient herself denies any history of CAD, stents, or prior cardiac catheterizations  She had a stress test however is unable to recall when this was  Patient lives at home with   Patient does not use any assistive devices to ambulate  Review of Systems:    General:   No Fever or chills;    EENT:   No ear pain, facial swelling; No sneezing, sore throat  Skin:   No rashes, color changes  Respiratory:     No shortness of breath, cough, wheezing, stridor  Cardiovascular:     + chest pressure/ache, palpitations  Gastrointestinal:    No nausea, vomiting, diarrhea; No abdominal pain  Musculoskeletal:     No arthralgias, myalgias, swelling  Neurologic:   No dizziness, numbness, weakness  No speech difficulties     Psych: No agitation, suicidal ideations  Otherwise, All other twelve-point review of systems normal      Past Medical and Surgical History:     Past Medical History:   Diagnosis Date    Anxiety     Gastroesophageal reflux disease without esophagitis 8/28/2018    Hyperlipidemia     Hypertension     Hypothyroidism     Osteoarthritis 10/5/2012       Past Surgical History:   Procedure Laterality Date    CATARACT EXTRACTION      CHOLECYSTECTOMY      HYSTERECTOMY         Meds/Allergies:    Current Facility-Administered Medications   Medication Dose Route Frequency Provider Last Rate Last Dose    acetaminophen (TYLENOL) tablet 650 mg  650 mg Oral Q4H PRN Elie Macias PA-C        [START ON 7/15/2020] aspirin (ECOTRIN LOW STRENGTH) EC tablet 81 mg  81 mg Oral Every Other Day Elie Macias PA-C        aspirin tablet 325 mg  325 mg Oral Once Elie Macias PA-C        clonazePAM (KlonoPIN) tablet 0 25 mg  0 25 mg Oral BID Mira Gant PA-C        [START ON 7/15/2020] enoxaparin (LOVENOX) subcutaneous injection 40 mg  40 mg Subcutaneous Daily Mira Gant PA-C        hydrALAZINE (APRESOLINE) injection 5 mg  5 mg Intravenous Once PRN Elie Macias PA-C        [START ON 7/15/2020] losartan (COZAAR) tablet 50 mg  50 mg Oral Daily Mira Gant PA-C        ondansetron (ZOFRAN) injection 4 mg  4 mg Intravenous Q6H PRN Elie Macias PA-C        [START ON 7/15/2020] pantoprazole (PROTONIX) EC tablet 40 mg  40 mg Oral Early Morning Mira Gant PA-C        [START ON 7/15/2020] predniSONE tablet 2 5 mg  2 5 mg Oral Daily BESSIE Grider-LEVY        [START ON 7/15/2020] propranolol (INDERAL LA) 24 hr capsule 60 mg  60 mg Oral Daily Mira Gant PA-C           Allergies   Allergen Reactions    No Known Allergies        Allergies:    Allergies   Allergen Reactions    No Known Allergies        Social History:     Marital Status: /Civil Union     Substance Use History:   Social History     Substance and Sexual Activity   Alcohol Use No     Social History     Tobacco Use   Smoking Status Never Smoker   Smokeless Tobacco Never Used     Social History     Substance and Sexual Activity   Drug Use No       Family History:    father- MI age 52  son- CAD    Physical Exam:     Vitals:   Blood Pressure: (!) 174/72 (07/14/20 1903)  Pulse: 71 (07/14/20 1903)  Temperature: 98 1 °F (36 7 °C) (07/14/20 1903)  Temp Source: Temporal (07/14/20 1903)  Respirations: 18 (07/14/20 1903)  Weight - Scale: 64 kg (141 lb 1 5 oz) (07/14/20 1520)  SpO2: 98 % (07/14/20 1903)    Physical Exam   Constitutional: She is oriented to person, place, and time  She appears well-developed and well-nourished  No distress  HENT:   Head: Normocephalic and atraumatic  Eyes: No scleral icterus  Cardiovascular: Normal rate, regular rhythm and normal heart sounds  Pulmonary/Chest: Effort normal and breath sounds normal  No stridor  No respiratory distress  She has no wheezes  She has no rales  She exhibits no tenderness  Abdominal: Soft  Bowel sounds are normal  She exhibits no distension and no mass  There is no tenderness  There is no rebound and no guarding  No hernia  Musculoskeletal: She exhibits no deformity  Neurological: She is alert and oriented to person, place, and time  Skin: Skin is warm and dry  She is not diaphoretic  Psychiatric: She has a normal mood and affect  Her behavior is normal    Nursing note and vitals reviewed  Additional Data:     Lab Results: I have personally reviewed pertinent reports        Results from last 7 days   Lab Units 07/14/20  1524   WBC Thousand/uL 6 84   HEMOGLOBIN g/dL 12 9   HEMATOCRIT % 39 0   PLATELETS Thousands/uL 298   NEUTROS PCT % 71   LYMPHS PCT % 21   MONOS PCT % 7   EOS PCT % 0     Results from last 7 days   Lab Units 07/14/20  1524   POTASSIUM mmol/L 4 1   CHLORIDE mmol/L 95*   CO2 mmol/L 31   BUN mg/dL 17   CREATININE mg/dL 0 90   CALCIUM mg/dL 9 1   ALK PHOS U/L 51   ALT U/L 18 AST U/L 13           Imaging: I have personally reviewed pertinent reports  Xr Chest 1 View Portable    Result Date: 6/24/2020  Narrative: CHEST INDICATION:   altered mental status  COMPARISON:  Chest radiograph from 1/2/2018  EXAM PERFORMED/VIEWS:  XR CHEST PORTABLE FINDINGS: Cardiomediastinal silhouette appears unremarkable  The lungs are clear  No pneumothorax or pleural effusion  Osseous structures appear within normal limits for patient age  Impression: No acute cardiopulmonary disease  Workstation performed: [id number]     Xr Chest Pa & Lateral    Result Date: 7/14/2020  Narrative: CHEST INDICATION:   chest pain  COMPARISON:  None EXAM PERFORMED/VIEWS:  XR CHEST PA & LATERAL FINDINGS: Cardiomediastinal silhouette appears unremarkable  The lungs are clear  No pneumothorax or pleural effusion  Osseous structures appear within normal limits for patient age  Impression: No acute cardiopulmonary disease  Workstation performed: [id number]     Ct Head Without Contrast    Result Date: 6/24/2020  Narrative: CT BRAIN - WITHOUT CONTRAST INDICATION:   Altered mental status  COMPARISON:  8/10/2018 TECHNIQUE:  CT examination of the brain was performed  In addition to axial images, coronal 2D reformatted images were created and submitted for interpretation  Radiation dose length product (DLP) for this visit:  792 mGy-cm   This examination, like all CT scans performed in the [facility], was performed utilizing techniques to minimize radiation dose exposure, including the use of iterative reconstruction and automated exposure control  IMAGE QUALITY:  Diagnostic  FINDINGS: PARENCHYMA: Decreased attenuation is noted in periventricular and subcortical white matter demonstrating an appearance that is statistically most likely to represent moderate microangiopathic change; this appearance is similar when compared to most recent prior examination  No CT signs of acute infarction    No intracranial mass, mass effect or midline shift  No acute parenchymal hemorrhage  VENTRICLES AND EXTRA-AXIAL SPACES:  Ventricles and extra-axial CSF spaces are prominent commensurate with the degree of volume loss  No hydrocephalus  No acute extra-axial hemorrhage  VISUALIZED ORBITS AND PARANASAL SINUSES:  Unremarkable  CALVARIUM AND EXTRACRANIAL SOFT TISSUES:  Normal      Impression: No acute intracranial abnormality  Microangiopathic changes  Workstation performed: [id number]       EKG, Pathology, and Other Studies Reviewed on Admission:   · EK sinus, inverted t wave lead AVR, AVL, V1, V2    [employer] Records Reviewed: Yes     Total Time for Visit, including Counseling / Coordination of Care: 45 minutes  Greater than 50% of this total time spent on direct patient counseling and coordination of care  ** Please Note: This note has been constructed using a voice recognition system   ** Graft Donor Site Bandage (Optional-Leave Blank If You Don't Want In Note): Steri-strips and a pressure bandage were applied to the donor site.

## 2020-07-14 NOTE — ED PROVIDER NOTES
History  Chief Complaint   Patient presents with    Chest Pain     left sided chest pain x 1 hour  Pt reports pain is not radiating  Pt reports nausea  Pt denies covid contatcs      79-year-old female with history of hypertension presenting with chest pain  Patient states chest pain started around 1 hour prior to arrival this is this is left-sided chest pressure that has been constant since then  Unaware of any aggravating or relieving factors  States chest pain was accompanied with nausea but no vomiting  Denies any radiation of the pain or shortness of breath  Denies any fever, chills, vomiting, cough, known sick contacts, swelling in extremities  Patient states she took baby aspirin today and normally takes it every other day  Prior to Admission Medications   Prescriptions Last Dose Informant Patient Reported? Taking?    L-Methylfolate 15 MG TABS   No Yes   Sig: Take 1 tablet (15 mg total) by mouth daily   Probiotic Product (VSL#3) CAPS Unknown at Unknown time  No No   Sig: Take 1 capsule by mouth 2 (two) times a day   aspirin (Aspirin Adult Low Dose) 81 mg EC tablet   No Yes   Sig: Take 1 tablet (81 mg total) by mouth every other day   azilsartan medoxomil (Edarbi) 40 MG tablet   No Yes   Sig: Take 1 tablet (40 mg total) by mouth daily   clonazePAM (KlonoPIN) 0 25 MG disintegrating tablet   No Yes   Sig: Take 1 tablet (0 25 mg total) by mouth 2 (two) times a day   fluticasone (FLONASE) 50 mcg/act nasal spray   No Yes   Si sprays into each nostril daily   loratadine (CLARITIN REDITABS) 10 MG dissolvable tablet Unknown at Unknown time  No No   Sig: Take 1 tablet (10 mg total) by mouth daily   pantoprazole (PROTONIX) 40 mg tablet   No Yes   Sig: Take 1 tablet (40 mg total) by mouth every morning   predniSONE 2 5 mg tablet   No Yes   Sig: Take 1 tablet (2 5 mg total) by mouth daily   propranolol (INDERAL LA) 60 mg 24 hr capsule   No Yes   Sig: Take 1 capsule (60 mg total) by mouth daily Please stop [facility]    Facility-Administered Medications: None       Past Medical History:   Diagnosis Date    Anxiety     Gastroesophageal reflux disease without esophagitis 8/28/2018    Hyperlipidemia     Hypertension     Hypothyroidism     Osteoarthritis 10/5/2012       Past Surgical History:   Procedure Laterality Date    CATARACT EXTRACTION      CHOLECYSTECTOMY      HYSTERECTOMY         Family History   Problem Relation Age of Onset    No Known Problems Mother     No Known Problems Father      I have reviewed and agree with the history as documented  E-Cigarette/Vaping    E-Cigarette Use Never User      E-Cigarette/Vaping Substances    Nicotine No     THC No     CBD No     Flavoring No      Social History     Tobacco Use    Smoking status: Never Smoker    Smokeless tobacco: Never Used   Substance Use Topics    Alcohol use: No    Drug use: No        Review of Systems   Constitutional: Negative for activity change, appetite change, chills, fatigue and fever  HENT: Negative for ear pain, rhinorrhea and tinnitus  Eyes: Negative for pain and visual disturbance  Respiratory: Negative for apnea, cough, chest tightness, shortness of breath and wheezing  Cardiovascular: Positive for chest pain  Negative for palpitations and leg swelling  Gastrointestinal: Positive for nausea  Negative for abdominal pain, diarrhea and vomiting  Genitourinary: Negative for dysuria, flank pain and pelvic pain  Musculoskeletal: Negative for arthralgias and myalgias  Skin: Negative for rash and wound  Neurological: Negative for dizziness, syncope, weakness, numbness and headaches  All other systems reviewed and are negative        Physical Exam  ED Triage Vitals   Temperature Pulse Respirations Blood Pressure SpO2   07/14/20 1520 07/14/20 1520 07/14/20 1520 07/14/20 1520 07/14/20 1520   97 8 °F (36 6 °C) 67 18 (!) 222/101 98 %      Temp Source Heart Rate Source Patient Position - Orthostatic VS BP Location FiO2 (%)   07/14/20 1520 07/14/20 1520 07/14/20 1520 07/14/20 1520 --   Temporal Monitor Sitting Right arm       Pain Score       07/14/20 1821       No Pain             Orthostatic Vital Signs  Vitals:    07/14/20 1739 07/14/20 1807 07/14/20 1903 07/15/20 0025   BP: (!) 181/84 (!) 192/87 (!) 174/72 169/75   Pulse: 66 69 71 66   Patient Position - Orthostatic VS:  Lying Lying Lying       Physical Exam   Constitutional: She is oriented to person, place, and time  She appears well-developed and well-nourished  No distress  HENT:   Head: Normocephalic and atraumatic  Eyes: Conjunctivae and EOM are normal  Right eye exhibits no discharge  Left eye exhibits no discharge  No scleral icterus  Neck: Normal range of motion  Neck supple  No JVD present  No tracheal deviation present  Cardiovascular: Normal rate, regular rhythm, normal heart sounds and intact distal pulses  Exam reveals no gallop and no friction rub  No murmur heard  Pulmonary/Chest: Effort normal and breath sounds normal  No stridor  No respiratory distress  She has no wheezes  She has no rales  She exhibits no tenderness  Abdominal: Soft  There is no tenderness  Musculoskeletal: Normal range of motion  She exhibits no edema, tenderness or deformity  Neurological: She is alert and oriented to person, place, and time  No sensory deficit  She exhibits normal muscle tone  Skin: Skin is warm  Capillary refill takes less than 2 seconds  No rash noted  She is not diaphoretic  No erythema  Psychiatric: She has a normal mood and affect  Her behavior is normal  Judgment and thought content normal    Nursing note and vitals reviewed        ED Medications  Medications   aspirin (ECOTRIN LOW STRENGTH) EC tablet 81 mg (has no administration in time range)   losartan (COZAAR) tablet 50 mg (has no administration in time range)   clonazePAM (KlonoPIN) tablet 0 25 mg (0 25 mg Oral Given 7/14/20 2042)   predniSONE tablet 2 5 mg (has no administration in time range)   pantoprazole (PROTONIX) EC tablet 40 mg (has no administration in time range)   propranolol (INDERAL LA) 24 hr capsule 60 mg (has no administration in time range)   ondansetron (ZOFRAN) injection 4 mg (has no administration in time range)   enoxaparin (LOVENOX) subcutaneous injection 40 mg (has no administration in time range)   acetaminophen (TYLENOL) tablet 650 mg (has no administration in time range)   aspirin tablet 325 mg (325 mg Oral Not Given 7/14/20 2030)   hydrALAZINE (APRESOLINE) injection 5 mg (has no administration in time range)   hydrALAZINE (APRESOLINE) injection 5 mg (5 mg Intravenous Given 7/14/20 1648)       Diagnostic Studies  Results Reviewed     Procedure Component Value Units Date/Time    Troponin I [[id number]]  (Normal) Collected:  07/14/20 1831    Lab Status:  Final result Specimen:  Blood from Arm, Right Updated:  07/14/20 1911     Troponin I <0 02 ng/mL     Troponin I [[id number]]  (Normal) Collected:  07/14/20 1524    Lab Status:  Final result Specimen:  Blood from Arm, Right Updated:  07/14/20 1614     Troponin I <0 02 ng/mL     Comprehensive metabolic panel [[id number]]  (Abnormal) Collected:  07/14/20 1524    Lab Status:  Final result Specimen:  Blood from Arm, Right Updated:  07/14/20 1612     Sodium 133 mmol/L      Potassium 4 1 mmol/L      Chloride 95 mmol/L      CO2 31 mmol/L      ANION GAP 7 mmol/L      BUN 17 mg/dL      Creatinine 0 90 mg/dL      Glucose 115 mg/dL      Calcium 9 1 mg/dL      AST 13 U/L      ALT 18 U/L      Alkaline Phosphatase 51 U/L      Total Protein 7 8 g/dL      Albumin 3 9 g/dL      Total Bilirubin 0 44 mg/dL      eGFR 61 ml/min/1 73sq m     Narrative:       [place] guidelines for Chronic Kidney Disease (CKD):     Stage 1 with normal or high GFR (GFR > 90 mL/min/1 73 square meters)    Stage 2 Mild CKD (GFR = 60-89 mL/min/1 73 square meters)    Stage 3A Moderate CKD (GFR = 45-59 mL/min/1 73 square meters)    Stage 3B Moderate CKD (GFR = 30-44 mL/min/1 73 square meters)    Stage 4 Severe CKD (GFR = 15-29 mL/min/1 73 square meters)    Stage 5 End Stage CKD (GFR <15 mL/min/1 73 square meters)  Note: GFR calculation is accurate only with a steady state creatinine    CBC and differential [[id number]] Collected:  07/14/20 1524    Lab Status:  Final result Specimen:  Blood from Arm, Right Updated:  07/14/20 1549     WBC 6 84 Thousand/uL      RBC 4 10 Million/uL      Hemoglobin 12 9 g/dL      Hematocrit 39 0 %      MCV 95 fL      MCH 31 5 pg      MCHC 33 1 g/dL      RDW 13 1 %      MPV 10 1 fL      Platelets 822 Thousands/uL      nRBC 0 /100 WBCs      Neutrophils Relative 71 %      Immat GRANS % 0 %      Lymphocytes Relative 21 %      Monocytes Relative 7 %      Eosinophils Relative 0 %      Basophils Relative 1 %      Neutrophils Absolute 4 82 Thousands/µL      Immature Grans Absolute 0 02 Thousand/uL      Lymphocytes Absolute 1 43 Thousands/µL      Monocytes Absolute 0 50 Thousand/µL      Eosinophils Absolute 0 03 Thousand/µL      Basophils Absolute 0 04 Thousands/µL                  XR chest pa & lateral   Final Result by Sumit Jennings MD (07/14 1705)      No acute cardiopulmonary disease              Workstation performed: [id number]               Procedures  ECG 12 Lead Documentation Only  Date/Time: 7/14/2020 4:59 PM  Performed by: Ravi Julio DO  Authorized by: Ravi Julio DO     Indications / Diagnosis:  Chest pain  ECG reviewed by me, the ED Provider: yes    Patient location:  ED  Previous ECG:     Previous ECG:  Unavailable  Interpretation:     Interpretation: non-specific    Rate:     ECG rate:  64    ECG rate assessment: normal    Rhythm:     Rhythm: sinus rhythm    Ectopy:     Ectopy: none    QRS:     QRS axis:  Normal  Conduction:     Conduction: normal    ST segments:     ST segments:  Normal  T waves:     T waves: non-specific    Comments:      Normal sinus rhythm with non specific t wave changes          ED Course       US AUDIT      Most Recent Value   Initial Alcohol Screen: US AUDIT-C    1  How often do you have a drink containing alcohol?  0 Filed at: 07/14/2020 1523   2  How many drinks containing alcohol do you have on a typical day you are drinking? 0 Filed at: 07/14/2020 1523   3a  Male UNDER 65: How often do you have five or more drinks on one occasion? 0 Filed at: 07/14/2020 1523   3b  FEMALE Any Age, or MALE 65+: How often do you have 4 or more drinks on one occassion? 0 Filed at: 07/14/2020 1523   Audit-C Score  0 Filed at: 07/14/2020 1523            HEART Risk Score      Most Recent Value   Heart Score Risk Calculator   History  1 Filed at: 07/14/2020 1615   ECG  1 Filed at: 07/14/2020 1615   Age  2 Filed at: 07/14/2020 1615   Risk Factors  1 Filed at: 07/14/2020 1615   Troponin  0 Filed at: 07/14/2020 1615   HEART Score  5 Filed at: 07/14/2020 1615            DEBORAH/DAST-10      Most Recent Value   How many times in the past year have you    Used an illegal drug or used a prescription medication for non-medical reasons?   Never Filed at: 07/14/2020 1523                              MDM  Number of Diagnoses or Management Options  Chest pain:   Diagnosis management comments: ACS workup unremarkable emergency department  Due to elevated heart score and significantly elevated blood pressure systolic over 813 patient was admitted to medicine for observation  By did IV hydralazine emergency department for hypertensive urgency        Disposition  Final diagnoses:   Chest pain     Time reflects when diagnosis was documented in both MDM as applicable and the Disposition within this note     Time User Action Codes Description Comment    7/14/2020  5:23 PM Sony Sam Add [R07 9] Chest pain       ED Disposition     ED Disposition Condition Date/Time Comment    Admit Stable Tue Jul 14, 2020  5:23 PM Case was discussed with Dr Rashawn Torres and the patient's admission status was agreed to be Admission Status: observation status to the service of Dr Gianna Ray   Follow-up Information    None         Current Discharge Medication List      CONTINUE these medications which have NOT CHANGED    Details   aspirin (Aspirin Adult Low Dose) 81 mg EC tablet Take 1 tablet (81 mg total) by mouth every other day  Qty: 45 tablet, Refills: 3    Associated Diagnoses: Other hyperlipidemia; Essential hypertension      azilsartan medoxomil (Edarbi) 40 MG tablet Take 1 tablet (40 mg total) by mouth daily  Qty: 90 tablet, Refills: 3    Associated Diagnoses: Essential hypertension      clonazePAM (KlonoPIN) 0 25 MG disintegrating tablet Take 1 tablet (0 25 mg total) by mouth 2 (two) times a day  Qty: 60 tablet, Refills: 1    Associated Diagnoses: Essential hypertension; Palpitations; Anxiety      fluticasone (FLONASE) 50 mcg/act nasal spray 2 sprays into each nostril daily  Qty: 1 Bottle, Refills: 3    Associated Diagnoses: Acute sinusitis, recurrence not specified, unspecified location      L-Methylfolate 15 MG TABS Take 1 tablet (15 mg total) by mouth daily  Qty: 90 tablet, Refills: 3    Associated Diagnoses: Anxiety      pantoprazole (PROTONIX) 40 mg tablet Take 1 tablet (40 mg total) by mouth every morning  Qty: 90 tablet, Refills: 3    Associated Diagnoses: Gastroesophageal reflux disease without esophagitis      predniSONE 2 5 mg tablet Take 1 tablet (2 5 mg total) by mouth daily  Qty: 30 tablet, Refills: 2    Associated Diagnoses: Temporal arteritis (HCC)      propranolol (INDERAL LA) 60 mg 24 hr capsule Take 1 capsule (60 mg total) by mouth daily Please stop Norvasc      Qty: 90 capsule, Refills: 3    Associated Diagnoses: Essential hypertension; Palpitations      loratadine (CLARITIN REDITABS) 10 MG dissolvable tablet Take 1 tablet (10 mg total) by mouth daily  Qty: 30 tablet, Refills: 1    Associated Diagnoses: Seasonal allergic rhinitis, unspecified trigger      Probiotic Product (VSL#3) CAPS Take 1 capsule by mouth 2 (two) times a day  Qty: 60 capsule, Refills: 5    Associated Diagnoses: Acute diverticulitis           No discharge procedures on file  PDMP Review       Value Time User    PDMP Reviewed  Yes 5/20/2020 11:09 AM Hosea Chang MD           ED Provider  Attending physically available and evaluated Eddie Patch  I managed the patient along with the ED Attending      Electronically Signed by         Rachel Dennis DO  07/15/20 0127

## 2020-07-14 NOTE — QUICK NOTE
[facility] Hospitalist Service Attending Physician Attestation Note - Admission    I have seen and examined Sean Marcial personally and have reviewed the medical record independently  Date Evaluated: July 14th   Time Evaluated: 7 pm      Subjective / HPI:   This is a [de-identified]year old female with atypical chest pain, not a smoker, however father had MI at 52  Reporting indigestion like symptoms  Currently pain is well controlled  EKG non ischemic initial troponin negative  Exam:   HS regular  No MRG  Chest - no crackles, no wheeze  Abdomen - SNT, BS present  Neuro - nil focal  Psych - AAOx3    Assessment and Plan:  · Atypical CP  · ASA statin  · Nuclear stress tomorrow  · Tele and serial trop  · ? ACS versus HTN urgency  · Check Dimer  Education and Discussions with Family / Patient: Discussed with patient and with  at bedside  Epic / Care Everywhere Records Reviewed Independently: Yes     For detailed history, assessment, and plan of care, please review the statements below by [facility] ERIN Chavarria MD

## 2020-07-15 ENCOUNTER — APPOINTMENT (OUTPATIENT)
Dept: NUCLEAR MEDICINE | Facility: HOSPITAL | Age: 80
End: 2020-07-15
Payer: COMMERCIAL

## 2020-07-15 ENCOUNTER — APPOINTMENT (OUTPATIENT)
Dept: NON INVASIVE DIAGNOSTICS | Facility: HOSPITAL | Age: 80
End: 2020-07-15
Payer: COMMERCIAL

## 2020-07-15 VITALS
OXYGEN SATURATION: 98 % | RESPIRATION RATE: 18 BRPM | BODY MASS INDEX: 23.51 KG/M2 | SYSTOLIC BLOOD PRESSURE: 164 MMHG | HEIGHT: 65 IN | DIASTOLIC BLOOD PRESSURE: 77 MMHG | HEART RATE: 68 BPM | WEIGHT: 141.09 LBS | TEMPERATURE: 98.6 F

## 2020-07-15 LAB
ATRIAL RATE: 66 BPM
CHEST PAIN STATEMENT: NORMAL
MAX DIASTOLIC BP: 80 MMHG
MAX HEART RATE: 104 BPM
MAX PREDICTED HEART RATE: 140 BPM
MAX. SYSTOLIC BP: 152 MMHG
P AXIS: 44 DEGREES
PR INTERVAL: 140 MS
PROTOCOL NAME: NORMAL
QRS AXIS: 32 DEGREES
QRSD INTERVAL: 86 MS
QT INTERVAL: 390 MS
QTC INTERVAL: 408 MS
REASON FOR TERMINATION: NORMAL
T WAVE AXIS: 72 DEGREES
TARGET HR FORMULA: NORMAL
TEST INDICATION: NORMAL
TIME IN EXERCISE PHASE: NORMAL
TROPONIN I SERPL-MCNC: <0.02 NG/ML
VENTRICULAR RATE: 66 BPM

## 2020-07-15 PROCEDURE — A9502 TC99M TETROFOSMIN: HCPCS

## 2020-07-15 PROCEDURE — 93017 CV STRESS TEST TRACING ONLY: CPT

## 2020-07-15 PROCEDURE — 93016 CV STRESS TEST SUPVJ ONLY: CPT

## 2020-07-15 PROCEDURE — 78452 HT MUSCLE IMAGE SPECT MULT: CPT

## 2020-07-15 PROCEDURE — 93010 ELECTROCARDIOGRAM REPORT: CPT | Performed by: INTERNAL MEDICINE

## 2020-07-15 PROCEDURE — 93005 ELECTROCARDIOGRAM TRACING: CPT

## 2020-07-15 PROCEDURE — 99217 PR OBSERVATION CARE DISCHARGE MANAGEMENT: CPT | Performed by: PHYSICIAN ASSISTANT

## 2020-07-15 PROCEDURE — 84484 ASSAY OF TROPONIN QUANT: CPT | Performed by: PHYSICIAN ASSISTANT

## 2020-07-15 PROCEDURE — 93018 CV STRESS TEST I&R ONLY: CPT

## 2020-07-15 RX ADMIN — REGADENOSON 0.4 MG: 0.08 INJECTION, SOLUTION INTRAVENOUS at 12:52

## 2020-07-15 RX ADMIN — CLONAZEPAM 0.25 MG: 0.5 TABLET ORAL at 08:08

## 2020-07-15 RX ADMIN — PANTOPRAZOLE SODIUM 40 MG: 40 TABLET, DELAYED RELEASE ORAL at 05:41

## 2020-07-15 RX ADMIN — LOSARTAN POTASSIUM 50 MG: 50 TABLET, FILM COATED ORAL at 08:08

## 2020-07-15 RX ADMIN — PREDNISONE 2.5 MG: 2.5 TABLET ORAL at 08:09

## 2020-07-15 RX ADMIN — PROPRANOLOL HYDROCHLORIDE 60 MG: 60 CAPSULE, EXTENDED RELEASE ORAL at 08:09

## 2020-07-15 RX ADMIN — ENOXAPARIN SODIUM 40 MG: 40 INJECTION SUBCUTANEOUS at 08:09

## 2020-07-15 RX ADMIN — ASPIRIN 81 MG: 81 TABLET, COATED ORAL at 08:08

## 2020-07-15 NOTE — ASSESSMENT & PLAN NOTE
Lipid panel from 08/14/2019:  Cholesterol 290, triglycerides 135, HDL 69,   History of hyperlipidemia but was taken off of statin a few months ago

## 2020-07-15 NOTE — PROGRESS NOTES
Progress Note - Nasim Vaca 1940, [de-identified] y o  female MRN: [id number]    Unit/Bed#: E4 -01 Encounter: [id number]    Primary Care Provider: Daina Essex, MD   Date and time admitted to hospital: 7/14/2020  3:15 PM        * Hypertensive urgency  Assessment & Plan  Symptomatic with left sided chest pressure and nausea  Blood pressures initially 222/101  Received IV hydralazine 5 mg in ED  Most recent blood pressure 192/87  Caution not to drop the blood pressure too quickly  Home regimen propanolol 60 mg daily, azilsartan medoxomil 40 mg daily  Continue propanolol and provide formulary ARB - losartan 50 mg daily  Add IV hydralazine 5 mg for SBP greater than 180  -Appears patient did not require additional hydralazine overnight    -BP now improved and most recent 170/80    Chest pressure  Assessment & Plan  Presenting with a complaint of left-sided chest pressure and nausea  Denies history of CAD, stents, prior cardiac catheterization  Initial troponin less than 0 02  Trend x3  Initial EKG sinus rhythm with inverted T-waves in leads AVR, aVL, V1, V2  CXR without widened mediastinum  Positive family history- father with MI at age 52  No hx of CAD or prior cardiac caths  Given   Continue ASA 81 daily   -Troponins have remained negative      -For inpatient stress test today  Polymyalgia rheumatica (HCC)  Assessment & Plan  Maintained on prednisone 2 5 mg daily    Hyponatremia  Assessment & Plan  Sodium level at 133  Continue to trend    Hyperlipidemia  Assessment & Plan  Lipid panel from 08/14/2019:  Cholesterol 290, triglycerides 135, HDL 69,   History of hyperlipidemia but was taken off of statin a few months ago  Recheck lipid panel    Gastroesophageal reflux disease without esophagitis  Assessment & Plan  Continue Protonix      VTE Pharmacologic Prophylaxis:   Pharmacologic: Enoxaparin (Lovenox)  Mechanical VTE Prophylaxis in Place: Yes    Discharge Plan:   For inpatient stress test today    Discussions with Specialists or Other Care Team Provider:  Nursing at bedside    Education and Discussions with Family / Patient:  Patient,     Time Spent for Care: 15 minutes  More than 50% of total time spent on counseling and coordination of care as described above  Current Length of Stay: 0 day(s)  Current Patient Status: Observation   Code Status: Level 1 - Full Code    Subjective:   Patient in bathroom upon arrival   She reports feeling constipated and having difficulty having a bowel movement  She denies any recurrent episodes of chest pain while hospitalized  For inpatient stress test today  Objective:     Vitals:   Temp (24hrs), Av 8 °F (36 6 °C), Min:96 5 °F (35 8 °C), Max:98 3 °F (36 8 °C)    Temp:  [96 5 °F (35 8 °C)-98 3 °F (36 8 °C)] 98 3 °F (36 8 °C)  HR:  [60-79] 79  Resp:  [16-22] 20  BP: (162-222)/() 170/80  SpO2:  [96 %-100 %] 98 %  Body mass index is 23 48 kg/m²  Input and Output Summary (last 24 hours):     No intake or output data in the 24 hours ending 07/15/20 1248    Physical Exam:     Physical Exam   Constitutional: She is oriented to person, place, and time  She appears well-developed and well-nourished  No distress  HENT:   Head: Normocephalic and atraumatic  Eyes: No scleral icterus  Cardiovascular: Normal rate, regular rhythm and normal heart sounds  Pulmonary/Chest: Effort normal and breath sounds normal  No stridor  No respiratory distress  She has no wheezes  She has no rales  She exhibits no tenderness  Abdominal: Soft  Bowel sounds are normal  She exhibits no distension and no mass  There is no tenderness  There is no rebound and no guarding  No hernia  Neurological: She is alert and oriented to person, place, and time  Skin: Skin is warm and dry  She is not diaphoretic  Psychiatric: She has a normal mood and affect  Her behavior is normal    Nursing note and vitals reviewed        Additional Data: Labs:    Results from last 7 days   Lab Units 07/14/20  1524   WBC Thousand/uL 6 84   HEMOGLOBIN g/dL 12 9   HEMATOCRIT % 39 0   PLATELETS Thousands/uL 298   NEUTROS PCT % 71   LYMPHS PCT % 21   MONOS PCT % 7   EOS PCT % 0     Results from last 7 days   Lab Units 07/14/20  1524   POTASSIUM mmol/L 4 1   CHLORIDE mmol/L 95*   CO2 mmol/L 31   BUN mg/dL 17   CREATININE mg/dL 0 90   CALCIUM mg/dL 9 1   ALK PHOS U/L 51   ALT U/L 18   AST U/L 13           * I Have Reviewed All Lab Data Listed Above  * Additional Pertinent Lab Tests Reviewed: Tarun 66 Admission Reviewed    Imaging:    Imaging Reports Reviewed Today Include:   Imaging Personally Reviewed by Myself Includes:      Recent Cultures (last 7 days):           Last 24 Hours Medication List:     Current Facility-Administered Medications:  acetaminophen 650 mg Oral Q4H PRN Abdifatah Huerta PA-C   aspirin 81 mg Oral Every Other Day Abdifatah Huerta, ERIN   aspirin 325 mg Oral Once Abdifatah Huerta, ERIN   clonazePAM 0 25 mg Oral BID Mira Pedersenr, PA-LEVY   enoxaparin 40 mg Subcutaneous Daily Mira Pedersenr, PA-LEVY   hydrALAZINE 5 mg Intravenous Once PRN Abdifatah Huerta, PA-LEVY   losartan 50 mg Oral Daily Mira Gant, PA-LEVY   ondansetron 4 mg Intravenous Q6H PRN Mira Pedersenr, PA-LEVY   pantoprazole 40 mg Oral Early Morning Mira Pedersenr, PA-LEVY   predniSONE 2 5 mg Oral Daily Mira Pedersenr, PA-LEVY   propranolol 60 mg Oral Daily Abdifatah Huerta PA-C        Today, Patient Was Seen By: Abdifatah Huerta PA-C    ** Please Note: This note has been constructed using a voice recognition system   **

## 2020-07-15 NOTE — PLAN OF CARE
Problem: Potential for Falls  Goal: Patient will remain free of falls  Description  INTERVENTIONS:  - Assess patient frequently for physical needs  -  Identify cognitive and physical deficits and behaviors that affect risk of falls    -  [place] fall precautions as indicated by assessment   - Educate patient/family on patient safety including physical limitations  - Instruct patient to call for assistance with activity based on assessment  - Modify environment to reduce risk of injury  - Consider OT/PT consult to assist with strengthening/mobility  Outcome: Progressing     Problem: PAIN - ADULT  Goal: Verbalizes/displays adequate comfort level or baseline comfort level  Description  Interventions:  - Encourage patient to monitor pain and request assistance  - Assess pain using appropriate pain scale  - Administer analgesics based on type and severity of pain and evaluate response  - Implement non-pharmacological measures as appropriate and evaluate response  - Consider cultural and social influences on pain and pain management  - Notify physician/advanced practitioner if interventions unsuccessful or patient reports new pain  Outcome: Progressing     Problem: INFECTION - ADULT  Goal: Absence or prevention of progression during hospitalization  Description  INTERVENTIONS:  - Assess and monitor for signs and symptoms of infection  - Monitor lab/diagnostic results  - Monitor all insertion sites, i e  indwelling lines, tubes, and drains  - Monitor endotracheal if appropriate and nasal secretions for changes in amount and color  - [place] appropriate cooling/warming therapies per order  - Administer medications as ordered  - Instruct and encourage patient and family to use good hand hygiene technique  - Identify and instruct in appropriate isolation precautions for identified infection/condition  Outcome: Progressing  Goal: Absence of fever/infection during neutropenic period  Description  INTERVENTIONS:  - Monitor WBC    Outcome: Progressing     Problem: SAFETY ADULT  Goal: Maintain or return to baseline ADL function  Description  INTERVENTIONS:  -  Assess patient's ability to carry out ADLs; assess patient's baseline for ADL function and identify physical deficits which impact ability to perform ADLs (bathing, care of mouth/teeth, toileting, grooming, dressing, etc )  - Assess/evaluate cause of self-care deficits   - Assess range of motion  - Assess patient's mobility; develop plan if impaired  - Assess patient's need for assistive devices and provide as appropriate  - Encourage maximum independence but intervene and supervise when necessary  - Involve family in performance of ADLs  - Assess for home care needs following discharge   - Consider OT consult to assist with ADL evaluation and planning for discharge  - Provide patient education as appropriate  Outcome: Progressing  Goal: Maintain or return mobility status to optimal level  Description  INTERVENTIONS:  - Assess patient's baseline mobility status (ambulation, transfers, stairs, etc )    - Identify cognitive and physical deficits and behaviors that affect mobility  - Identify mobility aids required to assist with transfers and/or ambulation (gait belt, sit-to-stand, lift, walker, cane, etc )  - [place] fall precautions as indicated by assessment  - Record patient progress and toleration of activity level on Mobility SBAR; progress patient to next Phase/Stage  - Instruct patient to call for assistance with activity based on assessment  - Consider rehabilitation consult to assist with strengthening/weightbearing, etc   Outcome: Progressing     Problem: DISCHARGE PLANNING  Goal: Discharge to home or other facility with appropriate resources  Description  INTERVENTIONS:  - Identify barriers to discharge w/patient and caregiver  - Arrange for needed discharge resources and transportation as appropriate  - Identify discharge learning needs (meds, wound care, etc )  - Arrange for interpretive services to assist at discharge as needed  - Refer to Case Management Department for coordinating discharge planning if the patient needs post-hospital services based on physician/advanced practitioner order or complex needs related to functional status, cognitive ability, or social support system  Outcome: Progressing     Problem: Knowledge Deficit  Goal: Patient/family/caregiver demonstrates understanding of disease process, treatment plan, medications, and discharge instructions  Description  Complete learning assessment and assess knowledge base    Interventions:  - Provide teaching at level of understanding  - Provide teaching via preferred learning methods  Outcome: Progressing

## 2020-07-15 NOTE — DISCHARGE SUMMARY
Discharge- Nasim Vaca 1940, [de-identified] y o  female MRN: [id number]    Unit/Bed#: E4 -01 Encounter: [id number]    Primary Care Provider: Daina Essex, MD   Date and time admitted to hospital: 7/14/2020  3:15 PM      Discharge date: 7/15/20      * Hypertensive urgency  Assessment & Plan  Symptomatic with left sided chest pressure and nausea  Blood pressures initially 222/101  Received IV hydralazine 5 mg in ED  Most recent blood pressure 192/87  Caution not to drop the blood pressure too quickly  Home regimen propanolol 60 mg daily, azilsartan medoxomil 40 mg daily  Continue propanolol and provide formulary ARB - losartan 50 mg daily  Add IV hydralazine 5 mg for SBP greater than 180  -Appears patient did not require additional hydralazine overnight    -BP now improved and most recent 164/77   -Continue home BP meds  -Keep BP log and followup with PCP on 7/20/20    Chest pressure  Assessment & Plan  Presenting with a complaint of left-sided chest pressure and nausea  Denies history of CAD, stents, prior cardiac catheterization  Initial troponin less than 0 02  Trend x3  Initial EKG sinus rhythm with inverted T-waves in leads AVR, aVL, V1, V2  CXR without widened mediastinum  Positive family history- father with MI at age 52  No hx of CAD or prior cardiac caths  Given   Continue ASA 81 daily   -Troponins have remained negative      -For inpatient stress test today  Nuclear stress test: Normal study after pharmacological vasodilation  Image artifact was present without diagnostic evidence for perfusion abnormality    Left ventricular systolic function was normal    -Patient stable for discharge at this time with PCP followup    Polymyalgia rheumatica ([facility] [address] )  Assessment & Plan  Maintained on prednisone 2 5 mg daily    Hyponatremia  Assessment & Plan  Sodium level at 133 on admission    Hyperlipidemia  Assessment & Plan  Lipid panel from 08/14/2019:  Cholesterol 290, triglycerides 135, HDL 69,   History of hyperlipidemia but was taken off of statin a few months ago    Gastroesophageal reflux disease without esophagitis  Assessment & Plan  Continue Protonix      Consultations During Hospital Stay:  · None     Procedures Performed:   · 07/14/2020:  Chest x-ray:  No acute cardiopulmonary abnormality    · 07/15/2020:  Nuclear stress test:  SUMMARY:  -  Stress results: There was no chest pain during stress  -  ECG conclusions: The stress ECG was negative for ischemia and normal   -  Perfusion imaging: There was a small, mild in severity, fixed myocardial perfusion defect of the apex  There was normal wall motion on gated imaging indicating likely artifact from breast attenuation   -  Gated SPECT: The calculated left ventricular ejection fraction was 72 %  Left ventricular ejection fraction was within normal limits by visual estimate  There was no left ventricular regional abnormality      IMPRESSIONS: Normal study after pharmacologic vasodilation  There was image artifact, without diagnostic evidence for perfusion abnormality  Left ventricular systolic function was normal     Significant Findings / Test Results:   · Normal nuclear stress test  · Hypertensive urgency    Incidental Findings:   · None    Test Results Pending at Discharge (will require follow up): · None     Outpatient follow-up Requested:  · PCP-1 week    Complications:  none    Hospital Course:     Nazario Horan is a [de-identified] y o  female patient who originally presented to the hospital on 7/14/2020 due to left-sided chest pain/pressure  Her troponins remain negative and she underwent a nuclear stress test   Her blood pressure subsequently improved  She will be asked to continue her home medications and keep a blood pressure log  Instructed to follow-up with PCP on 07/20/2020  Results of stress test were normal and patient was discharged home      Condition at Discharge: stable     Discharge Day Visit / Exam:     * Please refer to separate progress note for these details *    Discussion with Family:   at bedside      Discharge instructions/Information to patient and family:   See after visit summary for information provided to patient and family  Provisions for Follow-Up Care:  See after visit summary for information related to follow-up care and any pertinent home health orders  Planned Readmission:  No    Discharge Statement:  I spent 40 minutes discharging the patient  This time was spent on the day of discharge  I had direct contact with the patient on the day of discharge  Greater than 50% of the total time was spent examining patient, answering all patient questions, arranging and discussing plan of care with patient as well as directly providing post-discharge instructions  Additional time then spent on discharge activities  Discharge Medications:  See after visit summary for reconciled discharge medications provided to patient and family  ** Please Note: This note has been constructed using a voice recognition system   **

## 2020-07-15 NOTE — PLAN OF CARE
Problem: Potential for Falls  Goal: Patient will remain free of falls  Description  INTERVENTIONS:  - Assess patient frequently for physical needs  -  Identify cognitive and physical deficits and behaviors that affect risk of falls    -  [place] fall precautions as indicated by assessment   - Educate patient/family on patient safety including physical limitations  - Instruct patient to call for assistance with activity based on assessment  - Modify environment to reduce risk of injury  - Consider OT/PT consult to assist with strengthening/mobility  Outcome: Progressing

## 2020-07-15 NOTE — DISCHARGE INSTRUCTIONS
You had a nuclear stress test which was normal   Your blood pressure or are elevated when you 1st got to the hospital   Your blood pressures improved here with your home medications    Continue to take your home medications    Keep a blood pressure log- take blood pressure in a m  and p m - record  Take record to PCP  Call PCP tomorrow to get appointment for hospital followup and blood pressure check on 7/20/20

## 2020-07-15 NOTE — UTILIZATION REVIEW
Initial Clinical Review    Admission: Date/Time/Statement: Admission Orders (From admission, onward)     Ordered        07/14/20 1724  Place in Observation (expected length of stay for this patient is less than two midnights)  Once                   Orders Placed This Encounter   Procedures    Place in Observation (expected length of stay for this patient is less than two midnights)     Standing Status:   Standing     Number of Occurrences:   1     Order Specific Question:   Admitting Physician     Answer:   Yael Ryan     Order Specific Question:   Level of Care     Answer:   Med Surg [16]     ED Arrival Information     Expected Arrival Acuity Means of Arrival Escorted By Service Admission Type    - 7/14/2020 15:11 Emergent Walk-In Family Member Hospitalist Emergency    Arrival Complaint    chest pain        Chief Complaint   Patient presents with    Chest Pain     left sided chest pain x 1 hour  Pt reports pain is not radiating  Pt reports nausea  Pt denies covid contatcs      Assessment/Plan:  [de-identified] yo female with PMH HTN, HLD, GERD presents to ED from home with Left sided chest pressure x 1 hour associated with nausea  Hypertensive on arrival  Na 133, EKG NSR, Trop negative  REc'd IV Hydralazine in ED  Admitted to Observation with Chest Pain,  Hypertensive Urgency  Plan: cont home BP meds, prn Hydralazine, ASA, ACS r/o,  Trend na  Nuclear Stress 7/15     7/15 Denies any further chest pain  Stress Test  IMPRESSIONS: Normal study after pharmacologic vasodilation  There was image artifact, without diagnostic evidence for perfusion abnormality   Left ventricular systolic function was normal        ED Triage Vitals   Temperature Pulse Respirations Blood Pressure SpO2   07/14/20 1520 07/14/20 1520 07/14/20 1520 07/14/20 1520 07/14/20 1520   97 8 °F (36 6 °C) 67 18 (!) 222/101 98 %      Temp Source Heart Rate Source Patient Position - Orthostatic VS BP Location FiO2 (%)   07/14/20 1520 07/14/20 1520 07/14/20 1520 07/14/20 1520 --   Temporal Monitor Sitting Right arm       Pain Score       07/14/20 1821       No Pain        Wt Readings from Last 1 Encounters:   07/14/20 64 kg (141 lb 1 5 oz)     Additional Vital Signs:   07/15/20 1628  98 6 °F (37 °C)  68  18  164/77 98 % None (Room air) Lying   07/15/20 1134  98 3 °F (36 8 °C)  79  20  170/80 98 % None (Room air) Lying     07/15/20 0756  98 3 °F (36 8 °C)  77  18  162/75 100 %  None (Room air) Lying   07/15/20 0438  97 5 °F (36 4 °C)  66  18  165/76 96 %  None (Room air) Lying   07/15/20 0025  96 5 °F (35 8 °C)  66  20  169/75 97 %  None (Room air) Lying   07/14/20 1903  98 1 °F (36 7 °C)  71  18  174/72Abnormal  98 %  None (Room air) Lying   07/14/20 1807  97 9 °F (36 6 °C)  69  18  192/87Abnormal  98 %  None (Room air) Lying   07/14/20 1739    66  16  181/84Abnormal  98 %  None (Room air)    07/14/20 1652    60  20  193/88Abnormal  98 %  None (Room air) Lying   07/14/20 1648        194/88Abnormal        07/14/20 1601        208/97Abnormal     Lying   07/14/20 1600    60  22  205/91Abnormal  99 %   Lying       Pertinent Labs/Diagnostic Test Results:   Results from last 7 days   Lab Units 07/14/20  1524   WBC Thousand/uL 6 84   HEMOGLOBIN g/dL 12 9   HEMATOCRIT % 39 0   PLATELETS Thousands/uL 298   NEUTROS ABS Thousands/µL 4 82     Results from last 7 days   Lab Units 07/14/20  1524   SODIUM mmol/L 133*   POTASSIUM mmol/L 4 1   CHLORIDE mmol/L 95*   CO2 mmol/L 31   ANION GAP mmol/L 7   BUN mg/dL 17   CREATININE mg/dL 0 90   EGFR ml/min/1 73sq m 61   CALCIUM mg/dL 9 1     Results from last 7 days   Lab Units 07/14/20  1524   AST U/L 13   ALT U/L 18   ALK PHOS U/L 51   TOTAL PROTEIN g/dL 7 8   ALBUMIN g/dL 3 9   TOTAL BILIRUBIN mg/dL 0 44     Results from last 7 days   Lab Units 07/14/20  1524   GLUCOSE RANDOM mg/dL 115     Results from last 7 days   Lab Units 07/15/20  0054 07/14/20  2210 07/14/20  1831 07/14/20  1524   TROPONIN I ng/mL <0 02 <0 02 <0 02 <0 02     7/14 CXR: No acute cardiopulmonary disease  7/14 EKG: Normal sinus rhythm with non specific t wave changes    ED Treatment:   Medication Administration from 07/14/2020 1511 to 07/14/2020 1809       Date/Time Order Dose Route Action     07/14/2020 1648 hydrALAZINE (APRESOLINE) injection 5 mg 5 mg Intravenous Given        Past Medical History:   Diagnosis Date    Anxiety     Gastroesophageal reflux disease without esophagitis 8/28/2018    Hyperlipidemia     Hypertension     Hypothyroidism     Osteoarthritis 10/5/2012     Present on Admission:   Hyperlipidemia   Hypertensive urgency   Chest pressure   Hyponatremia   Polymyalgia rheumatica (HCC)   Gastroesophageal reflux disease without esophagitis      Admitting Diagnosis: Chest pain [R07 9]  Age/Sex: [de-identified] y o  female  Admission Orders:  Scheduled Medications:  Medications:  aspirin 81 mg Oral Every Other Day   aspirin 325 mg Oral Once   clonazePAM 0 25 mg Oral BID   enoxaparin 40 mg Subcutaneous Daily   losartan 50 mg Oral Daily   pantoprazole 40 mg Oral Early Morning   predniSONE 2 5 mg Oral Daily   propranolol 60 mg Oral Daily     Continuous IV Infusions:     PRN Meds:  acetaminophen 650 mg Oral Q4H PRN   hydrALAZINE 5 mg Intravenous Once PRN   ondansetron 4 mg Intravenous Q6H PRN     TELE  SCDs    Network Utilization Review Department  [email or web address]  ATTENTION: Please call with any questions or concerns to [phone] and carefully listen to the prompts so that you are directed to the right person  All voicemails are confidential   Farrel Bodily all requests for admission clinical reviews, approved or denied determinations and any other requests to dedicated fax number below belonging to the campus where the patient is receiving treatment   List of dedicated fax numbers for the Facilities:  [address] [place] [address] DENIALS (Administrative/Medical Necessity) [phone]   [address] [place] [address] (Maternity/NICU/Pediatrics) [phone]   [facility] [phone]   [facility] [phone]   [address] [place] [address] [phone]   [address] [place] [address] [address] [facility] [phone]   [facility] [phone]   [address]  [address] [facility] [address] [place] [address] [phone]

## 2020-07-15 NOTE — ASSESSMENT & PLAN NOTE
Presenting with a complaint of left-sided chest pressure and nausea  Denies history of CAD, stents, prior cardiac catheterization  Initial troponin less than 0 02  Trend x3  Initial EKG sinus rhythm with inverted T-waves in leads AVR, aVL, V1, V2  CXR without widened mediastinum  Positive family history- father with MI at age 52  No hx of CAD or prior cardiac caths  Given   Continue ASA 81 daily   -Troponins have remained negative      -For inpatient stress test today

## 2020-07-15 NOTE — ASSESSMENT & PLAN NOTE
Symptomatic with left sided chest pressure and nausea  Blood pressures initially 222/101  Received IV hydralazine 5 mg in ED  Most recent blood pressure 192/87  Caution not to drop the blood pressure too quickly  Home regimen propanolol 60 mg daily, azilsartan medoxomil 40 mg daily  Continue propanolol and provide formulary ARB - losartan 50 mg daily  Add IV hydralazine 5 mg for SBP greater than 180      -Appears patient did not require additional hydralazine overnight    -BP now improved and most recent 170/80

## 2020-07-15 NOTE — ASSESSMENT & PLAN NOTE
Symptomatic with left sided chest pressure and nausea  Blood pressures initially 222/101  Received IV hydralazine 5 mg in ED  Most recent blood pressure 192/87  Caution not to drop the blood pressure too quickly  Home regimen propanolol 60 mg daily, azilsartan medoxomil 40 mg daily  Continue propanolol and provide formulary ARB - losartan 50 mg daily  Add IV hydralazine 5 mg for SBP greater than 180  -Appears patient did not require additional hydralazine overnight    -BP now improved and most recent 164/77   -Continue home BP meds     -Keep BP log and followup with PCP on 7/20/20

## 2020-07-15 NOTE — ASSESSMENT & PLAN NOTE
Presenting with a complaint of left-sided chest pressure and nausea  Denies history of CAD, stents, prior cardiac catheterization  Initial troponin less than 0 02  Trend x3  Initial EKG sinus rhythm with inverted T-waves in leads AVR, aVL, V1, V2  CXR without widened mediastinum  Positive family history- father with MI at age 52  No hx of CAD or prior cardiac caths  Given   Continue ASA 81 daily   -Troponins have remained negative      -For inpatient stress test today  Nuclear stress test: Normal study after pharmacological vasodilation  Image artifact was present without diagnostic evidence for perfusion abnormality    Left ventricular systolic function was normal    -Patient stable for discharge at this time with PCP followup

## 2020-07-15 NOTE — ASSESSMENT & PLAN NOTE
Lipid panel from 08/14/2019:  Cholesterol 290, triglycerides 135, HDL 69,   History of hyperlipidemia but was taken off of statin a few months ago  Recheck lipid panel

## 2020-07-16 ENCOUNTER — TRANSITIONAL CARE MANAGEMENT (OUTPATIENT)
Dept: FAMILY MEDICINE CLINIC | Facility: CLINIC | Age: 80
End: 2020-07-16

## 2020-07-22 ENCOUNTER — TELEMEDICINE (OUTPATIENT)
Dept: FAMILY MEDICINE CLINIC | Facility: CLINIC | Age: 80
End: 2020-07-22
Payer: COMMERCIAL

## 2020-07-22 DIAGNOSIS — I10 ESSENTIAL HYPERTENSION: ICD-10-CM

## 2020-07-22 DIAGNOSIS — F41.9 ANXIETY: ICD-10-CM

## 2020-07-22 DIAGNOSIS — R07.89 OTHER CHEST PAIN: Primary | ICD-10-CM

## 2020-07-22 DIAGNOSIS — M35.3 PMR (POLYMYALGIA RHEUMATICA) (HCC): ICD-10-CM

## 2020-07-22 PROCEDURE — 99495 TRANSJ CARE MGMT MOD F2F 14D: CPT | Performed by: INTERNAL MEDICINE

## 2020-07-22 NOTE — PROGRESS NOTES
Assessment/Plan:        Problem List Items Addressed This Visit        Cardiovascular and Mediastinum    Essential hypertension     Continue Inderal LA, and Edarbi 40 mg   RTC in 1-2 mos w Blood work            Other    Other chest pain - Primary     Most Likely was due to Anxiety and PMR  Acute MI was r/O  Continue same  RTC in 1-2 mos w Blood work         PMR (polymyalgia rheumatica) (HCC)     Continue Prednisone 2 5 mg Daily Breakfast  RTC in 1-2 mos w Blood work         Anxiety     Continue Inderal and Clonazepam  RTC in 1-2 mos w Blood work                  Reason for visit is here for [employer] Visit as Below :    Encounter provider Kiersten Duarte MD       Provider located at [facility] AT [facility]  [address] [address]  [address] [address]      Recent Visits  No visits were found meeting these conditions  Showing recent visits within past 7 days and meeting all other requirements     Today's Visits  Date Type Provider Dept   07/22/20 Tico Hutton MD [facility]   Showing today's visits and meeting all other requirements     Future Appointments  No visits were found meeting these conditions  Showing future appointments within next 150 days and meeting all other requirements        After connecting through [employer], the patient was identified by name and date of birth  Annie Posey was informed that this is a telemedicine visit and that the visit is being conducted through [employer] and patient was informed that this is not a secure, HIPAA-complaint platform  She agrees to proceed     My office door was closed  No one else was in the room  She acknowledged consent and understanding of privacy and security of the video platform  The patient has agreed to participate and understands they can discontinue the visit at any time  Patient is aware this is a billable service        Subjective:     Patient ID: Mariano Ríos Luci Robertson is a [de-identified] y o  female  Nomi Tanner 53 is here for [employer], She feels better, D/C Summary and Med List reviewed w pt and Her daughter in detail, No New symptoms,    Review of Systems   Constitutional: Negative for chills, fatigue and fever  HENT: Negative for congestion, facial swelling, sore throat, trouble swallowing and voice change  Eyes: Negative for pain, discharge and visual disturbance  Respiratory: Negative for cough, shortness of breath and wheezing  Cardiovascular: Negative for chest pain, palpitations and leg swelling  Gastrointestinal: Negative for abdominal pain, blood in stool, constipation, diarrhea and nausea  Endocrine: Negative for polydipsia, polyphagia and polyuria  Genitourinary: Negative for difficulty urinating, hematuria and urgency  Musculoskeletal: Positive for arthralgias  Negative for myalgias  Skin: Negative for rash  Neurological: Negative for dizziness, tremors, weakness and headaches  Hematological: Negative for adenopathy  Does not bruise/bleed easily  Psychiatric/Behavioral: Negative for dysphoric mood, sleep disturbance and suicidal ideas  Objective: There were no vitals filed for this visit  Physical Exam   Constitutional: She is oriented to person, place, and time  She appears well-nourished  No distress  HENT:   Head: Normocephalic  Mouth/Throat: Oropharynx is clear and moist  No oropharyngeal exudate  Eyes: Pupils are equal, round, and reactive to light  Conjunctivae are normal  No scleral icterus  Neck: Neck supple  No thyromegaly present  Cardiovascular: Normal rate and regular rhythm  Murmur heard  Pulmonary/Chest: Effort normal and breath sounds normal  No respiratory distress  She has no wheezes  She has no rales  Abdominal: Soft  Bowel sounds are normal  She exhibits no distension  There is no tenderness  There is no rebound and no guarding     Musculoskeletal: She exhibits tenderness  She exhibits no edema  Lymphadenopathy:     She has no cervical adenopathy  Neurological: She is alert and oriented to person, place, and time  No cranial nerve deficit  Coordination normal    Skin: No rash noted  No erythema  Psychiatric: She has a normal mood and affect  Transitional Care Management Review:  Eddie Sloan is a [de-identified] y o  female here for TCM follow up  During the TCM phone call patient stated:    TCM Call (since 6/21/2020)     Date and time call was made  7/16/2020 11:01 AM    Patient was hospitialized at  [address] [place] [address]    Date of Admission  07/14/20    Date of discharge  07/15/20    Diagnosis  BP    Disposition  Home      TCM Call (since 6/21/2020)     I have advised the patient to call PCP with any new or worsening symptoms  kojo Chang          I spent 25  minutes with the patient during this visit      Hosea Chang MD

## 2020-07-22 NOTE — ASSESSMENT & PLAN NOTE
Most Likely was due to Anxiety and PMR  Acute MI was r/O  Continue same  RTC in 1-2 mos w Blood work

## 2020-07-23 ENCOUNTER — TELEPHONE (OUTPATIENT)
Dept: FAMILY MEDICINE CLINIC | Facility: CLINIC | Age: 80
End: 2020-07-23

## 2020-07-23 DIAGNOSIS — F41.9 ANXIETY: Primary | ICD-10-CM

## 2020-07-23 RX ORDER — CLONAZEPAM 0.5 MG/1
0.5 TABLET, ORALLY DISINTEGRATING ORAL 2 TIMES DAILY
Qty: 60 TABLET | Refills: 1 | Status: SHIPPED | OUTPATIENT
Start: 2020-07-23 | End: 2020-09-09

## 2020-08-06 DIAGNOSIS — M31.6 TEMPORAL ARTERITIS (HCC): ICD-10-CM

## 2020-08-06 RX ORDER — PREDNISONE 2.5 MG
2.5 TABLET ORAL DAILY
Qty: 30 TABLET | Refills: 2 | Status: SHIPPED | OUTPATIENT
Start: 2020-08-06 | End: 2020-09-09 | Stop reason: SDUPTHER

## 2020-08-07 ENCOUNTER — TELEPHONE (OUTPATIENT)
Dept: FAMILY MEDICINE CLINIC | Facility: CLINIC | Age: 80
End: 2020-08-07

## 2020-08-07 NOTE — TELEPHONE ENCOUNTER
Message left for Dorothy Odom ASA 81 mg ( once or twice a week is enough per Dr Juanis Conner ) any questions call the office

## 2020-08-23 NOTE — PROGRESS NOTES
DEPARTMENT OF NEUROLOGICAL SCIENCES  [address] and [facility]        NEW PATIENT EVALUATION NOTE    Patient: Caren Maya  Medical Record Number: # [id number]  YOB: 1940  Date of visit: 8/24/2020    Referring provider: Warden Yadira MD    ASSESSMENT     Diagnoses for this encounter:  1  Dementia with behavioral disturbance, unspecified dementia type (HCC)  mirtazapine (REMERON) 15 mg tablet    MRI brain NeuroQuant wo and w contrast    TSH, 3rd generation with Free T4 reflex    Lipid panel    Vitamin B12   2  Anxiety disorder, unspecified type  mirtazapine (REMERON) 15 mg tablet    TSH, 3rd generation with Free T4 reflex     Impression of this [de-identified] yo lady with up to 1 yr hx of memory loss per daughter  MOCA today was 14/30 with 2/5 word recall and she requires assistance with her own medications, voluntarily relinquished driving herself, and engages in repetitive activities without ultimately greater benefit (I e  writing down the same list repeatedly and losing the notes)  She had a more recent worsening of her memory a few months ago without a known trigger  She is taking clonazepam for years  Given her prior possible small stroke and her sudden change, it would benefit her to have a repeat MRI scan for comparison  She does have a high level of anxiety amplifying her indecisiveness and disorganization  Clonazepam, though previously helpful, is no longer as effective and she tried multiple SSRIs  Consider mirtazapine or trazodone as alternative  PLAN     · Relevant laboratory results in last 3 months and relevant neuroimaging results reviewed in HPI  · MRI Brain NeuroQuant  · Check TSH / free T4, lipid panel, repeat vitamin B12  · Continue stroke risk factor management (baby aspirin, and blood pressure medications)  · She does not drive  [employer] form to be filled out    · Recommend weaning down clonazepam as higher doses are risky for mentation and current dose is not effective for anxiety  Starting on mirtazapine 15mg qHS  · At this point, will hold off starting any new medications on her, including Aricept, given the etiology is unclear for now  · Encouraged to remain both physically and mentally active, including crossword puzzles, brain teasers  Online resources at [employer] or [employer] for cognitive training games for free  · Encouraged enrollment in day program for social interaction and stimulation  · Recommend reading The Dementia Care-Partner's Workbook printed by [employer]: [phone] by Constantino Soulier Bartholome Barrs, M D , M A  For families struggling with dementia  · Thank you very much for sending me this interesting patient  · The patient has been instructed to call us about any new neurological problems or medication side effects  · Return to Clinic in 6 months    A total of 60 minutes were spent face-to-face with this patient, of which 25% was spent on counseling and coordination of care  We discussed the natural history of the patient's condition, differential diagnosis, level of diagnostic certainty, treatment alternatives and their side effects and possible complications  HISTORY OF PRESENT ILLNESS:     Ms Desi Sotelo is a [de-identified] y o  right handed female with hx of polymyalgia rheumatica on daily prednisone, who has been referred to the [facility] [address] [place] [address] for evaluation of memory loss and confusion  The patient was accompanied today  History was obtained from patient and daughter Irais Duran  Pt referred by PCP    She had been followed at [facility] with Dr Paz Barrientos regarding gait disturbance, anxiety and dizziness, last seen July 2018  Per accounts, she has been taking diazepam nightly for anxiety control and at that time the dizziness had resolved  No particular cause for her subjective gait disturbance was found and she had normal examination  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        Within the past three months she has been seen in ER 6/24/20 for memory loss, and 7/14/20 for chest pain  Per records, daughter reported acute memory loss since one year ago when she had diverticulitis, significantly worsened in May 2020 with "periods of forgetfulness", denying numbness or weakness  She has been on [place] for years, clonazepam unchanged for 2 years  She started prednisone for polymyalgia rheumatica one year ago, but she thinks it was after the start of memory symptoms  Pt called her daughter at 3am stating that she could not turn off her alarm  She had no other major changes to her routine at the time and sepsis was ruled out  No red flags were found in the evaluation of her chest pain, including a normal nuclear stress test      Her daughter says she is confusing dates and times (she is retired) over the past year, very disoriented in the morning, confusion when she wakes up from a nap, anxiety during the evenings  She has trouble keeping track of doctor's appointments and medications, often writing and re-writing the same list over and over again  She does sometimes repeat herself  They deny any hallucinations or acting out dreams  Pt subjectively feels her mentation has improved since she had her diverticulitis  She says her anxiety level is very high  Previously she could not tolerate sertraline, buspirone  She has not had any recent COVID testing, but also no major risk factors for exposure  Workup:   - Blood testing within last four months showing persistent mild hyponatremia and low chloride, normal magnesium, normal CMP otherwise, normal CBC, PT/INR/PTT/TSH, abnormally elevated thyroglobulin antibody  Historically normal vit B12, positive JOSE, neg anti DS DNA ab     - 7/14/20 EKG normal sinus rhythm  - 6/24/20 CT head for AMS showing moderate microangiopathic change similar to 2018   No acute changes  - 8/4/17 CTA head was reviewed by me personally and scanned with the head tilted at an oblique angle  No report available  L lateral ventricular horn appears dilated despite accounting for this, in setting of diffuse cerebral atrophy  Current Relevant Medications: prednisone, clonazepam, aspirin 81mg, azilsartan, L-methylfolate, protonix, Propranolol, flonase, probiotic, lortadine  Living Situation + ADLs: She has no issues with ADLs  She lives at home with her   Daughter has started to manage her medications  She does cook, but along with her   She can do household chores  She has never been the one to manage finances since marriage -  does this  She has not driven in the past year, voluntarily  Daughter has been helping with shopping trips  She still possesses her 's license      Durable Power of : yes (her )  Living Will: yes       Parkinson's Disease Motor Symptoms:   Rigidity:  none  Tremor:  none  Freezing of gait:  none   Family History: Number of First Degree Relatives With Parkinsonism/Dementia: none known    REVIEW OF PAST MEDICAL, SOCIAL AND FAMILY HISTORY:  This is the list of problems as per our Medical Records:    Patient Active Problem List    Diagnosis Date Noted    Other chest pain 07/22/2020    PMR (polymyalgia rheumatica) ([facility] ) 07/22/2020    Anxiety 07/22/2020    Hypertensive urgency 07/14/2020    Chest pressure 07/14/2020    Hyponatremia 07/14/2020    Polymyalgia rheumatica ([facility] ) 07/14/2020    Disease of thyroid gland     Hyperlipidemia     Hypertension     Hypothyroidism     Multiple thyroid nodules 04/15/2020    Temporal arteritis ([facility] ) 01/08/2020    Gastroesophageal reflux disease without esophagitis 08/28/2018    Essential hypertension 08/28/2018    Abnormal CT scan, head 01/05/2018    Goiter 10/05/2012    Osteoarthritis 10/05/2012     Past Medical History:   Diagnosis Date    Anxiety     Gastroesophageal reflux disease without esophagitis 8/28/2018    Hyperlipidemia     Hypertension     Hypothyroidism     Osteoarthritis 10/5/2012      Past Surgical History:   Procedure Laterality Date    CATARACT EXTRACTION      CHOLECYSTECTOMY      HYSTERECTOMY        Allergies   Allergen Reactions    No Known Allergies       Outpatient Encounter Medications as of 8/24/2020   Medication Sig Dispense Refill    aspirin (Aspirin Adult Low Dose) 81 mg EC tablet Take 1 tablet (81 mg total) by mouth every other day 45 tablet 3    azilsartan medoxomil (Edarbi) 40 MG tablet Take 1 tablet (40 mg total) by mouth daily 90 tablet 3    clonazePAM (KlonoPIN) 0 5 MG disintegrating tablet Take 1 tablet (0 5 mg total) by mouth 2 (two) times a day 60 tablet 1    L-Methylfolate 15 MG TABS Take 1 tablet (15 mg total) by mouth daily 90 tablet 3    pantoprazole (PROTONIX) 40 mg tablet Take 1 tablet (40 mg total) by mouth every morning 90 tablet 3    predniSONE 2 5 mg tablet Take 1 tablet (2 5 mg total) by mouth daily 30 tablet 2    propranolol (INDERAL LA) 60 mg 24 hr capsule Take 1 capsule (60 mg total) by mouth daily Please stop Norvasc    90 capsule 3    fluticasone (FLONASE) 50 mcg/act nasal spray 2 sprays into each nostril daily (Patient not taking: Reported on 8/24/2020) 1 Bottle 3    loratadine (CLARITIN REDITABS) 10 MG dissolvable tablet Take 1 tablet (10 mg total) by mouth daily (Patient not taking: Reported on 8/24/2020) 30 tablet 1    mirtazapine (REMERON) 15 mg tablet Start with 0 5 tablet at bedtime for a week, then to 1 full tablet at bedtime afterwards  Call in a month 30 tablet 2    Probiotic Product (VSL#3) CAPS Take 1 capsule by mouth 2 (two) times a day (Patient not taking: Reported on 8/24/2020) 60 capsule 5     No facility-administered encounter medications on file as of 8/24/2020        Social History     Tobacco Use    Smoking status: Never Smoker    Smokeless tobacco: Never Used   Substance Use Topics    Alcohol use: No      Family History   Problem Relation Age of Onset    No Known Problems Mother     No Known Problems Father         REVIEW OF SYSTEMS:  The patient has entered data on an intake form regarding present illness, past medical and surgical history, medications, allergies, family and social history, and a full review of 14 systems  I have reviewed this form with the patient, and all the relevant information has been included on this note  The full review of systems was negative except as stated in HPI and below  Constitutional: Negative  Negative for appetite change and fever  HENT: Positive for sinus pressure and sinus pain  Negative for hearing loss, tinnitus, trouble swallowing and voice change  Eyes: Negative  Negative for photophobia and pain  Respiratory: Negative  Negative for shortness of breath  Cardiovascular: Negative  Negative for palpitations  Gastrointestinal: Negative  Negative for nausea and vomiting  Endocrine: Negative  Negative for cold intolerance  Genitourinary: Negative  Negative for dysuria, frequency and urgency  Musculoskeletal: Positive for gait problem  Negative for myalgias and neck pain  Skin: Negative  Negative for rash  Neurological: Negative for dizziness, tremors, seizures, syncope, facial asymmetry, speech difficulty, weakness, light-headedness, numbness and headaches  Forgetful  Memory loss   Hematological: Negative  Does not bruise/bleed easily  Psychiatric/Behavioral: Positive for confusion  Negative for hallucinations and sleep disturbance  The patient is nervous/anxious  Mood swings    PHYSICAL EXAMINATION:     Vital signs:  /78   Temp 97 7 °F (36 5 °C)   Ht 5' 5" (1 651 m)   Wt 64 7 kg (142 lb 11 2 oz)   BMI 23 75 kg/m²     General:  Well-appearing, well nourished, pleasant patient in no acute distress  Mood appropriate  Head:  Normocephalic, atraumatic  Oropharynx and conjunctiva are clear  Speech  No hypophonia, no bradylalia  No scanning speech     Language: Comprehension intact  Neck:  Supple, strong 5/5 forward flexion and retroflexion  Extremities: Range of motion is normal       Cognitive and Mental Exam:    MOCA version 1 0    Points MAX   Visuospatial  ----------   Trails A 0 1   Cube Drawing 0 1   Clock Drawing 1 3   Naming Objects 3 3   Attention  ----------   Digit Span 2 2   Letter Reading 1 1   Serial 7s 1 3   Language  ----------   Repetition 2 2   Fluency 0 1   Abstraction 0 2   Delayed Recall 2 5   Orientation               2           6              13 30   + 1 for HS education = 14    Cranial Nerves:  CN II:  Direct and consensual light reflexes were equally reactive to light symmetrically  No afferent pupillary defect  Visual fields are full to confrontation  CN III / IV / VI: Extraocular movements were full, with normal pursuit and saccades  CN V:   Facial sensation to light touch was intact  CN VII: Face is symmetric with normal strength  CN VIII: Hearing was assessed using the Calibrated Finger Rub Auditory Screening Test (CALFRAST) and was not abnormal (Better than CALFRAST-Strong-70)  CN X:   Palate is up going bilaterally and symmetrically  CN XI:  Neck muscles are strong  CN XII: Tongue protrusion is at midline with normal movements  No dysarthria  Motor:    Dystonia: none  Dyskinesia: none  Myoclonus: none  Chorea: none  Tics: none       UPDRSIII                Time since last dose:   8/24/20       Speech  0     Facial Expression  1    Postural Tremor (Right) 0    Postural Tremor (Left) 0    Kinetic Tremor (Right)  0    Kinetic Tremor (Left)  0    Rest tremor amplitude RUE 0    Rest tremor amplitude LUE 0    Rest tremor amplitude RLE 0    Rest tremor amplitude LLE 0    Lip/Jaw Tremor  0    Consistency of tremor 0    Finger Taps (Right)   1    Finger Taps (Left)  1    Hand Movement (Right)  0    Hand Movement (Left)   0    Pronation/Supination (Right)  0    Pronation/Supination (Left)   0    Toe Tapping (Right) 0    Toe Tapping (Left) 0    Leg Agility (Right)  0 Leg Agility (Left)   0     Rigidity - Neck  0     Rigidity - Upper Extremity (Right)  0      Rigidity - Upper Extremity (Left)   0     Rigidity - Lower Extremity (Right)  0    Rigidity - Lower Extremity (Left)   0    Arising from Chair   0      Gait   1     Freezing of Gait 0     Postural Stability  -     Posture 0     Global spontaneity of movement 0       -------------------------------------------------------------------------------------    Muscle Strength Right Left  Muscle Strength Right Left   Deltoid 5/5 5/5  Hip Adductors 5/5 5/5   Biceps 5/5 5/5  Hip Abductors 5/5 5/5   Triceps 5/5 5/5  Knee Extensors 5/5 5/5   Wrist Extensors 5/5 5/5  Knee Flexors 5/5 5/5   Wrist Flexors 5/5 5/5  Ankle Extensors 5/5 5/5    5/5 5/5  Ankle Flexors 5/5 5/5   Finger Abductors 5/5 5/5       Hip Flexors 5/5 5/5   Hip Extensors 5/5 5/5     Coordination: Finger-to-nose-finger: normal  Gait: Normal rise from chair, stance, stride speed and length, and turns  Difficulty with tandem gait  Reflexes:    Right Left   Biceps 2/4 2/4   Brachioradialis 2/4 2/4   Triceps 2/4 2/4   Knee 2/4 2/4   Ankle 2/4 2/4      REVIEW OF ANCILLARY TESTS:   Results for orders placed or performed during the hospital encounter of 06/24/20   CT head without contrast    Narrative    CT BRAIN - WITHOUT CONTRAST    INDICATION:   Altered mental status  COMPARISON:  8/10/2018    TECHNIQUE:  CT examination of the brain was performed  In addition to axial images, coronal 2D reformatted images were created and submitted for interpretation  Radiation dose length product (DLP) for this visit:  792 mGy-cm   This examination, like all CT scans performed in the [facility], was performed utilizing techniques to minimize radiation dose exposure, including the use of iterative   reconstruction and automated exposure control  IMAGE QUALITY:  Diagnostic      FINDINGS:    PARENCHYMA: Decreased attenuation is noted in periventricular and subcortical white matter demonstrating an appearance that is statistically most likely to represent moderate microangiopathic change; this appearance is similar when compared to most   recent prior examination  No CT signs of acute infarction  No intracranial mass, mass effect or midline shift  No acute parenchymal hemorrhage  VENTRICLES AND EXTRA-AXIAL SPACES:  Ventricles and extra-axial CSF spaces are prominent commensurate with the degree of volume loss  No hydrocephalus  No acute extra-axial hemorrhage  VISUALIZED ORBITS AND PARANASAL SINUSES:  Unremarkable  CALVARIUM AND EXTRACRANIAL SOFT TISSUES:  Normal       Impression    No acute intracranial abnormality  Microangiopathic changes     Results for orders placed or performed in visit on 04/23/19   CT head w wo contrast    [facility]    [phone] [id number] [phone] [id number]

## 2020-08-24 ENCOUNTER — CONSULT (OUTPATIENT)
Dept: NEUROLOGY | Facility: CLINIC | Age: 80
End: 2020-08-24
Payer: COMMERCIAL

## 2020-08-24 VITALS
DIASTOLIC BLOOD PRESSURE: 78 MMHG | BODY MASS INDEX: 23.78 KG/M2 | TEMPERATURE: 97.7 F | SYSTOLIC BLOOD PRESSURE: 142 MMHG | WEIGHT: 142.7 LBS | HEIGHT: 65 IN

## 2020-08-24 DIAGNOSIS — F41.9 ANXIETY DISORDER, UNSPECIFIED TYPE: ICD-10-CM

## 2020-08-24 DIAGNOSIS — F03.91 DEMENTIA WITH BEHAVIORAL DISTURBANCE, UNSPECIFIED DEMENTIA TYPE (HCC): Primary | ICD-10-CM

## 2020-08-24 PROBLEM — F03.918 DEMENTIA WITH BEHAVIORAL DISTURBANCE: Status: ACTIVE | Noted: 2020-08-24

## 2020-08-24 PROCEDURE — 99204 OFFICE O/P NEW MOD 45 MIN: CPT | Performed by: PSYCHIATRY & NEUROLOGY

## 2020-08-24 PROCEDURE — 1036F TOBACCO NON-USER: CPT | Performed by: PSYCHIATRY & NEUROLOGY

## 2020-08-24 PROCEDURE — 3077F SYST BP >= 140 MM HG: CPT | Performed by: PSYCHIATRY & NEUROLOGY

## 2020-08-24 PROCEDURE — 1160F RVW MEDS BY RX/DR IN RCRD: CPT | Performed by: PSYCHIATRY & NEUROLOGY

## 2020-08-24 PROCEDURE — 3078F DIAST BP <80 MM HG: CPT | Performed by: PSYCHIATRY & NEUROLOGY

## 2020-08-24 RX ORDER — MIRTAZAPINE 15 MG/1
TABLET, FILM COATED ORAL
Qty: 30 TABLET | Refills: 2 | Status: SHIPPED | OUTPATIENT
Start: 2020-08-24 | End: 2020-09-29

## 2020-08-24 NOTE — LETTER
August 24, 2020     Jhon Roca, [address]    Patient: David Santiago   YOB: 1940   Date of Visit: 8/24/2020       Dear Dr Dante Genao: Thank you for referring David Santiago to me for evaluation  Below are my notes for this consultation  If you have questions, please do not hesitate to call me  I look forward to following your patient along with you  Sincerely,        Malina Fields MD        CC: No Recipients  Malina Fields MD  8/24/2020 11:44 PM  Sign when Signing Visit  [facility]        NEW PATIENT EVALUATION NOTE    Patient: David Santiago  Medical Record Number: # [id number]  YOB: 1940  Date of visit: 8/24/2020    Referring provider: Agnieszka Cortez MD    ASSESSMENT     Diagnoses for this encounter:  1  Dementia with behavioral disturbance, unspecified dementia type (HCC)  mirtazapine (REMERON) 15 mg tablet    MRI brain NeuroQuant wo and w contrast    TSH, 3rd generation with Free T4 reflex    Lipid panel    Vitamin B12   2  Anxiety disorder, unspecified type  mirtazapine (REMERON) 15 mg tablet    TSH, 3rd generation with Free T4 reflex     Impression of this [de-identified] yo lady with up to 1 yr hx of memory loss per daughter  MOCA today was 14/30 with 2/5 word recall and she requires assistance with her own medications, voluntarily relinquished driving herself, and engages in repetitive activities without ultimately greater benefit (I e  writing down the same list repeatedly and losing the notes)  She had a more recent worsening of her memory a few months ago without a known trigger  She is taking clonazepam for years  Given her prior possible small stroke and her sudden change, it would benefit her to have a repeat MRI scan for comparison  She does have a high level of anxiety amplifying her indecisiveness and disorganization   Clonazepam, though previously helpful, is no longer as effective and she tried multiple SSRIs  Consider mirtazapine or trazodone as alternative  PLAN     · Relevant laboratory results in last 3 months and relevant neuroimaging results reviewed in HPI  · MRI Brain NeuroQuant  · Check TSH / free T4, lipid panel, repeat vitamin B12  · Continue stroke risk factor management (baby aspirin, and blood pressure medications)  · She does not drive  [employer] form to be filled out  · Recommend weaning down clonazepam as higher doses are risky for mentation and current dose is not effective for anxiety  Starting on mirtazapine 15mg qHS  · At this point, will hold off starting any new medications on her, including Aricept, given the etiology is unclear for now  · Encouraged to remain both physically and mentally active, including crossword puzzles, brain teasers  Online resources at [employer] or [employer] for cognitive training games for free  · Encouraged enrollment in day program for social interaction and stimulation  · Recommend reading The Dementia Care-Partner's Workbook printed by [email or web address] Ph: [phone] by MADELIN Dempsey , M A  For families struggling with dementia  · Thank you very much for sending me this interesting patient  · The patient has been instructed to call us about any new neurological problems or medication side effects  · Return to Clinic in 6 months    A total of 60 minutes were spent face-to-face with this patient, of which 25% was spent on counseling and coordination of care  We discussed the natural history of the patient's condition, differential diagnosis, level of diagnostic certainty, treatment alternatives and their side effects and possible complications       HISTORY OF PRESENT ILLNESS:     Ms Danny Suarez is a [de-identified] y o  right handed female with hx of polymyalgia rheumatica on daily prednisone, who has been referred to the [facility] for evaluation of memory loss and confusion  The patient was accompanied today  History was obtained from patient and daughter Dannielle Torres  Pt referred by PCP    She had been followed at [facility] with Dr Roya Santacruz regarding gait disturbance, anxiety and dizziness, last seen July 2018  Per accounts, she has been taking diazepam nightly for anxiety control and at that time the dizziness had resolved  No particular cause for her subjective gait disturbance was found and she had normal examination  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  Within the past three months she has been seen in ER 6/24/20 for memory loss, and 7/14/20 for chest pain  Per records, daughter reported acute memory loss since one year ago when she had diverticulitis, significantly worsened in May 2020 with "periods of forgetfulness", denying numbness or weakness  She has been on [place] for years, clonazepam unchanged for 2 years  She started prednisone for polymyalgia rheumatica one year ago, but she thinks it was after the start of memory symptoms  Pt called her daughter at 3am stating that she could not turn off her alarm  She had no other major changes to her routine at the time and sepsis was ruled out  No red flags were found in the evaluation of her chest pain, including a normal nuclear stress test      Her daughter says she is confusing dates and times (she is retired) over the past year, very disoriented in the morning, confusion when she wakes up from a nap, anxiety during the evenings  She has trouble keeping track of doctor's appointments and medications, often writing and re-writing the same list over and over again  She does sometimes repeat herself  They deny any hallucinations or acting out dreams  Pt subjectively feels her mentation has improved since she had her diverticulitis  She says her anxiety level is very high  Previously she could not tolerate sertraline, buspirone       She has not had any recent COVID testing, but also no major risk factors for exposure  Workup:   - Blood testing within last four months showing persistent mild hyponatremia and low chloride, normal magnesium, normal CMP otherwise, normal CBC, PT/INR/PTT/TSH, abnormally elevated thyroglobulin antibody  Historically normal vit B12, positive JOSE, neg anti DS DNA ab     - 7/14/20 EKG normal sinus rhythm  - 6/24/20 CT head for AMS showing moderate microangiopathic change similar to 2018  No acute changes  - 8/4/17 CTA head was reviewed by me personally and scanned with the head tilted at an oblique angle  No report available  L lateral ventricular horn appears dilated despite accounting for this, in setting of diffuse cerebral atrophy  Current Relevant Medications: prednisone, clonazepam, aspirin 81mg, azilsartan, L-methylfolate, protonix, Propranolol, flonase, probiotic, lortadine  Living Situation + ADLs: She has no issues with ADLs  She lives at home with her   Daughter has started to manage her medications  She does cook, but along with her   She can do household chores  She has never been the one to manage finances since marriage -  does this  She has not driven in the past year, voluntarily  Daughter has been helping with shopping trips  She still possesses her 's license      Durable Power of : yes (her )  Living Will: yes       Parkinson's Disease Motor Symptoms:   Rigidity:  none  Tremor:  none  Freezing of gait:  none   Family History: Number of First Degree Relatives With Parkinsonism/Dementia: none known    REVIEW OF PAST MEDICAL, SOCIAL AND FAMILY HISTORY:  This is the list of problems as per our Medical Records:    Patient Active Problem List    Diagnosis Date Noted    Other chest pain 07/22/2020    PM[facility]ymyalgia rhe[facility]a) [facility] [place] [facility] ) 07/22/2020    Anxiety 07/22/2020    Hypertensive urgency 07/14/2020    Chest pressure 07/14/2020    Hyponatremia 07/14/2020    Polymyalgia rheumatica [facility] [address] ) 07/14/2020    Disease of thyroid gland     Hyperlipidemia     Hypertension     Hypothyroidism     Multiple thyroid nodules 04/15/2020    Temporal arteritis ([address] ) 01/08/2020    Gastroesophageal reflux disease without esophagitis 08/28/2018    Essential hypertension 08/28/2018    Abnormal CT scan, head 01/05/2018    Goiter 10/05/2012    Osteoarthritis 10/05/2012     Past Medical History:   Diagnosis Date    Anxiety     Gastroesophageal reflux disease without esophagitis 8/28/2018    Hyperlipidemia     Hypertension     Hypothyroidism     Osteoarthritis 10/5/2012      Past Surgical History:   Procedure Laterality Date    CATARACT EXTRACTION      CHOLECYSTECTOMY      HYSTERECTOMY        Allergies   Allergen Reactions    No Known Allergies       Outpatient Encounter Medications as of 8/24/2020   Medication Sig Dispense Refill    aspirin (Aspirin Adult Low Dose) 81 mg EC tablet Take 1 tablet (81 mg total) by mouth every other day 45 tablet 3    azilsartan medoxomil (Edarbi) 40 MG tablet Take 1 tablet (40 mg total) by mouth daily 90 tablet 3    clonazePAM (KlonoPIN) 0 5 MG disintegrating tablet Take 1 tablet (0 5 mg total) by mouth 2 (two) times a day 60 tablet 1    L-Methylfolate 15 MG TABS Take 1 tablet (15 mg total) by mouth daily 90 tablet 3    pantoprazole (PROTONIX) 40 mg tablet Take 1 tablet (40 mg total) by mouth every morning 90 tablet 3    predniSONE 2 5 mg tablet Take 1 tablet (2 5 mg total) by mouth daily 30 tablet 2    propranolol (INDERAL LA) 60 mg 24 hr capsule Take 1 capsule (60 mg total) by mouth daily Please stop Norvasc    90 capsule 3    fluticasone (FLONASE) 50 mcg/act nasal spray 2 sprays into each nostril daily (Patient not taking: Reported on 8/24/2020) 1 Bottle 3    loratadine (CLARITIN REDITABS) 10 MG dissolvable tablet Take 1 tablet (10 mg total) by mouth daily (Patient not taking: Reported on 8/24/2020) 30 tablet 1    mirtazapine (REMERON) 15 mg tablet Start with 0 5 tablet at bedtime for a week, then to 1 full tablet at bedtime afterwards  Call in a month 30 tablet 2    Probiotic Product (VSL#3) CAPS Take 1 capsule by mouth 2 (two) times a day (Patient not taking: Reported on 8/24/2020) 60 capsule 5     No facility-administered encounter medications on file as of 8/24/2020  Social History     Tobacco Use    Smoking status: Never Smoker    Smokeless tobacco: Never Used   Substance Use Topics    Alcohol use: No      Family History   Problem Relation Age of Onset    No Known Problems Mother     No Known Problems Father         REVIEW OF SYSTEMS:  The patient has entered data on an intake form regarding present illness, past medical and surgical history, medications, allergies, family and social history, and a full review of 14 systems  I have reviewed this form with the patient, and all the relevant information has been included on this note  The full review of systems was negative except as stated in HPI and below  Constitutional: Negative  Negative for appetite change and fever  HENT: Positive for sinus pressure and sinus pain  Negative for hearing loss, tinnitus, trouble swallowing and voice change  Eyes: Negative  Negative for photophobia and pain  Respiratory: Negative  Negative for shortness of breath  Cardiovascular: Negative  Negative for palpitations  Gastrointestinal: Negative  Negative for nausea and vomiting  Endocrine: Negative  Negative for cold intolerance  Genitourinary: Negative  Negative for dysuria, frequency and urgency  Musculoskeletal: Positive for gait problem  Negative for myalgias and neck pain  Skin: Negative  Negative for rash  Neurological: Negative for dizziness, tremors, seizures, syncope, facial asymmetry, speech difficulty, weakness, light-headedness, numbness and headaches  Forgetful  Memory loss   Hematological: Negative  Does not bruise/bleed easily  Psychiatric/Behavioral: Positive for confusion   Negative for hallucinations and sleep disturbance  The patient is nervous/anxious  Mood swings    PHYSICAL EXAMINATION:     Vital signs:  /78   Temp 97 7 °F (36 5 °C)   Ht 5' 5" (1 651 m)   Wt 64 7 kg (142 lb 11 2 oz)   BMI 23 75 kg/m²     General:  Well-appearing, well nourished, pleasant patient in no acute distress  Mood appropriate  Head:  Normocephalic, atraumatic  Oropharynx and conjunctiva are clear  Speech  No hypophonia, no bradylalia  No scanning speech  Language: Comprehension intact  Neck:  Supple, strong 5/5 forward flexion and retroflexion  Extremities: Range of motion is normal       Cognitive and Mental Exam:    MOCA version 1 0    Points MAX   Visuospatial  ----------   Trails A 0 1   Cube Drawing 0 1   Clock Drawing 1 3   Naming Objects 3 3   Attention  ----------   Digit Span 2 2   Letter Reading 1 1   Serial 7s 1 3   Language  ----------   Repetition 2 2   Fluency 0 1   Abstraction 0 2   Delayed Recall 2 5   Orientation               2           6              13 30   + 1 for  education = 14    Cranial Nerves:  CN II:  Direct and consensual light reflexes were equally reactive to light symmetrically  No afferent pupillary defect  Visual fields are full to confrontation  CN III / IV / VI: Extraocular movements were full, with normal pursuit and saccades  CN V:   Facial sensation to light touch was intact  CN VII: Face is symmetric with normal strength  CN VIII: Hearing was assessed using the Calibrated Finger Rub Auditory Screening Test (CALFRAST) and was not abnormal (Better than CALFRAST-Strong-70)  CN X:   Palate is up going bilaterally and symmetrically  CN XI:  Neck muscles are strong  CN XII: Tongue protrusion is at midline with normal movements  No dysarthria  Motor:    Dystonia: none  Dyskinesia: none  Myoclonus: none  Chorea: none  Tics: none       UPDRSIII                Time since last dose:   8/24/20       Speech  0     Facial Expression  1 Postural Tremor (Right) 0    Postural Tremor (Left) 0    Kinetic Tremor (Right)  0    Kinetic Tremor (Left)  0    Rest tremor amplitude RUE 0    Rest tremor amplitude LUE 0    Rest tremor amplitude RLE 0    Rest tremor amplitude LLE 0    Lip/Jaw Tremor  0    Consistency of tremor 0    Finger Taps (Right)   1    Finger Taps (Left)  1    Hand Movement (Right)  0    Hand Movement (Left)   0    Pronation/Supination (Right)  0    Pronation/Supination (Left)   0    Toe Tapping (Right) 0    Toe Tapping (Left) 0    Leg Agility (Right)  0    Leg Agility (Left)   0     Rigidity - Neck  0     Rigidity - Upper Extremity (Right)  0      Rigidity - Upper Extremity (Left)   0     Rigidity - Lower Extremity (Right)  0    Rigidity - Lower Extremity (Left)   0    Arising from Chair   0      Gait   1     Freezing of Gait 0     Postural Stability  -     Posture 0     Global spontaneity of movement 0       -------------------------------------------------------------------------------------    Muscle Strength Right Left  Muscle Strength Right Left   Deltoid 5/5 5/5  Hip Adductors 5/5 5/5   Biceps 5/5 5/5  Hip Abductors 5/5 5/5   Triceps 5/5 5/5  Knee Extensors 5/5 5/5   Wrist Extensors 5/5 5/5  Knee Flexors 5/5 5/5   Wrist Flexors 5/5 5/5  Ankle Extensors 5/5 5/5    5/5 5/5  Ankle Flexors 5/5 5/5   Finger Abductors 5/5 5/5       Hip Flexors 5/5 5/5   Hip Extensors 5/5 5/5     Coordination: Finger-to-nose-finger: normal  Gait: Normal rise from chair, stance, stride speed and length, and turns  Difficulty with tandem gait  Reflexes:    Right Left   Biceps 2/4 2/4   Brachioradialis 2/4 2/4   Triceps 2/4 2/4   Knee 2/4 2/4   Ankle 2/4 2/4      REVIEW OF ANCILLARY TESTS:   Results for orders placed or performed during the hospital encounter of 06/24/20   CT head without contrast    Narrative    CT BRAIN - WITHOUT CONTRAST    INDICATION:   Altered mental status      COMPARISON:  8/10/2018    TECHNIQUE:  CT examination of the brain was performed  In addition to axial images, coronal 2D reformatted images were created and submitted for interpretation  Radiation dose length product (DLP) for this visit:  792 mGy-cm   This examination, like all CT scans performed in the [facility], was performed utilizing techniques to minimize radiation dose exposure, including the use of iterative   reconstruction and automated exposure control  IMAGE QUALITY:  Diagnostic  FINDINGS:    PARENCHYMA: Decreased attenuation is noted in periventricular and subcortical white matter demonstrating an appearance that is statistically most likely to represent moderate microangiopathic change; this appearance is similar when compared to most   recent prior examination  No CT signs of acute infarction  No intracranial mass, mass effect or midline shift  No acute parenchymal hemorrhage  VENTRICLES AND EXTRA-AXIAL SPACES:  Ventricles and extra-axial CSF spaces are prominent commensurate with the degree of volume loss  No hydrocephalus  No acute extra-axial hemorrhage  VISUALIZED ORBITS AND PARANASAL SINUSES:  Unremarkable  CALVARIUM AND EXTRACRANIAL SOFT TISSUES:  Normal       Impression    No acute intracranial abnormality  Microangiopathic changes     Results for orders placed or performed in visit on 04/23/19   CT head w wo contrast    Narrative    [phone] [id number] [phone] [id number]

## 2020-08-24 NOTE — PROGRESS NOTES
Patient ID: Nasim Vaca is a [de-identified] y o  female  Assessment/Plan:    No problem-specific Assessment & Plan notes found for this encounter  {Assess/PlanSmartLinks:45132}       Subjective:    HPI    {[facility] HPI texts:99597}    {Common ambulatory SmartLinks:87804}         Objective:    Temperature 97 7 °F (36 5 °C), height 5' 5" (1 651 m)  Physical Exam    Neurological Exam      ROS:    Review of Systems   Constitutional: Negative  Negative for appetite change and fever  HENT: Positive for sinus pressure and sinus pain  Negative for hearing loss, tinnitus, trouble swallowing and voice change  Eyes: Negative  Negative for photophobia and pain  Respiratory: Negative  Negative for shortness of breath  Cardiovascular: Negative  Negative for palpitations  Gastrointestinal: Negative  Negative for nausea and vomiting  Endocrine: Negative  Negative for cold intolerance  Genitourinary: Negative  Negative for dysuria, frequency and urgency  Musculoskeletal: Positive for gait problem  Negative for myalgias and neck pain  Skin: Negative  Negative for rash  Neurological: Negative for dizziness, tremors, seizures, syncope, facial asymmetry, speech difficulty, weakness, light-headedness, numbness and headaches  Forgetful  Memory loss   Hematological: Negative  Does not bruise/bleed easily  Psychiatric/Behavioral: Positive for confusion  Negative for hallucinations and sleep disturbance  The patient is nervous/anxious           Mood swings

## 2020-08-24 NOTE — PATIENT INSTRUCTIONS
· Relevant laboratory results in last 3 months and relevant neuroimaging results reviewed in HPI  · MRI Brain NeuroQuant  · Check TSH / free T4, lipid panel, repeat vitamin B12  · Continue stroke risk factor management (baby aspirin, and blood pressure medications)  · Recommend weaning down clonazepam as higher doses are risky for mentation and current dose is not effective for anxiety  Starting on mirtazapine 15mg qHS  · Encouraged to remain both physically and mentally active, including crossword puzzles, brain teasers  Online resources at [employer] or [employer] for cognitive training games for free  · Encouraged enrollment in day program for social interaction and stimulation  · Recommend reading The Dementia Care-Partner's Workbook printed by [employer] Ph: [phone] by MADELIN Julio , M A  For families struggling with dementia  · Thank you very much for sending me this interesting patient  · The patient has been instructed to call us about any new neurological problems or medication side effects    · Return to Clinic in 6 months

## 2020-08-25 ENCOUNTER — APPOINTMENT (OUTPATIENT)
Dept: LAB | Age: 80
End: 2020-08-25
Payer: COMMERCIAL

## 2020-08-25 ENCOUNTER — TELEPHONE (OUTPATIENT)
Dept: FAMILY MEDICINE CLINIC | Facility: CLINIC | Age: 80
End: 2020-08-25

## 2020-08-25 DIAGNOSIS — R41.3 MEMORY LOSS: ICD-10-CM

## 2020-08-25 DIAGNOSIS — F03.91 DEMENTIA WITH BEHAVIORAL DISTURBANCE, UNSPECIFIED DEMENTIA TYPE (HCC): ICD-10-CM

## 2020-08-25 DIAGNOSIS — F41.9 ANXIETY DISORDER, UNSPECIFIED TYPE: ICD-10-CM

## 2020-08-25 LAB
ALBUMIN SERPL BCP-MCNC: 3.8 G/DL (ref 3.5–5)
ALP SERPL-CCNC: 43 U/L (ref 46–116)
ALT SERPL W P-5'-P-CCNC: 19 U/L (ref 12–78)
ANION GAP SERPL CALCULATED.3IONS-SCNC: 3 MMOL/L (ref 4–13)
AST SERPL W P-5'-P-CCNC: 19 U/L (ref 5–45)
BASOPHILS # BLD AUTO: 0.06 THOUSANDS/ΜL (ref 0–0.1)
BASOPHILS NFR BLD AUTO: 1 % (ref 0–1)
BILIRUB SERPL-MCNC: 0.66 MG/DL (ref 0.2–1)
BUN SERPL-MCNC: 16 MG/DL (ref 5–25)
CALCIUM SERPL-MCNC: 9.5 MG/DL (ref 8.3–10.1)
CHLORIDE SERPL-SCNC: 99 MMOL/L (ref 100–108)
CHOLEST SERPL-MCNC: 302 MG/DL (ref 50–200)
CO2 SERPL-SCNC: 32 MMOL/L (ref 21–32)
CREAT SERPL-MCNC: 0.94 MG/DL (ref 0.6–1.3)
CRP SERPL QL: <3 MG/L
EOSINOPHIL # BLD AUTO: 0.11 THOUSAND/ΜL (ref 0–0.61)
EOSINOPHIL NFR BLD AUTO: 2 % (ref 0–6)
ERYTHROCYTE [DISTWIDTH] IN BLOOD BY AUTOMATED COUNT: 13.2 % (ref 11.6–15.1)
ERYTHROCYTE [SEDIMENTATION RATE] IN BLOOD: 20 MM/HOUR (ref 0–29)
FERRITIN SERPL-MCNC: 87 NG/ML (ref 8–388)
FOLATE SERPL-MCNC: >20 NG/ML (ref 3.1–17.5)
GFR SERPL CREATININE-BSD FRML MDRD: 57 ML/MIN/1.73SQ M
GLUCOSE P FAST SERPL-MCNC: 76 MG/DL (ref 65–99)
HCT VFR BLD AUTO: 38.4 % (ref 34.8–46.1)
HDLC SERPL-MCNC: 73 MG/DL
HGB BLD-MCNC: 12.6 G/DL (ref 11.5–15.4)
IMM GRANULOCYTES # BLD AUTO: 0.03 THOUSAND/UL (ref 0–0.2)
IMM GRANULOCYTES NFR BLD AUTO: 1 % (ref 0–2)
LDLC SERPL CALC-MCNC: 204 MG/DL (ref 0–100)
LYMPHOCYTES # BLD AUTO: 1.94 THOUSANDS/ΜL (ref 0.6–4.47)
LYMPHOCYTES NFR BLD AUTO: 32 % (ref 14–44)
MCH RBC QN AUTO: 31.9 PG (ref 26.8–34.3)
MCHC RBC AUTO-ENTMCNC: 32.8 G/DL (ref 31.4–37.4)
MCV RBC AUTO: 97 FL (ref 82–98)
MONOCYTES # BLD AUTO: 0.57 THOUSAND/ΜL (ref 0.17–1.22)
MONOCYTES NFR BLD AUTO: 10 % (ref 4–12)
NEUTROPHILS # BLD AUTO: 3.32 THOUSANDS/ΜL (ref 1.85–7.62)
NEUTS SEG NFR BLD AUTO: 54 % (ref 43–75)
NONHDLC SERPL-MCNC: 229 MG/DL
NRBC BLD AUTO-RTO: 0 /100 WBCS
PLATELET # BLD AUTO: 278 THOUSANDS/UL (ref 149–390)
PMV BLD AUTO: 11 FL (ref 8.9–12.7)
POTASSIUM SERPL-SCNC: 4.3 MMOL/L (ref 3.5–5.3)
PROT SERPL-MCNC: 7.3 G/DL (ref 6.4–8.2)
RBC # BLD AUTO: 3.95 MILLION/UL (ref 3.81–5.12)
RPR SER QL: NORMAL
SODIUM SERPL-SCNC: 134 MMOL/L (ref 136–145)
T4 FREE SERPL-MCNC: 1.06 NG/DL (ref 0.76–1.46)
TRIGL SERPL-MCNC: 127 MG/DL
TSH SERPL DL<=0.05 MIU/L-ACNC: 3.76 UIU/ML (ref 0.36–3.74)
VIT B12 SERPL-MCNC: 568 PG/ML (ref 100–900)
WBC # BLD AUTO: 6.03 THOUSAND/UL (ref 4.31–10.16)

## 2020-08-25 PROCEDURE — 82607 VITAMIN B-12: CPT

## 2020-08-25 PROCEDURE — 80061 LIPID PANEL: CPT

## 2020-08-25 PROCEDURE — 86592 SYPHILIS TEST NON-TREP QUAL: CPT

## 2020-08-25 PROCEDURE — 36415 COLL VENOUS BLD VENIPUNCTURE: CPT

## 2020-08-25 PROCEDURE — 85652 RBC SED RATE AUTOMATED: CPT

## 2020-08-25 PROCEDURE — 82746 ASSAY OF FOLIC ACID SERUM: CPT

## 2020-08-25 PROCEDURE — 80053 COMPREHEN METABOLIC PANEL: CPT

## 2020-08-25 PROCEDURE — 82728 ASSAY OF FERRITIN: CPT

## 2020-08-25 PROCEDURE — 84443 ASSAY THYROID STIM HORMONE: CPT

## 2020-08-25 PROCEDURE — 86140 C-REACTIVE PROTEIN: CPT

## 2020-08-25 PROCEDURE — 85025 COMPLETE CBC W/AUTO DIFF WBC: CPT

## 2020-08-25 PROCEDURE — 84439 ASSAY OF FREE THYROXINE: CPT

## 2020-08-26 ENCOUNTER — TELEPHONE (OUTPATIENT)
Dept: NEUROLOGY | Facility: CLINIC | Age: 80
End: 2020-08-26

## 2020-08-26 NOTE — TELEPHONE ENCOUNTER
pt's daughter yuriy called and states that pt had labs done yesterday  i see that you reviewed these but no message  she states that you would review results prior to you leaving the practice    please advise  [phone] to leave a detailed message

## 2020-08-26 NOTE — TELEPHONE ENCOUNTER
The vitamin b12 was normal  Her thyroid hormone is only borderline elevated but not to a significant degree to require correction  However her lipid panel shows her cholesterol at over 300 now, higher than last year and I don't believe she is on anything for cholesterol at the moment  I see she was on rosuvastatin before but it was discontinued  I think for stroke prevention purposes it may be best to reconsider a statin for her unless there is some major contraindication  I would like to defer to Dr Chanell Burns on the appropriate choice  Suggest atorvastatin is possible, but red yeast rice, flaxseed oil if not   Thank you

## 2020-08-27 NOTE — TELEPHONE ENCOUNTER
Spoke to Nelida  Informed of previous  Verbalized understanding with suggestions  Nothing further at this time  Labs faxed to [facility]

## 2020-09-09 ENCOUNTER — OFFICE VISIT (OUTPATIENT)
Dept: FAMILY MEDICINE CLINIC | Facility: CLINIC | Age: 80
End: 2020-09-09
Payer: COMMERCIAL

## 2020-09-09 VITALS
TEMPERATURE: 97.8 F | DIASTOLIC BLOOD PRESSURE: 82 MMHG | OXYGEN SATURATION: 99 % | RESPIRATION RATE: 15 BRPM | SYSTOLIC BLOOD PRESSURE: 138 MMHG | HEIGHT: 65 IN | WEIGHT: 142 LBS | BODY MASS INDEX: 23.66 KG/M2 | HEART RATE: 70 BPM

## 2020-09-09 DIAGNOSIS — F41.9 ANXIETY: ICD-10-CM

## 2020-09-09 DIAGNOSIS — E78.49 OTHER HYPERLIPIDEMIA: Primary | ICD-10-CM

## 2020-09-09 DIAGNOSIS — M31.6 TEMPORAL ARTERITIS (HCC): ICD-10-CM

## 2020-09-09 DIAGNOSIS — R00.2 PALPITATIONS: ICD-10-CM

## 2020-09-09 DIAGNOSIS — I10 ESSENTIAL HYPERTENSION: ICD-10-CM

## 2020-09-09 PROCEDURE — 3079F DIAST BP 80-89 MM HG: CPT | Performed by: INTERNAL MEDICINE

## 2020-09-09 PROCEDURE — 1160F RVW MEDS BY RX/DR IN RCRD: CPT | Performed by: INTERNAL MEDICINE

## 2020-09-09 PROCEDURE — 1036F TOBACCO NON-USER: CPT | Performed by: INTERNAL MEDICINE

## 2020-09-09 PROCEDURE — 99214 OFFICE O/P EST MOD 30 MIN: CPT | Performed by: INTERNAL MEDICINE

## 2020-09-09 RX ORDER — PROPRANOLOL HCL 60 MG
60 CAPSULE, EXTENDED RELEASE 24HR ORAL DAILY
Qty: 90 CAPSULE | Refills: 3 | Status: SHIPPED | OUTPATIENT
Start: 2020-09-09 | End: 2020-11-10 | Stop reason: SDUPTHER

## 2020-09-09 RX ORDER — AZILSARTAN KAMEDOXOMIL 40 MG/1
40 TABLET ORAL DAILY
Qty: 90 TABLET | Refills: 3 | Status: SHIPPED | OUTPATIENT
Start: 2020-09-09 | End: 2020-11-10 | Stop reason: SDUPTHER

## 2020-09-09 RX ORDER — ROSUVASTATIN CALCIUM 10 MG/1
10 TABLET, COATED ORAL DAILY
Qty: 90 TABLET | Refills: 3 | Status: SHIPPED | OUTPATIENT
Start: 2020-09-09 | End: 2020-11-10 | Stop reason: SDUPTHER

## 2020-09-09 RX ORDER — PREDNISONE 2.5 MG
2.5 TABLET ORAL DAILY
Qty: 30 TABLET | Refills: 3 | Status: SHIPPED | OUTPATIENT
Start: 2020-09-09 | End: 2020-11-10 | Stop reason: SDUPTHER

## 2020-09-09 RX ORDER — PROPRANOLOL HCL 60 MG
60 CAPSULE, EXTENDED RELEASE 24HR ORAL DAILY
Qty: 90 CAPSULE | Refills: 3 | Status: CANCELLED | OUTPATIENT
Start: 2020-09-09

## 2020-09-09 RX ORDER — CLONAZEPAM 0.5 MG/1
0.5 TABLET, ORALLY DISINTEGRATING ORAL 2 TIMES DAILY
Qty: 60 TABLET | Refills: 1 | Status: CANCELLED | OUTPATIENT
Start: 2020-09-09

## 2020-09-09 RX ORDER — AZILSARTAN KAMEDOXOMIL 40 MG/1
40 TABLET ORAL DAILY
Qty: 90 TABLET | Refills: 3 | Status: SHIPPED | OUTPATIENT
Start: 2020-09-09 | End: 2020-09-09 | Stop reason: SDUPTHER

## 2020-09-09 NOTE — PROGRESS NOTES
Assessment/Plan:         Diagnoses and all orders for this visit:    Other hyperlipidemia; start :  -     rosuvastatin (CRESTOR) 10 MG tablet; Take 1 tablet (10 mg total) by mouth daily  RTC in 2-3 mos w :  -     Comprehensive metabolic panel; Future  -     CK (with reflex to MB); Future  -     Lipid panel; Future  -     TSH, 3rd generation; Future  -     T4, free; Future  -     Thyroid Antibodies Panel; Future  -     UA (URINE) with reflex to Scope; Future    Anxiety; stable    Essential hypertension; stable  Renew :  -     propranolol (INDERAL LA) 60 mg 24 hr capsule; Take 1 capsule (60 mg total) by mouth daily Please stop Norvasc    And   -     azilsartan medoxomil (Edarbi) 40 MG tablet; Take 1 tablet (40 mg total) by mouth daily    Palpitations; stable on :  -     propranolol (INDERAL LA) 60 mg 24 hr capsule; Take 1 capsule (60 mg total) by mouth daily Please stop Norvasc    RTC in 3mos w :  -     Comprehensive metabolic panel; Future  -     CK (with reflex to MB); Future  -     Lipid panel; Future  -     TSH, 3rd generation; Future  -     T4, free; Future  -     Thyroid Antibodies Panel; Future  -     UA (URINE) with reflex to Scope; Future    Temporal arteritis ([facility] [address] ); stable on :  -     predniSONE 2 5 mg tablet; Take 1 tablet (2 5 mg total) by mouth daily  -     Sedimentation rate, automated; Future  -     C-reactive protein; Future    Other orders  -     Cancel: clonazePAM (KlonoPIN) 0 5 MG disintegrating tablet; Take 1 tablet (0 5 mg total) by mouth 2 (two) times a day  -     Cancel: propranolol (INDERAL LA) 60 mg 24 hr capsule; Take 1 capsule (60 mg total) by mouth daily Please stop Norvasc    Subjective:      Patient ID: Brandyn Acevedo is a [de-identified] y o  female  Nomi Tanner 53 is here for Regular Check up, she feels better, No New symptoms, recent Blood work and med List reviewed w  Pt in detail          The following portions of the patient's history were reviewed and updated as appropriate: allergies, past family history, past medical history, past social history, past surgical history and problem list     Review of Systems   Constitutional: Negative for chills, fatigue and fever  HENT: Negative for congestion, facial swelling, sore throat, trouble swallowing and voice change  Eyes: Negative for pain, discharge and visual disturbance  Respiratory: Negative for cough, shortness of breath and wheezing  Cardiovascular: Negative for chest pain, palpitations and leg swelling  Gastrointestinal: Negative for abdominal pain, blood in stool, constipation, diarrhea and nausea  Endocrine: Negative for polydipsia, polyphagia and polyuria  Genitourinary: Negative for difficulty urinating, hematuria and urgency  Musculoskeletal: Negative for arthralgias and myalgias  Skin: Negative for rash  Neurological: Negative for dizziness, tremors, weakness and headaches  Hematological: Negative for adenopathy  Does not bruise/bleed easily  Psychiatric/Behavioral: Negative for dysphoric mood, sleep disturbance and suicidal ideas  Objective:      /82 (BP Location: Left arm, Patient Position: Sitting, Cuff Size: Standard)   Pulse 70   Temp 97 8 °F (36 6 °C) (Tympanic)   Resp 15   Ht 5' 5" (1 651 m)   Wt 64 4 kg (142 lb)   SpO2 99%   BMI 23 63 kg/m²          Physical Exam  Constitutional:       General: She is not in acute distress  HENT:      Head: Normocephalic  Mouth/Throat:      Pharynx: No oropharyngeal exudate  Eyes:      General: No scleral icterus  Conjunctiva/sclera: Conjunctivae normal       Pupils: Pupils are equal, round, and reactive to light  Neck:      Musculoskeletal: Neck supple  Thyroid: No thyromegaly  Cardiovascular:      Rate and Rhythm: Normal rate and regular rhythm  Heart sounds: Murmur present  Pulmonary:      Effort: Pulmonary effort is normal  No respiratory distress  Breath sounds: Normal breath sounds   No wheezing or rales    Abdominal:      General: Bowel sounds are normal  There is no distension  Palpations: Abdomen is soft  Tenderness: There is no abdominal tenderness  There is no guarding or rebound  Musculoskeletal:         General: No tenderness  Left lower leg: No edema  Lymphadenopathy:      Cervical: No cervical adenopathy  Skin:     Coloration: Skin is not pale  Findings: No rash  Neurological:      Mental Status: She is alert and oriented to person, place, and time  Sensory: No sensory deficit  Motor: No weakness

## 2020-09-21 DIAGNOSIS — Z12.31 SCREENING MAMMOGRAM FOR HIGH-RISK PATIENT: Primary | ICD-10-CM

## 2020-09-22 ENCOUNTER — TELEPHONE (OUTPATIENT)
Dept: FAMILY MEDICINE CLINIC | Facility: CLINIC | Age: 80
End: 2020-09-22

## 2020-09-22 DIAGNOSIS — N39.0 ACUTE UTI: Primary | ICD-10-CM

## 2020-09-22 RX ORDER — CIPROFLOXACIN 250 MG/1
250 TABLET, FILM COATED ORAL EVERY 12 HOURS SCHEDULED
Qty: 10 TABLET | Refills: 0 | Status: SHIPPED | OUTPATIENT
Start: 2020-09-22 | End: 2020-09-27

## 2020-09-26 ENCOUNTER — HOSPITAL ENCOUNTER (OUTPATIENT)
Dept: MRI IMAGING | Facility: HOSPITAL | Age: 80
Discharge: HOME | End: 2020-09-26
Attending: PSYCHIATRY & NEUROLOGY
Payer: COMMERCIAL

## 2020-09-26 DIAGNOSIS — F03.91 DEMENTIA WITH BEHAVIORAL DISTURBANCE, UNSPECIFIED DEMENTIA TYPE (HCC): ICD-10-CM

## 2020-09-26 PROCEDURE — 70553 MRI BRAIN STEM W/O & W/DYE: CPT

## 2020-09-26 PROCEDURE — A9585 GADOBUTROL INJECTION: HCPCS | Performed by: PSYCHIATRY & NEUROLOGY

## 2020-09-26 PROCEDURE — G1004 CDSM NDSC: HCPCS

## 2020-09-26 RX ADMIN — GADOBUTROL 6 ML: 604.72 INJECTION INTRAVENOUS at 15:01

## 2020-09-28 ENCOUNTER — TELEPHONE (OUTPATIENT)
Dept: NEUROLOGY | Facility: CLINIC | Age: 80
End: 2020-09-28

## 2020-09-28 NOTE — TELEPHONE ENCOUNTER
Esther patient  Patient's daughter Unknown Colon (on communication consent) calling to report that she's notice worsening cognitive decline and more agitation since starting the mirtazapine  She's been taking it for about 6 weeks  Patient also c/o feeling nauseated every morning  Confirms she's taking 15 mg mirtazapine at bed  Off the clonazepam     Per Dr Addis Hope office note: Clonazepam, though previously helpful, is no longer as effective and she tried multiple SSRIs  Consider mirtazapine or trazodone as alternative  Daughter stated she is okay with trying trazodone if appropriate    (patient did have a UTI and is on cipro, however daughter reports the patient's sxs started back in August)  Please advise  Okay to leave vm

## 2020-09-29 DIAGNOSIS — F03.91 DEMENTIA WITH BEHAVIORAL DISTURBANCE, UNSPECIFIED DEMENTIA TYPE (HCC): Primary | ICD-10-CM

## 2020-09-29 RX ORDER — TRAZODONE HYDROCHLORIDE 50 MG/1
TABLET ORAL
Qty: 30 TABLET | Refills: 3 | Status: SHIPPED | OUTPATIENT
Start: 2020-09-29 | End: 2021-01-19 | Stop reason: SDUPTHER

## 2020-09-29 NOTE — TELEPHONE ENCOUNTER
Okay, lets have her take Remeron 1/2 tab for a week then stop  She can then start Trazadone  Will start with 25mg before bed for a week, if no benefit AND no side effects then can increase to 50mg before bed  Thanks!

## 2020-10-07 ENCOUNTER — TELEPHONE (OUTPATIENT)
Dept: NEUROLOGY | Facility: CLINIC | Age: 80
End: 2020-10-07

## 2020-10-28 ENCOUNTER — TELEPHONE (OUTPATIENT)
Dept: NEUROLOGY | Facility: CLINIC | Age: 80
End: 2020-10-28

## 2020-11-06 ENCOUNTER — CLINICAL SUPPORT (OUTPATIENT)
Dept: FAMILY MEDICINE CLINIC | Facility: CLINIC | Age: 80
End: 2020-11-06
Payer: COMMERCIAL

## 2020-11-06 DIAGNOSIS — I10 HYPERTENSION, UNSPECIFIED TYPE: ICD-10-CM

## 2020-11-06 DIAGNOSIS — E78.5 HYPERLIPIDEMIA, UNSPECIFIED HYPERLIPIDEMIA TYPE: ICD-10-CM

## 2020-11-06 DIAGNOSIS — Z23 NEED FOR INFLUENZA VACCINATION: ICD-10-CM

## 2020-11-06 DIAGNOSIS — E03.9 HYPOTHYROIDISM, UNSPECIFIED TYPE: Primary | ICD-10-CM

## 2020-11-06 DIAGNOSIS — M35.3 POLYMYALGIA RHEUMATICA (HCC): ICD-10-CM

## 2020-11-06 LAB
ALBUMIN SERPL BCP-MCNC: 3.8 G/DL (ref 3.5–5)
ALP SERPL-CCNC: 45 U/L (ref 46–116)
ALT SERPL W P-5'-P-CCNC: 23 U/L (ref 12–78)
ANION GAP SERPL CALCULATED.3IONS-SCNC: 4 MMOL/L (ref 4–13)
AST SERPL W P-5'-P-CCNC: 12 U/L (ref 5–45)
BILIRUB SERPL-MCNC: 0.5 MG/DL (ref 0.2–1)
BUN SERPL-MCNC: 19 MG/DL (ref 5–25)
CALCIUM SERPL-MCNC: 9.7 MG/DL (ref 8.3–10.1)
CHLORIDE SERPL-SCNC: 106 MMOL/L (ref 100–108)
CHOLEST SERPL-MCNC: 209 MG/DL (ref 50–200)
CK SERPL-CCNC: 62 U/L (ref 26–192)
CO2 SERPL-SCNC: 30 MMOL/L (ref 21–32)
CREAT SERPL-MCNC: 1.15 MG/DL (ref 0.6–1.3)
CRP SERPL QL: <3 MG/L
ERYTHROCYTE [SEDIMENTATION RATE] IN BLOOD: 15 MM/HOUR (ref 0–29)
GFR SERPL CREATININE-BSD FRML MDRD: 45 ML/MIN/1.73SQ M
GLUCOSE P FAST SERPL-MCNC: 92 MG/DL (ref 65–99)
HDLC SERPL-MCNC: 98 MG/DL
LDLC SERPL CALC-MCNC: 95 MG/DL (ref 0–100)
NONHDLC SERPL-MCNC: 111 MG/DL
POTASSIUM SERPL-SCNC: 4.2 MMOL/L (ref 3.5–5.3)
PROT SERPL-MCNC: 7.2 G/DL (ref 6.4–8.2)
SODIUM SERPL-SCNC: 140 MMOL/L (ref 136–145)
T4 FREE SERPL-MCNC: 1.14 NG/DL (ref 0.76–1.46)
TRIGL SERPL-MCNC: 80 MG/DL
TSH SERPL DL<=0.05 MIU/L-ACNC: 2.63 UIU/ML (ref 0.36–3.74)

## 2020-11-06 PROCEDURE — 86800 THYROGLOBULIN ANTIBODY: CPT | Performed by: INTERNAL MEDICINE

## 2020-11-06 PROCEDURE — 86140 C-REACTIVE PROTEIN: CPT | Performed by: INTERNAL MEDICINE

## 2020-11-06 PROCEDURE — 84443 ASSAY THYROID STIM HORMONE: CPT | Performed by: INTERNAL MEDICINE

## 2020-11-06 PROCEDURE — 84439 ASSAY OF FREE THYROXINE: CPT | Performed by: INTERNAL MEDICINE

## 2020-11-06 PROCEDURE — 80061 LIPID PANEL: CPT | Performed by: INTERNAL MEDICINE

## 2020-11-06 PROCEDURE — 85652 RBC SED RATE AUTOMATED: CPT | Performed by: INTERNAL MEDICINE

## 2020-11-06 PROCEDURE — 36415 COLL VENOUS BLD VENIPUNCTURE: CPT | Performed by: INTERNAL MEDICINE

## 2020-11-06 PROCEDURE — 80053 COMPREHEN METABOLIC PANEL: CPT | Performed by: INTERNAL MEDICINE

## 2020-11-06 PROCEDURE — 86376 MICROSOMAL ANTIBODY EACH: CPT | Performed by: INTERNAL MEDICINE

## 2020-11-06 PROCEDURE — 90662 IIV NO PRSV INCREASED AG IM: CPT

## 2020-11-06 PROCEDURE — G0008 ADMIN INFLUENZA VIRUS VAC: HCPCS

## 2020-11-06 PROCEDURE — 82550 ASSAY OF CK (CPK): CPT | Performed by: INTERNAL MEDICINE

## 2020-11-07 LAB
THYROGLOB AB SERPL-ACNC: 39 IU/ML (ref 0–0.9)
THYROPEROXIDASE AB SERPL-ACNC: <9 IU/ML (ref 0–34)

## 2020-11-10 ENCOUNTER — TELEMEDICINE (OUTPATIENT)
Dept: FAMILY MEDICINE CLINIC | Facility: CLINIC | Age: 80
End: 2020-11-10
Payer: COMMERCIAL

## 2020-11-10 DIAGNOSIS — M31.6 TEMPORAL ARTERITIS (HCC): ICD-10-CM

## 2020-11-10 DIAGNOSIS — E78.49 OTHER HYPERLIPIDEMIA: ICD-10-CM

## 2020-11-10 DIAGNOSIS — I10 ESSENTIAL HYPERTENSION: ICD-10-CM

## 2020-11-10 DIAGNOSIS — K59.04 CHRONIC IDIOPATHIC CONSTIPATION: Primary | ICD-10-CM

## 2020-11-10 DIAGNOSIS — R00.2 PALPITATIONS: ICD-10-CM

## 2020-11-10 DIAGNOSIS — K21.9 GASTROESOPHAGEAL REFLUX DISEASE WITHOUT ESOPHAGITIS: ICD-10-CM

## 2020-11-10 PROCEDURE — 3725F SCREEN DEPRESSION PERFORMED: CPT | Performed by: INTERNAL MEDICINE

## 2020-11-10 PROCEDURE — 1160F RVW MEDS BY RX/DR IN RCRD: CPT | Performed by: INTERNAL MEDICINE

## 2020-11-10 PROCEDURE — 99214 OFFICE O/P EST MOD 30 MIN: CPT | Performed by: INTERNAL MEDICINE

## 2020-11-10 PROCEDURE — 1036F TOBACCO NON-USER: CPT | Performed by: INTERNAL MEDICINE

## 2020-11-10 RX ORDER — PREDNISONE 2.5 MG
2.5 TABLET ORAL DAILY
Qty: 30 TABLET | Refills: 3 | Status: SHIPPED | OUTPATIENT
Start: 2020-11-10 | End: 2020-12-22 | Stop reason: SDUPTHER

## 2020-11-10 RX ORDER — PANTOPRAZOLE SODIUM 40 MG/1
40 TABLET, DELAYED RELEASE ORAL EVERY MORNING
Qty: 90 TABLET | Refills: 3 | Status: SHIPPED | OUTPATIENT
Start: 2020-11-10 | End: 2020-12-01 | Stop reason: SDUPTHER

## 2020-11-10 RX ORDER — ROSUVASTATIN CALCIUM 10 MG/1
10 TABLET, COATED ORAL DAILY
Qty: 90 TABLET | Refills: 3 | Status: SHIPPED | OUTPATIENT
Start: 2020-11-10 | End: 2021-01-14

## 2020-11-10 RX ORDER — AZILSARTAN KAMEDOXOMIL 40 MG/1
40 TABLET ORAL DAILY
Qty: 90 TABLET | Refills: 3 | Status: SHIPPED | OUTPATIENT
Start: 2020-11-10 | End: 2021-04-14 | Stop reason: SDUPTHER

## 2020-11-10 RX ORDER — PROPRANOLOL HCL 60 MG
60 CAPSULE, EXTENDED RELEASE 24HR ORAL DAILY
Qty: 90 CAPSULE | Refills: 3 | Status: SHIPPED | OUTPATIENT
Start: 2020-11-10 | End: 2021-04-14 | Stop reason: SDUPTHER

## 2020-11-10 RX ORDER — FOLIC ACID 1 MG/1
2 TABLET ORAL DAILY
Qty: 180 TABLET | Refills: 3 | Status: SHIPPED | OUTPATIENT
Start: 2020-11-10 | End: 2021-04-14 | Stop reason: SDUPTHER

## 2020-11-12 ENCOUNTER — TELEPHONE (OUTPATIENT)
Dept: FAMILY MEDICINE CLINIC | Facility: CLINIC | Age: 80
End: 2020-11-12

## 2020-11-16 ENCOUNTER — TELEPHONE (OUTPATIENT)
Dept: FAMILY MEDICINE CLINIC | Facility: CLINIC | Age: 80
End: 2020-11-16

## 2020-11-17 ENCOUNTER — TELEPHONE (OUTPATIENT)
Dept: FAMILY MEDICINE CLINIC | Facility: CLINIC | Age: 80
End: 2020-11-17

## 2020-12-01 DIAGNOSIS — K21.9 GASTROESOPHAGEAL REFLUX DISEASE WITHOUT ESOPHAGITIS: ICD-10-CM

## 2020-12-01 RX ORDER — PANTOPRAZOLE SODIUM 40 MG/1
40 TABLET, DELAYED RELEASE ORAL EVERY MORNING
Qty: 14 TABLET | Refills: 0 | Status: SHIPPED | OUTPATIENT
Start: 2020-12-01 | End: 2020-12-06

## 2020-12-06 DIAGNOSIS — K21.9 GASTROESOPHAGEAL REFLUX DISEASE WITHOUT ESOPHAGITIS: ICD-10-CM

## 2020-12-06 RX ORDER — PANTOPRAZOLE SODIUM 40 MG/1
TABLET, DELAYED RELEASE ORAL
Qty: 90 TABLET | Refills: 2 | Status: SHIPPED | OUTPATIENT
Start: 2020-12-06 | End: 2020-12-10

## 2020-12-10 DIAGNOSIS — K21.9 GASTROESOPHAGEAL REFLUX DISEASE WITHOUT ESOPHAGITIS: ICD-10-CM

## 2020-12-10 RX ORDER — PANTOPRAZOLE SODIUM 40 MG/1
TABLET, DELAYED RELEASE ORAL
Qty: 30 TABLET | Refills: 3 | Status: SHIPPED | OUTPATIENT
Start: 2020-12-10 | End: 2021-04-14 | Stop reason: SDUPTHER

## 2020-12-22 DIAGNOSIS — M31.6 TEMPORAL ARTERITIS (HCC): ICD-10-CM

## 2020-12-22 RX ORDER — PREDNISONE 2.5 MG
2.5 TABLET ORAL DAILY
Qty: 90 TABLET | Refills: 1 | Status: SHIPPED | OUTPATIENT
Start: 2020-12-22 | End: 2021-01-01

## 2021-01-01 DIAGNOSIS — M31.6 TEMPORAL ARTERITIS (HCC): ICD-10-CM

## 2021-01-01 RX ORDER — PREDNISONE 2.5 MG
TABLET ORAL
Qty: 30 TABLET | Refills: 3 | Status: SHIPPED | OUTPATIENT
Start: 2021-01-01 | End: 2021-04-14 | Stop reason: SDUPTHER

## 2021-01-14 ENCOUNTER — OFFICE VISIT (OUTPATIENT)
Dept: FAMILY MEDICINE CLINIC | Facility: CLINIC | Age: 81
End: 2021-01-14
Payer: COMMERCIAL

## 2021-01-14 VITALS
DIASTOLIC BLOOD PRESSURE: 80 MMHG | SYSTOLIC BLOOD PRESSURE: 132 MMHG | HEIGHT: 65 IN | TEMPERATURE: 97.5 F | WEIGHT: 146 LBS | BODY MASS INDEX: 24.32 KG/M2 | OXYGEN SATURATION: 97 % | RESPIRATION RATE: 14 BRPM | HEART RATE: 60 BPM

## 2021-01-14 DIAGNOSIS — E78.49 OTHER HYPERLIPIDEMIA: ICD-10-CM

## 2021-01-14 DIAGNOSIS — I10 ESSENTIAL HYPERTENSION: ICD-10-CM

## 2021-01-14 DIAGNOSIS — J30.9 ALLERGIC RHINITIS, UNSPECIFIED SEASONALITY, UNSPECIFIED TRIGGER: Primary | ICD-10-CM

## 2021-01-14 DIAGNOSIS — F03.91 DEMENTIA WITH BEHAVIORAL DISTURBANCE, UNSPECIFIED DEMENTIA TYPE (HCC): ICD-10-CM

## 2021-01-14 DIAGNOSIS — E78.5 DYSLIPIDEMIA: ICD-10-CM

## 2021-01-14 DIAGNOSIS — M31.6 TEMPORAL ARTERITIS (HCC): ICD-10-CM

## 2021-01-14 PROCEDURE — 3075F SYST BP GE 130 - 139MM HG: CPT | Performed by: INTERNAL MEDICINE

## 2021-01-14 PROCEDURE — 99214 OFFICE O/P EST MOD 30 MIN: CPT | Performed by: INTERNAL MEDICINE

## 2021-01-14 PROCEDURE — 1160F RVW MEDS BY RX/DR IN RCRD: CPT | Performed by: INTERNAL MEDICINE

## 2021-01-14 PROCEDURE — 3079F DIAST BP 80-89 MM HG: CPT | Performed by: INTERNAL MEDICINE

## 2021-01-14 PROCEDURE — 1036F TOBACCO NON-USER: CPT | Performed by: INTERNAL MEDICINE

## 2021-01-14 RX ORDER — ROSUVASTATIN CALCIUM 5 MG/1
5 TABLET, COATED ORAL DAILY
Qty: 90 TABLET | Refills: 3 | Status: SHIPPED | OUTPATIENT
Start: 2021-01-14 | End: 2021-04-14 | Stop reason: SDUPTHER

## 2021-01-14 NOTE — PROGRESS NOTES
Assessment/Plan:         Diagnoses and all orders for this visit:    Allergic rhinitis, unspecified seasonality, unspecified trigger; Zyrtec 10 mg pm  And :  -     sodium chloride (OCEAN) 0 65 % nasal spray; 2 sprays into each nostril 3 (three) times a day    Temporal arteritis ([facility] ); stable    Dementia with behavioral disturbance, unspecified dementia type ([facility] ); RTC in 3 mos w :  -     Vitamin B12; Future  -     Vitamin D 25 hydroxy; Future    Dyslipidemia; reduce :  -     rosuvastatin (CRESTOR)  TO 5 mg tablet; Take 1 tablet (5 mg total) by mouth daily  RTC in 2-3 mos w  :    -     Lipid panel; Future  -     CK (with reflex to MB); Future  -     TSH, 3rd generation; Future  -     T4, free; Future    Essential hypertension; stable  Continue same  RTC in 3mos w :  -     Comprehensive metabolic panel; Future  -     CBC and differential; Future  -     Magnesium; Future  -     UA (URINE) with reflex to Scope; Future  -     TSH, 3rd generation; Future  -     T4, free; Future        Subjective:      Patient ID: Antonia Abbott is a 79 y o  female  79 Y O lady is here for Regular check up, she feels ok except for allergic rhinitis    No recent blood work,  Med list reviewed w Daughter    The following portions of the patient's history were reviewed and updated as appropriate: allergies, past family history, past medical history, past social history, past surgical history and problem list     Review of Systems   Constitutional: Negative for chills, fatigue and fever  HENT: Positive for congestion, postnasal drip and rhinorrhea  Negative for facial swelling, sore throat, trouble swallowing and voice change  Eyes: Negative for pain, discharge and visual disturbance  Respiratory: Negative for cough, shortness of breath and wheezing  Cardiovascular: Negative for chest pain, palpitations and leg swelling  Gastrointestinal: Negative for abdominal pain, blood in stool, constipation, diarrhea and nausea  Endocrine: Negative for polydipsia, polyphagia and polyuria  Genitourinary: Negative for difficulty urinating, hematuria and urgency  Musculoskeletal: Negative for arthralgias and myalgias  Skin: Negative for rash  Neurological: Negative for dizziness, tremors, weakness and headaches  Hematological: Negative for adenopathy  Does not bruise/bleed easily  Psychiatric/Behavioral: Negative for dysphoric mood, sleep disturbance and suicidal ideas  Objective:      /80 (BP Location: Left arm, Patient Position: Sitting, Cuff Size: Standard)   Pulse 60   Temp 97 5 °F (36 4 °C) (Temporal)   Resp 14   Ht 5' 5" (1 651 m)   Wt 66 2 kg (146 lb)   SpO2 97%   BMI 24 30 kg/m²          Physical Exam  Constitutional:       General: She is not in acute distress  HENT:      Head: Normocephalic  Ears:      Comments: Allergic rhinitis sinusitis     Mouth/Throat:      Pharynx: No oropharyngeal exudate  Eyes:      General: No scleral icterus  Conjunctiva/sclera: Conjunctivae normal       Pupils: Pupils are equal, round, and reactive to light  Neck:      Musculoskeletal: Neck supple  Thyroid: No thyromegaly  Cardiovascular:      Rate and Rhythm: Normal rate and regular rhythm  Heart sounds: Murmur present  Pulmonary:      Effort: Pulmonary effort is normal  No respiratory distress  Breath sounds: Normal breath sounds  No wheezing or rales  Abdominal:      General: Bowel sounds are normal  There is no distension  Palpations: Abdomen is soft  Tenderness: There is no abdominal tenderness  There is no guarding or rebound  Musculoskeletal:         General: No tenderness  Lymphadenopathy:      Cervical: No cervical adenopathy  Skin:     Coloration: Skin is not pale  Findings: No rash (  )  Neurological:      Mental Status: She is alert and oriented to person, place, and time  Sensory: No sensory deficit  Motor: No weakness

## 2021-01-19 DIAGNOSIS — F03.91 DEMENTIA WITH BEHAVIORAL DISTURBANCE, UNSPECIFIED DEMENTIA TYPE (HCC): ICD-10-CM

## 2021-01-19 RX ORDER — TRAZODONE HYDROCHLORIDE 50 MG/1
TABLET ORAL
Qty: 30 TABLET | Refills: 3 | Status: SHIPPED | OUTPATIENT
Start: 2021-01-19 | End: 2021-02-25 | Stop reason: SDUPTHER

## 2021-01-21 ENCOUNTER — IMMUNIZATIONS (OUTPATIENT)
Dept: FAMILY MEDICINE CLINIC | Facility: HOSPITAL | Age: 81
End: 2021-01-21

## 2021-01-21 DIAGNOSIS — Z23 ENCOUNTER FOR IMMUNIZATION: Primary | ICD-10-CM

## 2021-01-21 PROCEDURE — 91301 SARS-COV-2 / COVID-19 MRNA VACCINE (MODERNA) 100 MCG: CPT

## 2021-01-21 PROCEDURE — 0011A SARS-COV-2 / COVID-19 MRNA VACCINE (MODERNA) 100 MCG: CPT

## 2021-02-17 ENCOUNTER — IMMUNIZATIONS (OUTPATIENT)
Dept: FAMILY MEDICINE CLINIC | Facility: HOSPITAL | Age: 81
End: 2021-02-17

## 2021-02-17 DIAGNOSIS — Z23 ENCOUNTER FOR IMMUNIZATION: Primary | ICD-10-CM

## 2021-02-17 PROCEDURE — 0012A SARS-COV-2 / COVID-19 MRNA VACCINE (MODERNA) 100 MCG: CPT

## 2021-02-17 PROCEDURE — 91301 SARS-COV-2 / COVID-19 MRNA VACCINE (MODERNA) 100 MCG: CPT

## 2021-02-25 ENCOUNTER — OFFICE VISIT (OUTPATIENT)
Dept: NEUROLOGY | Facility: CLINIC | Age: 81
End: 2021-02-25
Payer: COMMERCIAL

## 2021-02-25 VITALS
WEIGHT: 153 LBS | SYSTOLIC BLOOD PRESSURE: 134 MMHG | BODY MASS INDEX: 25.46 KG/M2 | HEART RATE: 74 BPM | DIASTOLIC BLOOD PRESSURE: 76 MMHG

## 2021-02-25 DIAGNOSIS — F03.91 DEMENTIA WITH BEHAVIORAL DISTURBANCE, UNSPECIFIED DEMENTIA TYPE (HCC): Primary | ICD-10-CM

## 2021-02-25 PROCEDURE — 1036F TOBACCO NON-USER: CPT | Performed by: PHYSICIAN ASSISTANT

## 2021-02-25 PROCEDURE — 3075F SYST BP GE 130 - 139MM HG: CPT | Performed by: PHYSICIAN ASSISTANT

## 2021-02-25 PROCEDURE — 1160F RVW MEDS BY RX/DR IN RCRD: CPT | Performed by: PHYSICIAN ASSISTANT

## 2021-02-25 PROCEDURE — 99215 OFFICE O/P EST HI 40 MIN: CPT | Performed by: PHYSICIAN ASSISTANT

## 2021-02-25 PROCEDURE — 3078F DIAST BP <80 MM HG: CPT | Performed by: PHYSICIAN ASSISTANT

## 2021-02-25 RX ORDER — DONEPEZIL HYDROCHLORIDE 5 MG/1
5 TABLET, FILM COATED ORAL
Qty: 30 TABLET | Refills: 0 | Status: SHIPPED | OUTPATIENT
Start: 2021-02-25 | End: 2021-04-14

## 2021-02-25 RX ORDER — TRAZODONE HYDROCHLORIDE 50 MG/1
TABLET ORAL
Qty: 60 TABLET | Refills: 3 | Status: SHIPPED | OUTPATIENT
Start: 2021-02-25 | End: 2021-04-14 | Stop reason: SDUPTHER

## 2021-02-25 RX ORDER — DONEPEZIL HYDROCHLORIDE 10 MG/1
10 TABLET, FILM COATED ORAL
Qty: 30 TABLET | Refills: 5 | Status: SHIPPED | OUTPATIENT
Start: 2021-02-25 | End: 2021-04-14 | Stop reason: SDUPTHER

## 2021-02-25 NOTE — ASSESSMENT & PLAN NOTE
Patient with progressive cognitive deficits over the past 2-3 years which has interfered with daily functions including driving  She scored a 16/30 on [place] in the office today  We reviewed her MRI with NQ which revealed changes to suggest a neuro degenerative etiology  We discussed that these findings as well as her clinical history are consistent with mild dementia  Medications options were discussed and it was decided to start a trial of Aricept  Start with 5mg before bed for 1 month  IF you have increased issues with sleeping when on this medication you can change to taking it in the AM   After the first month the dose will increase to 10mg daily  Once on the Aricept for 1-2 weeks you can also increase the Trazodone dose to 75mg (1 5tabs) before bed  She has had benefit when this medication was started however it does not appear to be as helpful  She is also having some issues with waking at night which this can also help with  If she has side effects on the higher dose however she will reduce back to 50mg qhs  Patient was encouraged to increase mind stimulating activities such as reading, crosswords, word searches, puzzles, Soduku, solitaire, coloring and other brain games  We also discussed the importance of staying physically active and eating a health diet such as the Mediterranean or MIND diet

## 2021-02-25 NOTE — PATIENT INSTRUCTIONS
Will start a trial of Aricept  Start with 5mg before bed for 1 month  IF you have increased issues with sleeping when on this medication you can change to taking it in the AM   After the first month the dose will increase to 10mg daily  Once on the Aricept for 1-2 weeks you can also increase the Trazodone dose to 75mg (1 5tabs) before bed  Things that we know are helpful for thinking and memory   1  Exercise program: gradually increase your physical activity over time  Start small and be patient  Aerobic (cardio) activity is best but incorporate balance, strength and flexibility training as well    a  Try to get at least 30min 3 times per week   2  Diet: Mediterranean diet (colorful fruits and vegetables, olive oil, fish, whole grains, very little red meet is any), MIND diet, anti-inflammatory diet  a  Stay well hydrated: drink 6-8 glasses of water per day   b  What's good for your heart is good for your brain  c  Avoid high-salt foods   3  Sleep: aim for at least 7-8 hours per night   a  Avoid alcohol and medications like Benadryl (Tylenol PM) or other sedating drugs  4  Stress management/mindfulness practice:   a  Talk with our social workers about finding a cognitive behavioral therapists  b  Try a smart phone dorothy like Feife[employer].com or mindfulness for beginners   i  Try curable for pain    c  Take a course in Mindfulness Based Stress Reduction (MBSR)   i  Shantal meyer  Read or listen to an audiobook about it:  i  Mindfulness for beginners   ii  10% happier  iii  The happiness advantage   5  Social engagement:   a  Stay in touch with family and friends   b  Plan a few specific activities for your social health every week   shankar fernandez local support group   d  Volunteer! www [email or web address] [email or web address] or call [phone]     MIND diet score:  1 point for each component      · Green leafy vegetables: at least 6 per week  · Other vegetable: at least 1 per day   · Berries: at least 2 per week  · Red meat: fewer than 4 per week   · Fish: at least 1 per week   · Poultry: at least 2 per week  · Beans: at least 3 per week  · Nuts: at least 5 per week  · Fast or fried food: less than 1 per week  · Olive oil   · Butter less: less than 1 table-spoon per day   · Cheese: less than 1 serving per week   · Pastries/sweets: less than 5 servings per week  · Alcohol: no more than 1 serving/ day

## 2021-02-25 NOTE — PROGRESS NOTES
Patient ID: Lianne Najjar is a 79 y o  female  Assessment/Plan:    Dementia with behavioral disturbance [facility]  Patient with progressive cognitive deficits over the past 2-3 years which has interfered with daily functions including driving  She scored a 16/30 on [place] in the office today  We reviewed her MRI with NQ which revealed changes to suggest a neuro degenerative etiology  We discussed that these findings as well as her clinical history are consistent with mild dementia  Medications options were discussed and it was decided to start a trial of Aricept  Start with 5mg before bed for 1 month  IF you have increased issues with sleeping when on this medication you can change to taking it in the AM   After the first month the dose will increase to 10mg daily  Once on the Aricept for 1-2 weeks you can also increase the Trazodone dose to 75mg (1 5tabs) before bed  She has had benefit when this medication was started however it does not appear to be as helpful  She is also having some issues with waking at night which this can also help with  If she has side effects on the higher dose however she will reduce back to 50mg qhs  Patient was encouraged to increase mind stimulating activities such as reading, crosswords, word searches, puzzles, Soduku, solitaire, coloring and other brain games  We also discussed the importance of staying physically active and eating a health diet such as the Mediterranean or MIND diet  Subjective:    Ms Trung Villafana is an 79 y o  right handed female with hx of polymyalgia rheumatica on daily prednisone and dementia who presents for follow up  To review, she had been followed at [facility] with Dr Clinton Almanzar regarding gait disturbance, anxiety and dizziness, last seen July 2018  Per accounts, she has been taking diazepam nightly for anxiety control and at that time the dizziness had resolved   No particular cause for her subjective gait disturbance was found and she had normal examination  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  She was seen in ER 6/24/20 for memory loss  Per records, daughter reported acute memory loss since one year ago when she had diverticulitis, significantly worsened in May 2020 with "periods of forgetfulness", denying numbness or weakness  She has been on [place] for years, clonazepam unchanged for 2 years  She started prednisone for polymyalgia rheumatica one year ago, but she thinks it was after the start of memory symptoms  Pt called her daughter at 3am stating that she could not turn off her alarm  Her daughter says she is confusing dates and times (she is retired) over the past year, very disoriented in the morning, confusion when she wakes up from a nap, anxiety during the evenings  She has trouble keeping track of doctor's appointments and medications, often writing and re-writing the same list over and over again  She does sometimes repeat herself  She voluntarily stopped driving  She says her anxiety level is very high  Previously she could not tolerate sertraline, buspirone  At her initial visit with Dr Kim Gunn she scored a 14/30 on MoCA  She was sent for repeat labs and MRI  She was also asked to wean off clonazepam and started on Remeron  INTERVAL HISTORY:  Daughter called with concerns for worsening confusion and agitation on Remeron  She was switched to Trazodone  She has been doing better on the Trazodone  Per the daughter she was doing better when it was first started and it does not seem to be as effective  She still gets agitated with her  at times  She is often anxious and denny  She is able to bathe, dress and cook  She does tend to wake and walk around in the middle of the night  She resides with her   Daughter lives close by  Her  puts her medications together and then she can take them when due  No hallucinations  She is eating well and has a good appetite  Workup:   - Labs revealed normal B12, slightly elevated TSH however repeat was normal    - MRI brain with NQ - Advanced white matter change within the brain parenchyma consistent with chronic microangiopathic change  No acute ischemia, mass or hemorrhage  Neuroquant revealed low hippocampal volume supporting a neurodegenerative etiology but may or may not be medial temporal lobe focused process  HOC 0 55  Consider 6-12 month follow-up examination as a superimposed mesial temporal lobe neurodegenerative process cannot be completely excluded  - normal magnesium, abnormally elevated thyroglobulin antibody  Historically normal vit B12, positive JOSE, neg anti DS DNA ab     - 6/24/20 CT head for AMS showing moderate microangiopathic change similar to 2018  No acute changes    Durable Power of : yes (her )  Living Will: yes  Family History: Number of First Degree Relatives With Parkinsonism/Dementia: none known      I personally reviewed and updated the ROS  Total time spent today was 45 minutes  Greater than 50% of total time was spent with the patient and / or family counseling and / or coordinating plan of care  Objective:    Blood pressure 134/76, pulse 74, weight 69 4 kg (153 lb)  Physical Exam  Constitutional:       General: She is awake  HENT:      Right Ear: Hearing normal       Left Ear: Hearing normal    Eyes:      General: Lids are normal       Extraocular Movements: Extraocular movements intact  Pupils: Pupils are equal, round, and reactive to light  Pulmonary:      Effort: Pulmonary effort is normal    Neurological:      Mental Status: She is alert  Deep Tendon Reflexes: Strength normal          Neurological Exam  Mental Status  Awake and alert  Oriented to person, place, time and situation  Unable to copy figure  Clock drawing is abnormal  Able to name objects  Unable to perform serial calculations    MoCA 16/30 - 2/25/21  Patient was very pleasant, occasionally repeated herself  [address] [place] [address] 14/30 - 8/24/20  Cranial Nerves  CN III, IV, VI: Extraocular movements intact bilaterally  Normal lids and orbits bilaterally  Pupils equal round and reactive to light bilaterally  CN V:  Right: Facial sensation is normal   Left: Facial sensation is normal on the left  CN VIII:  Right: Hearing is normal   Left: Hearing is normal   CN XI: Shoulder shrug strength is normal     Motor   Strength is 5/5 throughout all four extremities  Sensory  Light touch is normal in upper and lower extremities  Coordination  Right: Finger-to-nose normal   Left: Finger-to-nose normal     Gait  Casual gait is normal including stance, stride, and arm swing  ROS:    Review of Systems   Constitutional: Negative  Negative for appetite change and fever  HENT: Negative  Negative for hearing loss, tinnitus, trouble swallowing and voice change  Eyes: Negative  Negative for photophobia and pain  Respiratory: Negative  Negative for shortness of breath  Cardiovascular: Negative  Negative for palpitations  Gastrointestinal: Negative  Negative for nausea and vomiting  Endocrine: Negative  Negative for cold intolerance  Genitourinary: Negative  Negative for dysuria, frequency and urgency  Musculoskeletal: Positive for myalgias (thigh)  Negative for neck pain  Skin: Negative  Negative for rash  Allergic/Immunologic: Negative  Neurological: Positive for light-headedness (at times)  Negative for dizziness, tremors, seizures, syncope, facial asymmetry, speech difficulty, weakness, numbness and headaches  Hematological: Negative  Does not bruise/bleed easily  Psychiatric/Behavioral: Positive for sleep disturbance (Doesnt sleep much at night)  Negative for confusion and hallucinations  Memory issues     All other systems reviewed and are negative

## 2021-03-23 DIAGNOSIS — I10 ESSENTIAL HYPERTENSION: ICD-10-CM

## 2021-03-23 DIAGNOSIS — E78.49 OTHER HYPERLIPIDEMIA: ICD-10-CM

## 2021-03-23 RX ORDER — ACETAMINOPHEN/DIPHENHYDRAMINE 500MG-25MG
TABLET ORAL
Qty: 45 TABLET | Refills: 3 | Status: SHIPPED | OUTPATIENT
Start: 2021-03-23 | End: 2021-10-27

## 2021-04-05 ENCOUNTER — TELEPHONE (OUTPATIENT)
Dept: FAMILY MEDICINE CLINIC | Facility: CLINIC | Age: 81
End: 2021-04-05

## 2021-04-05 DIAGNOSIS — N76.0 ACUTE VAGINITIS: Primary | ICD-10-CM

## 2021-04-05 RX ORDER — CLOTRIMAZOLE AND BETAMETHASONE DIPROPIONATE 10; .64 MG/G; MG/G
CREAM TOPICAL 2 TIMES DAILY
Qty: 30 G | Refills: 0 | Status: SHIPPED | OUTPATIENT
Start: 2021-04-05 | End: 2021-04-14

## 2021-04-05 RX ORDER — FLUCONAZOLE 150 MG/1
150 TABLET ORAL ONCE
Qty: 1 TABLET | Refills: 0 | Status: SHIPPED | OUTPATIENT
Start: 2021-04-05 | End: 2021-04-05

## 2021-04-05 RX ORDER — CIPROFLOXACIN 250 MG/1
250 TABLET, FILM COATED ORAL EVERY 12 HOURS SCHEDULED
Qty: 10 TABLET | Refills: 0 | Status: SHIPPED | OUTPATIENT
Start: 2021-04-05 | End: 2021-04-10

## 2021-04-05 NOTE — TELEPHONE ENCOUNTER
Patient c/o itching and burning in vagina area  She is going for b/w today or tomorrow, and is wondering if you can put in a urine for her? She has an apt with you next week

## 2021-04-06 ENCOUNTER — LAB (OUTPATIENT)
Dept: LAB | Age: 81
End: 2021-04-06
Payer: COMMERCIAL

## 2021-04-06 DIAGNOSIS — F03.91 DEMENTIA WITH BEHAVIORAL DISTURBANCE, UNSPECIFIED DEMENTIA TYPE (HCC): ICD-10-CM

## 2021-04-06 DIAGNOSIS — I10 ESSENTIAL HYPERTENSION: ICD-10-CM

## 2021-04-06 DIAGNOSIS — Z00.01 ENCOUNTER FOR GENERAL ADULT MEDICAL EXAMINATION WITH ABNORMAL FINDINGS: ICD-10-CM

## 2021-04-06 DIAGNOSIS — E78.49 OTHER HYPERLIPIDEMIA: ICD-10-CM

## 2021-04-06 DIAGNOSIS — N76.0 ACUTE VAGINITIS: ICD-10-CM

## 2021-04-06 LAB
25(OH)D3 SERPL-MCNC: 25.3 NG/ML (ref 30–100)
ALBUMIN SERPL BCP-MCNC: 3.9 G/DL (ref 3.5–5)
ALP SERPL-CCNC: 44 U/L (ref 46–116)
ALT SERPL W P-5'-P-CCNC: 28 U/L (ref 12–78)
ANION GAP SERPL CALCULATED.3IONS-SCNC: 6 MMOL/L (ref 4–13)
AST SERPL W P-5'-P-CCNC: 19 U/L (ref 5–45)
BACTERIA UR QL AUTO: NORMAL /HPF
BASOPHILS # BLD AUTO: 0.05 THOUSANDS/ΜL (ref 0–0.1)
BASOPHILS NFR BLD AUTO: 1 % (ref 0–1)
BILIRUB SERPL-MCNC: 0.66 MG/DL (ref 0.2–1)
BILIRUB UR QL STRIP: NEGATIVE
BUN SERPL-MCNC: 9 MG/DL (ref 5–25)
CALCIUM SERPL-MCNC: 9.5 MG/DL (ref 8.3–10.1)
CHLORIDE SERPL-SCNC: 100 MMOL/L (ref 100–108)
CHOLEST SERPL-MCNC: 231 MG/DL (ref 50–200)
CK SERPL-CCNC: 88 U/L (ref 26–192)
CLARITY UR: CLEAR
CO2 SERPL-SCNC: 31 MMOL/L (ref 21–32)
COLOR UR: YELLOW
CREAT SERPL-MCNC: 0.93 MG/DL (ref 0.6–1.3)
EOSINOPHIL # BLD AUTO: 0.08 THOUSAND/ΜL (ref 0–0.61)
EOSINOPHIL NFR BLD AUTO: 1 % (ref 0–6)
ERYTHROCYTE [DISTWIDTH] IN BLOOD BY AUTOMATED COUNT: 13 % (ref 11.6–15.1)
GFR SERPL CREATININE-BSD FRML MDRD: 58 ML/MIN/1.73SQ M
GLUCOSE P FAST SERPL-MCNC: 88 MG/DL (ref 65–99)
GLUCOSE UR STRIP-MCNC: NEGATIVE MG/DL
HCT VFR BLD AUTO: 39.1 % (ref 34.8–46.1)
HDLC SERPL-MCNC: 79 MG/DL
HGB BLD-MCNC: 13 G/DL (ref 11.5–15.4)
HGB UR QL STRIP.AUTO: ABNORMAL
HYALINE CASTS #/AREA URNS LPF: NORMAL /LPF
IMM GRANULOCYTES # BLD AUTO: 0.02 THOUSAND/UL (ref 0–0.2)
IMM GRANULOCYTES NFR BLD AUTO: 0 % (ref 0–2)
KETONES UR STRIP-MCNC: NEGATIVE MG/DL
LDLC SERPL CALC-MCNC: 127 MG/DL (ref 0–100)
LEUKOCYTE ESTERASE UR QL STRIP: ABNORMAL
LYMPHOCYTES # BLD AUTO: 1.86 THOUSANDS/ΜL (ref 0.6–4.47)
LYMPHOCYTES NFR BLD AUTO: 32 % (ref 14–44)
MAGNESIUM SERPL-MCNC: 2.1 MG/DL (ref 1.6–2.6)
MCH RBC QN AUTO: 31.9 PG (ref 26.8–34.3)
MCHC RBC AUTO-ENTMCNC: 33.2 G/DL (ref 31.4–37.4)
MCV RBC AUTO: 96 FL (ref 82–98)
MONOCYTES # BLD AUTO: 0.62 THOUSAND/ΜL (ref 0.17–1.22)
MONOCYTES NFR BLD AUTO: 11 % (ref 4–12)
NEUTROPHILS # BLD AUTO: 3.24 THOUSANDS/ΜL (ref 1.85–7.62)
NEUTS SEG NFR BLD AUTO: 55 % (ref 43–75)
NITRITE UR QL STRIP: NEGATIVE
NON-SQ EPI CELLS URNS QL MICRO: NORMAL /HPF
NONHDLC SERPL-MCNC: 152 MG/DL
NRBC BLD AUTO-RTO: 0 /100 WBCS
PH UR STRIP.AUTO: 7 [PH]
PLATELET # BLD AUTO: 277 THOUSANDS/UL (ref 149–390)
PMV BLD AUTO: 11.2 FL (ref 8.9–12.7)
POTASSIUM SERPL-SCNC: 4.1 MMOL/L (ref 3.5–5.3)
PROT SERPL-MCNC: 7.5 G/DL (ref 6.4–8.2)
PROT UR STRIP-MCNC: NEGATIVE MG/DL
RBC # BLD AUTO: 4.07 MILLION/UL (ref 3.81–5.12)
RBC #/AREA URNS AUTO: NORMAL /HPF
SODIUM SERPL-SCNC: 137 MMOL/L (ref 136–145)
SP GR UR STRIP.AUTO: 1.01 (ref 1–1.03)
T4 FREE SERPL-MCNC: 1.07 NG/DL (ref 0.76–1.46)
TRIGL SERPL-MCNC: 124 MG/DL
TSH SERPL DL<=0.05 MIU/L-ACNC: 4.06 UIU/ML (ref 0.36–3.74)
UROBILINOGEN UR QL STRIP.AUTO: 0.2 E.U./DL
VIT B12 SERPL-MCNC: 661 PG/ML (ref 100–900)
WBC # BLD AUTO: 5.87 THOUSAND/UL (ref 4.31–10.16)
WBC #/AREA URNS AUTO: NORMAL /HPF

## 2021-04-06 PROCEDURE — 82550 ASSAY OF CK (CPK): CPT

## 2021-04-06 PROCEDURE — 80053 COMPREHEN METABOLIC PANEL: CPT

## 2021-04-06 PROCEDURE — 36415 COLL VENOUS BLD VENIPUNCTURE: CPT

## 2021-04-06 PROCEDURE — 84439 ASSAY OF FREE THYROXINE: CPT

## 2021-04-06 PROCEDURE — 80061 LIPID PANEL: CPT

## 2021-04-06 PROCEDURE — 82607 VITAMIN B-12: CPT

## 2021-04-06 PROCEDURE — 83735 ASSAY OF MAGNESIUM: CPT

## 2021-04-06 PROCEDURE — 81001 URINALYSIS AUTO W/SCOPE: CPT

## 2021-04-06 PROCEDURE — 85025 COMPLETE CBC W/AUTO DIFF WBC: CPT

## 2021-04-06 PROCEDURE — 82306 VITAMIN D 25 HYDROXY: CPT

## 2021-04-06 PROCEDURE — 84443 ASSAY THYROID STIM HORMONE: CPT

## 2021-04-14 ENCOUNTER — OFFICE VISIT (OUTPATIENT)
Dept: FAMILY MEDICINE CLINIC | Facility: CLINIC | Age: 81
End: 2021-04-14
Payer: COMMERCIAL

## 2021-04-14 VITALS
HEIGHT: 65 IN | SYSTOLIC BLOOD PRESSURE: 124 MMHG | RESPIRATION RATE: 14 BRPM | TEMPERATURE: 96 F | OXYGEN SATURATION: 96 % | WEIGHT: 152 LBS | BODY MASS INDEX: 25.33 KG/M2 | HEART RATE: 55 BPM | DIASTOLIC BLOOD PRESSURE: 80 MMHG

## 2021-04-14 DIAGNOSIS — R79.89 LOW VITAMIN D LEVEL: Primary | ICD-10-CM

## 2021-04-14 DIAGNOSIS — K21.9 GASTROESOPHAGEAL REFLUX DISEASE WITHOUT ESOPHAGITIS: ICD-10-CM

## 2021-04-14 DIAGNOSIS — M31.6 TEMPORAL ARTERITIS (HCC): ICD-10-CM

## 2021-04-14 DIAGNOSIS — I10 ESSENTIAL HYPERTENSION: ICD-10-CM

## 2021-04-14 DIAGNOSIS — R00.2 PALPITATIONS: ICD-10-CM

## 2021-04-14 DIAGNOSIS — E78.5 DYSLIPIDEMIA: ICD-10-CM

## 2021-04-14 DIAGNOSIS — E78.49 OTHER HYPERLIPIDEMIA: ICD-10-CM

## 2021-04-14 DIAGNOSIS — F03.91 DEMENTIA WITH BEHAVIORAL DISTURBANCE, UNSPECIFIED DEMENTIA TYPE (HCC): ICD-10-CM

## 2021-04-14 PROCEDURE — 1160F RVW MEDS BY RX/DR IN RCRD: CPT | Performed by: INTERNAL MEDICINE

## 2021-04-14 PROCEDURE — 3074F SYST BP LT 130 MM HG: CPT | Performed by: INTERNAL MEDICINE

## 2021-04-14 PROCEDURE — 1036F TOBACCO NON-USER: CPT | Performed by: INTERNAL MEDICINE

## 2021-04-14 PROCEDURE — 3079F DIAST BP 80-89 MM HG: CPT | Performed by: INTERNAL MEDICINE

## 2021-04-14 PROCEDURE — 99214 OFFICE O/P EST MOD 30 MIN: CPT | Performed by: INTERNAL MEDICINE

## 2021-04-14 RX ORDER — AZILSARTAN KAMEDOXOMIL 40 MG/1
40 TABLET ORAL DAILY
Qty: 90 TABLET | Refills: 3 | Status: SHIPPED | OUTPATIENT
Start: 2021-04-14 | End: 2021-09-01 | Stop reason: SDUPTHER

## 2021-04-14 RX ORDER — FOLIC ACID 1 MG/1
2 TABLET ORAL DAILY
Qty: 180 TABLET | Refills: 3 | Status: SHIPPED | OUTPATIENT
Start: 2021-04-14 | End: 2021-10-27

## 2021-04-14 RX ORDER — TRAZODONE HYDROCHLORIDE 50 MG/1
TABLET ORAL
Qty: 60 TABLET | Refills: 3 | Status: SHIPPED | OUTPATIENT
Start: 2021-04-14 | End: 2021-09-27

## 2021-04-14 RX ORDER — PROPRANOLOL HCL 60 MG
60 CAPSULE, EXTENDED RELEASE 24HR ORAL DAILY
Qty: 90 CAPSULE | Refills: 3 | Status: SHIPPED | OUTPATIENT
Start: 2021-04-14 | End: 2021-10-27 | Stop reason: SDUPTHER

## 2021-04-14 RX ORDER — DONEPEZIL HYDROCHLORIDE 10 MG/1
10 TABLET, FILM COATED ORAL DAILY
Qty: 90 TABLET | Refills: 3 | Status: SHIPPED | OUTPATIENT
Start: 2021-04-14 | End: 2022-03-14 | Stop reason: SDUPTHER

## 2021-04-14 RX ORDER — ERGOCALCIFEROL 1.25 MG/1
50000 CAPSULE ORAL WEEKLY
Qty: 13 CAPSULE | Refills: 1 | Status: SHIPPED | OUTPATIENT
Start: 2021-04-14 | End: 2021-09-29 | Stop reason: SDUPTHER

## 2021-04-14 RX ORDER — PREDNISONE 2.5 MG
2.5 TABLET ORAL DAILY
Qty: 30 TABLET | Refills: 3 | Status: SHIPPED | OUTPATIENT
Start: 2021-04-14 | End: 2021-07-07 | Stop reason: SDUPTHER

## 2021-04-14 RX ORDER — ROSUVASTATIN CALCIUM 5 MG/1
5 TABLET, COATED ORAL DAILY
Qty: 90 TABLET | Refills: 3 | Status: SHIPPED | OUTPATIENT
Start: 2021-04-14 | End: 2022-01-17 | Stop reason: SDUPTHER

## 2021-04-14 RX ORDER — PANTOPRAZOLE SODIUM 40 MG/1
40 TABLET, DELAYED RELEASE ORAL EVERY MORNING
Qty: 90 TABLET | Refills: 3 | Status: SHIPPED | OUTPATIENT
Start: 2021-04-14 | End: 2021-08-25 | Stop reason: SDUPTHER

## 2021-04-14 NOTE — PROGRESS NOTES
Assessment/Plan:         Diagnoses and all orders for this visit:    Low vitamin D level; continue :  -     ergocalciferol (VITAMIN D2) 50,000 units; Take 1 capsule (50,000 Units total) by mouth once a week    Dementia with behavioral disturbance, unspecified dementia type (HCC)  -     traZODone (DESYREL) 50 mg tablet; Take 1 5tabs qhs  -     donepezil (ARICEPT) 10 mg tablet; Take 1 tablet (10 mg total) by mouth daily In the Morning with Breakfast    Dyslipidemia; continue :  -     rosuvastatin (CRESTOR) 5 mg tablet; Take 1 tablet (5 mg total) by mouth daily  RTC in 3mos w Blood  Work    Essential hypertension  -     propranolol (INDERAL LA) 60 mg 24 hr capsule; Take 1 capsule (60 mg total) by mouth daily Please stop Norvasc     -     folic acid (FOLVITE) 1 mg tablet; Take 2 tablets (2 mg total) by mouth daily  -     azilsartan medoxomil (Edarbi) 40 MG tablet; Take 1 tablet (40 mg total) by mouth daily    Palpitations  -     propranolol (INDERAL LA) 60 mg 24 hr capsule; Take 1 capsule (60 mg total) by mouth daily Please stop Norvasc     -     folic acid (FOLVITE) 1 mg tablet; Take 2 tablets (2 mg total) by mouth daily    Temporal arteritis (HCC)  -     predniSONE 2 5 mg tablet; Take 1 tablet (2 5 mg total) by mouth daily    Gastroesophageal reflux disease without esophagitis  -     pantoprazole (PROTONIX) 40 mg tablet; Take 1 tablet (40 mg total) by mouth every morning  -     folic acid (FOLVITE) 1 mg tablet; Take 2 tablets (2 mg total) by mouth daily    Renew :  -     folic acid (FOLVITE) 1 mg tablet; Take 2 tablets (2 mg total) by mouth daily      Insomnia : Take Aricept 10 mg w Breakfast,  Use Melatonin and Benadryl 25 mg HS    Subjective:      Patient ID: Morris Foster is a 79 y o  female  [address] is here for Regular Check up, she has insomnia getting worse, recent Blood work and med list reviewed,        The following portions of the patient's history were reviewed and updated as appropriate: allergies, current medications, past medical history, past social history, past surgical history and problem list     Review of Systems   Constitutional: Negative for chills, fatigue and fever  HENT: Negative for congestion, facial swelling, sore throat, trouble swallowing and voice change  Eyes: Negative for pain, discharge and visual disturbance  Respiratory: Negative for cough, shortness of breath and wheezing  Cardiovascular: Negative for chest pain, palpitations and leg swelling  Gastrointestinal: Negative for abdominal pain, blood in stool, constipation, diarrhea and nausea  Endocrine: Negative for polydipsia, polyphagia and polyuria  Genitourinary: Negative for difficulty urinating, hematuria and urgency  Musculoskeletal: Negative for arthralgias and myalgias  Skin: Negative for rash  Neurological: Negative for dizziness, tremors, weakness and headaches  Hematological: Negative for adenopathy  Does not bruise/bleed easily  Psychiatric/Behavioral: Positive for sleep disturbance  Negative for dysphoric mood and suicidal ideas  Objective:      /80 (BP Location: Left arm, Patient Position: Sitting, Cuff Size: Standard)   Pulse 55   Temp (!) 96 °F (35 6 °C) (Tympanic)   Resp 14   Ht 5' 5" (1 651 m)   Wt 68 9 kg (152 lb)   SpO2 96%   BMI 25 29 kg/m²          Physical Exam  Constitutional:       General: She is not in acute distress  HENT:      Head: Normocephalic  Mouth/Throat:      Pharynx: No oropharyngeal exudate  Eyes:      General: No scleral icterus  Conjunctiva/sclera: Conjunctivae normal       Pupils: Pupils are equal, round, and reactive to light  Neck:      Musculoskeletal: Neck supple  Thyroid: No thyromegaly  Cardiovascular:      Rate and Rhythm: Normal rate and regular rhythm  Heart sounds: Normal heart sounds  No murmur  Pulmonary:      Effort: Pulmonary effort is normal  No respiratory distress        Breath sounds: Normal breath sounds  No wheezing or rales  Abdominal:      General: Bowel sounds are normal  There is no distension  Palpations: Abdomen is soft  Tenderness: There is no abdominal tenderness  There is no guarding or rebound  Musculoskeletal:         General: No tenderness  Lymphadenopathy:      Cervical: No cervical adenopathy  Skin:     Coloration: Skin is not pale  Findings: No rash  Neurological:      Mental Status: She is alert and oriented to person, place, and time  Sensory: No sensory deficit  Motor: No weakness

## 2021-04-14 NOTE — PROGRESS NOTES
BMI Counseling: Body mass index is 25 29 kg/m²  The BMI is above normal  Exercise recommendations include exercising 3-5 times per week

## 2021-05-05 ENCOUNTER — TELEPHONE (OUTPATIENT)
Dept: NEUROLOGY | Facility: CLINIC | Age: 81
End: 2021-05-05

## 2021-05-05 NOTE — TELEPHONE ENCOUNTER
Daughter called in to ask if Alessandra Crane is okay with patient starting a clinical trial for donanemab called Hessel Blazers ALZ-2  Trial is run by Otilia Lang MD in [facility], trial #[id number]     She would like to know if you have any opinions about this? Best call back [phone], okay with detailed messages

## 2021-05-06 NOTE — TELEPHONE ENCOUNTER
I think that would be absolutely fine  I do not know much about it however I did look up the study and it looks like they are testing safety and effectiveness of the medication for slowing cognitive impairment and reducing amyloid plaques (looks like she would be getting PET scans done to measure plaque build up)  Sounds very interesting!

## 2021-05-06 NOTE — TELEPHONE ENCOUNTER
Called and left a detailed message on pt's dtr Anneliese Sandhu answering machine of the below and for a call back for further questions or concerns

## 2021-06-01 ENCOUNTER — TELEPHONE (OUTPATIENT)
Dept: FAMILY MEDICINE CLINIC | Facility: CLINIC | Age: 81
End: 2021-06-01

## 2021-06-01 DIAGNOSIS — N39.0 ACUTE UTI: Primary | ICD-10-CM

## 2021-06-01 RX ORDER — NITROFURANTOIN 25; 75 MG/1; MG/1
100 CAPSULE ORAL 2 TIMES DAILY
Qty: 14 CAPSULE | Refills: 0 | Status: SHIPPED | OUTPATIENT
Start: 2021-06-01 | End: 2021-06-08

## 2021-06-11 ENCOUNTER — DOCUMENTATION (OUTPATIENT)
Dept: FAMILY MEDICINE CLINIC | Facility: CLINIC | Age: 81
End: 2021-06-11

## 2021-06-22 ENCOUNTER — OFFICE VISIT (OUTPATIENT)
Dept: FAMILY MEDICINE CLINIC | Facility: CLINIC | Age: 81
End: 2021-06-22
Payer: COMMERCIAL

## 2021-06-22 VITALS
DIASTOLIC BLOOD PRESSURE: 80 MMHG | SYSTOLIC BLOOD PRESSURE: 140 MMHG | BODY MASS INDEX: 24.66 KG/M2 | RESPIRATION RATE: 14 BRPM | HEART RATE: 68 BPM | TEMPERATURE: 97.7 F | HEIGHT: 65 IN | OXYGEN SATURATION: 97 % | WEIGHT: 148 LBS

## 2021-06-22 DIAGNOSIS — Z00.01 ENCOUNTER FOR GENERAL ADULT MEDICAL EXAMINATION WITH ABNORMAL FINDINGS: Primary | ICD-10-CM

## 2021-06-22 DIAGNOSIS — L03.119 CELLULITIS OF FOOT: ICD-10-CM

## 2021-06-22 DIAGNOSIS — E78.49 OTHER HYPERLIPIDEMIA: ICD-10-CM

## 2021-06-22 PROCEDURE — 1125F AMNT PAIN NOTED PAIN PRSNT: CPT | Performed by: INTERNAL MEDICINE

## 2021-06-22 PROCEDURE — 3725F SCREEN DEPRESSION PERFORMED: CPT | Performed by: INTERNAL MEDICINE

## 2021-06-22 PROCEDURE — 3079F DIAST BP 80-89 MM HG: CPT | Performed by: INTERNAL MEDICINE

## 2021-06-22 PROCEDURE — 3077F SYST BP >= 140 MM HG: CPT | Performed by: INTERNAL MEDICINE

## 2021-06-22 PROCEDURE — 99214 OFFICE O/P EST MOD 30 MIN: CPT | Performed by: INTERNAL MEDICINE

## 2021-06-22 PROCEDURE — 1170F FXNL STATUS ASSESSED: CPT | Performed by: INTERNAL MEDICINE

## 2021-06-22 PROCEDURE — G0439 PPPS, SUBSEQ VISIT: HCPCS | Performed by: INTERNAL MEDICINE

## 2021-06-22 PROCEDURE — 3288F FALL RISK ASSESSMENT DOCD: CPT | Performed by: INTERNAL MEDICINE

## 2021-06-22 RX ORDER — AMOXICILLIN AND CLAVULANATE POTASSIUM 875; 125 MG/1; MG/1
1 TABLET, FILM COATED ORAL EVERY 12 HOURS SCHEDULED
Qty: 14 TABLET | Refills: 0 | Status: SHIPPED | OUTPATIENT
Start: 2021-06-22 | End: 2021-06-29

## 2021-06-22 NOTE — PROGRESS NOTES
Assessment/Plan:         Diagnoses and all orders for this visit:    Encounter for general adult medical examination with abnormal findings; Done in detail    Cellulitis of foot; Try :  -     amoxicillin-clavulanate (AUGMENTIN) 875-125 mg per tablet; Take 1 tablet by mouth every 12 (twelve) hours for 7 days With food/Meals  -     mupirocin (BACTROBAN) 2 % ointment; Apply topically 3 (three) times a day    Other hyperlipidemia; continue Crestor  RTc in 1-2 mos w Blood  work        Subjective:      Patient ID: Erin Chiang is a 79 y o  female  79 Y O lady is here for AWV and Regular check up, and increased pain erythema large toe, No recent Blood  Work, Med List reviewed w  Pt and Daughter    The following portions of the patient's history were reviewed and updated as appropriate: allergies, current medications, past family history, past medical history, past social history, past surgical history and problem list     Review of Systems   Constitutional: Negative for chills, fatigue and fever  HENT: Negative for congestion, facial swelling, sore throat, trouble swallowing and voice change  Eyes: Negative for pain, discharge and visual disturbance  Respiratory: Negative for cough, shortness of breath and wheezing  Cardiovascular: Negative for chest pain, palpitations and leg swelling  Gastrointestinal: Negative for abdominal pain, blood in stool, constipation, diarrhea and nausea  Endocrine: Negative for polydipsia, polyphagia and polyuria  Genitourinary: Negative for difficulty urinating, hematuria and urgency  Musculoskeletal: Negative for arthralgias and myalgias  Skin: Positive for color change and wound  Negative for rash  Neurological: Negative for dizziness, tremors, weakness and headaches  Hematological: Negative for adenopathy  Does not bruise/bleed easily  Psychiatric/Behavioral: Negative for dysphoric mood, sleep disturbance and suicidal ideas  Objective:      /80 (BP Location: Right arm, Patient Position: Sitting, Cuff Size: Standard)   Pulse 68   Temp 97 7 °F (36 5 °C) (Tympanic)   Resp 14   Ht 5' 5" (1 651 m)   Wt 67 1 kg (148 lb)   SpO2 97%   BMI 24 63 kg/m²          Physical Exam  Constitutional:       General: She is not in acute distress  HENT:      Head: Normocephalic  Mouth/Throat:      Pharynx: No oropharyngeal exudate  Eyes:      General: No scleral icterus  Conjunctiva/sclera: Conjunctivae normal       Pupils: Pupils are equal, round, and reactive to light  Neck:      Thyroid: No thyromegaly  Cardiovascular:      Rate and Rhythm: Normal rate and regular rhythm  Heart sounds: Murmur heard  Pulmonary:      Effort: Pulmonary effort is normal  No respiratory distress  Breath sounds: Normal breath sounds  No wheezing or rales  Abdominal:      General: Bowel sounds are normal  There is no distension  Palpations: Abdomen is soft  Tenderness: There is no abdominal tenderness  There is no guarding or rebound  Musculoskeletal:         General: Swelling and tenderness present  Cervical back: Neck supple  Lymphadenopathy:      Cervical: No cervical adenopathy  Skin:     Coloration: Skin is not pale  Findings: Erythema and rash present  Neurological:      Mental Status: She is alert and oriented to person, place, and time  Sensory: No sensory deficit  Motor: No weakness

## 2021-06-22 NOTE — PROGRESS NOTES
Assessment and Plan:     Problem List Items Addressed This Visit     None           Preventive health issues were discussed with patient, and age appropriate screening tests were ordered as noted in patient's After Visit Summary  Personalized health advice and appropriate referrals for health education or preventive services given if needed, as noted in patient's After Visit Summary       History of Present Illness:     Patient presents for Medicare Annual Wellness visit    Patient Care Team:  Laurie Dhaliwal MD as PCP - General (Internal Medicine)  Laurie Dhaliwal MD as PCP - [address] [place] [address] (RTE)     Problem List:     Patient Active Problem List   Diagnosis    Gastroesophageal reflux disease without esophagitis    Essential hypertension    Temporal arteritis ([facility] [address] )    Abnormal CT scan, head    Goiter    Osteoarthritis    Multiple thyroid nodules    Disease of thyroid gland    Hyperlipidemia    Hypertension    Hypothyroidism    Hypertensive urgency    Chest pressure    Hyponatremia    Polymyalgia rheumatica (HCC)    Other chest pain    PMR (polymyalgia rheumatica) (HCC)    Anxiety disorder    Dementia with behavioral disturbance (HCC)      Past Medical and Surgical History:     Past Medical History:   Diagnosis Date    Anxiety     Gastroesophageal reflux disease without esophagitis 8/28/2018    Hyperlipidemia     Hypertension     Hypothyroidism     Osteoarthritis 10/5/2012     Past Surgical History:   Procedure Laterality Date    CATARACT EXTRACTION      CHOLECYSTECTOMY      HYSTERECTOMY        Family History:     Family History   Problem Relation Age of Onset    No Known Problems Mother     No Known Problems Father       Social History:     Social History     Socioeconomic History    Marital status: /Civil Union     Spouse name: Not on file    Number of children: Not on file    Years of education: Not on file    Highest education level: Not on file   Occupational History  Occupation: retired   Tobacco Use    Smoking status: Never Smoker    Smokeless tobacco: Never Used   Vaping Use    Vaping Use: Never used   Substance and Sexual Activity    Alcohol use: No    Drug use: No    Sexual activity: Not Currently   Other Topics Concern    Not on file   Social History Narrative    Not on file     Social Determinants of Health     Financial Resource Strain: Low Risk     Difficulty of Paying Living Expenses: Not hard at all   Food Insecurity: No Food Insecurity    Worried About [address] in the Last Year: Never true    [facility] in the Last Year: Never true   Transportation Needs: No Transportation Needs    Lack of Transportation (Medical): No    Lack of Transportation (Non-Medical):  No   Physical Activity: Insufficiently Active    Days of Exercise per Week: 2 days    Minutes of Exercise per Session: 10 min   Stress: No Stress Concern Present    Feeling of Stress : Not at all   Social Connections: Unknown    Frequency of Communication with Friends and Family: Patient refused    Frequency of Social Gatherings with Friends and Family: Patient refused    Attends Oriental orthodox Services: Patient refused    Active Member of Clubs or Organizations: Patient refused    Attends Club or Organization Meetings: Patient refused    Marital Status: Patient refused   Intimate Partner Violence: Not At Risk    Fear of Current or Ex-Partner: No    Emotionally Abused: No    Physically Abused: No    Sexually Abused: No      Medications and Allergies:     Current Outpatient Medications   Medication Sig Dispense Refill    azilsartan medoxomil (Edarbi) 40 MG tablet Take 1 tablet (40 mg total) by mouth daily 90 tablet 3    donepezil (ARICEPT) 10 mg tablet Take 1 tablet (10 mg total) by mouth daily In the Morning with Breakfast 90 tablet 3    ergocalciferol (VITAMIN D2) 50,000 units Take 1 capsule (50,000 Units total) by mouth once a week 13 capsule 1    folic acid (Valeta Ingles) 1 mg tablet Take 2 tablets (2 mg total) by mouth daily 180 tablet 3    pantoprazole (PROTONIX) 40 mg tablet Take 1 tablet (40 mg total) by mouth every morning 90 tablet 3    predniSONE 2 5 mg tablet Take 1 tablet (2 5 mg total) by mouth daily 30 tablet 3    propranolol (INDERAL LA) 60 mg 24 hr capsule Take 1 capsule (60 mg total) by mouth daily Please stop Norvasc    90 capsule 3    RA Aspirin EC 81 MG EC tablet take 1 tablet by mouth every other day 45 tablet 3    rosuvastatin (CRESTOR) 5 mg tablet Take 1 tablet (5 mg total) by mouth daily 90 tablet 3    traZODone (DESYREL) 50 mg tablet Take 1 5tabs qhs 60 tablet 3     No current facility-administered medications for this visit  Allergies   Allergen Reactions    No Known Allergies       Immunizations:     Immunization History   Administered Date(s) Administered    INFLUENZA 09/21/2015, 11/01/2016, 10/24/2017, 10/12/2018    Influenza Split 09/03/2013, 09/02/2014    Influenza Split High Dose Preservative Free IM 11/01/2016, 10/24/2017    Influenza, high dose seasonal 0 7 mL 09/25/2019, 11/06/2020    Influenza, seasonal, injectable 09/21/2015    Pneumococcal Conjugate 13-Valent 10/24/2017    Pneumococcal Polysaccharide PPV23 12/01/2008    SARS-CoV-2 / COVID-19 mRNA IM ([employer]) 01/21/2021, 02/17/2021    Td (adult), adsorbed 01/01/2004    Tdap 12/19/2018      Health Maintenance: There are no preventive care reminders to display for this patient  There are no preventive care reminders to display for this patient  Medicare Health Risk Assessment:     There were no vitals taken for this visit  Chelita Newton is here for her Subsequent Wellness visit  Last Medicare Wellness visit information reviewed, patient interviewed and updates made to the record today  Health Risk Assessment:   Patient rates overall health as good  Patient feels that their physical health rating is same  Eyesight was rated as same  Hearing was rated as same   Patient feels that their emotional and mental health rating is same  Patients states they are never, rarely angry  Patient states they are never, rarely unusually tired/fatigued  Pain experienced in the last 7 days has been none  Patient states that she has experienced no weight loss or gain in last 6 months  Depression Screening:   PHQ-2 Score: 0      Fall Risk Screening: In the past year, patient has experienced: no history of falling in past year      Urinary Incontinence Screening:   Patient has not leaked urine accidently in the last six months  Home Safety:  Patient does not have trouble with stairs inside or outside of their home  Patient has working smoke alarms and has working carbon monoxide detector  Home safety hazards include: none  Nutrition:   Current diet is Regular  Medications:   Patient is not currently taking any over-the-counter supplements  Patient is able to manage medications  Activities of Daily Living (ADLs)/Instrumental Activities of Daily Living (IADLs):   Walk and transfer into and out of bed and chair?: Yes  Dress and groom yourself?: Yes    Bathe or shower yourself?: Yes    Feed yourself? Yes  Do your laundry/housekeeping?: Yes  Manage your money, pay your bills and track your expenses?: Yes  Make your own meals?: Yes    Do your own shopping?: Yes    Previous Hospitalizations:   Any hospitalizations or ED visits within the last 12 months?: No      Advance Care Planning:   Living will: Yes    Durable POA for healthcare:  Yes    Advanced directive: Yes    Advanced directive counseling given: No    Five wishes given: No    End of Life Decisions reviewed with patient: No      PREVENTIVE SCREENINGS      Cardiovascular Screening:    General: Screening Not Indicated, History Lipid Disorder and Risks and Benefits Discussed      Diabetes Screening:     General: Screening Current and Risks and Benefits Discussed      Colorectal Cancer Screening:     General: Risks and Benefits Discussed and Screening Not Indicated      Breast Cancer Screening:     General: Risks and Benefits Discussed and Screening Not Indicated      Cervical Cancer Screening:    General: Screening Not Indicated and Risks and Benefits Discussed      Osteoporosis Screening:    General: Risks and Benefits Discussed      Abdominal Aortic Aneurysm (AAA) Screening:        General: Risks and Benefits Discussed      Lung Cancer Screening:     General: Screening Not Indicated and Risks and Benefits Discussed      Hepatitis C Screening:    General: Risks and Benefits Discussed    Screening, Brief Intervention, and Referral to Treatment (SBIRT)    Screening      Single Item Drug Screening:  How often have you used an illegal drug (including marijuana) or a prescription medication for non-medical reasons in the past year? never    Single Item Drug Screen Score: 0  Interpretation: Negative screen for possible drug use disorder    Other Counseling Topics:   Car/seat belt/driving safety, skin self-exam, sunscreen and regular weightbearing exercise and calcium and vitamin D intake         Roberto Mc MD

## 2021-07-07 DIAGNOSIS — M31.6 TEMPORAL ARTERITIS (HCC): ICD-10-CM

## 2021-07-07 RX ORDER — PREDNISONE 2.5 MG
2.5 TABLET ORAL DAILY
Qty: 90 TABLET | Refills: 1 | Status: SHIPPED | OUTPATIENT
Start: 2021-07-07 | End: 2021-10-27

## 2021-07-14 ENCOUNTER — OFFICE VISIT (OUTPATIENT)
Dept: FAMILY MEDICINE CLINIC | Facility: CLINIC | Age: 81
End: 2021-07-14
Payer: COMMERCIAL

## 2021-07-14 VITALS
SYSTOLIC BLOOD PRESSURE: 136 MMHG | HEART RATE: 64 BPM | BODY MASS INDEX: 23.82 KG/M2 | WEIGHT: 143 LBS | RESPIRATION RATE: 14 BRPM | TEMPERATURE: 96.1 F | OXYGEN SATURATION: 97 % | HEIGHT: 65 IN | DIASTOLIC BLOOD PRESSURE: 78 MMHG

## 2021-07-14 DIAGNOSIS — I10 ESSENTIAL HYPERTENSION: ICD-10-CM

## 2021-07-14 DIAGNOSIS — F32.A DEPRESSION, UNSPECIFIED DEPRESSION TYPE: Primary | ICD-10-CM

## 2021-07-14 DIAGNOSIS — M31.6 TEMPORAL ARTERITIS (HCC): ICD-10-CM

## 2021-07-14 DIAGNOSIS — E78.49 OTHER HYPERLIPIDEMIA: ICD-10-CM

## 2021-07-14 PROCEDURE — 99214 OFFICE O/P EST MOD 30 MIN: CPT | Performed by: INTERNAL MEDICINE

## 2021-07-14 PROCEDURE — 1160F RVW MEDS BY RX/DR IN RCRD: CPT | Performed by: INTERNAL MEDICINE

## 2021-07-14 PROCEDURE — 1036F TOBACCO NON-USER: CPT | Performed by: INTERNAL MEDICINE

## 2021-07-14 RX ORDER — ESCITALOPRAM OXALATE 5 MG/1
5 TABLET ORAL DAILY
Qty: 30 TABLET | Refills: 2 | Status: SHIPPED | OUTPATIENT
Start: 2021-07-14 | End: 2021-09-02

## 2021-08-10 ENCOUNTER — TELEPHONE (OUTPATIENT)
Dept: FAMILY MEDICINE CLINIC | Facility: CLINIC | Age: 81
End: 2021-08-10

## 2021-08-11 ENCOUNTER — OFFICE VISIT (OUTPATIENT)
Dept: FAMILY MEDICINE CLINIC | Facility: CLINIC | Age: 81
End: 2021-08-11
Payer: COMMERCIAL

## 2021-08-11 VITALS
HEART RATE: 80 BPM | HEIGHT: 65 IN | DIASTOLIC BLOOD PRESSURE: 86 MMHG | RESPIRATION RATE: 16 BRPM | SYSTOLIC BLOOD PRESSURE: 158 MMHG | WEIGHT: 143 LBS | OXYGEN SATURATION: 98 % | BODY MASS INDEX: 23.82 KG/M2 | TEMPERATURE: 97.9 F

## 2021-08-11 DIAGNOSIS — R21 RASH: Primary | ICD-10-CM

## 2021-08-11 DIAGNOSIS — H10.30 ACUTE CONJUNCTIVITIS, UNSPECIFIED ACUTE CONJUNCTIVITIS TYPE, UNSPECIFIED LATERALITY: ICD-10-CM

## 2021-08-11 PROCEDURE — 96372 THER/PROPH/DIAG INJ SC/IM: CPT

## 2021-08-11 PROCEDURE — 99213 OFFICE O/P EST LOW 20 MIN: CPT | Performed by: INTERNAL MEDICINE

## 2021-08-11 RX ORDER — METHYLPREDNISOLONE SODIUM SUCCINATE 125 MG/2ML
125 INJECTION, POWDER, LYOPHILIZED, FOR SOLUTION INTRAMUSCULAR; INTRAVENOUS ONCE
Status: COMPLETED | OUTPATIENT
Start: 2021-08-11 | End: 2021-08-11

## 2021-08-11 RX ORDER — PREDNISONE 10 MG/1
TABLET ORAL
Qty: 20 TABLET | Refills: 0 | Status: SHIPPED | OUTPATIENT
Start: 2021-08-11 | End: 2021-09-02

## 2021-08-11 RX ORDER — TOBRAMYCIN AND DEXAMETHASONE 3; 1 MG/ML; MG/ML
1 SUSPENSION/ DROPS OPHTHALMIC
Qty: 5 ML | Refills: 1 | Status: SHIPPED | OUTPATIENT
Start: 2021-08-11 | End: 2021-09-22

## 2021-08-11 RX ADMIN — METHYLPREDNISOLONE SODIUM SUCCINATE 125 MG: 125 INJECTION, POWDER, LYOPHILIZED, FOR SOLUTION INTRAMUSCULAR; INTRAVENOUS at 11:17

## 2021-08-11 NOTE — PROGRESS NOTES
Assessment/Plan:         Diagnoses and all orders for this visit:    Rash; Try :  -     methylPREDNISolone sodium succinate (Solu-MEDROL) injection 125 mg, IM Now  -     predniSONE 10 mg tablet; Take 3 tabs daily with breakfast for 3 days then Take 2 tabs daily for 3 Days then take one tab daily for 3 days then stop  Julissa Sinks Use Zyrtec 10 mg PM daily  RTc in 1-2  Weeks    Acute conjunctivitis, unspecified acute conjunctivitis type, unspecified laterality  -     predniSONE 10 mg tablet; Take 3 tabs daily with breakfast for 3 days then Take 2 tabs daily for 3 Days then take one tab daily for 3 days then stop     -     tobramycin-dexamethasone (TOBRADEX) ophthalmic suspension; Administer 1 drop to both eyes every 4 (four) hours while awake        Subjective:      Patient ID: Karishma Lee is a 79 y o  female  [address] [place] [address] Road is here for Increasing itchy rash on her upper Exts, upper body, neck and itchy eyes, for 1 week after working in her [facility]    The following portions of the patient's history were reviewed and updated as appropriate: allergies, current medications, past family history, past medical history, past social history, past surgical history and problem list     Review of Systems   Constitutional: Negative for chills, fatigue and fever  HENT: Negative for congestion, facial swelling, sore throat, trouble swallowing and voice change  Eyes: Positive for redness and itching  Negative for pain, discharge and visual disturbance  Respiratory: Negative for cough, shortness of breath and wheezing  Cardiovascular: Negative for chest pain, palpitations and leg swelling  Gastrointestinal: Negative for abdominal pain, blood in stool, constipation, diarrhea and nausea  Endocrine: Negative for polydipsia, polyphagia and polyuria  Genitourinary: Negative for difficulty urinating, hematuria and urgency  Musculoskeletal: Negative for arthralgias and myalgias  Skin: Positive for rash     Neurological: Negative for dizziness, tremors, weakness and headaches  Hematological: Negative for adenopathy  Does not bruise/bleed easily  Psychiatric/Behavioral: Negative for dysphoric mood, sleep disturbance and suicidal ideas  Objective:      /86 (BP Location: Left arm, Patient Position: Sitting, Cuff Size: Standard)   Pulse 80   Temp 97 9 °F (36 6 °C) (Tympanic)   Resp 16   Ht 5' 5" (1 651 m)   Wt 64 9 kg (143 lb)   SpO2 98%   BMI 23 80 kg/m²          Physical Exam  Constitutional:       General: She is not in acute distress  HENT:      Head: Normocephalic  Mouth/Throat:      Pharynx: No oropharyngeal exudate  Eyes:      General: No scleral icterus  Conjunctiva/sclera: Conjunctivae normal       Pupils: Pupils are equal, round, and reactive to light  Neck:      Thyroid: No thyromegaly  Cardiovascular:      Rate and Rhythm: Normal rate and regular rhythm  Heart sounds: Normal heart sounds  No murmur heard  Pulmonary:      Effort: Pulmonary effort is normal  No respiratory distress  Breath sounds: Normal breath sounds  No wheezing or rales  Abdominal:      General: Bowel sounds are normal  There is no distension  Palpations: Abdomen is soft  Tenderness: There is no abdominal tenderness  There is no guarding or rebound  Musculoskeletal:         General: No tenderness  Cervical back: Neck supple  Lymphadenopathy:      Cervical: No cervical adenopathy  Skin:     Coloration: Skin is not pale  Findings: Rash present  Neurological:      Mental Status: She is alert and oriented to person, place, and time  Sensory: No sensory deficit  Motor: No weakness

## 2021-08-18 ENCOUNTER — OFFICE VISIT (OUTPATIENT)
Dept: FAMILY MEDICINE CLINIC | Facility: CLINIC | Age: 81
End: 2021-08-18
Payer: COMMERCIAL

## 2021-08-18 ENCOUNTER — TELEPHONE (OUTPATIENT)
Dept: FAMILY MEDICINE CLINIC | Facility: CLINIC | Age: 81
End: 2021-08-18

## 2021-08-18 VITALS
TEMPERATURE: 96.8 F | DIASTOLIC BLOOD PRESSURE: 82 MMHG | HEART RATE: 74 BPM | RESPIRATION RATE: 14 BRPM | BODY MASS INDEX: 23.82 KG/M2 | WEIGHT: 143 LBS | HEIGHT: 65 IN | OXYGEN SATURATION: 98 % | SYSTOLIC BLOOD PRESSURE: 126 MMHG

## 2021-08-18 DIAGNOSIS — R31.9 HEMATURIA, UNSPECIFIED TYPE: ICD-10-CM

## 2021-08-18 DIAGNOSIS — N39.0 RECURRENT UTI: Primary | ICD-10-CM

## 2021-08-18 LAB
SL AMB  POCT GLUCOSE, UA: ABNORMAL
SL AMB LEUKOCYTE ESTERASE,UA: ABNORMAL
SL AMB POCT BILIRUBIN,UA: ABNORMAL
SL AMB POCT BLOOD,UA: ABNORMAL
SL AMB POCT CLARITY,UA: CLEAR
SL AMB POCT COLOR,UA: ABNORMAL
SL AMB POCT KETONES,UA: ABNORMAL
SL AMB POCT NITRITE,UA: ABNORMAL
SL AMB POCT PH,UA: 6
SL AMB POCT SPECIFIC GRAVITY,UA: 1.01
SL AMB POCT URINE PROTEIN: ABNORMAL
SL AMB POCT UROBILINOGEN: ABNORMAL

## 2021-08-18 PROCEDURE — 1160F RVW MEDS BY RX/DR IN RCRD: CPT | Performed by: INTERNAL MEDICINE

## 2021-08-18 PROCEDURE — 81002 URINALYSIS NONAUTO W/O SCOPE: CPT | Performed by: INTERNAL MEDICINE

## 2021-08-18 PROCEDURE — 1036F TOBACCO NON-USER: CPT | Performed by: INTERNAL MEDICINE

## 2021-08-18 PROCEDURE — 99213 OFFICE O/P EST LOW 20 MIN: CPT | Performed by: INTERNAL MEDICINE

## 2021-08-18 RX ORDER — CIPROFLOXACIN 250 MG/1
250 TABLET, FILM COATED ORAL EVERY 12 HOURS SCHEDULED
Qty: 10 TABLET | Refills: 0 | Status: SHIPPED | OUTPATIENT
Start: 2021-08-18 | End: 2021-08-23

## 2021-08-18 NOTE — PROGRESS NOTES
Assessment/Plan:         Diagnoses and all orders for this visit:    Recurrent UTI; cause ? RTC in 1-2 mos w :  -     US kidney and bladder with pvr; Future  Try:  -     ciprofloxacin (CIPRO) 250 mg tablet; Take 1 tablet (250 mg total) by mouth every 12 (twelve) hours for 5 days With food/Meals  Increase Po Water    Hematuria, unspecified type  -     POCT urine dip  -     US kidney and bladder with pvr; Future  -     ciprofloxacin (CIPRO) 250 mg tablet; Take 1 tablet (250 mg total) by mouth every 12 (twelve) hours for 5 days With food/Meals        Subjective:      Patient ID: Kera Angela is a 79 y o  female  79 y o lady is here for increasing urinary symptoms lately, recent blood work and med list reviewed w pt,  The following portions of the patient's history were reviewed and updated as appropriate: allergies, current medications, past family history, past medical history, past social history, past surgical history and problem list     Review of Systems   Constitutional: Negative for chills, fatigue and fever  HENT: Negative for congestion, facial swelling, sore throat, trouble swallowing and voice change  Eyes: Negative for pain, discharge and visual disturbance  Respiratory: Negative for cough, shortness of breath and wheezing  Cardiovascular: Negative for chest pain, palpitations and leg swelling  Gastrointestinal: Negative for abdominal pain, blood in stool, constipation, diarrhea and nausea  Endocrine: Negative for polydipsia, polyphagia and polyuria  Genitourinary: Positive for dysuria, hematuria and urgency  Negative for difficulty urinating  Musculoskeletal: Negative for arthralgias and myalgias  Skin: Negative for rash  Neurological: Negative for dizziness, tremors, weakness and headaches  Hematological: Negative for adenopathy  Does not bruise/bleed easily  Psychiatric/Behavioral: Negative for dysphoric mood, sleep disturbance and suicidal ideas  Objective:      /82 (BP Location: Left arm, Patient Position: Sitting, Cuff Size: Standard)   Pulse 74   Temp (!) 96 8 °F (36 °C) (Tympanic)   Resp 14   Ht 5' 5" (1 651 m)   Wt 64 9 kg (143 lb)   SpO2 98%   BMI 23 80 kg/m²          Physical Exam  Constitutional:       General: She is not in acute distress  HENT:      Head: Normocephalic  Mouth/Throat:      Pharynx: No oropharyngeal exudate  Eyes:      General: No scleral icterus  Conjunctiva/sclera: Conjunctivae normal       Pupils: Pupils are equal, round, and reactive to light  Neck:      Thyroid: No thyromegaly  Cardiovascular:      Rate and Rhythm: Normal rate and regular rhythm  Heart sounds: Normal heart sounds  No murmur heard  Pulmonary:      Effort: Pulmonary effort is normal  No respiratory distress  Breath sounds: Normal breath sounds  No wheezing or rales  Abdominal:      General: Bowel sounds are normal  There is no distension  Palpations: Abdomen is soft  Tenderness: There is no abdominal tenderness  There is no guarding or rebound  Musculoskeletal:         General: No tenderness  Cervical back: Neck supple  Lymphadenopathy:      Cervical: No cervical adenopathy  Skin:     Coloration: Skin is not pale  Findings: No rash  Neurological:      Mental Status: She is alert and oriented to person, place, and time  Sensory: No sensory deficit

## 2021-08-25 DIAGNOSIS — K21.9 GASTROESOPHAGEAL REFLUX DISEASE WITHOUT ESOPHAGITIS: ICD-10-CM

## 2021-08-25 RX ORDER — PANTOPRAZOLE SODIUM 40 MG/1
40 TABLET, DELAYED RELEASE ORAL EVERY MORNING
Qty: 90 TABLET | Refills: 3 | Status: SHIPPED | OUTPATIENT
Start: 2021-08-25 | End: 2021-10-27 | Stop reason: SDUPTHER

## 2021-08-27 ENCOUNTER — HOSPITAL ENCOUNTER (OUTPATIENT)
Dept: ULTRASOUND IMAGING | Facility: MEDICAL CENTER | Age: 81
Discharge: HOME | End: 2021-08-27
Payer: COMMERCIAL

## 2021-08-27 DIAGNOSIS — N39.0 RECURRENT UTI: ICD-10-CM

## 2021-08-27 DIAGNOSIS — R31.9 HEMATURIA, UNSPECIFIED TYPE: ICD-10-CM

## 2021-08-27 PROCEDURE — 76770 US EXAM ABDO BACK WALL COMP: CPT

## 2021-09-01 DIAGNOSIS — I10 ESSENTIAL HYPERTENSION: ICD-10-CM

## 2021-09-01 RX ORDER — AZILSARTAN KAMEDOXOMIL 40 MG/1
40 TABLET ORAL DAILY
Qty: 90 TABLET | Refills: 3 | Status: SHIPPED | OUTPATIENT
Start: 2021-09-01 | End: 2022-03-14 | Stop reason: SDUPTHER

## 2021-09-02 ENCOUNTER — OFFICE VISIT (OUTPATIENT)
Dept: NEUROLOGY | Facility: CLINIC | Age: 81
End: 2021-09-02
Payer: COMMERCIAL

## 2021-09-02 ENCOUNTER — TELEPHONE (OUTPATIENT)
Dept: FAMILY MEDICINE CLINIC | Facility: CLINIC | Age: 81
End: 2021-09-02

## 2021-09-02 VITALS
DIASTOLIC BLOOD PRESSURE: 74 MMHG | HEART RATE: 79 BPM | WEIGHT: 149.4 LBS | BODY MASS INDEX: 24.86 KG/M2 | SYSTOLIC BLOOD PRESSURE: 136 MMHG

## 2021-09-02 DIAGNOSIS — F32.A DEPRESSION, UNSPECIFIED DEPRESSION TYPE: Primary | ICD-10-CM

## 2021-09-02 DIAGNOSIS — F03.91 DEMENTIA WITH BEHAVIORAL DISTURBANCE, UNSPECIFIED DEMENTIA TYPE (HCC): Primary | ICD-10-CM

## 2021-09-02 PROCEDURE — 99214 OFFICE O/P EST MOD 30 MIN: CPT | Performed by: PHYSICIAN ASSISTANT

## 2021-09-02 RX ORDER — ESCITALOPRAM OXALATE 10 MG/1
10 TABLET ORAL DAILY
Qty: 30 TABLET | Refills: 2 | Status: SHIPPED | OUTPATIENT
Start: 2021-09-02 | End: 2021-12-09

## 2021-09-02 NOTE — PROGRESS NOTES
Patient ID: Prince Garcia is a 79 y o  female  Assessment/Plan:    Dementia with behavioral disturbance [facility]  Patient with progressive cognitive deficits over the past 2-3 years which interfere with daily functions as well as MRI with NQ which revealed changes to suggest a neuro degenerative etiology  Symptoms are consistent with mild dementia  She is tolerating Aricept well without any side effects  She did feel there was some benefit when starting this medication  She is also now part of a local trial for Donanemab  She was overall stable on memory testing in the office today scoring a 17/30  At this point we will not make any changes however we did discuss the option of adding Namenda in the future if there is further progression  She does continue to have some issues with mood as well as sleep  She is currently on low dose of Lexapro as well as trazodone  I will have her discuss this further with her PCP to see if perhaps they could increase her Lexapro dose  I will also be having her follow-up with the geriatric center at this time  If she is doing well with them and then she can continue follow ups from there on out  Patient was encouraged to increase mind stimulating activities such as reading, crosswords, word searches, puzzles, Soduku, solitaire, coloring and other brain games  We also discussed the importance of staying physically active and eating a health diet such as the Mediterranean or MIND diet  Subjective:    Ms Ace Leon is an 79 y o  right handed female with hx of polymyalgia rheumatica on daily prednisone and dementia who presents for follow up  To review, she had been followed at [facility] with Dr Niko Huerta regarding gait disturbance, anxiety and dizziness, last seen July 2018  Per accounts, she has been taking diazepam nightly for anxiety control and at that time the dizziness had resolved   No particular cause for her subjective gait disturbance was found and she had normal examination  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  She was seen in ER 6/24/20 for memory loss  Per records, daughter reported acute memory loss since one year ago when she had diverticulitis, significantly worsened in May 2020 with "periods of forgetfulness"  Her daughter says she is confusing dates and times (she is retired) over the past year, very disoriented in the morning, confusion when she wakes up from a nap, anxiety during the evenings  She voluntarily stopped driving       At her initial visit with Dr Corby Mccann she scored a 14/30 on MoCA  At her last visit she scored a 16/30 on Kinney and she was started on a trial of Aricept  Trazodone dose was also increased from 50 mg to 75 mg before bed      INTERVAL HISTORY:  No side effects with the Aricept  She felt there was some benefit with the addition of Aricept  She is now part of the Donanemab trial in [place]  She just recently had some imaging through the trial (PET scan and MRI)  She resides with her  and 3 cats  She is able to perform all of her ADLs on her own  She dresses and showers on her own  She cleans and does the laundry  She helps to cook   She continues to struggle with her sleep  She is able to fall asleep however she can not stay asleep for long  She has tried Benadryl and Melatonin with no clear benefit   She does not feel that Trazodone 75mg is as helpful as in the past    No hallucinations  She eats however she does not feel hungry often  She likes to be outside  She has lost desire to do the puzzles and word searches  Workup:   - Labs revealed normal B12, slightly elevated TSH however repeat was normal    - MRI brain with NQ - Advanced white matter change within the brain parenchyma consistent with chronic microangiopathic change  No acute ischemia, mass or hemorrhage    Neuroquant revealed low hippocampal volume supporting a neurodegenerative etiology but may or may not be medial temporal lobe focused process  HOC 0 55  Consider 6-12 month follow-up examination as a superimposed mesial temporal lobe neurodegenerative process cannot be completely excluded  - normal magnesium, abnormally elevated thyroglobulin antibody  Historically normal vit B12, positive JOSE, neg anti DS DNA ab     - 6/24/20 CT head for AMS showing moderate microangiopathic change similar to 2018  No acute changes     Durable Power of : yes (her )  Living Will: yes  Family History: Number of First Degree Relatives With Parkinsonism/Dementia: none known    I personally reviewed and updated the ROS  Objective:    Blood pressure 136/74, pulse 79, weight 67 8 kg (149 lb 6 4 oz)  Physical Exam  Constitutional:       General: She is awake  Appearance: Normal appearance  HENT:      Right Ear: Hearing normal       Left Ear: Hearing normal    Eyes:      General: Lids are normal       Extraocular Movements: Extraocular movements intact  Pupils: Pupils are equal, round, and reactive to light  Pulmonary:      Effort: Pulmonary effort is normal    Neurological:      Mental Status: She is alert  Deep Tendon Reflexes: Strength normal          Neurological Exam  Mental Status  Awake and alert  Oriented to person, place, time and situation  Unable to copy figure  Clock drawing is abnormal  Able to name objects  Unable to perform serial calculations  MoCA 17/30 - 9/2/21    MoCA 16/30 - 2/25/21  MOCA 14/30 - 8/24/20  Cranial Nerves  CN III, IV, VI: Extraocular movements intact bilaterally  Normal lids and orbits bilaterally  Pupils equal round and reactive to light bilaterally  CN V:  Right: Facial sensation is normal   Left: Facial sensation is normal on the left  CN VIII:  Right: Hearing is normal   Left: Hearing is normal   CN XI: Shoulder shrug strength is normal     Motor   Strength is 5/5 throughout all four extremities      Sensory  Light touch is normal in upper and lower extremities  Coordination  Right: Finger-to-nose normal   Left: Finger-to-nose normal     Gait  Casual gait is normal including stance, stride, and arm swing  ROS:    Review of Systems   Constitutional: Negative  Negative for appetite change and fever  HENT: Negative  Negative for hearing loss, tinnitus, trouble swallowing and voice change  Eyes: Negative  Negative for photophobia and pain  Respiratory: Negative  Negative for shortness of breath  Cardiovascular: Negative  Negative for palpitations  Gastrointestinal: Negative  Negative for nausea and vomiting  Endocrine: Negative  Negative for cold intolerance  Genitourinary: Negative  Negative for dysuria, frequency, hematuria and urgency  Musculoskeletal: Negative for myalgias and neck pain  Skin: Negative  Negative for rash  Allergic/Immunologic: Negative  Neurological: Negative  Negative for dizziness, tremors, seizures, syncope, facial asymmetry, speech difficulty, weakness, light-headedness, numbness and headaches  Hematological: Negative  Does not bruise/bleed easily  Psychiatric/Behavioral: Positive for confusion (Once in a while) and sleep disturbance  Negative for hallucinations  Memory issues     All other systems reviewed and are negative

## 2021-09-02 NOTE — ASSESSMENT & PLAN NOTE
Patient with progressive cognitive deficits over the past 2-3 years which interfere with daily functions as well as MRI with NQ which revealed changes to suggest a neuro degenerative etiology  Symptoms are consistent with mild dementia  She is tolerating Aricept well without any side effects  She did feel there was some benefit when starting this medication  She is also now part of a local trial for Donanemab  She was overall stable on memory testing in the office today scoring a 17/30  At this point we will not make any changes however we did discuss the option of adding Namenda in the future if there is further progression  She does continue to have some issues with mood as well as sleep  She is currently on low dose of Lexapro as well as trazodone  I will have her discuss this further with her PCP to see if perhaps they could increase her Lexapro dose  I will also be having her follow-up with the geriatric center at this time  If she is doing well with them and then she can continue follow ups from there on out  Patient was encouraged to increase mind stimulating activities such as reading, crosswords, word searches, puzzles, Soduku, solitaire, coloring and other brain games  We also discussed the importance of staying physically active and eating a health diet such as the Mediterranean or MIND diet

## 2021-09-02 NOTE — PATIENT INSTRUCTIONS
Will remain on Aricept       Things that we know are helpful for thinking and memory   1  Exercise program: gradually increase your physical activity over time  Start small and be patient  Aerobic (cardio) activity is best but incorporate balance, strength and flexibility training as well    a  Try to get at least 30min 3 times per week   2  Diet: Mediterranean diet (colorful fruits and vegetables, olive oil, fish, whole grains, very little red meet is any), MIND diet, anti-inflammatory diet  a  Stay well hydrated: drink 6-8 glasses of water per day   b  What's good for your heart is good for your brain  c  Avoid high-salt foods   3  Sleep: aim for at least 7-8 hours per night   a  Avoid alcohol and medications like Benadryl (Tylenol PM) or other sedating drugs  4  Stress management/mindfulness practice:   a  Talk with our social workers about finding a cognitive behavioral therapists  b  Try a smart phone dorothy like European Batteries or Airspan for beginners   i  Try curable for pain    c  Take a course in Mindfulness Based Stress Reduction (MBSR)   i  Shantal meyer  Read or listen to an audiobook about it:  i  Mindfulness for beginners   ii  10% happier  iii  The happiness advantage   5  Social engagement:   a  Stay in touch with family and friends   b  Plan a few specific activities for your social health every week   c  Annika  a local support group   d  Volunteer! www [facility] org/volunteernow or call [phone]     MIND diet score:  1 point for each component      · Green leafy vegetables: at least 6 per week  · Other vegetable: at least 1 per day   · Berries: at least 2 per week  · Red meat: fewer than 4 per week   · Fish: at least 1 per week   · Poultry: at least 2 per week  · Beans: at least 3 per week  · Nuts: at least 5 per week  · Fast or fried food: less than 1 per week  · Olive oil   · Butter less: less than 1 table-spoon per day   · Cheese: less than 1 serving per week · Pastries/sweets: less than 5 servings per week  · Alcohol: no more than 1 serving/ day

## 2021-09-09 ENCOUNTER — TELEPHONE (OUTPATIENT)
Dept: GERIATRICS | Age: 81
End: 2021-09-09

## 2021-09-09 NOTE — LETTER
September 9, 2021      Vijay Landry  c/o [address] [place] [address] Drive, [address] [facility]    Dear Vijay Landry,    Thank you for choosing [facility]  We assist in the needs of older adults and their families  Our goal is to help aging adults in maintaining a healthy, safe and independent lifestyle and to work with their primary care physician to coordinate care  There are many reasons appointments are scheduled with us  Some patients are seeking a baseline assessment as part of medical history; other patients might have a specific concern regarding cognition, multiple medications, or overall health concerns  What to Expect  Two, possibly, three visits:     Initial Visit is approximately one hour  The patient and representative/family meet with the doctor, possibly a resident/fellow, and the   In addition to medical issues, patient goals, quality of life, home safety, and greatest areas of concern are addressed  If necessary, additional testing may be ordered such as bloodwork, imaging, and/or a computerized cognitive evaluation  A physical therapy/occupation therapy evaluation may also be ordered  Conference visit is approximately one hour  The patient and representative/family review an individualized care plan targeting patient's goals/concerns/issues and are provided with recommendations and resources  [facility]  is a teaching institution and there will be residents and fellows as a part of your visits with us       Please bring the following to your initial appointment:      A mask without a valve (to be worn by everyone entering the building)    Eye glasses and/or hearing aids (as applicable)    Enclosed forms (please complete and bring to appointment)    Advance directives (a living will and/or durable power of ) if established    Medication list (including vitamins or supplements you are currently taking)     DATE of INITIAL VISIT: Friday, December 17, 2021 at 10:00 AM    Please arrive at least 15 minutes before scheduled appointment time  When you park, please call our office at [phone] to let us know you have arrived  We will conduct check-in by telephone  One person may accompany you to your appointment  DATE of CARE CONFERENCE: Friday, January 14, 2021 at 10:00 AM    For Video Conference: [facility] office will call 30-45 minutes prior to appointment complete check in, review medications and allergies, as well as ask for vitals  If you can take the patient's blood pressure, pulse, temperature, and weight on the day of this appointment, it would be appreciated  For In-office Conference: One person may accompany the patient to appointment  Others are welcome to join by phone or video  Please let us know which you prefer so we can provide a conference phone number or a video link  PLEASE NOTE: Due to the extensive and comprehensive nature of the visit, if you are more than 15 minutes late we will need to reschedule the appointment  Thank you again for choosing [address] [place] [address]  We look forward to meeting you!

## 2021-09-09 NOTE — TELEPHONE ENCOUNTER
[place] [address]  [phone]    Telephone Intake: Geriatric Assessment     Referral source: oDttie Bullard PA-C (NEUROLOGY)                                   Caller who is scheduling/relationship to patient: Alana Gleason (daughter)  Caller's phone number: [phone]              Is there a Power of  in place/If so, who? Yes , Yasmany Fabian (spouse)    Reason for referral: Family member concerns and Provider concerns regarding memory concerns, behavior changes/concerns and mood concerns  What is the goal of visit? Spouse & daughter in need of care-giver support group; feeling a little burned out  Provide resources/help with how the spouse and daughter can best help her; also need support in ways to redirect the patient  Patient feeling a little isolated during her free time (not remembering that family is conversing with her or was just helping her with something)  Has the patient been seen by a Neurologist or Geriatrician? Yes   Has the patient ever been diagnosed with dementia? Yes (neuro)      Preferred language? English  Highest education level? HS Grad  Does the patient wear glasses? Yes   Does the patient use hearing aids? No     Does the patient and/or caregiver have access for a virtual visit (computer or smart phone, working audio and video)? Yes     Caller was informed:    Please make sure the patient is accompanied by someone who knows them well / a caregiver / family member to participate in this appointment  If a patient plans to attend the assessment alone, please route a message to the assigned provider   Primary number on file will be called to confirm this appointment  Who will accompany the patient to their assessment?  Rowena, Alana Gleason    Office packet mailed out to:   [address], [address] [facility]  NOTE FOR : If the patient was recently hospitalized, please route intake to assigned provider for chart review

## 2021-09-10 NOTE — TELEPHONE ENCOUNTER
LCSW left voicemail for Aida providing my name, role, and direct phone number for return call to discuss support groups and other resources as needed

## 2021-09-14 NOTE — TELEPHONE ENCOUNTER
Received return call from [facility] and called back  Provided direct line for return call and noted availability today

## 2021-09-15 ENCOUNTER — APPOINTMENT (OUTPATIENT)
Dept: LAB | Age: 81
End: 2021-09-15
Payer: COMMERCIAL

## 2021-09-15 DIAGNOSIS — E78.49 OTHER HYPERLIPIDEMIA: ICD-10-CM

## 2021-09-15 DIAGNOSIS — I10 ESSENTIAL HYPERTENSION: ICD-10-CM

## 2021-09-15 DIAGNOSIS — F32.A DEPRESSION, UNSPECIFIED DEPRESSION TYPE: ICD-10-CM

## 2021-09-15 DIAGNOSIS — M31.6 TEMPORAL ARTERITIS (HCC): ICD-10-CM

## 2021-09-15 LAB
ALBUMIN SERPL BCP-MCNC: 3.5 G/DL (ref 3.5–5)
ALP SERPL-CCNC: 38 U/L (ref 46–116)
ALT SERPL W P-5'-P-CCNC: 26 U/L (ref 12–78)
ANION GAP SERPL CALCULATED.3IONS-SCNC: 5 MMOL/L (ref 4–13)
AST SERPL W P-5'-P-CCNC: 18 U/L (ref 5–45)
BASOPHILS # BLD AUTO: 0.06 THOUSANDS/ΜL (ref 0–0.1)
BASOPHILS NFR BLD AUTO: 1 % (ref 0–1)
BILIRUB SERPL-MCNC: 0.59 MG/DL (ref 0.2–1)
BUN SERPL-MCNC: 12 MG/DL (ref 5–25)
CALCIUM SERPL-MCNC: 9.2 MG/DL (ref 8.3–10.1)
CHLORIDE SERPL-SCNC: 102 MMOL/L (ref 100–108)
CHOLEST SERPL-MCNC: 198 MG/DL (ref 50–200)
CO2 SERPL-SCNC: 29 MMOL/L (ref 21–32)
CREAT SERPL-MCNC: 1.12 MG/DL (ref 0.6–1.3)
CRP SERPL QL: <3 MG/L
EOSINOPHIL # BLD AUTO: 0.08 THOUSAND/ΜL (ref 0–0.61)
EOSINOPHIL NFR BLD AUTO: 1 % (ref 0–6)
ERYTHROCYTE [DISTWIDTH] IN BLOOD BY AUTOMATED COUNT: 13.1 % (ref 11.6–15.1)
ERYTHROCYTE [SEDIMENTATION RATE] IN BLOOD: 12 MM/HOUR (ref 0–29)
GFR SERPL CREATININE-BSD FRML MDRD: 46 ML/MIN/1.73SQ M
GLUCOSE P FAST SERPL-MCNC: 78 MG/DL (ref 65–99)
HCT VFR BLD AUTO: 37.5 % (ref 34.8–46.1)
HDLC SERPL-MCNC: 82 MG/DL
HGB BLD-MCNC: 12.2 G/DL (ref 11.5–15.4)
IMM GRANULOCYTES # BLD AUTO: 0.01 THOUSAND/UL (ref 0–0.2)
IMM GRANULOCYTES NFR BLD AUTO: 0 % (ref 0–2)
LDLC SERPL CALC-MCNC: 98 MG/DL (ref 0–100)
LYMPHOCYTES # BLD AUTO: 1.85 THOUSANDS/ΜL (ref 0.6–4.47)
LYMPHOCYTES NFR BLD AUTO: 32 % (ref 14–44)
MCH RBC QN AUTO: 32.2 PG (ref 26.8–34.3)
MCHC RBC AUTO-ENTMCNC: 32.5 G/DL (ref 31.4–37.4)
MCV RBC AUTO: 99 FL (ref 82–98)
MONOCYTES # BLD AUTO: 0.62 THOUSAND/ΜL (ref 0.17–1.22)
MONOCYTES NFR BLD AUTO: 11 % (ref 4–12)
NEUTROPHILS # BLD AUTO: 3.19 THOUSANDS/ΜL (ref 1.85–7.62)
NEUTS SEG NFR BLD AUTO: 55 % (ref 43–75)
NONHDLC SERPL-MCNC: 116 MG/DL
NRBC BLD AUTO-RTO: 0 /100 WBCS
PLATELET # BLD AUTO: 263 THOUSANDS/UL (ref 149–390)
PMV BLD AUTO: 11.4 FL (ref 8.9–12.7)
POTASSIUM SERPL-SCNC: 4 MMOL/L (ref 3.5–5.3)
PROT SERPL-MCNC: 6.8 G/DL (ref 6.4–8.2)
RBC # BLD AUTO: 3.79 MILLION/UL (ref 3.81–5.12)
SODIUM SERPL-SCNC: 136 MMOL/L (ref 136–145)
TRIGL SERPL-MCNC: 91 MG/DL
TSH SERPL DL<=0.05 MIU/L-ACNC: 3.2 UIU/ML (ref 0.36–3.74)
WBC # BLD AUTO: 5.81 THOUSAND/UL (ref 4.31–10.16)

## 2021-09-15 PROCEDURE — 80061 LIPID PANEL: CPT

## 2021-09-15 PROCEDURE — 80053 COMPREHEN METABOLIC PANEL: CPT

## 2021-09-15 PROCEDURE — 86140 C-REACTIVE PROTEIN: CPT

## 2021-09-15 PROCEDURE — 85652 RBC SED RATE AUTOMATED: CPT

## 2021-09-15 PROCEDURE — 84443 ASSAY THYROID STIM HORMONE: CPT

## 2021-09-15 PROCEDURE — 36415 COLL VENOUS BLD VENIPUNCTURE: CPT

## 2021-09-15 PROCEDURE — 85025 COMPLETE CBC W/AUTO DIFF WBC: CPT

## 2021-09-16 NOTE — TELEPHONE ENCOUNTER
MAGDALENAW and Guthrie Centerjun Xiong discussed on Tues 9/14 her concerns regarding her parents  She described difficult dynamics and noted that her mother can feel anxious and lonely  Her father has difficulty interacting with her in a way that calms Dangelo and G[place]rie Centerjun Xiong requests information about how to best communicate  We discussed availability of caregiver support groups, Savvy Caregiver Program offered at [facility], and literature to be sent out to Colette Xiong by mail  Colette Xiong expressed interest in [address] that she and her father would both be able to participate in  LCSW provided contact information for Beckie Forde, who facilitates this program (email: [email or web address])    Colette Xiong also reports that paperwork for geriatric assessment was accidentally mailed to her mother's address  LCSW apologized for this error  Colette Xoing reports Jose Almanza is now concerned about this appointment; LCSW did offer to outreach Dangelo to discuss the appointment further  Colette Xiong declines at this time, however, will call in the future if she feels that may help things  LCSW mailed out the following resources to Aida:   - Shania for a Person with Alzheimer's Disease, Your Easy-to-Use Guide from the [employer] on [address]  Caregiver Support Group flyer  - Alzheimer's Association RX pad with guide of helpful resources on their website as well as 24/7 helpline, 5-411-665-804-209-5840  - Alzheimer's [employer] Caregiver Tip Sheets: Communication, Anxiety, Depression & Sadness, Repeating, IDEA!  Strategy

## 2021-09-22 ENCOUNTER — OFFICE VISIT (OUTPATIENT)
Dept: FAMILY MEDICINE CLINIC | Facility: CLINIC | Age: 81
End: 2021-09-22
Payer: COMMERCIAL

## 2021-09-22 VITALS
HEIGHT: 65 IN | RESPIRATION RATE: 14 BRPM | DIASTOLIC BLOOD PRESSURE: 72 MMHG | SYSTOLIC BLOOD PRESSURE: 116 MMHG | BODY MASS INDEX: 24.49 KG/M2 | HEART RATE: 55 BPM | WEIGHT: 147 LBS | OXYGEN SATURATION: 96 % | TEMPERATURE: 97 F

## 2021-09-22 DIAGNOSIS — E78.49 OTHER HYPERLIPIDEMIA: ICD-10-CM

## 2021-09-22 DIAGNOSIS — I10 ESSENTIAL HYPERTENSION: ICD-10-CM

## 2021-09-22 DIAGNOSIS — M35.3 PMR (POLYMYALGIA RHEUMATICA) (HCC): ICD-10-CM

## 2021-09-22 DIAGNOSIS — Z23 NEED FOR INFLUENZA VACCINATION: Primary | ICD-10-CM

## 2021-09-22 PROBLEM — I16.0 HYPERTENSIVE URGENCY: Status: RESOLVED | Noted: 2020-07-14 | Resolved: 2021-09-22

## 2021-09-22 PROBLEM — E04.2 MULTIPLE THYROID NODULES: Status: RESOLVED | Noted: 2020-04-15 | Resolved: 2021-09-22

## 2021-09-22 PROCEDURE — 99214 OFFICE O/P EST MOD 30 MIN: CPT | Performed by: INTERNAL MEDICINE

## 2021-09-22 PROCEDURE — G0008 ADMIN INFLUENZA VIRUS VAC: HCPCS

## 2021-09-22 PROCEDURE — 1160F RVW MEDS BY RX/DR IN RCRD: CPT | Performed by: INTERNAL MEDICINE

## 2021-09-22 PROCEDURE — 90662 IIV NO PRSV INCREASED AG IM: CPT

## 2021-09-22 PROCEDURE — 1036F TOBACCO NON-USER: CPT | Performed by: INTERNAL MEDICINE

## 2021-09-22 PROCEDURE — 3074F SYST BP LT 130 MM HG: CPT | Performed by: INTERNAL MEDICINE

## 2021-09-22 PROCEDURE — 3078F DIAST BP <80 MM HG: CPT | Performed by: INTERNAL MEDICINE

## 2021-09-22 NOTE — PROGRESS NOTES
Assessment/Plan:         Diagnoses and all orders for this visit:    Need for influenza vaccination;  -     influenza vaccine, high-dose, PF 0 7 mL (FLUZONE HIGH-DOSE)    PMR (polymyalgia rheumatica) (HCC)  Stable  Continue Prednisone  RTC in 3mos    Essential hypertension; stable  Continue Edarby and Inderal La  RTC in 3mos    Other hyperlipidemia; stable on crestor        Subjective:      Patient ID: Kera Angela is a 79 y o  female  Mayer Gasper is here for Regular check up, she feels ok, recent Blood work and med list Reviewed,  The following portions of the patient's history were reviewed and updated as appropriate: allergies, current medications, past medical history, past social history, past surgical history and problem list     Review of Systems   Constitutional: Negative for chills, fatigue and fever  HENT: Negative for congestion, facial swelling, sore throat, trouble swallowing and voice change  Eyes: Negative for pain, discharge and visual disturbance  Respiratory: Negative for cough, shortness of breath and wheezing  Cardiovascular: Negative for chest pain, palpitations and leg swelling  Gastrointestinal: Negative for abdominal pain, blood in stool, constipation, diarrhea and nausea  Endocrine: Negative for polydipsia, polyphagia and polyuria  Genitourinary: Negative for difficulty urinating, hematuria and urgency  Musculoskeletal: Negative for arthralgias and myalgias  Skin: Negative for rash  Neurological: Negative for dizziness, tremors, weakness and headaches  Hematological: Negative for adenopathy  Does not bruise/bleed easily  Psychiatric/Behavioral: Negative for dysphoric mood, sleep disturbance and suicidal ideas           Objective:      /72 (BP Location: Left arm, Patient Position: Sitting, Cuff Size: Standard)   Pulse 55   Temp (!) 97 °F (36 1 °C) (Tympanic)   Resp 14   Ht 5' 5" (1 651 m)   Wt 66 7 kg (147 lb)   SpO2 96%   BMI 24 46 kg/m² Physical Exam  Constitutional:       General: She is not in acute distress  HENT:      Head: Normocephalic  Mouth/Throat:      Pharynx: No oropharyngeal exudate  Eyes:      General: No scleral icterus  Conjunctiva/sclera: Conjunctivae normal       Pupils: Pupils are equal, round, and reactive to light  Neck:      Thyroid: No thyromegaly  Cardiovascular:      Rate and Rhythm: Normal rate and regular rhythm  Heart sounds: Normal heart sounds  No murmur heard  Pulmonary:      Effort: Pulmonary effort is normal  No respiratory distress  Breath sounds: Normal breath sounds  No wheezing or rales  Abdominal:      General: Bowel sounds are normal  There is no distension  Palpations: Abdomen is soft  Tenderness: There is no abdominal tenderness  There is no guarding or rebound  Musculoskeletal:         General: No tenderness  Cervical back: Neck supple  Lymphadenopathy:      Cervical: No cervical adenopathy  Skin:     Coloration: Skin is not pale  Findings: No rash  Neurological:      Mental Status: She is alert and oriented to person, place, and time  Sensory: No sensory deficit  Motor: No weakness

## 2021-09-27 DIAGNOSIS — F03.91 DEMENTIA WITH BEHAVIORAL DISTURBANCE, UNSPECIFIED DEMENTIA TYPE (HCC): ICD-10-CM

## 2021-09-27 RX ORDER — TRAZODONE HYDROCHLORIDE 50 MG/1
TABLET ORAL
Qty: 60 TABLET | Refills: 3 | Status: SHIPPED | OUTPATIENT
Start: 2021-09-27 | End: 2021-09-29 | Stop reason: SDUPTHER

## 2021-09-29 DIAGNOSIS — R79.89 LOW VITAMIN D LEVEL: ICD-10-CM

## 2021-09-29 DIAGNOSIS — F03.91 DEMENTIA WITH BEHAVIORAL DISTURBANCE, UNSPECIFIED DEMENTIA TYPE (HCC): ICD-10-CM

## 2021-09-29 RX ORDER — ERGOCALCIFEROL 1.25 MG/1
50000 CAPSULE ORAL WEEKLY
Qty: 13 CAPSULE | Refills: 1 | Status: SHIPPED | OUTPATIENT
Start: 2021-09-29 | End: 2022-02-28 | Stop reason: SDUPTHER

## 2021-09-29 RX ORDER — TRAZODONE HYDROCHLORIDE 50 MG/1
TABLET ORAL
Qty: 60 TABLET | Refills: 3 | Status: SHIPPED | OUTPATIENT
Start: 2021-09-29 | End: 2021-10-27

## 2021-10-27 ENCOUNTER — OFFICE VISIT (OUTPATIENT)
Dept: FAMILY MEDICINE CLINIC | Facility: CLINIC | Age: 81
End: 2021-10-27
Payer: COMMERCIAL

## 2021-10-27 VITALS
TEMPERATURE: 96.7 F | WEIGHT: 143 LBS | SYSTOLIC BLOOD PRESSURE: 160 MMHG | HEART RATE: 69 BPM | BODY MASS INDEX: 23.82 KG/M2 | DIASTOLIC BLOOD PRESSURE: 88 MMHG | OXYGEN SATURATION: 96 % | HEIGHT: 65 IN | RESPIRATION RATE: 14 BRPM

## 2021-10-27 DIAGNOSIS — G47.00 INSOMNIA, UNSPECIFIED TYPE: ICD-10-CM

## 2021-10-27 DIAGNOSIS — E04.2 MULTIPLE THYROID NODULES: ICD-10-CM

## 2021-10-27 DIAGNOSIS — R00.2 PALPITATIONS: ICD-10-CM

## 2021-10-27 DIAGNOSIS — I10 ESSENTIAL HYPERTENSION: ICD-10-CM

## 2021-10-27 DIAGNOSIS — K29.01 ACUTE GASTRITIS WITH HEMORRHAGE, UNSPECIFIED GASTRITIS TYPE: Primary | ICD-10-CM

## 2021-10-27 DIAGNOSIS — K21.9 GASTROESOPHAGEAL REFLUX DISEASE WITHOUT ESOPHAGITIS: ICD-10-CM

## 2021-10-27 PROCEDURE — 93000 ELECTROCARDIOGRAM COMPLETE: CPT | Performed by: INTERNAL MEDICINE

## 2021-10-27 PROCEDURE — 3079F DIAST BP 80-89 MM HG: CPT | Performed by: INTERNAL MEDICINE

## 2021-10-27 PROCEDURE — 3077F SYST BP >= 140 MM HG: CPT | Performed by: INTERNAL MEDICINE

## 2021-10-27 PROCEDURE — 1160F RVW MEDS BY RX/DR IN RCRD: CPT | Performed by: INTERNAL MEDICINE

## 2021-10-27 PROCEDURE — 1036F TOBACCO NON-USER: CPT | Performed by: INTERNAL MEDICINE

## 2021-10-27 PROCEDURE — 99214 OFFICE O/P EST MOD 30 MIN: CPT | Performed by: INTERNAL MEDICINE

## 2021-10-27 RX ORDER — PROPRANOLOL HCL 60 MG
60 CAPSULE, EXTENDED RELEASE 24HR ORAL DAILY
Qty: 90 CAPSULE | Refills: 3 | Status: SHIPPED | OUTPATIENT
Start: 2021-10-27 | End: 2022-03-14 | Stop reason: SDUPTHER

## 2021-10-27 RX ORDER — PANTOPRAZOLE SODIUM 40 MG/1
40 TABLET, DELAYED RELEASE ORAL 2 TIMES DAILY
Qty: 180 TABLET | Refills: 3 | Status: SHIPPED | OUTPATIENT
Start: 2021-10-27 | End: 2021-11-15 | Stop reason: SDUPTHER

## 2021-10-27 RX ORDER — QUETIAPINE FUMARATE 25 MG/1
25 TABLET, FILM COATED ORAL
Qty: 30 TABLET | Refills: 3 | Status: SHIPPED | OUTPATIENT
Start: 2021-10-27 | End: 2022-02-14 | Stop reason: SDUPTHER

## 2021-10-28 ENCOUNTER — APPOINTMENT (OUTPATIENT)
Dept: LAB | Age: 81
End: 2021-10-28
Payer: COMMERCIAL

## 2021-10-28 DIAGNOSIS — E04.2 MULTIPLE THYROID NODULES: ICD-10-CM

## 2021-10-28 DIAGNOSIS — R00.2 PALPITATIONS: ICD-10-CM

## 2021-10-28 LAB
25(OH)D3 SERPL-MCNC: 88.9 NG/ML (ref 30–100)
BASOPHILS # BLD AUTO: 0.05 THOUSANDS/ΜL (ref 0–0.1)
BASOPHILS NFR BLD AUTO: 1 % (ref 0–1)
EOSINOPHIL # BLD AUTO: 0.1 THOUSAND/ΜL (ref 0–0.61)
EOSINOPHIL NFR BLD AUTO: 2 % (ref 0–6)
ERYTHROCYTE [DISTWIDTH] IN BLOOD BY AUTOMATED COUNT: 12.8 % (ref 11.6–15.1)
FERRITIN SERPL-MCNC: 128 NG/ML (ref 8–388)
HCT VFR BLD AUTO: 36.9 % (ref 34.8–46.1)
HGB BLD-MCNC: 12.3 G/DL (ref 11.5–15.4)
IMM GRANULOCYTES # BLD AUTO: 0.02 THOUSAND/UL (ref 0–0.2)
IMM GRANULOCYTES NFR BLD AUTO: 0 % (ref 0–2)
LYMPHOCYTES # BLD AUTO: 1.91 THOUSANDS/ΜL (ref 0.6–4.47)
LYMPHOCYTES NFR BLD AUTO: 37 % (ref 14–44)
MCH RBC QN AUTO: 31.5 PG (ref 26.8–34.3)
MCHC RBC AUTO-ENTMCNC: 33.3 G/DL (ref 31.4–37.4)
MCV RBC AUTO: 95 FL (ref 82–98)
MONOCYTES # BLD AUTO: 0.55 THOUSAND/ΜL (ref 0.17–1.22)
MONOCYTES NFR BLD AUTO: 11 % (ref 4–12)
NEUTROPHILS # BLD AUTO: 2.55 THOUSANDS/ΜL (ref 1.85–7.62)
NEUTS SEG NFR BLD AUTO: 49 % (ref 43–75)
NRBC BLD AUTO-RTO: 0 /100 WBCS
PLATELET # BLD AUTO: 269 THOUSANDS/UL (ref 149–390)
PMV BLD AUTO: 11.7 FL (ref 8.9–12.7)
RBC # BLD AUTO: 3.9 MILLION/UL (ref 3.81–5.12)
T4 FREE SERPL-MCNC: 1.06 NG/DL (ref 0.76–1.46)
TSH SERPL DL<=0.05 MIU/L-ACNC: 2.96 UIU/ML (ref 0.36–3.74)
VIT B12 SERPL-MCNC: 808 PG/ML (ref 100–900)
WBC # BLD AUTO: 5.18 THOUSAND/UL (ref 4.31–10.16)

## 2021-10-28 PROCEDURE — 86800 THYROGLOBULIN ANTIBODY: CPT

## 2021-10-28 PROCEDURE — 82306 VITAMIN D 25 HYDROXY: CPT

## 2021-10-28 PROCEDURE — 84439 ASSAY OF FREE THYROXINE: CPT

## 2021-10-28 PROCEDURE — 84443 ASSAY THYROID STIM HORMONE: CPT

## 2021-10-28 PROCEDURE — 82728 ASSAY OF FERRITIN: CPT

## 2021-10-28 PROCEDURE — 85025 COMPLETE CBC W/AUTO DIFF WBC: CPT

## 2021-10-28 PROCEDURE — 36415 COLL VENOUS BLD VENIPUNCTURE: CPT

## 2021-10-28 PROCEDURE — 86376 MICROSOMAL ANTIBODY EACH: CPT

## 2021-10-28 PROCEDURE — 82607 VITAMIN B-12: CPT

## 2021-10-29 LAB
THYROGLOB AB SERPL-ACNC: 53.6 IU/ML (ref 0–0.9)
THYROPEROXIDASE AB SERPL-ACNC: <8 IU/ML (ref 0–34)

## 2021-11-02 ENCOUNTER — APPOINTMENT (OUTPATIENT)
Dept: LAB | Age: 81
End: 2021-11-02
Payer: COMMERCIAL

## 2021-11-02 DIAGNOSIS — K29.01 ACUTE GASTRITIS WITH HEMORRHAGE, UNSPECIFIED GASTRITIS TYPE: ICD-10-CM

## 2021-11-02 LAB
DATE SPECIMEN #1: NORMAL
HEMOCCULT SP1 STL QL: NEGATIVE
HEMOCCULT SP2 STL QL: NORMAL
HEMOCCULT SP3 STL QL: NORMAL

## 2021-11-02 PROCEDURE — 82270 OCCULT BLOOD FECES: CPT

## 2021-11-04 ENCOUNTER — TELEPHONE (OUTPATIENT)
Dept: FAMILY MEDICINE CLINIC | Facility: CLINIC | Age: 81
End: 2021-11-04

## 2021-11-04 DIAGNOSIS — R19.7 DIARRHEA, UNSPECIFIED TYPE: Primary | ICD-10-CM

## 2021-11-05 ENCOUNTER — APPOINTMENT (OUTPATIENT)
Dept: LAB | Facility: HOSPITAL | Age: 81
End: 2021-11-05
Payer: COMMERCIAL

## 2021-11-05 DIAGNOSIS — R19.7 DIARRHEA, UNSPECIFIED TYPE: ICD-10-CM

## 2021-11-05 LAB
ALBUMIN SERPL BCP-MCNC: 3.6 G/DL (ref 3.5–5)
ALP SERPL-CCNC: 44 U/L (ref 46–116)
ALT SERPL W P-5'-P-CCNC: 21 U/L (ref 12–78)
ANION GAP SERPL CALCULATED.3IONS-SCNC: 6 MMOL/L (ref 4–13)
AST SERPL W P-5'-P-CCNC: 18 U/L (ref 5–45)
BASOPHILS # BLD AUTO: 0.07 THOUSANDS/ΜL (ref 0–0.1)
BASOPHILS NFR BLD AUTO: 1 % (ref 0–1)
BILIRUB SERPL-MCNC: 0.75 MG/DL (ref 0.2–1)
BUN SERPL-MCNC: 11 MG/DL (ref 5–25)
CALCIUM SERPL-MCNC: 9.5 MG/DL (ref 8.3–10.1)
CHLORIDE SERPL-SCNC: 102 MMOL/L (ref 100–108)
CO2 SERPL-SCNC: 30 MMOL/L (ref 21–32)
CREAT SERPL-MCNC: 1.16 MG/DL (ref 0.6–1.3)
CRP SERPL QL: <3 MG/L
EOSINOPHIL # BLD AUTO: 0.1 THOUSAND/ΜL (ref 0–0.61)
EOSINOPHIL NFR BLD AUTO: 2 % (ref 0–6)
ERYTHROCYTE [DISTWIDTH] IN BLOOD BY AUTOMATED COUNT: 12.5 % (ref 11.6–15.1)
ERYTHROCYTE [SEDIMENTATION RATE] IN BLOOD: 9 MM/HOUR (ref 0–29)
GFR SERPL CREATININE-BSD FRML MDRD: 44 ML/MIN/1.73SQ M
GLUCOSE SERPL-MCNC: 93 MG/DL (ref 65–140)
HCT VFR BLD AUTO: 37.3 % (ref 34.8–46.1)
HGB BLD-MCNC: 12.3 G/DL (ref 11.5–15.4)
IGA SERPL-MCNC: 173 MG/DL (ref 70–400)
IMM GRANULOCYTES # BLD AUTO: 0.02 THOUSAND/UL (ref 0–0.2)
IMM GRANULOCYTES NFR BLD AUTO: 0 % (ref 0–2)
LIPASE SERPL-CCNC: 67 U/L (ref 73–393)
LYMPHOCYTES # BLD AUTO: 1.34 THOUSANDS/ΜL (ref 0.6–4.47)
LYMPHOCYTES NFR BLD AUTO: 25 % (ref 14–44)
MCH RBC QN AUTO: 30.9 PG (ref 26.8–34.3)
MCHC RBC AUTO-ENTMCNC: 33 G/DL (ref 31.4–37.4)
MCV RBC AUTO: 94 FL (ref 82–98)
MONOCYTES # BLD AUTO: 0.46 THOUSAND/ΜL (ref 0.17–1.22)
MONOCYTES NFR BLD AUTO: 8 % (ref 4–12)
NEUTROPHILS # BLD AUTO: 3.47 THOUSANDS/ΜL (ref 1.85–7.62)
NEUTS SEG NFR BLD AUTO: 64 % (ref 43–75)
NRBC BLD AUTO-RTO: 0 /100 WBCS
PLATELET # BLD AUTO: 272 THOUSANDS/UL (ref 149–390)
PMV BLD AUTO: 10.9 FL (ref 8.9–12.7)
POTASSIUM SERPL-SCNC: 4 MMOL/L (ref 3.5–5.3)
PROT SERPL-MCNC: 7.3 G/DL (ref 6.4–8.2)
RBC # BLD AUTO: 3.98 MILLION/UL (ref 3.81–5.12)
SODIUM SERPL-SCNC: 138 MMOL/L (ref 136–145)
WBC # BLD AUTO: 5.46 THOUSAND/UL (ref 4.31–10.16)

## 2021-11-05 PROCEDURE — 82784 ASSAY IGA/IGD/IGG/IGM EACH: CPT

## 2021-11-05 PROCEDURE — 80053 COMPREHEN METABOLIC PANEL: CPT

## 2021-11-05 PROCEDURE — 36415 COLL VENOUS BLD VENIPUNCTURE: CPT

## 2021-11-05 PROCEDURE — 85025 COMPLETE CBC W/AUTO DIFF WBC: CPT

## 2021-11-05 PROCEDURE — 83516 IMMUNOASSAY NONANTIBODY: CPT

## 2021-11-05 PROCEDURE — 85652 RBC SED RATE AUTOMATED: CPT

## 2021-11-05 PROCEDURE — 82785 ASSAY OF IGE: CPT

## 2021-11-05 PROCEDURE — 86255 FLUORESCENT ANTIBODY SCREEN: CPT

## 2021-11-05 PROCEDURE — 83690 ASSAY OF LIPASE: CPT

## 2021-11-05 PROCEDURE — 86140 C-REACTIVE PROTEIN: CPT

## 2021-11-05 PROCEDURE — 86003 ALLG SPEC IGE CRUDE XTRC EA: CPT

## 2021-11-06 LAB
ENDOMYSIUM IGA SER QL: NEGATIVE
GLIADIN PEPTIDE IGA SER-ACNC: 3 UNITS (ref 0–19)
GLIADIN PEPTIDE IGG SER-ACNC: 2 UNITS (ref 0–19)
IGA SERPL-MCNC: 164 MG/DL (ref 64–422)
TTG IGA SER-ACNC: <2 U/ML (ref 0–3)
TTG IGG SER-ACNC: <2 U/ML (ref 0–5)

## 2021-11-07 ENCOUNTER — HOSPITAL ENCOUNTER (OUTPATIENT)
Dept: ULTRASOUND IMAGING | Facility: HOSPITAL | Age: 81
Discharge: HOME | End: 2021-11-07
Payer: COMMERCIAL

## 2021-11-07 DIAGNOSIS — E04.2 MULTIPLE THYROID NODULES: ICD-10-CM

## 2021-11-07 DIAGNOSIS — R00.2 PALPITATIONS: ICD-10-CM

## 2021-11-07 PROCEDURE — 76536 US EXAM OF HEAD AND NECK: CPT

## 2021-11-08 LAB
ALLERGEN COMMENT: NORMAL
GLUTEN IGE QN: <0.1 KUA/I
TOTAL IGE SMQN RAST: 29.8 KU/L (ref 0–113)

## 2021-11-11 ENCOUNTER — CONSULT (OUTPATIENT)
Dept: GASTROENTEROLOGY | Facility: CLINIC | Age: 81
End: 2021-11-11
Payer: COMMERCIAL

## 2021-11-11 ENCOUNTER — TELEPHONE (OUTPATIENT)
Dept: FAMILY MEDICINE CLINIC | Facility: CLINIC | Age: 81
End: 2021-11-11

## 2021-11-11 VITALS
WEIGHT: 141.8 LBS | HEIGHT: 65 IN | DIASTOLIC BLOOD PRESSURE: 85 MMHG | TEMPERATURE: 97.3 F | BODY MASS INDEX: 23.63 KG/M2 | SYSTOLIC BLOOD PRESSURE: 120 MMHG | HEART RATE: 98 BPM

## 2021-11-11 DIAGNOSIS — R19.7 DIARRHEA, UNSPECIFIED TYPE: Primary | ICD-10-CM

## 2021-11-11 DIAGNOSIS — R10.9 ABDOMINAL PAIN, UNSPECIFIED ABDOMINAL LOCATION: ICD-10-CM

## 2021-11-11 PROCEDURE — 3074F SYST BP LT 130 MM HG: CPT | Performed by: INTERNAL MEDICINE

## 2021-11-11 PROCEDURE — 99204 OFFICE O/P NEW MOD 45 MIN: CPT | Performed by: INTERNAL MEDICINE

## 2021-11-11 PROCEDURE — 3079F DIAST BP 80-89 MM HG: CPT | Performed by: INTERNAL MEDICINE

## 2021-11-12 DIAGNOSIS — E04.2 MULTIPLE THYROID NODULES: Primary | ICD-10-CM

## 2021-11-15 ENCOUNTER — APPOINTMENT (OUTPATIENT)
Dept: RADIOLOGY | Age: 81
End: 2021-11-15
Payer: COMMERCIAL

## 2021-11-15 DIAGNOSIS — K21.9 GASTROESOPHAGEAL REFLUX DISEASE WITHOUT ESOPHAGITIS: ICD-10-CM

## 2021-11-15 DIAGNOSIS — R19.7 DIARRHEA, UNSPECIFIED TYPE: ICD-10-CM

## 2021-11-15 PROCEDURE — 74022 RADEX COMPL AQT ABD SERIES: CPT

## 2021-11-15 RX ORDER — PANTOPRAZOLE SODIUM 40 MG/1
40 TABLET, DELAYED RELEASE ORAL 2 TIMES DAILY
Qty: 180 TABLET | Refills: 3 | Status: SHIPPED | OUTPATIENT
Start: 2021-11-15 | End: 2021-11-18

## 2021-11-16 ENCOUNTER — APPOINTMENT (OUTPATIENT)
Dept: LAB | Age: 81
End: 2021-11-16
Payer: COMMERCIAL

## 2021-11-16 DIAGNOSIS — R19.7 DIARRHEA, UNSPECIFIED TYPE: ICD-10-CM

## 2021-11-16 LAB
C DIFF TOX GENS STL QL NAA+PROBE: NEGATIVE
CAMPYLOBACTER DNA SPEC NAA+PROBE: NORMAL
SALMONELLA DNA SPEC QL NAA+PROBE: NORMAL
SHIGA TOXIN STX GENE SPEC NAA+PROBE: NORMAL
SHIGELLA DNA SPEC QL NAA+PROBE: NORMAL

## 2021-11-16 PROCEDURE — 87505 NFCT AGENT DETECTION GI: CPT

## 2021-11-16 PROCEDURE — 83993 ASSAY FOR CALPROTECTIN FECAL: CPT

## 2021-11-17 ENCOUNTER — VBI (OUTPATIENT)
Dept: ADMINISTRATIVE | Facility: OTHER | Age: 81
End: 2021-11-17

## 2021-11-18 ENCOUNTER — TELEPHONE (OUTPATIENT)
Dept: FAMILY MEDICINE CLINIC | Facility: CLINIC | Age: 81
End: 2021-11-18

## 2021-11-18 ENCOUNTER — OFFICE VISIT (OUTPATIENT)
Dept: FAMILY MEDICINE CLINIC | Facility: CLINIC | Age: 81
End: 2021-11-18
Payer: COMMERCIAL

## 2021-11-18 VITALS
TEMPERATURE: 97.7 F | OXYGEN SATURATION: 98 % | BODY MASS INDEX: 23.26 KG/M2 | RESPIRATION RATE: 16 BRPM | HEIGHT: 65 IN | WEIGHT: 139.6 LBS | DIASTOLIC BLOOD PRESSURE: 78 MMHG | HEART RATE: 68 BPM | SYSTOLIC BLOOD PRESSURE: 138 MMHG

## 2021-11-18 DIAGNOSIS — R21 RASH: Primary | ICD-10-CM

## 2021-11-18 DIAGNOSIS — K21.9 GASTROESOPHAGEAL REFLUX DISEASE WITHOUT ESOPHAGITIS: ICD-10-CM

## 2021-11-18 DIAGNOSIS — K59.1 FUNCTIONAL DIARRHEA: ICD-10-CM

## 2021-11-18 DIAGNOSIS — E04.2 MULTIPLE THYROID NODULES: Primary | ICD-10-CM

## 2021-11-18 DIAGNOSIS — I10 ESSENTIAL HYPERTENSION: ICD-10-CM

## 2021-11-18 PROCEDURE — 1036F TOBACCO NON-USER: CPT | Performed by: INTERNAL MEDICINE

## 2021-11-18 PROCEDURE — 99214 OFFICE O/P EST MOD 30 MIN: CPT | Performed by: INTERNAL MEDICINE

## 2021-11-18 PROCEDURE — 1160F RVW MEDS BY RX/DR IN RCRD: CPT | Performed by: INTERNAL MEDICINE

## 2021-11-18 RX ORDER — PANTOPRAZOLE SODIUM 40 MG/1
40 TABLET, DELAYED RELEASE ORAL DAILY
Qty: 90 TABLET | Refills: 3 | Status: SHIPPED | OUTPATIENT
Start: 2021-11-18 | End: 2022-03-14 | Stop reason: SDUPTHER

## 2021-11-18 RX ORDER — CLOTRIMAZOLE AND BETAMETHASONE DIPROPIONATE 10; .64 MG/G; MG/G
CREAM TOPICAL 2 TIMES DAILY
Qty: 45 G | Refills: 1 | Status: SHIPPED | OUTPATIENT
Start: 2021-11-18

## 2021-11-20 LAB — CALPROTECTIN STL-MCNT: 56 UG/G (ref 0–120)

## 2021-11-26 ENCOUNTER — TELEPHONE (OUTPATIENT)
Dept: FAMILY MEDICINE CLINIC | Facility: CLINIC | Age: 81
End: 2021-11-26

## 2021-11-26 DIAGNOSIS — R21 RASH: Primary | ICD-10-CM

## 2021-12-03 ENCOUNTER — HOSPITAL ENCOUNTER (OUTPATIENT)
Dept: ULTRASOUND IMAGING | Facility: HOSPITAL | Age: 81
Discharge: HOME | End: 2021-12-03

## 2021-12-09 DIAGNOSIS — F32.A DEPRESSION, UNSPECIFIED DEPRESSION TYPE: ICD-10-CM

## 2021-12-09 RX ORDER — ESCITALOPRAM OXALATE 10 MG/1
TABLET ORAL
Qty: 30 TABLET | Refills: 2 | Status: SHIPPED | OUTPATIENT
Start: 2021-12-09 | End: 2021-12-13 | Stop reason: SDUPTHER

## 2021-12-13 DIAGNOSIS — F32.A DEPRESSION, UNSPECIFIED DEPRESSION TYPE: ICD-10-CM

## 2021-12-13 RX ORDER — ESCITALOPRAM OXALATE 10 MG/1
10 TABLET ORAL DAILY
Qty: 90 TABLET | Refills: 2 | Status: SHIPPED | OUTPATIENT
Start: 2021-12-13 | End: 2021-12-17

## 2021-12-15 ENCOUNTER — APPOINTMENT (OUTPATIENT)
Dept: LAB | Age: 81
End: 2021-12-15
Payer: COMMERCIAL

## 2021-12-15 DIAGNOSIS — I10 ESSENTIAL HYPERTENSION: ICD-10-CM

## 2021-12-15 DIAGNOSIS — E04.2 MULTIPLE THYROID NODULES: ICD-10-CM

## 2021-12-15 DIAGNOSIS — K21.9 GASTROESOPHAGEAL REFLUX DISEASE WITHOUT ESOPHAGITIS: ICD-10-CM

## 2021-12-15 LAB
ALBUMIN SERPL BCP-MCNC: 3.3 G/DL (ref 3.5–5)
ALP SERPL-CCNC: 56 U/L (ref 46–116)
ALT SERPL W P-5'-P-CCNC: 24 U/L (ref 12–78)
ANION GAP SERPL CALCULATED.3IONS-SCNC: 3 MMOL/L (ref 4–13)
AST SERPL W P-5'-P-CCNC: 16 U/L (ref 5–45)
BASOPHILS # BLD AUTO: 0.08 THOUSANDS/ΜL (ref 0–0.1)
BASOPHILS NFR BLD AUTO: 1 % (ref 0–1)
BILIRUB SERPL-MCNC: 0.59 MG/DL (ref 0.2–1)
BUN SERPL-MCNC: 13 MG/DL (ref 5–25)
CALCIUM ALBUM COR SERPL-MCNC: 9.8 MG/DL (ref 8.3–10.1)
CALCIUM SERPL-MCNC: 9.2 MG/DL (ref 8.3–10.1)
CHLORIDE SERPL-SCNC: 104 MMOL/L (ref 100–108)
CO2 SERPL-SCNC: 31 MMOL/L (ref 21–32)
CREAT SERPL-MCNC: 1.15 MG/DL (ref 0.6–1.3)
EOSINOPHIL # BLD AUTO: 0.11 THOUSAND/ΜL (ref 0–0.61)
EOSINOPHIL NFR BLD AUTO: 2 % (ref 0–6)
ERYTHROCYTE [DISTWIDTH] IN BLOOD BY AUTOMATED COUNT: 13.1 % (ref 11.6–15.1)
FERRITIN SERPL-MCNC: 170 NG/ML (ref 8–388)
GFR SERPL CREATININE-BSD FRML MDRD: 44 ML/MIN/1.73SQ M
GLUCOSE SERPL-MCNC: 79 MG/DL (ref 65–140)
HCT VFR BLD AUTO: 35.7 % (ref 34.8–46.1)
HGB BLD-MCNC: 11.6 G/DL (ref 11.5–15.4)
IMM GRANULOCYTES # BLD AUTO: 0.02 THOUSAND/UL (ref 0–0.2)
IMM GRANULOCYTES NFR BLD AUTO: 0 % (ref 0–2)
LYMPHOCYTES # BLD AUTO: 1.31 THOUSANDS/ΜL (ref 0.6–4.47)
LYMPHOCYTES NFR BLD AUTO: 24 % (ref 14–44)
MCH RBC QN AUTO: 30.9 PG (ref 26.8–34.3)
MCHC RBC AUTO-ENTMCNC: 32.5 G/DL (ref 31.4–37.4)
MCV RBC AUTO: 95 FL (ref 82–98)
MONOCYTES # BLD AUTO: 0.54 THOUSAND/ΜL (ref 0.17–1.22)
MONOCYTES NFR BLD AUTO: 10 % (ref 4–12)
NEUTROPHILS # BLD AUTO: 3.5 THOUSANDS/ΜL (ref 1.85–7.62)
NEUTS SEG NFR BLD AUTO: 63 % (ref 43–75)
NRBC BLD AUTO-RTO: 0 /100 WBCS
PLATELET # BLD AUTO: 313 THOUSANDS/UL (ref 149–390)
PMV BLD AUTO: 11.5 FL (ref 8.9–12.7)
POTASSIUM SERPL-SCNC: 3.8 MMOL/L (ref 3.5–5.3)
PROT SERPL-MCNC: 7.2 G/DL (ref 6.4–8.2)
RBC # BLD AUTO: 3.75 MILLION/UL (ref 3.81–5.12)
SODIUM SERPL-SCNC: 138 MMOL/L (ref 136–145)
T4 FREE SERPL-MCNC: 1 NG/DL (ref 0.76–1.46)
TSH SERPL DL<=0.05 MIU/L-ACNC: 2.1 UIU/ML (ref 0.36–3.74)
WBC # BLD AUTO: 5.56 THOUSAND/UL (ref 4.31–10.16)

## 2021-12-15 PROCEDURE — 84439 ASSAY OF FREE THYROXINE: CPT

## 2021-12-15 PROCEDURE — 84443 ASSAY THYROID STIM HORMONE: CPT

## 2021-12-15 PROCEDURE — 85025 COMPLETE CBC W/AUTO DIFF WBC: CPT

## 2021-12-15 PROCEDURE — 36415 COLL VENOUS BLD VENIPUNCTURE: CPT

## 2021-12-15 PROCEDURE — 82728 ASSAY OF FERRITIN: CPT

## 2021-12-15 PROCEDURE — 80053 COMPREHEN METABOLIC PANEL: CPT

## 2021-12-16 DIAGNOSIS — F32.A DEPRESSION, UNSPECIFIED DEPRESSION TYPE: ICD-10-CM

## 2021-12-17 ENCOUNTER — CONSULT (OUTPATIENT)
Dept: GERIATRICS | Age: 81
End: 2021-12-17
Payer: COMMERCIAL

## 2021-12-17 VITALS
WEIGHT: 139.6 LBS | HEART RATE: 54 BPM | RESPIRATION RATE: 16 BRPM | TEMPERATURE: 97.5 F | SYSTOLIC BLOOD PRESSURE: 172 MMHG | HEIGHT: 66 IN | OXYGEN SATURATION: 98 % | BODY MASS INDEX: 22.43 KG/M2 | DIASTOLIC BLOOD PRESSURE: 82 MMHG

## 2021-12-17 DIAGNOSIS — F41.9 ANXIETY DISORDER, UNSPECIFIED TYPE: ICD-10-CM

## 2021-12-17 DIAGNOSIS — F03.91 DEMENTIA WITH BEHAVIORAL DISTURBANCE, UNSPECIFIED DEMENTIA TYPE (HCC): Primary | ICD-10-CM

## 2021-12-17 DIAGNOSIS — R00.1 BRADYCARDIA: ICD-10-CM

## 2021-12-17 DIAGNOSIS — I10 ESSENTIAL HYPERTENSION: ICD-10-CM

## 2021-12-17 DIAGNOSIS — E78.49 OTHER HYPERLIPIDEMIA: ICD-10-CM

## 2021-12-17 DIAGNOSIS — Z79.899 ENCOUNTER FOR MEDICATION REVIEW: ICD-10-CM

## 2021-12-17 PROCEDURE — 99205 OFFICE O/P NEW HI 60 MIN: CPT | Performed by: STUDENT IN AN ORGANIZED HEALTH CARE EDUCATION/TRAINING PROGRAM

## 2021-12-17 RX ORDER — MEMANTINE HYDROCHLORIDE 5 MG-10 MG
KIT ORAL SEE ADMIN INSTRUCTIONS
Qty: 30 TABLET | Refills: 0 | Status: SHIPPED | OUTPATIENT
Start: 2021-12-17 | End: 2022-01-05 | Stop reason: DRUGHIGH

## 2021-12-17 RX ORDER — ESCITALOPRAM OXALATE 10 MG/1
TABLET ORAL
Qty: 30 TABLET | Refills: 1 | Status: SHIPPED | OUTPATIENT
Start: 2021-12-17 | End: 2022-01-24 | Stop reason: SDUPTHER

## 2021-12-19 PROBLEM — Z79.899 ENCOUNTER FOR MEDICATION REVIEW: Status: ACTIVE | Noted: 2021-12-19

## 2021-12-19 PROBLEM — R00.1 BRADYCARDIA: Status: ACTIVE | Noted: 2021-12-19

## 2021-12-21 ENCOUNTER — OFFICE VISIT (OUTPATIENT)
Dept: GASTROENTEROLOGY | Facility: CLINIC | Age: 81
End: 2021-12-21
Payer: COMMERCIAL

## 2021-12-21 VITALS
DIASTOLIC BLOOD PRESSURE: 80 MMHG | WEIGHT: 140.2 LBS | HEIGHT: 67 IN | TEMPERATURE: 97.7 F | BODY MASS INDEX: 22 KG/M2 | SYSTOLIC BLOOD PRESSURE: 120 MMHG

## 2021-12-21 DIAGNOSIS — R19.4 CHANGE IN BOWEL HABITS: Primary | ICD-10-CM

## 2021-12-21 DIAGNOSIS — R10.9 ABDOMINAL PAIN, UNSPECIFIED ABDOMINAL LOCATION: ICD-10-CM

## 2021-12-21 PROCEDURE — 3074F SYST BP LT 130 MM HG: CPT | Performed by: INTERNAL MEDICINE

## 2021-12-21 PROCEDURE — 99214 OFFICE O/P EST MOD 30 MIN: CPT | Performed by: INTERNAL MEDICINE

## 2021-12-21 PROCEDURE — 3079F DIAST BP 80-89 MM HG: CPT | Performed by: INTERNAL MEDICINE

## 2021-12-22 ENCOUNTER — OFFICE VISIT (OUTPATIENT)
Dept: FAMILY MEDICINE CLINIC | Facility: CLINIC | Age: 81
End: 2021-12-22
Payer: COMMERCIAL

## 2021-12-22 VITALS
SYSTOLIC BLOOD PRESSURE: 122 MMHG | TEMPERATURE: 97.9 F | HEIGHT: 67 IN | WEIGHT: 141 LBS | BODY MASS INDEX: 22.13 KG/M2 | RESPIRATION RATE: 14 BRPM | DIASTOLIC BLOOD PRESSURE: 80 MMHG | OXYGEN SATURATION: 96 % | HEART RATE: 56 BPM

## 2021-12-22 DIAGNOSIS — M35.3 PMR (POLYMYALGIA RHEUMATICA) (HCC): Primary | ICD-10-CM

## 2021-12-22 DIAGNOSIS — F41.9 ANXIETY DISORDER, UNSPECIFIED TYPE: ICD-10-CM

## 2021-12-22 DIAGNOSIS — I10 ESSENTIAL HYPERTENSION: ICD-10-CM

## 2021-12-22 DIAGNOSIS — F03.91 DEMENTIA WITH BEHAVIORAL DISTURBANCE, UNSPECIFIED DEMENTIA TYPE (HCC): ICD-10-CM

## 2021-12-22 PROCEDURE — 1036F TOBACCO NON-USER: CPT | Performed by: INTERNAL MEDICINE

## 2021-12-22 PROCEDURE — 99214 OFFICE O/P EST MOD 30 MIN: CPT | Performed by: INTERNAL MEDICINE

## 2021-12-22 PROCEDURE — 1160F RVW MEDS BY RX/DR IN RCRD: CPT | Performed by: INTERNAL MEDICINE

## 2021-12-30 ENCOUNTER — TELEPHONE (OUTPATIENT)
Dept: GERIATRICS | Age: 81
End: 2021-12-30

## 2022-01-03 DIAGNOSIS — F03.91 DEMENTIA WITH BEHAVIORAL DISTURBANCE, UNSPECIFIED DEMENTIA TYPE (HCC): ICD-10-CM

## 2022-01-05 ENCOUNTER — TELEPHONE (OUTPATIENT)
Dept: GERIATRICS | Age: 82
End: 2022-01-05

## 2022-01-05 DIAGNOSIS — F03.91 DEMENTIA WITH BEHAVIORAL DISTURBANCE, UNSPECIFIED DEMENTIA TYPE (HCC): Primary | ICD-10-CM

## 2022-01-05 RX ORDER — MEMANTINE HYDROCHLORIDE 5 MG-10 MG
KIT ORAL SEE ADMIN INSTRUCTIONS
Qty: 30 TABLET | Refills: 0 | OUTPATIENT
Start: 2022-01-05

## 2022-01-05 RX ORDER — MEMANTINE HYDROCHLORIDE 10 MG/1
10 TABLET ORAL 2 TIMES DAILY
Qty: 180 TABLET | Refills: 3 | Status: SHIPPED | OUTPATIENT
Start: 2022-01-05 | End: 2022-03-14 | Stop reason: SDUPTHER

## 2022-01-05 RX ORDER — MEMANTINE HYDROCHLORIDE 10 MG/1
10 TABLET ORAL 2 TIMES DAILY
Qty: 60 TABLET | Refills: 4 | Status: SHIPPED | OUTPATIENT
Start: 2022-01-05 | End: 2022-01-05

## 2022-01-05 NOTE — TELEPHONE ENCOUNTER
----- Message from Chantale Spivey MD sent at 1/4/2022  8:49 PM EST -----  Please call the patient's daughter and enquire whether the patient has completed the Namenda titration pack so that I can refill the maintenance dose of 10 mg b i d

## 2022-01-05 NOTE — TELEPHONE ENCOUNTER
Yes daughter would like maintatance medication dose 10 mg bid called to [facility]  Patient is doing much better the daughter reports

## 2022-01-06 ENCOUNTER — TELEPHONE (OUTPATIENT)
Dept: GERIATRICS | Age: 82
End: 2022-01-06

## 2022-01-06 NOTE — TELEPHONE ENCOUNTER
As per daughter request  Daughter asked MSW to send information on [employer]  Email sent to daughter     Alli Parra    My name is Boubacar Freed  I am a  here at [employer]  We spoke on the phone last week  You had reached out to us regarding information on [employer] and Silver sneakers  Last week I mailed you the information on memory cafe to this address [facility]  Also, here is the link to [employer] [email or web address] you inquire about  When you click on the link it will direct you to the homepage  On top of the page, you will see Fitness location, click on that, and put in your zip code  It will tell you all the Fitness location near you  Home  SilverSneakers  Take online classes from home or visit us at the gym With Silver[employer]neakers, you're free to move    Available at no cost for adults 65+ through select Medicare plans  Check your Eligibility  tools  [employer]      Please feel free to respond to this email if you have any additional questions  Thank You!

## 2022-01-14 ENCOUNTER — TELEMEDICINE (OUTPATIENT)
Dept: GERIATRICS | Age: 82
End: 2022-01-14
Payer: COMMERCIAL

## 2022-01-14 VITALS
WEIGHT: 136 LBS | BODY MASS INDEX: 21.86 KG/M2 | DIASTOLIC BLOOD PRESSURE: 82 MMHG | SYSTOLIC BLOOD PRESSURE: 120 MMHG | HEIGHT: 66 IN

## 2022-01-14 DIAGNOSIS — F03.91 DEMENTIA WITH BEHAVIORAL DISTURBANCE, UNSPECIFIED DEMENTIA TYPE (HCC): Primary | ICD-10-CM

## 2022-01-14 PROCEDURE — 1036F TOBACCO NON-USER: CPT | Performed by: STUDENT IN AN ORGANIZED HEALTH CARE EDUCATION/TRAINING PROGRAM

## 2022-01-14 PROCEDURE — 1160F RVW MEDS BY RX/DR IN RCRD: CPT | Performed by: STUDENT IN AN ORGANIZED HEALTH CARE EDUCATION/TRAINING PROGRAM

## 2022-01-14 PROCEDURE — 3074F SYST BP LT 130 MM HG: CPT | Performed by: STUDENT IN AN ORGANIZED HEALTH CARE EDUCATION/TRAINING PROGRAM

## 2022-01-14 PROCEDURE — 3079F DIAST BP 80-89 MM HG: CPT | Performed by: STUDENT IN AN ORGANIZED HEALTH CARE EDUCATION/TRAINING PROGRAM

## 2022-01-14 PROCEDURE — 99213 OFFICE O/P EST LOW 20 MIN: CPT | Performed by: STUDENT IN AN ORGANIZED HEALTH CARE EDUCATION/TRAINING PROGRAM

## 2022-01-14 RX ORDER — LANOLIN ALCOHOL/MO/W.PET/CERES
1000 CREAM (GRAM) TOPICAL DAILY
COMMUNITY
End: 2022-03-14 | Stop reason: SDUPTHER

## 2022-01-14 RX ORDER — PHENOL 1.4 %
10 AEROSOL, SPRAY (ML) MUCOUS MEMBRANE
COMMUNITY
Start: 2021-12-21 | End: 2022-03-14

## 2022-01-14 NOTE — PROGRESS NOTES
Virtual Regular Visit    Verification of patient location:    Patient is located in the following state in which I hold an active license [place]      Assessment/Plan:    Problem List Items Addressed This Visit        Nervous and Auditory    Dementia with behavioral disturbance ([facility] [address] ) - Primary     Overall remains stable  No acute changes in mood, personality or behavior  Patient remains independent in all ADLs  Since starting Namenda, no untoward side effects  Will continue Namenda maintenance of 10 mg b i d  Continue Aricept 10 mg at nighttime  Continue Lexapro 10 mg daily  Patient and family to decide about pursuing clinical trial she currently is enrolled with with [facility]  Will refer to speech therapy for cognitive rehab  Reorientation redirection as needed  Manage chronic conditions  Maintain [place] precautions  Encourage patient to remain active mentally, physically and socially  Participate in cognitively challenging exercises as able  Encouraged enrolling into a senior center for positive socialization and physical activity  Will follow-up in 6 months                    Reason for visit is   Chief Complaint   Patient presents with    Virtual Regular Visit     Medication Check    Virtual Regular Visit        Encounter provider Jacquelyne Epley, MD    Provider located at [address] [place], [place] [address] [address]  [phone]      Recent Visits  Date Type Provider Dept   01/14/22 Dandy Geiger MD [facility] recent visits within past 7 days and meeting all other requirements  Future Appointments  No visits were found meeting these conditions  Showing future appointments within next 150 days and meeting all other requirements       The patient was identified by name and date of birth   Nazario Horan was informed that this is a telemedicine visit and that the visit is being conducted through [employer] Teams and patient was informed that this is a secure, HIPAA-compliant platform  She agrees to proceed     My office door was closed  No one else was in the room  She acknowledged consent and understanding of privacy and security of the video platform  The patient has agreed to participate and understands they can discontinue the visit at any time  Patient is aware this is a billable service  Nesha Gunn is a 79 y o  female who presents for medication initiation follow up   HPI     Patient presents for follow-up after recent medication initiation with Namenda  She is accompanied by her daughter who participates her primary care taking  Per daughter, the patient has not had any intolerable side effects since starting Namenda, as a result of which we will continue the maintenance dosage at 10 mg b i d  The patient remains independent in all ADLs but dependent in IADLs  No acute changes in mood, personality or behavior  Per daughter, family has not yet decided whether the patient will continue her clinical trials with [facility]  For now will plan to refer to speech therapy for cognitive rehabilitation and the patient was encouraged to remain active mentally, physically and socially  Previously advised to consider enrolling into a senior center for positive socialization and physical activity  No cardiorespiratory distress, fever, chills, URI, urinary symptoms or recent falls  The patient continues to tolerate oral intake, has been sleeping well and has had no changes in bowel movements  Will plan to follow-up in 6 months        Past Medical History:   Diagnosis Date    Anxiety     Gastroesophageal reflux disease without esophagitis 8/28/2018    Hyperlipidemia     Hypertension     Hypothyroidism     Osteoarthritis 10/5/2012       Past Surgical History:   Procedure Laterality Date    CATARACT EXTRACTION      CHOLECYSTECTOMY      COLONOSCOPY      HYSTERECTOMY         Current Outpatient Medications   Medication Sig Dispense Refill    azilsartan medoxomil (Edarbi) 40 MG tablet Take 1 tablet (40 mg total) by mouth daily 90 tablet 3    donepezil (ARICEPT) 10 mg tablet Take 1 tablet (10 mg total) by mouth daily In the Morning with Breakfast 90 tablet 3    ergocalciferol (VITAMIN D2) 50,000 units Take 1 capsule (50,000 Units total) by mouth once a week 13 capsule 1    escitalopram (LEXAPRO) 10 mg tablet take 1 tablet by mouth once daily 30 tablet 1    Melatonin 10 MG TABS Take 10 mg by mouth daily at bedtime      memantine (Namenda) 10 mg tablet Take 1 tablet (10 mg total) by mouth 2 (two) times a day 180 tablet 3    pantoprazole (PROTONIX) 40 mg tablet Take 1 tablet (40 mg total) by mouth daily 90 tablet 3    propranolol (INDERAL LA) 60 mg 24 hr capsule Take 1 capsule (60 mg total) by mouth daily Please stop Norvasc    90 capsule 3    QUEtiapine (SEROquel) 25 mg tablet Take 1 tablet (25 mg total) by mouth daily at bedtime Please stop Trazadone 30 tablet 3    rosuvastatin (CRESTOR) 5 mg tablet Take 1 tablet (5 mg total) by mouth daily 90 tablet 3    vitamin B-12 (VITAMIN B-12) 1,000 mcg tablet Take 1,000 mcg by mouth daily      clotrimazole-betamethasone (LOTRISONE) 1-0 05 % cream Apply topically 2 (two) times a day (Patient not taking: Reported on 1/14/2022 ) 45 g 1    diphenhydrAMINE (BENADRYL) 50 MG tablet Take 50 mg by mouth daily at bedtime (Patient not taking: Reported on 1/14/2022 )      psyllium (METAMUCIL SMOOTH TEXTURE) 28 % packet Take 1 packet by mouth 2 (two) times a day 60 packet 0     No current facility-administered medications for this visit  Allergies   Allergen Reactions    No Known Allergies        Review of Systems   Constitutional: Negative for chills and fever  HENT: Negative for congestion, ear pain, rhinorrhea and sore throat  Eyes: Negative for discharge     Respiratory: Negative for cough, chest tightness, shortness of breath, wheezing and stridor  Cardiovascular: Negative for chest pain, palpitations and leg swelling  Gastrointestinal: Negative for abdominal pain, constipation, diarrhea, nausea and vomiting  Genitourinary: Negative for dysuria  Musculoskeletal: Negative for gait problem  Skin: Negative for color change, pallor, rash and wound  Neurological: Negative for dizziness  Psychiatric/Behavioral: Positive for decreased concentration  Negative for confusion, dysphoric mood, hallucinations and sleep disturbance  Video Exam    Vitals:    01/14/22 0948   BP: 120/82   BP Location: Right arm   Patient Position: Sitting   Cuff Size: Adult   Weight: 61 7 kg (136 lb)   Height: 5' 6" (1 676 m)       Physical Exam  Constitutional:       General: She is not in acute distress  Appearance: Normal appearance  She is not ill-appearing or diaphoretic  HENT:      Head: Normocephalic and atraumatic  Right Ear: External ear normal       Left Ear: External ear normal       Nose: Nose normal       Mouth/Throat:      Mouth: Mucous membranes are moist    Eyes:      General:         Right eye: No discharge  Left eye: No discharge  Conjunctiva/sclera: Conjunctivae normal    Pulmonary:      Effort: Pulmonary effort is normal  No respiratory distress  Abdominal:      General: There is no distension  Palpations: Abdomen is soft  Tenderness: There is no abdominal tenderness  Musculoskeletal:         General: Normal range of motion  Cervical back: Normal range of motion  Skin:     General: Skin is dry  Neurological:      General: No focal deficit present  Mental Status: She is alert and oriented to person, place, and time  Mental status is at baseline        Gait: Gait normal    Psychiatric:         Mood and Affect: Mood normal           I spent 22 minutes directly with the patient during this visit total    VIRTUAL VISIT DISCLAIMER      Montserrat Contreras verbally agrees to participate in [facility]  Pt is aware that [employer] could be limited without vital signs or the ability to perform a full hands-on physical Antonio Byrnes understands she or the provider may request at any time to terminate the video visit and request the patient to seek care or treatment in person

## 2022-01-16 NOTE — ASSESSMENT & PLAN NOTE
Overall remains stable  No acute changes in mood, personality or behavior  Patient remains independent in all ADLs  Since starting Namenda, no untoward side effects  Will continue Namenda maintenance of 10 mg b i d    Continue Aricept 10 mg at nighttime  Continue Lexapro 10 mg daily  Patient and family to decide about pursuing clinical trial she currently is enrolled with with [facility]  Will refer to speech therapy for cognitive rehab  Reorientation redirection as needed  Manage chronic conditions  Maintain [place] precautions  Encourage patient to remain active mentally, physically and socially  Participate in cognitively challenging exercises as able  Encouraged enrolling into a senior center for positive socialization and physical activity  Will follow-up in 6 months

## 2022-01-17 DIAGNOSIS — E78.5 DYSLIPIDEMIA: ICD-10-CM

## 2022-01-17 RX ORDER — ROSUVASTATIN CALCIUM 5 MG/1
5 TABLET, COATED ORAL DAILY
Qty: 90 TABLET | Refills: 3 | Status: SHIPPED | OUTPATIENT
Start: 2022-01-17 | End: 2022-03-14 | Stop reason: SDUPTHER

## 2022-01-24 DIAGNOSIS — F32.A DEPRESSION, UNSPECIFIED DEPRESSION TYPE: ICD-10-CM

## 2022-01-24 RX ORDER — ESCITALOPRAM OXALATE 10 MG/1
10 TABLET ORAL DAILY
Qty: 90 TABLET | Refills: 1 | Status: SHIPPED | OUTPATIENT
Start: 2022-01-24 | End: 2022-03-14 | Stop reason: SDUPTHER

## 2022-02-09 ENCOUNTER — TELEPHONE (OUTPATIENT)
Dept: GERIATRICS | Age: 82
End: 2022-02-09

## 2022-02-09 NOTE — TELEPHONE ENCOUNTER
Pt daughter Summer Serrato called stating pt has recently started taking memantine 10mg and has been doing great overall  However pt presented with depression like moods over the last 2days  Pt did take Tylenol for Arthritis, daughter is unsure if the Tylenol  was conflicting with pt taking memantine

## 2022-02-09 NOTE — TELEPHONE ENCOUNTER
Can you  pls reach out to Anahi from [facility] and enquire on the status of the referral pls

## 2022-02-10 ENCOUNTER — TELEPHONE (OUTPATIENT)
Dept: GERIATRICS | Age: 82
End: 2022-02-10

## 2022-02-10 NOTE — TELEPHONE ENCOUNTER
Called pt daughter to inform her that from [facility] would be giving her a call this week to schedule appt times and dates

## 2022-02-10 NOTE — TELEPHONE ENCOUNTER
Spoke with Deonna Rogers from [facility], she informed me that Speech therapy would be done with Aquilino Epstein and OT with [facility]  They will be following up wit the pt this week

## 2022-02-14 DIAGNOSIS — G47.00 INSOMNIA, UNSPECIFIED TYPE: ICD-10-CM

## 2022-02-14 DIAGNOSIS — R00.2 PALPITATIONS: ICD-10-CM

## 2022-02-14 RX ORDER — QUETIAPINE FUMARATE 25 MG/1
25 TABLET, FILM COATED ORAL
Qty: 90 TABLET | Refills: 1 | Status: SHIPPED | OUTPATIENT
Start: 2022-02-14 | End: 2022-03-14 | Stop reason: SDUPTHER

## 2022-02-28 DIAGNOSIS — R79.89 LOW VITAMIN D LEVEL: ICD-10-CM

## 2022-02-28 RX ORDER — ERGOCALCIFEROL 1.25 MG/1
50000 CAPSULE ORAL WEEKLY
Qty: 13 CAPSULE | Refills: 1 | Status: SHIPPED | OUTPATIENT
Start: 2022-02-28 | End: 2022-03-14 | Stop reason: SDUPTHER

## 2022-03-01 ENCOUNTER — TELEPHONE (OUTPATIENT)
Dept: NEUROLOGY | Facility: CLINIC | Age: 82
End: 2022-03-01

## 2022-03-01 NOTE — TELEPHONE ENCOUNTER
Pt's daughter Ernesto Chapman calling to cancel appointment with Brandon Bowman  States she is now seeing Dr Claribel Paula with [facility]  Was referred to Dr Claribel Paula by [facility] Bowman  Would like to clarify if Brandon Bowman agreeable to patient continuing follow ups from there on out  Chart reviewed, Lakeisha's recommendations states the same  Appt cancelled  No need to reschedule at this time  Nothing further at this time

## 2022-03-08 ENCOUNTER — TELEPHONE (OUTPATIENT)
Dept: FAMILY MEDICINE CLINIC | Facility: CLINIC | Age: 82
End: 2022-03-08

## 2022-03-08 NOTE — TELEPHONE ENCOUNTER
Patient's blood pressure reading 203/91 , 191/92 , 196/98 for appox  2 weeks   Patient c/o headache   She has visiting nursing coming into her home for physical therapy and occupational therapy for her memory

## 2022-03-14 ENCOUNTER — OFFICE VISIT (OUTPATIENT)
Dept: FAMILY MEDICINE CLINIC | Facility: CLINIC | Age: 82
End: 2022-03-14
Payer: COMMERCIAL

## 2022-03-14 VITALS
HEART RATE: 58 BPM | SYSTOLIC BLOOD PRESSURE: 190 MMHG | WEIGHT: 151.6 LBS | BODY MASS INDEX: 24.36 KG/M2 | OXYGEN SATURATION: 96 % | TEMPERATURE: 98.2 F | RESPIRATION RATE: 14 BRPM | DIASTOLIC BLOOD PRESSURE: 110 MMHG | HEIGHT: 66 IN

## 2022-03-14 DIAGNOSIS — F03.91 DEMENTIA WITH BEHAVIORAL DISTURBANCE, UNSPECIFIED DEMENTIA TYPE (HCC): ICD-10-CM

## 2022-03-14 DIAGNOSIS — Z13.820 SCREENING FOR OSTEOPOROSIS: ICD-10-CM

## 2022-03-14 DIAGNOSIS — I10 ESSENTIAL HYPERTENSION: ICD-10-CM

## 2022-03-14 DIAGNOSIS — M35.3 PMR (POLYMYALGIA RHEUMATICA) (HCC): ICD-10-CM

## 2022-03-14 DIAGNOSIS — H61.21 CERUMEN DEBRIS ON TYMPANIC MEMBRANE OF RIGHT EAR: Primary | ICD-10-CM

## 2022-03-14 PROCEDURE — 1160F RVW MEDS BY RX/DR IN RCRD: CPT | Performed by: INTERNAL MEDICINE

## 2022-03-14 PROCEDURE — 3077F SYST BP >= 140 MM HG: CPT | Performed by: INTERNAL MEDICINE

## 2022-03-14 PROCEDURE — 99214 OFFICE O/P EST MOD 30 MIN: CPT | Performed by: INTERNAL MEDICINE

## 2022-03-14 PROCEDURE — 3080F DIAST BP >= 90 MM HG: CPT | Performed by: INTERNAL MEDICINE

## 2022-03-14 PROCEDURE — 1036F TOBACCO NON-USER: CPT | Performed by: INTERNAL MEDICINE

## 2022-03-14 NOTE — PROGRESS NOTES
Assessment/Plan:         Diagnoses and all orders for this visit:    Cerumen debris on tympanic membrane of right ear  -     Ear cerumen removal, done in Office without using any instruments    Screening for osteoporosis  -     DXA bone density spine hip and pelvis; Future    PMR (polymyalgia rheumatica) ([facility] [address] ); stable    Dementia with behavioral disturbance, unspecified dementia type (HCC);stable  Continue same  RTC in 3mos w Blood work    Essential hypertension; continue Edarbi and propranolol  Add; Norvasc 2 5 mg daily  RTC in 3mos w Blood work        Subjective:      Patient ID: Luis Mejia is a 79 y o  female  Mayer Gasper is here for Regular check up,  she has few symptoms, Recent Blood work and med list reviewed w pt,  The following portions of the patient's history were reviewed and updated as appropriate: allergies, current medications, past family history, past social history, past surgical history and problem list     Review of Systems   Constitutional: Negative for chills, fatigue and fever  HENT: Positive for hearing loss  Negative for congestion, facial swelling, sore throat, trouble swallowing and voice change  Eyes: Negative for pain, discharge and visual disturbance  Respiratory: Negative for cough, shortness of breath and wheezing  Cardiovascular: Negative for chest pain, palpitations and leg swelling  Gastrointestinal: Negative for abdominal pain, blood in stool, constipation, diarrhea and nausea  Endocrine: Negative for polydipsia, polyphagia and polyuria  Genitourinary: Negative for difficulty urinating, hematuria and urgency  Musculoskeletal: Negative for arthralgias and myalgias  Skin: Negative for rash  Neurological: Negative for dizziness, tremors, weakness and headaches  Hematological: Negative for adenopathy  Does not bruise/bleed easily  Psychiatric/Behavioral: Negative for dysphoric mood, sleep disturbance and suicidal ideas  Objective:      BP (!) 190/110 (BP Location: Left arm, Patient Position: Sitting, Cuff Size: Standard)   Pulse 58   Temp 98 2 °F (36 8 °C)   Resp 14   Ht 5' 6" (1 676 m)   Wt 68 8 kg (151 lb 9 6 oz)   SpO2 96%   BMI 24 47 kg/m²          Physical Exam  Constitutional:       General: She is not in acute distress  HENT:      Head: Normocephalic  Mouth/Throat:      Pharynx: No oropharyngeal exudate  Eyes:      General: No scleral icterus  Conjunctiva/sclera: Conjunctivae normal       Pupils: Pupils are equal, round, and reactive to light  Neck:      Thyroid: No thyromegaly  Cardiovascular:      Rate and Rhythm: Normal rate and regular rhythm  Heart sounds: Normal heart sounds  No murmur heard  Pulmonary:      Effort: Pulmonary effort is normal  No respiratory distress  Breath sounds: Normal breath sounds  No wheezing or rales  Abdominal:      General: Bowel sounds are normal  There is no distension  Palpations: Abdomen is soft  Tenderness: There is no abdominal tenderness  There is no guarding or rebound  Musculoskeletal:         General: No tenderness  Cervical back: Neck supple  Lymphadenopathy:      Cervical: No cervical adenopathy  Skin:     Coloration: Skin is not pale  Findings: No rash  Neurological:      Mental Status: She is alert and oriented to person, place, and time  Sensory: No sensory deficit  Motor: No weakness

## 2022-03-30 DIAGNOSIS — I10 ESSENTIAL HYPERTENSION: ICD-10-CM

## 2022-03-30 DIAGNOSIS — R00.2 PALPITATIONS: ICD-10-CM

## 2022-03-30 DIAGNOSIS — F03.91 DEMENTIA WITH BEHAVIORAL DISTURBANCE, UNSPECIFIED DEMENTIA TYPE (HCC): ICD-10-CM

## 2022-03-30 DIAGNOSIS — E78.5 DYSLIPIDEMIA: ICD-10-CM

## 2022-03-30 DIAGNOSIS — F32.A DEPRESSION, UNSPECIFIED DEPRESSION TYPE: ICD-10-CM

## 2022-03-30 DIAGNOSIS — K21.9 GASTROESOPHAGEAL REFLUX DISEASE WITHOUT ESOPHAGITIS: ICD-10-CM

## 2022-03-30 DIAGNOSIS — G47.00 INSOMNIA, UNSPECIFIED TYPE: ICD-10-CM

## 2022-03-30 RX ORDER — ESCITALOPRAM OXALATE 10 MG/1
10 TABLET ORAL DAILY
Qty: 90 TABLET | Refills: 1 | Status: SHIPPED | OUTPATIENT
Start: 2022-03-30 | End: 2022-07-20 | Stop reason: SDUPTHER

## 2022-03-30 RX ORDER — MEMANTINE HYDROCHLORIDE 10 MG/1
10 TABLET ORAL 2 TIMES DAILY
Qty: 180 TABLET | Refills: 3 | Status: SHIPPED | OUTPATIENT
Start: 2022-03-30

## 2022-03-30 RX ORDER — AZILSARTAN KAMEDOXOMIL 40 MG/1
40 TABLET ORAL DAILY
Qty: 90 TABLET | Refills: 3 | Status: SHIPPED | OUTPATIENT
Start: 2022-03-30 | End: 2022-07-20 | Stop reason: SDUPTHER

## 2022-03-30 RX ORDER — QUETIAPINE FUMARATE 25 MG/1
25 TABLET, FILM COATED ORAL
Qty: 90 TABLET | Refills: 1 | Status: SHIPPED | OUTPATIENT
Start: 2022-03-30 | End: 2022-05-09 | Stop reason: SDUPTHER

## 2022-03-30 RX ORDER — PROPRANOLOL HCL 60 MG
60 CAPSULE, EXTENDED RELEASE 24HR ORAL DAILY
Qty: 90 CAPSULE | Refills: 3 | Status: SHIPPED | OUTPATIENT
Start: 2022-03-30 | End: 2022-07-20 | Stop reason: SDUPTHER

## 2022-03-30 RX ORDER — PANTOPRAZOLE SODIUM 40 MG/1
40 TABLET, DELAYED RELEASE ORAL DAILY
Qty: 90 TABLET | Refills: 3 | Status: SHIPPED | OUTPATIENT
Start: 2022-03-30

## 2022-03-30 RX ORDER — ROSUVASTATIN CALCIUM 5 MG/1
5 TABLET, COATED ORAL DAILY
Qty: 90 TABLET | Refills: 3 | Status: SHIPPED | OUTPATIENT
Start: 2022-03-30 | End: 2022-07-20 | Stop reason: SDUPTHER

## 2022-04-05 NOTE — TELEPHONE ENCOUNTER
Patient's daughter, Hayden Salcedo ([phone]), relayed that her and her dad could really use some support group resources  Also could use some re-direction methods for patient  Patient scheduled with Dr Aleks Fisher 12/17/21 at [place] [address] for [address]  [FreeTextEntry1] : The procedure risks was discussed with the patient.\par Indications: therapeutic purposes \par #1 site identified in the right knee . Verbal consent was obtained. \par Anesthesia was with ethyl chloride.\par The patient was prepped with betadine solution. \par A 21 gauge 1.5 inch needle was used.\par Injectables: 1mL of xylocaine 1% for local anesthetic and 25mg Supartz to right knee. \par The patient tolerated the procedure well. No fluid was aspirated. \par There were no complications. Handouts/patient instructions were given to patient . patient was instructed to call if redness at site, a decrease in range of motion or an increase in pain is noted after procedure. \par \par #2 site identified in the left knee . Verbal consent was obtained. \par Anesthesia was with ethyl chloride.\par The patient was prepped with betadine solution. \par A 21 gauge 1.5 inch needle was used.\par Injectables: 1mL of xylocaine 1% for local anesthetic. Injected 25mg Supartz to left knee. \par The patient tolerated the procedure well. No fluid was aspirated. \par There were no complications. Handouts/patient instructions were given to patient . patient was instructed to call if redness at site, a decrease in range of motion or an increase in pain is noted after procedure. \par \par \par

## 2022-04-08 ENCOUNTER — HOSPITAL ENCOUNTER (OUTPATIENT)
Dept: BONE DENSITY | Facility: MEDICAL CENTER | Age: 82
Discharge: HOME | End: 2022-04-08
Payer: COMMERCIAL

## 2022-04-08 DIAGNOSIS — Z13.820 SCREENING FOR OSTEOPOROSIS: ICD-10-CM

## 2022-04-08 PROCEDURE — 77080 DXA BONE DENSITY AXIAL: CPT

## 2022-04-19 ENCOUNTER — OFFICE VISIT (OUTPATIENT)
Dept: FAMILY MEDICINE CLINIC | Facility: CLINIC | Age: 82
End: 2022-04-19
Payer: COMMERCIAL

## 2022-04-19 VITALS
OXYGEN SATURATION: 95 % | BODY MASS INDEX: 24.65 KG/M2 | WEIGHT: 153.4 LBS | DIASTOLIC BLOOD PRESSURE: 86 MMHG | SYSTOLIC BLOOD PRESSURE: 140 MMHG | HEIGHT: 66 IN | RESPIRATION RATE: 16 BRPM | HEART RATE: 60 BPM | TEMPERATURE: 98 F

## 2022-04-19 DIAGNOSIS — M25.562 ACUTE PAIN OF LEFT KNEE: Primary | ICD-10-CM

## 2022-04-19 DIAGNOSIS — M81.8 IDIOPATHIC OSTEOPOROSIS: ICD-10-CM

## 2022-04-19 DIAGNOSIS — I10 ESSENTIAL HYPERTENSION: ICD-10-CM

## 2022-04-19 DIAGNOSIS — E78.49 OTHER HYPERLIPIDEMIA: ICD-10-CM

## 2022-04-19 PROCEDURE — 1160F RVW MEDS BY RX/DR IN RCRD: CPT | Performed by: INTERNAL MEDICINE

## 2022-04-19 PROCEDURE — 3079F DIAST BP 80-89 MM HG: CPT | Performed by: INTERNAL MEDICINE

## 2022-04-19 PROCEDURE — 99214 OFFICE O/P EST MOD 30 MIN: CPT | Performed by: INTERNAL MEDICINE

## 2022-04-19 PROCEDURE — 1036F TOBACCO NON-USER: CPT | Performed by: INTERNAL MEDICINE

## 2022-04-19 PROCEDURE — 3077F SYST BP >= 140 MM HG: CPT | Performed by: INTERNAL MEDICINE

## 2022-04-19 NOTE — PATIENT INSTRUCTIONS
Osteoporosis Education   Osteoporosis  is a long-term medical condition that causes your bones to become weak, brittle, and more likely to fracture  Osteoporosis occurs when your body absorbs more bone than it makes  It is also caused by a lack of calcium and estrogen (female hormone)  Common symptoms include the following: You may not have any signs or symptoms  You may break a bone after a muscle strain, bump, or fall  A break usually occurs in the hip, spine, or wrist  A collapsed vertebra (bone in your spine) may cause severe back pain or loss of height from bent posture  Call your doctor if:   ·  You have severe pain  ·  You have increasing pain after a fall  ·  You have pain when you do your daily activities  ·  You have questions or concerns about your condition or care  Diagnosis of osteoporosis:   · Blood and urine tests  measure your calcium, vitamin D, and estrogen levels  · An x-ray or CT may show thinned bones or a fracture  You may be given contrast liquid to help the bones show up better in the pictures  Tell the healthcare provider if you have ever had an allergic reaction to contrast liquid  Do not enter the MRI room with anything metal  Metal can cause serious injury  Tell the healthcare provider if you have any metal in or on your body  · A bone density test  compares your bone thickness with what is expected for someone of your age, gender, and ethnicity  Treatment for osteoporosis may include medicines to prevent bone loss, build new bone, and increase estrogen  These medicines help prevent fractures and may be given as a pill or injection  Ask your healthcare provider for more information on these medicines  Prevent bone loss:  · Eat healthy foods that are high in calcium  This helps keep your bones strong  Good sources of calcium are milk, cheese, broccoli, tofu, almonds, and canned salmon and sardines  Recommended to get at least 1200mg daily of calcium    · Increase your vitamin D intake  Vitamin D is in fish oils, some vegetables, and fortified milk, cereal, and bread  Vitamin D is also formed in the skin when it is exposed to the sun  Ask your healthcare provider how much sunlight is safe for you  You will require at least 800 units of vitamin D daily taken as a supplement  · Drink liquids as directed  Ask your healthcare provider how much liquid to drink each day and which liquids are best for you  Do not have alcohol or caffeine  They decrease bone mineral density, which can weaken your bones  · Exercise regularly  Ask your healthcare provider about the best exercise plan for you  Weight bearing exercise for 30 minutes, 3 times a week can help build and strengthen bone  · Do not smoke  Nicotine and other chemicals in cigarettes and cigars can cause lung damage  Ask your healthcare provider for information if you currently smoke and need help to quit  E-cigarettes or smokeless tobacco still contain nicotine  Talk to your healthcare provider before you use these products  · Go to physical therapy as directed  A physical therapist teaches you exercises to help improve movement and muscle strength  · Alcohol  It is recommended to avoid heavy alcohol use as increased consumption of alcohol is known to cause bone loss  © Copyright [employer] 2021 Information is for End User's use only and may not be sold, redistributed or otherwise used for commercial purposes  All illustrations and images included in CareNotes® are the copyrighted property of [employer]  or [facility] Gregorio     Calcium and vitamin D for bone health (Beyond the Basics)    CALCIUM AND VITAMIN D OVERVIEW  Osteoporosis is a common bone disorder that causes a progressive loss in bone density and mass  As a result, bones become thin, weakened, and easily fractured   It is estimated that more than 1 3 million osteoporosis-associated (or "osteoporotic") fractures occur every year in the [place], primarily of bone within the spine (the vertebrae), the hip, and the forearm near the wrist  =    A number of treatments can help to prevent loss of bone and treat low bone mass  However, the first step in preventing or treating osteoporosis is to consume foods and drinks that provide calcium, a mineral essential for bone strength, and vitamin D, which aids in calcium breakdown and absorption  =    CALCIUM AND VITAMIN D BENEFITS  Good nutrition is important at all ages to keep the bones healthy  · Taking calcium reduces bone loss and decreases the risk of fracturing the vertebrae (the bones that surround the spinal cord)  · Consuming calcium during childhood (eg, in milk) can lead to higher bone mass in adulthood  This increase in bone density can reduce the risk of fractures later in life  · Calcium may also have benefits in other body systems by reducing blood pressure and cholesterol levels  · Calcium and vitamin D supplements may help prevent tooth loss in older adults  RECOMMENDATIONS FOR CALCIUM    General recommendations -- Premenopausal women and men should consume at least 1000 mg of calcium, while postmenopausal women should consume 1200 mg (total diet plus supplement)  You should not consume more than 2000 mg of calcium per day (total diet plus supplement) due to the risk of side effects  Calcium in the diet -- The primary sources of calcium in the diet include milk and other dairy products, such as hard cheese, cottage cheese, or yogurt, as well as green vegetables, such as kale and broccoli (table 1)  Some cereals, soy products, and fruit juices are fortified with calcium  Calcium supplements -- The body is able to absorb calcium contained in supplements as well as from dietary sources  If it is not possible to get enough calcium from dietary sources, consult a health care provider to determine the best type, dose, and timing of calcium supplements       · Calcium carbonate is effective and is the least expensive form of calcium  It is best absorbed with a low-iron meal (such as breakfast)  Calcium carbonate may not be absorbed well in people who also take a specific medication for gastroesophageal reflux (called a proton pump inhibitor or H2 blocker), which blocks stomach acid  · Many natural calcium carbonate preparations such as oyster shells or bone meal contain some lead  · Calcium citrate is well absorbed in the fasting state as well as with a meal   · Calcium doses above 500 mg are not absorbed as well as smaller doses, so large doses of supplements should be taken in divided doses (eg, in the morning and evening)  · Calcium supplements do not replace other osteoporosis treatments such as hormone replacement, bisphosphonates (eg, risedronate [sample brand name: Actonel] and alendronate [brand name: Fosamax]), and raloxifene (brand name: Evista)  Calcium and vitamin D supplements alone are usually insufficient to prevent age-related bone loss, although they may be beneficial in some subgroups (older adults, those with very low intake)  In most patients with or at risk for osteoporosis, the addition of medication or hormonal therapy is necessary in order to slow the breakdown and removal of bone (ie, resorption)  Underlying gastrointestinal diseases -- Patients who do not adequately absorb nutrients from the gastrointestinal tract (due to malabsorption) may require more than 1000 to 1200 mg of calcium per day  In such cases, a health care provider can help to determine the optimal dose of calcium  Medications -- All medications should be discussed with a health care provider to ensure that possible interactions with calcium are identified  Certain medications change the amount of calcium that is absorbed and/or excreted  As an example, loop diuretics (eg, furosemide [sample brand name: Lasix]) increase the amount of calcium excreted in the urine      On the other hand, thiazide diuretics (eg, hydrochlorothiazide) can reduce levels of calcium in the urine, potentially reducing the risk of bone loss and kidney stones  Side effects of calcium -- Calcium is usually easily tolerated when it is taken in divided doses several times per day  Some people experience side effects related to calcium, including constipation and indigestion  Calcium supplements interfere with the absorption of iron and thyroid hormone, and therefore, these medications should be taken at different times  Kidney stones -- There is no evidence that consuming large amounts of calcium (from foods and drinks) increases the risk of kidney stones, or that avoiding dietary calcium decreases the risk  In fact, avoiding dairy products is likely to increase the risk of kidney stones  However, use of calcium supplements may increase the risk of kidney stones in susceptible individuals by raising the level of calcium in the urine  This is particularly true if the supplement is taken between meals or at bedtime, and this is one of the reasons we prefer for patients to get as much of their calcium requirement through dietary means  IMPORTANCE OF VITAMIN D  Vitamin D decreases bone loss and lowers the risk of fracture, especially in older men and women  Along with calcium, vitamin D also helps to prevent and treat osteoporosis  To absorb calcium efficiently, an adequate amount of vitamin D must be present  Vitamin D is normally made in the skin after exposure to sunlight  Recommendations for vitamin D -- The current recommendation is that men over 70 years and postmenopausal women consume at least 800 international units (20 micrograms) of vitamin D per day  Lower levels of vitamin D are not as effective, while high doses can be toxic, especially if taken for long periods of time   Although the optimal intake has not been clearly established in premenopausal women or in younger men with osteoporosis, 600 international units (15 micrograms) of vitamin D daily is generally suggested  Vitamin D is available as an individual supplement and is included in most multivitamins and some calcium supplements (table 2)  Milk is a relatively good dietary source of vitamin D, with approximately 100 international units (2 5 micrograms) per cup (240 mL), and salmon has 800 to 1000 international units (20 to 25 micrograms) of vitamin D per serving  Most healthy people don't need to check with their health care provider before taking standard doses of vitamin D and don't need monitoring of vitamin D levels if they are not being treated for vitamin D deficiency  However, recommendations for vitamin D supplementation may be different in people with certain underlying medical conditions, such as sarcoidosis or lymphoma  In these situations, a provider will determine if supplements are needed; if so, the person's vitamin D and calcium levels should be monitored with regular tests  ©3504 [employer], [address] rights reserved

## 2022-04-19 NOTE — PROGRESS NOTES
Assessment/Plan:         Diagnoses and all orders for this visit:    Acute pain of left knee; moist Heat  Pennsaid Lotion TID  RTC in 1-2 mos w :  -     XR knee 3 vw left non injury; Future  -     Bill-Pull Knee Sleeve    Idiopathic osteoporosis; caltrate Plus D  prolia Inj Q 6 mos    Essential hypertension; stable  Continue same  RTC in 3 mos w :  -     UA (URINE) with reflex to Scope; Future  -     Comprehensive metabolic panel; Future  -     CBC and differential; Future    Other hyperlipidemia  -     Lipid panel; Future        Subjective:      Patient ID: Linda Zarco is a 79 y o  female  79 Y O lady is here for Regular check Up, she feels ok, recent blood work and med list reviewed w pt in Detail    The following portions of the patient's history were reviewed and updated as appropriate: allergies, current medications, past medical history, past social history, past surgical history and problem list     Review of Systems   Constitutional: Negative for chills, fatigue and fever  HENT: Negative for congestion, facial swelling, sore throat, trouble swallowing and voice change  Eyes: Negative for pain, discharge and visual disturbance  Respiratory: Negative for cough, shortness of breath and wheezing  Cardiovascular: Negative for chest pain, palpitations and leg swelling  Gastrointestinal: Negative for abdominal pain, blood in stool, constipation, diarrhea and nausea  Endocrine: Negative for polydipsia, polyphagia and polyuria  Genitourinary: Negative for difficulty urinating, hematuria and urgency  Musculoskeletal: Negative for arthralgias and myalgias  Skin: Negative for rash  Neurological: Negative for dizziness, tremors, weakness and headaches  Hematological: Negative for adenopathy  Does not bruise/bleed easily  Psychiatric/Behavioral: Negative for dysphoric mood, sleep disturbance and suicidal ideas           Objective:      /86 (BP Location: Left arm, Patient Position: Sitting, Cuff Size: Standard)   Pulse 60   Temp 98 °F (36 7 °C)   Resp 16   Ht 5' 6" (1 676 m)   Wt 69 6 kg (153 lb 6 4 oz)   SpO2 95%   BMI 24 76 kg/m²          Physical Exam  Constitutional:       General: She is not in acute distress  HENT:      Head: Normocephalic  Mouth/Throat:      Pharynx: No oropharyngeal exudate  Eyes:      General: No scleral icterus  Conjunctiva/sclera: Conjunctivae normal       Pupils: Pupils are equal, round, and reactive to light  Neck:      Thyroid: No thyromegaly  Cardiovascular:      Rate and Rhythm: Normal rate and regular rhythm  Heart sounds: Normal heart sounds  No murmur heard  Pulmonary:      Effort: Pulmonary effort is normal  No respiratory distress  Breath sounds: Normal breath sounds  No wheezing or rales  Abdominal:      General: Bowel sounds are normal  There is no distension  Palpations: Abdomen is soft  Tenderness: There is no abdominal tenderness  There is no guarding or rebound  Musculoskeletal:         General: No tenderness  Cervical back: Neck supple  Lymphadenopathy:      Cervical: No cervical adenopathy  Skin:     Coloration: Skin is not pale  Findings: No rash  Neurological:      Mental Status: She is alert and oriented to person, place, and time  Sensory: No sensory deficit  Motor: No weakness

## 2022-04-26 ENCOUNTER — APPOINTMENT (OUTPATIENT)
Dept: RADIOLOGY | Facility: MEDICAL CENTER | Age: 82
End: 2022-04-26
Payer: COMMERCIAL

## 2022-04-26 ENCOUNTER — CLINICAL SUPPORT (OUTPATIENT)
Dept: FAMILY MEDICINE CLINIC | Facility: CLINIC | Age: 82
End: 2022-04-26
Payer: COMMERCIAL

## 2022-04-26 DIAGNOSIS — M25.562 ACUTE PAIN OF LEFT KNEE: ICD-10-CM

## 2022-04-26 DIAGNOSIS — M81.8 IDIOPATHIC OSTEOPOROSIS: Primary | ICD-10-CM

## 2022-04-26 PROCEDURE — 73562 X-RAY EXAM OF KNEE 3: CPT

## 2022-04-26 PROCEDURE — 96372 THER/PROPH/DIAG INJ SC/IM: CPT

## 2022-05-09 DIAGNOSIS — R00.2 PALPITATIONS: ICD-10-CM

## 2022-05-09 DIAGNOSIS — G47.00 INSOMNIA, UNSPECIFIED TYPE: ICD-10-CM

## 2022-05-09 RX ORDER — QUETIAPINE FUMARATE 25 MG/1
25 TABLET, FILM COATED ORAL
Qty: 90 TABLET | Refills: 1 | Status: SHIPPED | OUTPATIENT
Start: 2022-05-09

## 2022-05-12 ENCOUNTER — TELEPHONE (OUTPATIENT)
Dept: FAMILY MEDICINE CLINIC | Facility: CLINIC | Age: 82
End: 2022-05-12

## 2022-06-13 DIAGNOSIS — R79.89 LOW VITAMIN D LEVEL: ICD-10-CM

## 2022-06-13 RX ORDER — ERGOCALCIFEROL 1.25 MG/1
50000 CAPSULE ORAL WEEKLY
Qty: 13 CAPSULE | Refills: 1 | Status: SHIPPED | OUTPATIENT
Start: 2022-06-13 | End: 2022-06-20 | Stop reason: SDUPTHER

## 2022-06-20 DIAGNOSIS — R79.89 LOW VITAMIN D LEVEL: ICD-10-CM

## 2022-06-20 RX ORDER — ERGOCALCIFEROL 1.25 MG/1
50000 CAPSULE ORAL WEEKLY
Qty: 13 CAPSULE | Refills: 1 | Status: SHIPPED | OUTPATIENT
Start: 2022-06-20 | End: 2022-07-20 | Stop reason: SDUPTHER

## 2022-07-13 ENCOUNTER — RA CDI HCC (OUTPATIENT)
Dept: OTHER | Facility: HOSPITAL | Age: 82
End: 2022-07-13

## 2022-07-13 NOTE — PROGRESS NOTES
[facility]  coding opportunities     F33 0 Major depressive disorder, recurrent, mild OR  F33 1 Major depressive disorder, recurrent, moderate OR  F33 8 Other recurrent depressive disorders OR  F33 9 Major depressive disorder, recurrent, unspecified            Chart Reviewed number of suggestions sent to Provider: 1     Patients Insurance     Medicare Insurance: [employer]

## 2022-07-18 DIAGNOSIS — I10 ESSENTIAL HYPERTENSION: ICD-10-CM

## 2022-07-18 RX ORDER — AMLODIPINE BESYLATE 2.5 MG/1
2.5 TABLET ORAL DAILY
Qty: 30 TABLET | Refills: 3 | Status: SHIPPED | OUTPATIENT
Start: 2022-07-18 | End: 2022-07-20 | Stop reason: SDUPTHER

## 2022-07-19 ENCOUNTER — OFFICE VISIT (OUTPATIENT)
Dept: GERIATRICS | Age: 82
End: 2022-07-19
Payer: COMMERCIAL

## 2022-07-19 DIAGNOSIS — I10 ESSENTIAL HYPERTENSION: ICD-10-CM

## 2022-07-19 DIAGNOSIS — G47.09 OTHER INSOMNIA: ICD-10-CM

## 2022-07-19 DIAGNOSIS — F03.90 DEMENTIA WITHOUT BEHAVIORAL DISTURBANCE, UNSPECIFIED DEMENTIA TYPE: Primary | ICD-10-CM

## 2022-07-19 DIAGNOSIS — E78.49 OTHER HYPERLIPIDEMIA: ICD-10-CM

## 2022-07-19 DIAGNOSIS — F41.9 ANXIETY DISORDER, UNSPECIFIED TYPE: ICD-10-CM

## 2022-07-19 PROCEDURE — 99483 ASSMT & CARE PLN PT COG IMP: CPT | Performed by: STUDENT IN AN ORGANIZED HEALTH CARE EDUCATION/TRAINING PROGRAM

## 2022-07-19 PROCEDURE — 3079F DIAST BP 80-89 MM HG: CPT | Performed by: STUDENT IN AN ORGANIZED HEALTH CARE EDUCATION/TRAINING PROGRAM

## 2022-07-19 PROCEDURE — 3075F SYST BP GE 130 - 139MM HG: CPT | Performed by: STUDENT IN AN ORGANIZED HEALTH CARE EDUCATION/TRAINING PROGRAM

## 2022-07-19 NOTE — PROGRESS NOTES
[facility]  [address], [address], [address]  [phone]    Social Work Follow-Up    LSW met with Álvaro Wood for follow up visit  Completed [place] Cognitive Assessment, score 17/30 (previously 13/30 in 12/17/21), and Geriatric Depression Screen, 1/15 (previously CSDD 8 in 12/17/21)  LSW also met with daughter to provide the memory cafe brochure  [place] Cognitive Assessment ([employer]) Version   Education: 12th grade    Points Earned POSSIBLE Points   Visuospatial/Executive   Alternating Clemons Making 1 1   Visuoconstructional skills 0 1   Visuoconstructional skills (clock) 3 3   Naming   Naming Animals 3 3   Attention   Digit Span 2 2   Vigilance (letters) 0 1   Serial 7 subtraction 1 3   Language   Sentence Repetition 1 2   Verbal fluency 0 1   Abstraction   Abstraction (word pairings) 1 2   Delayed recall   Delayed recall 0 5   Memory index score: /15   Orientation   Orientation 4 6   TOTAL SCORE: 17/30  (Normal ?26/30)   Additional notes:      LSW to remain available as needed

## 2022-07-19 NOTE — PROGRESS NOTES
ASSESSMENT AND PLAN:  1  Dementia without behavioral disturbance, unspecified dementia type ([facility] [address] )  Assessment & Plan:  [facility] 17/30, GDS 1, TUGT 11 sec  Patient with deficits in visual spatial, executive function attention, language, abstraction, delayed recall orientation domains  Patient continues at a level mild dementia with etiology likely mixed:  Neuro degenerative vs vascular contributor vs mood disorder  Maintained on Aricept and Namenda  Previously enrolled in clinical trials with Donanemab which she discontinued  Remains independent in all ADLs and IADLs  No safety concerns at home  Mood has remained stable on Seroquel and Lexapro with daughter requesting no psychotropic weaning at this time due to stability of symptoms  Has since completed cognitive rehabilitation; daughter requesting therapy restarting in September  Consider blister packaging for ease of medication administration  Recommend a falls Alert device as a safety precaution  Encouraged to attend the memory cafe for positive socialization  Consider a senior  program in the future for positive socialization, physical and cognitive stimulation  Reorientation and redirection as needed  Follow with PCP and manage chronic conditions  Encourage patient remain active mentally, physically and socially  Participate in cognitively stimulating exercises as able  Will follow-up in 6 months      2  Other hyperlipidemia  Assessment & Plan:  Maintained on rosuvastatin 5 mg daily  Recommend heart healthy diet, exercise as able      3  Essential hypertension  Assessment & Plan:  /86  Stable  Maintained on amlodipine, propanolol  Recommend heart healthy diet, exercise as able      4   Anxiety disorder, unspecified type  Assessment & Plan:  Overall symptoms remain stable  Patient maintained on Lexapro, Seroquel managed by PCP  Daughter requesting continued therapy without any psychotropic weaning due to stability of symptoms  Continue social support by family and friends      5  Other insomnia  Assessment & Plan:  Improved  Maintained on melatonin 10 mg nighttime   Avoid daytime napping  Avoid caffeinated beverages after 14:00        Decision-making capacity:  The patient should not make any medical or financial decisions independently without the presence of her POA as assessed during this visit    Staging:  Mild Dementia, FAST stage 4    Medications Review:  Reviewed current psychotropic regimen with pt including Seroquel with side effect profile  Daughter declines any weaning given stability of symptoms      HPI:    We had the pleasure of re-evaluating Carmine Ford who is a 79 y o  female in Geriatric follow up today  Ms Purnima Lino is in the office with her daughter  Daughter relates that overall the patient's cognition has remained stable  She remains independent in all ADLs and IADLs  She continues to cook at home with no safety concerns at home leaving the stove on, burning food or using the microwave inappropriately  The patient no longer drives as a result of which this is not a concern  The patient had previously been involved in clinical trials with Donanemab which she had discontinued several months ago  She is maintained Aricept and Namenda which she has tolerated well  Mood remains stable on her current regimen of Lexapro and Seroquel  I did discuss in detail with the patient about psychotropic medication weaning, however daughter declines any medication changes as patient's mood has remained stable   does assist in supervising her medication regimen and daughter visits with her daily  The patient has completed cognitive rehabilitation in the past with daughter requesting a repeat course of therapy starting in September  Will place referral to [facility] (OUTPATIENT CAMP[facility])  No cardiorespiratory distress, fever, chills, URI, urinary symptoms or recent falls    The patient has been spending time outdoors doing yard work during the summer and has been sleeping well on her regimen of melatonin  Daughter was encouraged to ensure the patient remains active mentally, physically and socially  She was given information about attending the memory cafe for positive socialization  Will plan to reassess in 6 months  During the office visit, the patient was somewhat repetitive however answered most questions appropriately and did not require reorientation or redirection  COGNITION:  Memory Issues noticed since 6 years    Memory affected: short term memory loss    Symptoms started: gradual  Over time the memory has:  stable  Memory issue(s) were noted by: family   Patient has difficulties with medication errors  Difficulty finding the right word while speaking: Yes  Requires repeat information or asking the same question repeatedly: Yes  Fluctuation in alertness: No  Changes in mood or personality:No  Current or previous treatment for depression or anxiety: Yes    Family member with dementia and what type? no  History of head trauma: No  History of stroke: No  History of alcohol or substance abuse: No    FUNCTIONAL STATUS:  BADLs  Does patient require assistance with:  Bathing: No  Dressing: No  Toileting: No  Transferring: No  Continence: No  Feeding: No    IADLs  Dose patient require assistance with:  Telephone: Yes  Shopping: Yes  Food Preparation: No  Housekeeping: No  Laundry: No  Transportation: Yes  Medications: Yes  Finances: Yes    NEUROPSYCH SYMPTOMS:  Does patient get angry or hostile? Resist care from others? No  Does patient see or hear things that no one else can see or hear? No  Does patient act impatient and cranky? Does mood frequently change for no apparent reason? No  Does patient act suspicious without good reason (example: believes that others are stealing from him or her, or planning to harm him or her in some way)? No  Does patient less interested in his or her usual activities or in the activities and plans of others?  No  Does patient have trouble sleeping at night? No  Dose patient have abnormal movements while asleep? No    SAFETY:  Hearing and vision issue: Yes  Any gait or balance disorder: No  Uses: none  Any falls in the last year: No  Any history of wandering: No  Are there firearms or guns in the home: No  Does patient drive: No  Any driving accidents or citations in the last year: No  Any concerns about patient's ability to drive: No    ACP REVIEW:  Does patient have POA: Yes  Does patient have a Living will: Yes  Any legal assistance needed for healthcare planning?: No    ROS: Review of Systems   Constitutional: Negative for activity change, chills and fever  HENT: Negative for congestion, ear pain, rhinorrhea and sore throat  Eyes: Negative for discharge  Respiratory: Negative for cough, chest tightness, shortness of breath, wheezing and stridor  Cardiovascular: Negative for chest pain, palpitations and leg swelling  Gastrointestinal: Negative for abdominal pain, constipation, diarrhea, nausea and vomiting  Genitourinary: Negative for decreased urine volume and dysuria  Musculoskeletal: Negative for gait problem  Skin: Negative for color change, pallor, rash and wound  Neurological: Negative for dizziness  Hematological: Does not bruise/bleed easily  Psychiatric/Behavioral: Positive for decreased concentration  Negative for dysphoric mood, hallucinations and sleep disturbance  The patient is not nervous/anxious (improved)          Allergies   Allergen Reactions    No Known Allergies        Medications:    Current Outpatient Medications on File Prior to Visit   Medication Sig Dispense Refill    ergocalciferol (VITAMIN D2) 50,000 units Take 1 capsule (50,000 Units total) by mouth once a week 13 capsule 1    amLODIPine (NORVASC) 2 5 mg tablet Take 1 tablet (2 5 mg total) by mouth daily 30 tablet 3    azilsartan medoxomil (Edarbi) 40 MG tablet Take 1 tablet (40 mg total) by mouth daily 90 tablet 3    clotrimazole-betamethasone (LOTRISONE) 1-0 05 % cream Apply topically 2 (two) times a day 45 g 1    donepezil (ARICEPT) 10 mg tablet Take 1 tablet (10 mg total) by mouth daily In the Morning with Breakfast 90 tablet 3    escitalopram (LEXAPRO) 10 mg tablet Take 1 tablet (10 mg total) by mouth daily 90 tablet 1    memantine (Namenda) 10 mg tablet Take 1 tablet (10 mg total) by mouth 2 (two) times a day 180 tablet 3    pantoprazole (PROTONIX) 40 mg tablet Take 1 tablet (40 mg total) by mouth daily 90 tablet 3    propranolol (INDERAL LA) 60 mg 24 hr capsule Take 1 capsule (60 mg total) by mouth daily Please stop Norvasc    90 capsule 3    psyllium (METAMUCIL SMOOTH TEXTURE) 28 % packet Take 1 packet by mouth 2 (two) times a day 60 packet 0    QUEtiapine (SEROquel) 25 mg tablet Take 1 tablet (25 mg total) by mouth daily at bedtime Please stop Trazadone 90 tablet 1    rosuvastatin (CRESTOR) 5 mg tablet Take 1 tablet (5 mg total) by mouth daily 90 tablet 3    vitamin B-12 (VITAMIN B-12) 1,000 mcg tablet Take 1 tablet (1,000 mcg total) by mouth daily 90 tablet 3     No current facility-administered medications on file prior to visit         History:  Past Medical History:   Diagnosis Date    Anxiety     Gastroesophageal reflux disease without esophagitis 8/28/2018    Hyperlipidemia     Hypertension     Hypothyroidism     Osteoarthritis 10/5/2012     Past Surgical History:   Procedure Laterality Date    CATARACT EXTRACTION      CHOLECYSTECTOMY      COLONOSCOPY      HYSTERECTOMY       Family History   Problem Relation Age of Onset    No Known Problems Mother     No Known Problems Father      Social History     Socioeconomic History    Marital status: /Civil Union     Spouse name: Not on file    Number of children: Not on file    Years of education: Not on file    Highest education level: Not on file   Occupational History    Occupation: retired   Tobacco Use    Smoking status: Never Smoker  Smokeless tobacco: Never Used   Vaping Use    Vaping Use: Never used   Substance and Sexual Activity    Alcohol use: No    Drug use: No    Sexual activity: Not Currently   Other Topics Concern    Not on file   Social History Narrative    Not on file     Social Determinants of Health     Financial Resource Strain: Not on file   Food Insecurity: Not on file   Transportation Needs: Not on file   Physical Activity: Not on file   Stress: Not on file   Social Connections: Not on file   Intimate Partner Violence: Not on file   Housing Stability: Not on file     Past Surgical History:   Procedure Laterality Date    CATARACT EXTRACTION      CHOLECYSTECTOMY      COLONOSCOPY      HYSTERECTOMY         OBJECTIVE:  Vitals:    07/19/22 1604   BP: 138/86   BP Location: Left arm   Patient Position: Sitting   Cuff Size: Adult   Pulse: 58   Resp: 18   Temp: 97 5 °F (36 4 °C)   TempSrc: Temporal   SpO2: 95%   Weight: 71 4 kg (157 lb 6 4 oz)   Height: 5' 6" (1 676 m)     Body mass index is 25 41 kg/m²  Physical Exam  Vitals reviewed  Constitutional:       General: She is not in acute distress  Appearance: Normal appearance  She is well-developed  She is not ill-appearing or diaphoretic  HENT:      Head: Normocephalic and atraumatic  Right Ear: Tympanic membrane, ear canal and external ear normal       Left Ear: Tympanic membrane, ear canal and external ear normal       Nose: Nose normal       Mouth/Throat:      Mouth: Mucous membranes are moist       Pharynx: No oropharyngeal exudate  Eyes:      General: No scleral icterus  Right eye: No discharge  Left eye: No discharge  Conjunctiva/sclera: Conjunctivae normal    Neck:      Vascular: No JVD  Cardiovascular:      Rate and Rhythm: Normal rate and regular rhythm  Heart sounds: Murmur heard  No friction rub  No gallop  Pulmonary:      Effort: Pulmonary effort is normal  No respiratory distress        Breath sounds: Normal breath sounds  No wheezing or rales  Abdominal:      General: Bowel sounds are normal  There is no distension  Palpations: Abdomen is soft  Tenderness: There is no abdominal tenderness  There is no guarding  Musculoskeletal:         General: No tenderness or deformity  Normal range of motion  Cervical back: Normal range of motion and neck supple  Right lower leg: No edema  Left lower leg: No edema  Skin:     General: Skin is warm  Coloration: Skin is not pale  Findings: No erythema or rash  Neurological:      General: No focal deficit present  Mental Status: She is alert and oriented to person, place, and time  Mental status is at baseline  Cranial Nerves: No cranial nerve deficit  Deep Tendon Reflexes: Reflexes are normal and symmetric     Psychiatric:         Mood and Affect: Mood normal          MoCA: 17/30      Labs & Imaging:  Lab Results   Component Value Date    WBC 5 56 12/15/2021    HGB 11 6 12/15/2021    HCT 35 7 12/15/2021    MCV 95 12/15/2021     12/15/2021     Lab Results   Component Value Date    SODIUM 138 12/15/2021    K 3 8 12/15/2021     12/15/2021    CO2 31 12/15/2021    AGAP 3 (L) 12/15/2021    BUN 13 12/15/2021    CREATININE 1 15 12/15/2021    GLUC 79 12/15/2021    GLUF 78 09/15/2021    CALCIUM 9 2 12/15/2021    AST 16 12/15/2021    ALT 24 12/15/2021    ALKPHOS 56 12/15/2021    TP 7 2 12/15/2021    TBILI 0 59 12/15/2021    EGFR 44 12/15/2021     Lab Results   Component Value Date    HGBA1C 5 4 05/11/2019     Lab Results   Component Value Date    BLVQOPFJ42 808 10/28/2021     Lab Results   Component Value Date    WPN8HAFWIJTH 2 100 12/15/2021    TSH 4 62 (H) 08/22/2018     Lab Results   Component Value Date    RPR Non-Reactive 08/25/2020     Lab Results   Component Value Date    OJAE38RUTSLT 38 08/22/2018    [id number] 88 9 10/28/2021      Results for orders placed during the hospital encounter of 06/24/20    CT head without contrast    Narrative  CT BRAIN - WITHOUT CONTRAST    INDICATION:   Altered mental status  COMPARISON:  8/10/2018    TECHNIQUE:  CT examination of the brain was performed  In addition to axial images, coronal 2D reformatted images were created and submitted for interpretation  Radiation dose length product (DLP) for this visit:  792 mGy-cm   This examination, like all CT scans performed in the [facility], was performed utilizing techniques to minimize radiation dose exposure, including the use of iterative  reconstruction and automated exposure control  IMAGE QUALITY:  Diagnostic  FINDINGS:    PARENCHYMA: Decreased attenuation is noted in periventricular and subcortical white matter demonstrating an appearance that is statistically most likely to represent moderate microangiopathic change; this appearance is similar when compared to most  recent prior examination  No CT signs of acute infarction  No intracranial mass, mass effect or midline shift  No acute parenchymal hemorrhage  VENTRICLES AND EXTRA-AXIAL SPACES:  Ventricles and extra-axial CSF spaces are prominent commensurate with the degree of volume loss  No hydrocephalus  No acute extra-axial hemorrhage  VISUALIZED ORBITS AND PARANASAL SINUSES:  Unremarkable  CALVARIUM AND EXTRACRANIAL SOFT TISSUES:  Normal     Impression  No acute intracranial abnormality  Microangiopathic changes  MRI BRAIN WITH AND WITHOUT CONTRAST, [facility] IMAGING     INDICATION: [id number]: Unspecified dementia with behavioral disturbance      COMPARISON:   CT dated 6/24/2020  No prior MRI examinations of the brain      TECHNIQUE:  Sagittal T1, axial T2, axial Gradient, axial T1, axial FLAIR, axial diffusion imaging      Post contrast axial T1, sagittal T1 and Bravo postcontrast   Sagittal 3D volumetric imaging processed by [address] creating General Morphology and Age Related Atrophy reports         IV CONTRAST:  6 mL of gadobutrol injection (MULTI-DOSE)     IMAGE QUALITY:  Diagnostic      FINDINGS:     BRAIN PARENCHYMA:  There is no discrete mass, mass effect or midline shift  Brainstem and cerebellum demonstrate normal signal  There is no intracranial hemorrhage  There is no evidence of acute infarction and diffusion imaging is unremarkable  Post   contrast imaging is normal   Advanced, somewhat confluent white matter hyperintensity on T2 and FLAIR imaging throughout both cerebral hemispheres as well as the brainstem suggestive of chronic microangiopathic change       QUANTITATIVE:      Exam Quality: Adequate for volumetric analysis      Hippocampus     Total hippocampal volume (cc):                           5 42    Percentile for similar age:                              11th      Superior Lateral ventricular volume (cc):     85 62    Percentile for similar age:                             99th      Inferior lateral ventricular volume (cc):                            4 49    Percentile for similar age:                                    96th      Hippocampal Occupancy Score (HOC):                     0 55, 2 standard deviations below the norm           Quantitative conclusions:        Hippocampal Volume:                       Small but not quite 2 standard deviations below the norm  Superior Lateral Ventricle Volume:     High Volume       Inferior Lateral Ventricle Volume:       High Volume     Concordance between qualitative and quantitative hippocampal volume assessment: Concordant       Change in brain volumes: No previous volumetric study for comparison     Mean hippocampal volume loss among normal elderly: 0 7% per year, (-0 3 to 1 7;  Gomez 2008; also Pierce 2010)  VENTRICLES:  Mild asymmetry of the occipital horns  The right is decompressed while the left is mildly dilated    No signs of obstructive hydrocephalus      SELLA AND PITUITARY GLAND:  Normal      ORBITS:  Normal      PARANASAL SINUSES: Normal      VASCULATURE:  Evaluation of the major intracranial vasculature demonstrates appropriate flow voids      CALVARIUM AND SKULL BASE:  Normal      EXTRACRANIAL SOFT TISSUES:  Normal            IMPRESSION:     1  Advanced white matter change within the brain parenchyma consistent with chronic microangiopathic change  No acute ischemia, mass or hemorrhage  2   Neuro[facility] analysis was performed: Low hippocampal volume and enlarged inferior lateral and overal ventricular system, suggestive of global ex-vacuo dilatation: supports neurodegenerative etiology but may or may not be medial temporal lobe focused   process  Hippocampal occupancy score is nearly exactly 2 standard deviations below the norm  Consider 6-12 month follow-up examination as a superimposed mesial temporal lobe neurodegenerative process cannot be completely excluded

## 2022-07-20 ENCOUNTER — OFFICE VISIT (OUTPATIENT)
Dept: FAMILY MEDICINE CLINIC | Facility: CLINIC | Age: 82
End: 2022-07-20
Payer: COMMERCIAL

## 2022-07-20 VITALS
HEIGHT: 66 IN | DIASTOLIC BLOOD PRESSURE: 86 MMHG | OXYGEN SATURATION: 95 % | RESPIRATION RATE: 18 BRPM | TEMPERATURE: 97.5 F | BODY MASS INDEX: 25.3 KG/M2 | SYSTOLIC BLOOD PRESSURE: 138 MMHG | HEART RATE: 58 BPM | WEIGHT: 157.4 LBS

## 2022-07-20 VITALS
RESPIRATION RATE: 15 BRPM | DIASTOLIC BLOOD PRESSURE: 88 MMHG | SYSTOLIC BLOOD PRESSURE: 148 MMHG | WEIGHT: 156.2 LBS | BODY MASS INDEX: 25.1 KG/M2 | HEIGHT: 66 IN | HEART RATE: 52 BPM | TEMPERATURE: 96.6 F | OXYGEN SATURATION: 97 %

## 2022-07-20 DIAGNOSIS — N39.0 ACUTE UTI: ICD-10-CM

## 2022-07-20 DIAGNOSIS — F32.A DEPRESSION, UNSPECIFIED DEPRESSION TYPE: ICD-10-CM

## 2022-07-20 DIAGNOSIS — F03.91 DEMENTIA WITH BEHAVIORAL DISTURBANCE, UNSPECIFIED DEMENTIA TYPE: ICD-10-CM

## 2022-07-20 DIAGNOSIS — I10 ESSENTIAL HYPERTENSION: ICD-10-CM

## 2022-07-20 DIAGNOSIS — Z00.01 ENCOUNTER FOR GENERAL ADULT MEDICAL EXAMINATION WITH ABNORMAL FINDINGS: Primary | ICD-10-CM

## 2022-07-20 DIAGNOSIS — J30.9 ALLERGIC RHINITIS, UNSPECIFIED SEASONALITY, UNSPECIFIED TRIGGER: ICD-10-CM

## 2022-07-20 DIAGNOSIS — E78.5 DYSLIPIDEMIA: ICD-10-CM

## 2022-07-20 DIAGNOSIS — R79.89 LOW VITAMIN D LEVEL: ICD-10-CM

## 2022-07-20 DIAGNOSIS — R00.2 PALPITATIONS: ICD-10-CM

## 2022-07-20 PROBLEM — F03.90 DEMENTIA WITHOUT BEHAVIORAL DISTURBANCE (HCC): Status: ACTIVE | Noted: 2020-08-24

## 2022-07-20 PROBLEM — G47.09 OTHER INSOMNIA: Status: ACTIVE | Noted: 2022-07-20

## 2022-07-20 LAB
SL AMB  POCT GLUCOSE, UA: ABNORMAL
SL AMB LEUKOCYTE ESTERASE,UA: 15
SL AMB POCT BILIRUBIN,UA: ABNORMAL
SL AMB POCT BLOOD,UA: ABNORMAL
SL AMB POCT CLARITY,UA: CLEAR
SL AMB POCT COLOR,UA: YELLOW
SL AMB POCT KETONES,UA: ABNORMAL
SL AMB POCT NITRITE,UA: ABNORMAL
SL AMB POCT PH,UA: 6
SL AMB POCT SPECIFIC GRAVITY,UA: 1.02
SL AMB POCT URINE PROTEIN: 15
SL AMB POCT UROBILINOGEN: ABNORMAL

## 2022-07-20 PROCEDURE — G0439 PPPS, SUBSEQ VISIT: HCPCS | Performed by: INTERNAL MEDICINE

## 2022-07-20 PROCEDURE — 3288F FALL RISK ASSESSMENT DOCD: CPT | Performed by: INTERNAL MEDICINE

## 2022-07-20 PROCEDURE — 99214 OFFICE O/P EST MOD 30 MIN: CPT | Performed by: INTERNAL MEDICINE

## 2022-07-20 PROCEDURE — 1101F PT FALLS ASSESS-DOCD LE1/YR: CPT | Performed by: INTERNAL MEDICINE

## 2022-07-20 PROCEDURE — 1125F AMNT PAIN NOTED PAIN PRSNT: CPT | Performed by: INTERNAL MEDICINE

## 2022-07-20 PROCEDURE — 81002 URINALYSIS NONAUTO W/O SCOPE: CPT | Performed by: INTERNAL MEDICINE

## 2022-07-20 PROCEDURE — 1170F FXNL STATUS ASSESSED: CPT | Performed by: INTERNAL MEDICINE

## 2022-07-20 PROCEDURE — 1160F RVW MEDS BY RX/DR IN RCRD: CPT | Performed by: INTERNAL MEDICINE

## 2022-07-20 RX ORDER — ROSUVASTATIN CALCIUM 5 MG/1
5 TABLET, COATED ORAL DAILY
Qty: 90 TABLET | Refills: 3 | Status: SHIPPED | OUTPATIENT
Start: 2022-07-20

## 2022-07-20 RX ORDER — ERGOCALCIFEROL 1.25 MG/1
50000 CAPSULE ORAL WEEKLY
Qty: 13 CAPSULE | Refills: 1 | Status: SHIPPED | OUTPATIENT
Start: 2022-07-20

## 2022-07-20 RX ORDER — AMLODIPINE BESYLATE 2.5 MG/1
2.5 TABLET ORAL DAILY
Qty: 90 TABLET | Refills: 3 | Status: SHIPPED | OUTPATIENT
Start: 2022-07-20

## 2022-07-20 RX ORDER — MOMETASONE FUROATE 50 UG/1
2 SPRAY, METERED NASAL DAILY
Qty: 17 G | Refills: 1 | Status: SHIPPED | OUTPATIENT
Start: 2022-07-20

## 2022-07-20 RX ORDER — CIPROFLOXACIN 250 MG/1
250 TABLET, FILM COATED ORAL EVERY 12 HOURS SCHEDULED
Qty: 10 TABLET | Refills: 0 | Status: SHIPPED | OUTPATIENT
Start: 2022-07-20 | End: 2022-07-25

## 2022-07-20 RX ORDER — ESCITALOPRAM OXALATE 10 MG/1
10 TABLET ORAL DAILY
Qty: 90 TABLET | Refills: 1 | Status: SHIPPED | OUTPATIENT
Start: 2022-07-20

## 2022-07-20 RX ORDER — LANOLIN ALCOHOL/MO/W.PET/CERES
1000 CREAM (GRAM) TOPICAL DAILY
Qty: 90 TABLET | Refills: 3 | Status: SHIPPED | OUTPATIENT
Start: 2022-07-20

## 2022-07-20 RX ORDER — AZILSARTAN KAMEDOXOMIL 40 MG/1
40 TABLET ORAL DAILY
Qty: 90 TABLET | Refills: 3 | Status: SHIPPED | OUTPATIENT
Start: 2022-07-20

## 2022-07-20 RX ORDER — PROPRANOLOL HCL 60 MG
60 CAPSULE, EXTENDED RELEASE 24HR ORAL DAILY
Qty: 90 CAPSULE | Refills: 3 | Status: SHIPPED | OUTPATIENT
Start: 2022-07-20

## 2022-07-20 RX ORDER — CIPROFLOXACIN 250 MG/1
250 TABLET, FILM COATED ORAL EVERY 12 HOURS SCHEDULED
Qty: 10 TABLET | Refills: 0 | Status: SHIPPED | OUTPATIENT
Start: 2022-07-20 | End: 2022-07-20 | Stop reason: SDUPTHER

## 2022-07-20 RX ORDER — MOMETASONE FUROATE 50 UG/1
2 SPRAY, METERED NASAL DAILY
Qty: 17 G | Refills: 1 | Status: SHIPPED | OUTPATIENT
Start: 2022-07-20 | End: 2022-07-20 | Stop reason: SDUPTHER

## 2022-07-20 NOTE — PATIENT INSTRUCTIONS
Medicare Preventive Visit Patient Instructions  Thank you for completing your Welcome to Medicare Visit or Medicare Annual Wellness Visit today  Your next wellness visit will be due in one year (7/21/2023)  The screening/preventive services that you may require over the next 5-10 years are detailed below  Some tests may not apply to you based off risk factors and/or age  Screening tests ordered at today's visit but not completed yet may show as past due  Also, please note that scanned in results may not display below  Preventive Screenings:  Service Recommendations Previous Testing/Comments   Colorectal Cancer Screening  * Colonoscopy    * Fecal Occult Blood Test (FOBT)/Fecal Immunochemical Test (FIT)  * Fecal DNA/Cologuard Test  * Flexible Sigmoidoscopy Age: 54-65 years old   Colonoscopy: every 10 years (may be performed more frequently if at higher risk)  OR  FOBT/FIT: every 1 year  OR  Cologuard: every 3 years  OR  Sigmoidoscopy: every 5 years  Screening may be recommended earlier than age 48 if at higher risk for colorectal cancer  Also, an individualized decision between you and your healthcare provider will decide whether screening between the ages of 74-80 would be appropriate  Colonoscopy: Not on file  FOBT/FIT: 11/02/2021  Cologuard: Not on file  Sigmoidoscopy: Not on file          Breast Cancer Screening Age: 36 years old  Frequency: every 1-2 years  Not required if history of left and right mastectomy Mammogram: 04/04/2018        Cervical Cancer Screening Between the ages of 21-29, pap smear recommended once every 3 years  Between the ages of 33-67, can perform pap smear with HPV co-testing every 5 years     Recommendations may differ for women with a history of total hysterectomy, cervical cancer, or abnormal pap smears in past  Pap Smear: Not on file    Screening Not Indicated   Hepatitis C Screening Once for adults born between [facility]  More frequently in patients at high risk for Hepatitis C Hep C Antibody: Not on file        Diabetes Screening 1-2 times per year if you're at risk for diabetes or have pre-diabetes Fasting glucose: 78 mg/dL   A1C: 5 4 %    Screening Current   Cholesterol Screening Once every 5 years if you don't have a lipid disorder  May order more often based on risk factors  Lipid panel: 09/15/2021    Screening Not Indicated  History Lipid Disorder     Other Preventive Screenings Covered by Medicare:  1  Abdominal Aortic Aneurysm (AAA) Screening: covered once if your at risk  You're considered to be at risk if you have a family history of AAA  2  Lung Cancer Screening: covers low dose CT scan once per year if you meet all of the following conditions: (1) Age 50-69; (2) No signs or symptoms of lung cancer; (3) Current smoker or have quit smoking within the last 15 years; (4) You have a tobacco smoking history of at least 30 pack years (packs per day multiplied by number of years you smoked); (5) You get a written order from a healthcare provider  3  Glaucoma Screening: covered annually if you're considered high risk: (1) You have diabetes OR (2) Family history of glaucoma OR (3)  aged 48 and older OR (3)  American aged 72 and older  3  Osteoporosis Screening: covered every 2 years if you meet one of the following conditions: (1) You're estrogen deficient and at risk for osteoporosis based off medical history and other findings; (2) Have a vertebral abnormality; (3) On glucocorticoid therapy for more than 3 months; (4) Have primary hyperparathyroidism; (5) On osteoporosis medications and need to assess response to drug therapy  · Last bone density test (DXA Scan): 04/11/2022   5  HIV Screening: covered annually if you're between the age of 15-65  Also covered annually if you are younger than 13 and older than 72 with risk factors for HIV infection  For pregnant patients, it is covered up to 3 times per pregnancy      Immunizations:  Immunization Recommendations   Influenza Vaccine Annual influenza vaccination during flu season is recommended for all persons aged >= 6 months who do not have contraindications   Pneumococcal Vaccine (Prevnar and Pneumovax)  * Prevnar = PCV13  * Pneumovax = PPSV23   Adults 25-60 years old: 1-3 doses may be recommended based on certain risk factors  Adults 72 years old: Prevnar (PCV13) vaccine recommended followed by Pneumovax (PPSV23) vaccine  If already received PPSV23 since turning 65, then PCV13 recommended at least one year after PPSV23 dose  Hepatitis B Vaccine 3 dose series if at intermediate or high risk (ex: diabetes, end stage renal disease, liver disease)   Tetanus (Td) Vaccine - COST NOT COVERED BY MEDICARE PART B Following completion of primary series, a booster dose should be given every 10 years to maintain immunity against tetanus  Td may also be given as tetanus wound prophylaxis  Tdap Vaccine - COST NOT COVERED BY MEDICARE PART B Recommended at least once for all adults  For pregnant patients, recommended with each pregnancy  Shingles Vaccine (Shingrix) - COST NOT COVERED BY MEDICARE PART B  2 shot series recommended in those aged 48 and above     Health Maintenance Due:  There are no preventive care reminders to display for this patient  Immunizations Due:      Topic Date Due    Influenza Vaccine (1) 09/01/2022     Advance Directives   What are advance directives? Advance directives are legal documents that state your wishes and plans for medical care  These plans are made ahead of time in case you lose your ability to make decisions for yourself  Advance directives can apply to any medical decision, such as the treatments you want, and if you want to donate organs  What are the types of advance directives? There are many types of advance directives, and each state has rules about how to use them  You may choose a combination of any of the following:  · Living will:   This is a written record of the treatment you want  You can also choose which treatments you do not want, which to limit, and which to stop at a certain time  This includes surgery, medicine, IV fluid, and tube feedings  · Durable power of  for healthcare [facility]): This is a written record that states who you want to make healthcare choices for you when you are unable to make them for yourself  This person, called a proxy, is usually a family member or a friend  You may choose more than 1 proxy  · Do not resuscitate (DNR) order:  A DNR order is used in case your heart stops beating or you stop breathing  It is a request not to have certain forms of treatment, such as CPR  A DNR order may be included in other types of advance directives  · Medical directive: This covers the care that you want if you are in a coma, near death, or unable to make decisions for yourself  You can list the treatments you want for each condition  Treatment may include pain medicine, surgery, blood transfusions, dialysis, IV or tube feedings, and a ventilator (breathing machine)  · Values history: This document has questions about your views, beliefs, and how you feel and think about life  This information can help others choose the care that you would choose  Why are advance directives important? An advance directive helps you control your care  Although spoken wishes may be used, it is better to have your wishes written down  Spoken wishes can be misunderstood, or not followed  Treatments may be given even if you do not want them  An advance directive may make it easier for your family to make difficult choices about your care  Weight Management   Why it is important to manage your weight:  Being overweight increases your risk of health conditions such as heart disease, high blood pressure, type 2 diabetes, and certain types of cancer  It can also increase your risk for osteoarthritis, sleep apnea, and other respiratory problems   Aim for a slow, steady weight loss  Even a small amount of weight loss can lower your risk of health problems  How to lose weight safely:  A safe and healthy way to lose weight is to eat fewer calories and get regular exercise  You can lose up about 1 pound a week by decreasing the number of calories you eat by 500 calories each day  Healthy meal plan for weight management:  A healthy meal plan includes a variety of foods, contains fewer calories, and helps you stay healthy  A healthy meal plan includes the following:  · Eat whole-grain foods more often  A healthy meal plan should contain fiber  Fiber is the part of grains, fruits, and vegetables that is not broken down by your body  Whole-grain foods are healthy and provide extra fiber in your diet  Some examples of whole-grain foods are whole-wheat breads and pastas, oatmeal, brown rice, and bulgur  · Eat a variety of vegetables every day  Include dark, leafy greens such as spinach, kale, grace greens, and mustard greens  Eat yellow and orange vegetables such as carrots, sweet potatoes, and winter squash  · Eat a variety of fruits every day  Choose fresh or canned fruit (canned in its own juice or light syrup) instead of juice  Fruit juice has very little or no fiber  · Eat low-fat dairy foods  Drink fat-free (skim) milk or 1% milk  Eat fat-free yogurt and low-fat cottage cheese  Try low-fat cheeses such as mozzarella and other reduced-fat cheeses  · Choose meat and other protein foods that are low in fat  Choose beans or other legumes such as split peas or lentils  Choose fish, skinless poultry (chicken or turkey), or lean cuts of red meat (beef or pork)  Before you cook meat or poultry, cut off any visible fat  · Use less fat and oil  Try baking foods instead of frying them  Add less fat, such as margarine, sour cream, regular salad dressing and mayonnaise to foods  Eat fewer high-fat foods   Some examples of high-fat foods include french fries, doughnuts, ice cream, and cakes  · Eat fewer sweets  Limit foods and drinks that are high in sugar  This includes candy, cookies, regular soda, and sweetened drinks  Exercise:  Exercise at least 30 minutes per day on most days of the week  Some examples of exercise include walking, biking, dancing, and swimming  You can also fit in more physical activity by taking the stairs instead of the elevator or parking farther away from stores  Ask your healthcare provider about the best exercise plan for you  © Copyright [employer] 2018 Information is for End User's use only and may not be sold, redistributed or otherwise used for commercial purposes   All illustrations and images included in CareNotes® are the copyrighted property of [employer]  or [address] [place] [address]

## 2022-07-20 NOTE — PROGRESS NOTES
Assessment/Plan:         Diagnoses and all orders for this visit:    Encounter for general adult medical examination with abnormal findings; done in Detail    Essential hypertension; stable  Continue and renew;  -     amLODIPine (NORVASC) 2 5 mg tablet; Take 1 tablet (2 5 mg total) by mouth daily  -     azilsartan medoxomil (Edarbi) 40 MG tablet; Take 1 tablet (40 mg total) by mouth daily  -     propranolol (INDERAL LA) 60 mg 24 hr capsule; Take 1 capsule (60 mg total) by mouth daily Please stop Norvasc     -     vitamin B-12 (VITAMIN B-12) 1,000 mcg tablet; Take 1 tablet (1,000 mcg total) by mouth daily    Low vitamin D level  -     ergocalciferol (VITAMIN D2) 50,000 units; Take 1 capsule (50,000 Units total) by mouth once a week    Depression, unspecified depression type; improved on :  -     escitalopram (LEXAPRO) 10 mg tablet; Take 1 tablet (10 mg total) by mouth daily    Palpitations; stable on :  -     propranolol (INDERAL LA) 60 mg 24 hr capsule; Take 1 capsule (60 mg total) by mouth daily Please stop Norvasc    Dyslipidemia  -     rosuvastatin (CRESTOR) 5 mg tablet; Take 1 tablet (5 mg total) by mouth daily    Dementia with behavioral disturbance, unspecified dementia type ([facility] [address] ); Improved On :  -     vitamin B-12 (VITAMIN B-12) 1,000 mcg tablet; Take 1 tablet (1,000 mcg total) by mouth daily    Acute UTI  -     POCT urine dip  - TRY :   ciprofloxacin (CIPRO) 250 mg tablet; Take 1 tablet (250 mg total) by mouth every 12 (twelve) hours for 5 days    Allergic rhinitis, unspecified seasonality, unspecified trigger  -     mometasone (NASONEX) 50 mcg/act nasal spray; 2 sprays into each nostril daily        Subjective:      Patient ID: Misty Mccarthy is a 79 y o  female  Mayer Gasper is here for AWV and Regular check up, she feels ok, recent Blood work and med list reviewed w Pt in detail          The following portions of the patient's history were reviewed and updated as appropriate: allergies, current medications, past family history, past social history, past surgical history and problem list     Review of Systems   Constitutional: Negative for chills, fatigue and fever  HENT: Positive for congestion and postnasal drip  Negative for facial swelling, sore throat, trouble swallowing and voice change  Eyes: Negative for pain, discharge and visual disturbance  Respiratory: Negative for cough, shortness of breath and wheezing  Cardiovascular: Negative for chest pain, palpitations and leg swelling  Gastrointestinal: Negative for abdominal pain, blood in stool, constipation, diarrhea and nausea  Endocrine: Negative for polydipsia, polyphagia and polyuria  Genitourinary: Positive for frequency and urgency  Negative for difficulty urinating and hematuria  Musculoskeletal: Negative for arthralgias and myalgias  Skin: Negative for rash  Neurological: Negative for dizziness, tremors, weakness and headaches  Hematological: Negative for adenopathy  Does not bruise/bleed easily  Psychiatric/Behavioral: Negative for dysphoric mood, sleep disturbance and suicidal ideas  Objective:      /88 (BP Location: Left arm, Patient Position: Sitting, Cuff Size: Standard)   Pulse (!) 52   Temp (!) 96 6 °F (35 9 °C) (Tympanic)   Resp 15   Ht 5' 6" (1 676 m)   Wt 70 9 kg (156 lb 3 2 oz)   SpO2 97%   BMI 25 21 kg/m²          Physical Exam  Constitutional:       General: She is not in acute distress  HENT:      Head: Normocephalic  Mouth/Throat:      Pharynx: No oropharyngeal exudate  Eyes:      General: No scleral icterus  Conjunctiva/sclera: Conjunctivae normal       Pupils: Pupils are equal, round, and reactive to light  Neck:      Thyroid: No thyromegaly  Cardiovascular:      Rate and Rhythm: Normal rate and regular rhythm  Heart sounds: Normal heart sounds  No murmur heard  Pulmonary:      Effort: Pulmonary effort is normal  No respiratory distress        Breath sounds: Normal breath sounds  No wheezing or rales  Abdominal:      General: Bowel sounds are normal  There is no distension  Palpations: Abdomen is soft  Tenderness: There is no abdominal tenderness  There is no guarding or rebound  Musculoskeletal:         General: No tenderness  Cervical back: Neck supple  Lymphadenopathy:      Cervical: No cervical adenopathy  Skin:     Coloration: Skin is not pale  Findings: No rash  Neurological:      Mental Status: She is alert and oriented to person, place, and time  Sensory: No sensory deficit  Motor: No weakness

## 2022-07-20 NOTE — PATIENT INSTRUCTIONS
CARE PLAN:    1  Dementia without behavioral disturbance, unspecified dementia type ([facility] [address] )  Assessment & Plan:  [facility] 17/30, GDS 1, TUGT 11 sec  Patient with deficits in visual spatial, executive function attention, language, abstraction, delayed recall orientation domains  Patient continues at a level mild dementia with etiology likely mixed:  Neuro degenerative vs vascular contributor vs mood disorder  Maintained on Aricept and Namenda  Previously enrolled in clinical trials with Donanemab which she discontinued  Remains independent in all ADLs and IADLs  No safety concerns at home  Mood has remained stable on Seroquel and Lexapro with daughter requesting no psychotropic weaning at this time due to stability of symptoms  Has since completed cognitive rehabilitation; daughter requesting therapy restarting in September  Consider blister packaging for ease of medication administration  Recommend a falls Alert device as a safety precaution  Encouraged to attend the memory cafe for positive socialization  Consider a senior  program in the future for positive socialization, physical and cognitive stimulation  Reorientation and redirection as needed  Follow with PCP and manage chronic conditions  Encourage patient remain active mentally, physically and socially  Participate in cognitively stimulating exercises as able  Will follow-up in 6 months      2  Other hyperlipidemia  Assessment & Plan:  Maintained on rosuvastatin 5 mg daily  Recommend heart healthy diet, exercise as able      3  Essential hypertension  Assessment & Plan:  /86  Stable  Maintained on amlodipine, propanolol  Recommend heart healthy diet, exercise as able      4   Anxiety disorder, unspecified type  Assessment & Plan:  Overall symptoms remain stable  Patient maintained on Lexapro, Seroquel managed by PCP  Daughter requesting continued therapy without any psychotropic weaning due to stability of symptoms  Continue social support by family and friends

## 2022-07-20 NOTE — ASSESSMENT & PLAN NOTE
[facility] 17/30, GDS 1, TUGT 11 sec  Patient with deficits in visual spatial, executive function attention, language, abstraction, delayed recall orientation domains  Patient continues at a level mild dementia with etiology likely mixed:  Neuro degenerative vs vascular contributor vs mood disorder  Maintained on Aricept and Namenda  Previously enrolled in clinical trials with Donanemab which she discontinued  Remains independent in all ADLs and IADLs  No safety concerns at home  Mood has remained stable on Seroquel and Lexapro with daughter requesting no psychotropic weaning at this time due to stability of symptoms  Has since completed cognitive rehabilitation; daughter requesting therapy restarting in September  Consider blister packaging for ease of medication administration  Recommend a falls Alert device as a safety precaution  Encouraged to attend the memory cafe for positive socialization  Consider a senior  program in the future for positive socialization, physical and cognitive stimulation  Reorientation and redirection as needed  Follow with PCP and manage chronic conditions  Encourage patient remain active mentally, physically and socially  Participate in cognitively stimulating exercises as able  Will follow-up in 6 months

## 2022-07-20 NOTE — ASSESSMENT & PLAN NOTE
Overall symptoms remain stable  Patient maintained on Lexapro, Seroquel managed by PCP  Daughter requesting continued therapy without any psychotropic weaning due to stability of symptoms  Continue social support by family and friends

## 2022-07-20 NOTE — ASSESSMENT & PLAN NOTE
Improved  Maintained on melatonin 10 mg nighttime   Avoid daytime napping  Avoid caffeinated beverages after 14:00

## 2022-07-20 NOTE — PROGRESS NOTES
Assessment and Plan:     Problem List Items Addressed This Visit    None          Preventive health issues were discussed with patient, and age appropriate screening tests were ordered as noted in patient's After Visit Summary  Personalized health advice and appropriate referrals for health education or preventive services given if needed, as noted in patient's After Visit Summary       History of Present Illness:     Patient presents for a Medicare Wellness Visit    HPI   Patient Care Team:  Gogo Ryan MD as PCP - General (Internal Medicine)  Gogo Ryan MD as PCP - [address] [place] [address] (RTE)     Review of Systems:     Review of Systems     Problem List:     Patient Active Problem List   Diagnosis    Gastroesophageal reflux disease without esophagitis    Essential hypertension    Temporal arteritis ([facility] [address] )    Abnormal CT scan, head    Goiter    Osteoarthritis    Hyperlipidemia    Chest pressure    Hyponatremia    Polymyalgia rheumatica (HCC)    Other chest pain    PMR (polymyalgia rheumatica) (HCC)    Anxiety disorder    Dementia without behavioral disturbance ([facility] [address] )    Encounter for medication review    Bradycardia    Other insomnia      Past Medical and Surgical History:     Past Medical History:   Diagnosis Date    Anxiety     Gastroesophageal reflux disease without esophagitis 8/28/2018    Hyperlipidemia     Hypertension     Hypothyroidism     Osteoarthritis 10/5/2012     Past Surgical History:   Procedure Laterality Date    CATARACT EXTRACTION      CHOLECYSTECTOMY      COLONOSCOPY      HYSTERECTOMY        Family History:     Family History   Problem Relation Age of Onset    No Known Problems Mother     No Known Problems Father       Social History:     Social History     Socioeconomic History    Marital status: /Civil Union     Spouse name: None    Number of children: None    Years of education: None    Highest education level: None   Occupational History    Occupation: retired   Tobacco Use    Smoking status: Never Smoker    Smokeless tobacco: Never Used   Vaping Use    Vaping Use: Never used   Substance and Sexual Activity    Alcohol use: No    Drug use: No    Sexual activity: Not Currently   Other Topics Concern    None   Social History Narrative    None     Social Determinants of Health     Financial Resource Strain: Not on file   Food Insecurity: Not on file   Transportation Needs: Not on file   Physical Activity: Not on file   Stress: Not on file   Social Connections: Not on file   Intimate Partner Violence: Not on file   Housing Stability: Not on file      Medications and Allergies:     Current Outpatient Medications   Medication Sig Dispense Refill    ergocalciferol (VITAMIN D2) 50,000 units Take 1 capsule (50,000 Units total) by mouth once a week 13 capsule 1    amLODIPine (NORVASC) 2 5 mg tablet Take 1 tablet (2 5 mg total) by mouth daily 30 tablet 3    azilsartan medoxomil (Edarbi) 40 MG tablet Take 1 tablet (40 mg total) by mouth daily 90 tablet 3    clotrimazole-betamethasone (LOTRISONE) 1-0 05 % cream Apply topically 2 (two) times a day 45 g 1    donepezil (ARICEPT) 10 mg tablet Take 1 tablet (10 mg total) by mouth daily In the Morning with Breakfast 90 tablet 3    escitalopram (LEXAPRO) 10 mg tablet Take 1 tablet (10 mg total) by mouth daily 90 tablet 1    memantine (Namenda) 10 mg tablet Take 1 tablet (10 mg total) by mouth 2 (two) times a day 180 tablet 3    pantoprazole (PROTONIX) 40 mg tablet Take 1 tablet (40 mg total) by mouth daily 90 tablet 3    propranolol (INDERAL LA) 60 mg 24 hr capsule Take 1 capsule (60 mg total) by mouth daily Please stop Norvasc    90 capsule 3    psyllium (METAMUCIL SMOOTH TEXTURE) 28 % packet Take 1 packet by mouth 2 (two) times a day 60 packet 0    QUEtiapine (SEROquel) 25 mg tablet Take 1 tablet (25 mg total) by mouth daily at bedtime Please stop Trazadone 90 tablet 1    rosuvastatin (CRESTOR) 5 mg tablet Take 1 tablet (5 mg total) by mouth daily 90 tablet 3    vitamin B-12 (VITAMIN B-12) 1,000 mcg tablet Take 1 tablet (1,000 mcg total) by mouth daily 90 tablet 3     No current facility-administered medications for this visit  Allergies   Allergen Reactions    No Known Allergies       Immunizations:     Immunization History   Administered Date(s) Administered    COVID-19 MODERNA VACC 0 5 ML IM 01/21/2021, 02/17/2021, 11/19/2021, 04/18/2022    INFLUENZA 09/21/2015, 11/01/2016, 10/24/2017, 10/12/2018    Influenza Split 09/03/2013, 09/02/2014    Influenza Split High Dose Preservative Free IM 11/01/2016, 10/24/2017    Influenza, high dose seasonal 0 7 mL 09/25/2019, 11/06/2020, 09/22/2021    Influenza, seasonal, injectable 09/21/2015    Pneumococcal Conjugate 13-Valent 10/24/2017    Pneumococcal Polysaccharide PPV23 12/01/2008    Td (adult), adsorbed 01/01/2004    Tdap 12/19/2018      Health Maintenance: There are no preventive care reminders to display for this patient  Topic Date Due    Influenza Vaccine (1) 09/01/2022      Medicare Screening Tests and Risk Assessments:     Mariano Ríos is here for her Subsequent Wellness visit  Health Risk Assessment:   Patient rates overall health as good  Patient feels that their physical health rating is same  Patient is satisfied with their life  Eyesight was rated as same  Hearing was rated as same  Patient feels that their emotional and mental health rating is same  Patients states they are never, rarely angry  Patient states they are never, rarely unusually tired/fatigued  Pain experienced in the last 7 days has been none  Patient states that she has experienced no weight loss or gain in last 6 months  Fall Risk Screening: In the past year, patient has experienced: no history of falling in past year      Urinary Incontinence Screening:   Patient has not leaked urine accidently in the last six months       Home Safety:  Patient does not have trouble with stairs inside or outside of their home  Patient has working smoke alarms and has working carbon monoxide detector  Home safety hazards include: none  Nutrition:   Current diet is Regular  Medications:   Patient is not currently taking any over-the-counter supplements  Patient is able to manage medications  Activities of Daily Living (ADLs)/Instrumental Activities of Daily Living (IADLs):   Walk and transfer into and out of bed and chair?: Yes  Dress and groom yourself?: Yes    Bathe or shower yourself?: Yes    Feed yourself?  Yes  Do your laundry/housekeeping?: Yes  Manage your money, pay your bills and track your expenses?: No  Make your own meals?: Yes    Do your own shopping?: Yes    Previous Hospitalizations:   Any hospitalizations or ED visits within the last 12 months?: No      Advance Care Planning:   Living will: No    Durable POA for healthcare: No    Advanced directive counseling given: No    Five wishes given: No      PREVENTIVE SCREENINGS      Cardiovascular Screening:    General: Screening Not Indicated and History Lipid Disorder      Diabetes Screening:     General: Screening Current      Cervical Cancer Screening:    General: Screening Not Indicated      Osteoporosis Screening:    General: Screening Not Indicated and History Osteoporosis      Lung Cancer Screening:     General: Screening Not Indicated    Screening, Brief Intervention, and Referral to Treatment (SBIRT)    Screening      Single Item Drug Screening:  How often have you used an illegal drug (including marijuana) or a prescription medication for non-medical reasons in the past year? never    Single Item Drug Screen Score: 0  Interpretation: Negative screen for possible drug use disorder    No exam data present     Physical Exam:     Ht 5' 6" (1 676 m)   BMI 25 41 kg/m²     Physical Exam     Delon Bosworth, MD

## 2022-07-21 ENCOUNTER — APPOINTMENT (OUTPATIENT)
Dept: LAB | Age: 82
End: 2022-07-21
Payer: COMMERCIAL

## 2022-07-21 DIAGNOSIS — I10 ESSENTIAL HYPERTENSION: ICD-10-CM

## 2022-07-21 DIAGNOSIS — E78.49 OTHER HYPERLIPIDEMIA: ICD-10-CM

## 2022-07-21 LAB
ALBUMIN SERPL BCP-MCNC: 3.4 G/DL (ref 3.5–5)
ALP SERPL-CCNC: 48 U/L (ref 46–116)
ALT SERPL W P-5'-P-CCNC: 23 U/L (ref 12–78)
ANION GAP SERPL CALCULATED.3IONS-SCNC: 5 MMOL/L (ref 4–13)
AST SERPL W P-5'-P-CCNC: 16 U/L (ref 5–45)
BASOPHILS # BLD AUTO: 0.06 THOUSANDS/ΜL (ref 0–0.1)
BASOPHILS NFR BLD AUTO: 1 % (ref 0–1)
BILIRUB SERPL-MCNC: 0.45 MG/DL (ref 0.2–1)
BUN SERPL-MCNC: 17 MG/DL (ref 5–25)
CALCIUM ALBUM COR SERPL-MCNC: 9.5 MG/DL (ref 8.3–10.1)
CALCIUM SERPL-MCNC: 9 MG/DL (ref 8.3–10.1)
CHLORIDE SERPL-SCNC: 101 MMOL/L (ref 96–108)
CHOLEST SERPL-MCNC: 204 MG/DL
CO2 SERPL-SCNC: 29 MMOL/L (ref 21–32)
CREAT SERPL-MCNC: 1.12 MG/DL (ref 0.6–1.3)
EOSINOPHIL # BLD AUTO: 0.16 THOUSAND/ΜL (ref 0–0.61)
EOSINOPHIL NFR BLD AUTO: 3 % (ref 0–6)
ERYTHROCYTE [DISTWIDTH] IN BLOOD BY AUTOMATED COUNT: 13.2 % (ref 11.6–15.1)
GFR SERPL CREATININE-BSD FRML MDRD: 45 ML/MIN/1.73SQ M
GLUCOSE P FAST SERPL-MCNC: 84 MG/DL (ref 65–99)
HCT VFR BLD AUTO: 37 % (ref 34.8–46.1)
HDLC SERPL-MCNC: 66 MG/DL
HGB BLD-MCNC: 11.8 G/DL (ref 11.5–15.4)
IMM GRANULOCYTES # BLD AUTO: 0.02 THOUSAND/UL (ref 0–0.2)
IMM GRANULOCYTES NFR BLD AUTO: 0 % (ref 0–2)
LDLC SERPL CALC-MCNC: 120 MG/DL (ref 0–100)
LYMPHOCYTES # BLD AUTO: 1.9 THOUSANDS/ΜL (ref 0.6–4.47)
LYMPHOCYTES NFR BLD AUTO: 29 % (ref 14–44)
MCH RBC QN AUTO: 30.3 PG (ref 26.8–34.3)
MCHC RBC AUTO-ENTMCNC: 31.9 G/DL (ref 31.4–37.4)
MCV RBC AUTO: 95 FL (ref 82–98)
MONOCYTES # BLD AUTO: 0.61 THOUSAND/ΜL (ref 0.17–1.22)
MONOCYTES NFR BLD AUTO: 9 % (ref 4–12)
NEUTROPHILS # BLD AUTO: 3.73 THOUSANDS/ΜL (ref 1.85–7.62)
NEUTS SEG NFR BLD AUTO: 58 % (ref 43–75)
NONHDLC SERPL-MCNC: 138 MG/DL
NRBC BLD AUTO-RTO: 0 /100 WBCS
PLATELET # BLD AUTO: 278 THOUSANDS/UL (ref 149–390)
PMV BLD AUTO: 11 FL (ref 8.9–12.7)
POTASSIUM SERPL-SCNC: 4 MMOL/L (ref 3.5–5.3)
PROT SERPL-MCNC: 7.2 G/DL (ref 6.4–8.4)
RBC # BLD AUTO: 3.9 MILLION/UL (ref 3.81–5.12)
SODIUM SERPL-SCNC: 135 MMOL/L (ref 135–147)
TRIGL SERPL-MCNC: 89 MG/DL
WBC # BLD AUTO: 6.48 THOUSAND/UL (ref 4.31–10.16)

## 2022-07-21 PROCEDURE — 85025 COMPLETE CBC W/AUTO DIFF WBC: CPT

## 2022-07-21 PROCEDURE — 36415 COLL VENOUS BLD VENIPUNCTURE: CPT

## 2022-07-21 PROCEDURE — 80053 COMPREHEN METABOLIC PANEL: CPT

## 2022-07-21 PROCEDURE — 80061 LIPID PANEL: CPT

## 2022-09-06 ENCOUNTER — TELEPHONE (OUTPATIENT)
Dept: GERIATRICS | Age: 82
End: 2022-09-06

## 2022-09-06 NOTE — TELEPHONE ENCOUNTER
LSW left message for daughter-Aida with LSW direct line for return call  LSW to provide information on higher levels of care and [facility] phone number for support with placement

## 2022-09-06 NOTE — TELEPHONE ENCOUNTER
----- Message from Sabine Juarez MD sent at 9/2/2022 12:32 PM EDT -----   please call the patient's daughter Suzanne Nagel as they are interested in transitioning to a higher level of care (both the patient and her )

## 2022-10-05 ENCOUNTER — TELEPHONE (OUTPATIENT)
Dept: FAMILY MEDICINE CLINIC | Facility: CLINIC | Age: 82
End: 2022-10-05

## 2022-10-05 DIAGNOSIS — N39.0 ACUTE UTI: Primary | ICD-10-CM

## 2022-10-05 RX ORDER — LEVOFLOXACIN 250 MG/1
250 TABLET ORAL DAILY
Qty: 5 TABLET | Refills: 0 | Status: SHIPPED | OUTPATIENT
Start: 2022-10-05 | End: 2022-10-10

## 2022-10-06 ENCOUNTER — TELEPHONE (OUTPATIENT)
Dept: GERIATRICS | Age: 82
End: 2022-10-06

## 2022-10-24 ENCOUNTER — TELEPHONE (OUTPATIENT)
Dept: FAMILY MEDICINE CLINIC | Facility: CLINIC | Age: 82
End: 2022-10-24

## 2022-10-24 DIAGNOSIS — R00.2 PALPITATIONS: ICD-10-CM

## 2022-10-24 DIAGNOSIS — G47.00 INSOMNIA, UNSPECIFIED TYPE: ICD-10-CM

## 2022-10-24 RX ORDER — QUETIAPINE FUMARATE 25 MG/1
TABLET, FILM COATED ORAL
Qty: 90 TABLET | Refills: 1 | Status: SHIPPED | OUTPATIENT
Start: 2022-10-24

## 2022-10-24 NOTE — TELEPHONE ENCOUNTER
pts daughter called stating that her mother has h/o uti and needs an oral medication called in  It still hasn't fully cleared up since last month and would like something orally called in for her to the pharmacy   Please advise - as

## 2022-10-24 NOTE — TELEPHONE ENCOUNTER
Patient c/o UTI  She said the last time you gave her something for 5 days, it didn't go away totally    Please advise

## 2022-10-25 DIAGNOSIS — N39.0 ACUTE UTI: Primary | ICD-10-CM

## 2022-10-25 RX ORDER — FLUCONAZOLE 150 MG/1
150 TABLET ORAL ONCE
Qty: 1 TABLET | Refills: 0 | Status: SHIPPED | OUTPATIENT
Start: 2022-10-25 | End: 2022-10-25

## 2022-10-25 RX ORDER — LEVOFLOXACIN 250 MG/1
250 TABLET ORAL DAILY
Qty: 5 TABLET | Refills: 0 | Status: SHIPPED | OUTPATIENT
Start: 2022-10-25 | End: 2022-10-27

## 2022-10-25 NOTE — TELEPHONE ENCOUNTER
Spoke to daughter, she is aware medication was sent in Benzoyl Peroxide Pregnancy And Lactation Text: This medication is Pregnancy Category C. It is unknown if benzoyl peroxide is excreted in breast milk.

## 2022-10-27 ENCOUNTER — TELEPHONE (OUTPATIENT)
Dept: FAMILY MEDICINE CLINIC | Facility: CLINIC | Age: 82
End: 2022-10-27

## 2022-10-27 NOTE — TELEPHONE ENCOUNTER
Patient has apt Oct 31  There is no blood work in system  Do you want her to have some? Please advise

## 2022-10-29 ENCOUNTER — RA CDI HCC (OUTPATIENT)
Dept: OTHER | Facility: HOSPITAL | Age: 82
End: 2022-10-29

## 2022-10-29 NOTE — PROGRESS NOTES
[facility] [address]  coding opportunities       Chart reviewed, no opportunity found: CHART REVIEWED, NO OPPORTUNITY FOUND        Patients Insurance     Medicare Insurance: [employer]

## 2022-10-31 ENCOUNTER — OFFICE VISIT (OUTPATIENT)
Dept: FAMILY MEDICINE CLINIC | Facility: CLINIC | Age: 82
End: 2022-10-31

## 2022-10-31 VITALS
BODY MASS INDEX: 26.58 KG/M2 | HEIGHT: 66 IN | WEIGHT: 165.4 LBS | OXYGEN SATURATION: 97 % | HEART RATE: 68 BPM | RESPIRATION RATE: 15 BRPM | DIASTOLIC BLOOD PRESSURE: 70 MMHG | SYSTOLIC BLOOD PRESSURE: 120 MMHG | TEMPERATURE: 96.8 F

## 2022-10-31 DIAGNOSIS — E78.5 HYPERLIPIDEMIA ASSOCIATED WITH TYPE 2 DIABETES MELLITUS (HCC): ICD-10-CM

## 2022-10-31 DIAGNOSIS — Z23 NEED FOR INFLUENZA VACCINATION: Primary | ICD-10-CM

## 2022-10-31 DIAGNOSIS — E78.5 DYSLIPIDEMIA: ICD-10-CM

## 2022-10-31 DIAGNOSIS — R00.2 PALPITATIONS: ICD-10-CM

## 2022-10-31 DIAGNOSIS — N39.0 CHRONIC UTI: ICD-10-CM

## 2022-10-31 DIAGNOSIS — E11.69 HYPERLIPIDEMIA ASSOCIATED WITH TYPE 2 DIABETES MELLITUS (HCC): ICD-10-CM

## 2022-10-31 DIAGNOSIS — K21.9 GASTROESOPHAGEAL REFLUX DISEASE WITHOUT ESOPHAGITIS: ICD-10-CM

## 2022-10-31 DIAGNOSIS — I10 ESSENTIAL HYPERTENSION: ICD-10-CM

## 2022-10-31 RX ORDER — AZILSARTAN KAMEDOXOMIL 40 MG/1
40 TABLET ORAL DAILY
Qty: 90 TABLET | Refills: 3 | Status: SHIPPED | OUTPATIENT
Start: 2022-10-31

## 2022-10-31 RX ORDER — AMLODIPINE BESYLATE 2.5 MG/1
2.5 TABLET ORAL DAILY
Qty: 90 TABLET | Refills: 3 | Status: SHIPPED | OUTPATIENT
Start: 2022-10-31

## 2022-10-31 RX ORDER — PANTOPRAZOLE SODIUM 40 MG/1
40 TABLET, DELAYED RELEASE ORAL DAILY
Qty: 90 TABLET | Refills: 3 | Status: SHIPPED | OUTPATIENT
Start: 2022-10-31

## 2022-10-31 RX ORDER — ROSUVASTATIN CALCIUM 5 MG/1
5 TABLET, COATED ORAL DAILY
Qty: 90 TABLET | Refills: 3 | Status: SHIPPED | OUTPATIENT
Start: 2022-10-31

## 2022-10-31 RX ORDER — PROPRANOLOL HCL 60 MG
60 CAPSULE, EXTENDED RELEASE 24HR ORAL DAILY
Qty: 90 CAPSULE | Refills: 3 | Status: SHIPPED | OUTPATIENT
Start: 2022-10-31

## 2022-10-31 RX ORDER — LANOLIN ALCOHOL/MO/W.PET/CERES
1000 CREAM (GRAM) TOPICAL DAILY
Qty: 90 TABLET | Refills: 3 | Status: SHIPPED | OUTPATIENT
Start: 2022-10-31

## 2022-10-31 NOTE — PROGRESS NOTES
Name: Johnny Goddard      : 1940      MRN: [id number]  Encounter Provider: Luann Taveras MD  Encounter Date: 10/31/2022   Encounter department: [address] [place] [address]     1  Need for influenza vaccination  -     influenza vaccine, high-dose, PF 0 7 mL (FLUZONE HIGH-DOSE)    2  Essential hypertension  -     amLODIPine (NORVASC) 2 5 mg tablet; Take 1 tablet (2 5 mg total) by mouth daily  -     azilsartan medoxomil (Edarbi) 40 MG tablet; Take 1 tablet (40 mg total) by mouth daily  -     propranolol (INDERAL LA) 60 mg 24 hr capsule; Take 1 capsule (60 mg total) by mouth daily Please stop Norvasc     -     vitamin B-12 (VITAMIN B-12) 1,000 mcg tablet; Take 1 tablet (1,000 mcg total) by mouth daily  -     UA (URINE) with reflex to Scope; Future; Expected date: 2022  -     Comprehensive metabolic panel; Future  -     CBC and differential; Future  -     Magnesium; Future  -     Lipid panel; Future  -     TSH, 3rd generation; Future; Expected date: 10/31/2022  -     T4, free; Future; Expected date: 10/31/2022  -     Vitamin B12; Future  -     Vitamin D 25 hydroxy; Future; Expected date: 10/31/2022    3  Palpitations  -     propranolol (INDERAL LA) 60 mg 24 hr capsule; Take 1 capsule (60 mg total) by mouth daily Please stop Norvasc     -     TSH, 3rd generation; Future; Expected date: 10/31/2022  -     T4, free; Future; Expected date: 10/31/2022  -     Vitamin B12; Future  -     Vitamin D 25 hydroxy; Future; Expected date: 10/31/2022    4  Dyslipidemia  -     rosuvastatin (CRESTOR) 5 mg tablet; Take 1 tablet (5 mg total) by mouth daily    5  Hyperlipidemia 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)  -     vitamin B-12 (VITAMIN B-12) 1,000 mcg tablet; Take 1 tablet (1,000 mcg total) by mouth daily  -     Lipid panel; Future    6  Gastroesophageal reflux disease without esophagitis  -     pantoprazole (PROTONIX) 40 mg tablet;  Take 1 tablet (40 mg total) by mouth daily  -     Vitamin B12; Future  -     Vitamin D 25 hydroxy; Future; Expected date: 10/31/2022    7  Chronic UTI  -     POCT urine dip  -     US kidney and bladder with pvr; Future; Expected date: 10/31/2022    Life style Mod  Consider Gyn-Urology  Continue and Renew same Meds  RTc in 3 mos w Blood  work       Subjective      79 y o lady is here for regular check up, she has few symptoms, recent blood work and med list reviewed w pt and her daughter in detail    Review of Systems   Constitutional: Negative for chills, fatigue and fever  HENT: Negative for congestion, facial swelling, sore throat, trouble swallowing and voice change  Eyes: Negative for pain, discharge and visual disturbance  Respiratory: Negative for cough, shortness of breath and wheezing  Cardiovascular: Negative for chest pain, palpitations and leg swelling  Gastrointestinal: Negative for abdominal pain, blood in stool, constipation, diarrhea and nausea  Endocrine: Negative for polydipsia, polyphagia and polyuria  Genitourinary: Positive for dysuria, frequency and urgency  Negative for difficulty urinating and hematuria  Musculoskeletal: Negative for arthralgias and myalgias  Skin: Negative for rash  Neurological: Negative for dizziness, tremors, weakness and headaches  Hematological: Negative for adenopathy  Does not bruise/bleed easily  Psychiatric/Behavioral: Negative for dysphoric mood, sleep disturbance and suicidal ideas         Current Outpatient Medications on File Prior to Visit   Medication Sig   • donepezil (ARICEPT) 10 mg tablet Take 1 tablet (10 mg total) by mouth daily In the Morning with Breakfast   • ergocalciferol (VITAMIN D2) 50,000 units Take 1 capsule (50,000 Units total) by mouth once a week   • escitalopram (LEXAPRO) 10 mg tablet Take 1 tablet (10 mg total) by mouth daily   • memantine (Namenda) 10 mg tablet Take 1 tablet (10 mg total) by mouth 2 (two) times a day   • QUEtiapine (SEROquel) 25 mg tablet TAKE ONE TABLET (25 MG TOTAL) BY MOUTH DAILY AT BEDTIME PLEASE STOP TRAZADONE   • [DISCONTINUED] amLODIPine (NORVASC) 2 5 mg tablet Take 1 tablet (2 5 mg total) by mouth daily   • [DISCONTINUED] azilsartan medoxomil (Edarbi) 40 MG tablet Take 1 tablet (40 mg total) by mouth daily   • [DISCONTINUED] pantoprazole (PROTONIX) 40 mg tablet Take 1 tablet (40 mg total) by mouth daily   • [DISCONTINUED] propranolol (INDERAL LA) 60 mg 24 hr capsule Take 1 capsule (60 mg total) by mouth daily Please stop Norvasc      • [DISCONTINUED] rosuvastatin (CRESTOR) 5 mg tablet Take 1 tablet (5 mg total) by mouth daily   • [DISCONTINUED] vitamin B-12 (VITAMIN B-12) 1,000 mcg tablet Take 1 tablet (1,000 mcg total) by mouth daily   • psyllium (METAMUCIL SMOOTH TEXTURE) 28 % packet Take 1 packet by mouth 2 (two) times a day   • [DISCONTINUED] clotrimazole-betamethasone (LOTRISONE) 1-0 05 % cream Apply topically 2 (two) times a day (Patient not taking: Reported on 7/20/2022)   • [DISCONTINUED] mometasone (NASONEX) 50 mcg/act nasal spray 2 sprays into each nostril daily       Objective     /70 (BP Location: Left arm, Patient Position: Sitting, Cuff Size: Large)   Pulse 68   Temp (!) 96 8 °F (36 °C) (Tympanic)   Resp 15   Ht 5' 6" (1 676 m)   Wt 75 kg (165 lb 6 4 oz)   SpO2 97%   BMI 26 70 kg/m²     Physical Exam  Constitutional:       General: She is not in acute distress  HENT:      Head: Normocephalic  Mouth/Throat:      Pharynx: No oropharyngeal exudate  Eyes:      General: No scleral icterus  Conjunctiva/sclera: Conjunctivae normal       Pupils: Pupils are equal, round, and reactive to light  Neck:      Thyroid: No thyromegaly  Cardiovascular:      Rate and Rhythm: Normal rate and regular rhythm  Heart sounds: Normal heart sounds  No murmur heard  Pulmonary:      Effort: Pulmonary effort is normal  No respiratory distress  Breath sounds: Normal breath sounds  No wheezing or rales  Abdominal:      General: Bowel sounds are normal  There is no distension  Palpations: Abdomen is soft  Tenderness: There is no abdominal tenderness  There is no guarding or rebound  Musculoskeletal:         General: No tenderness  Cervical back: Neck supple  Lymphadenopathy:      Cervical: No cervical adenopathy  Skin:     Coloration: Skin is not pale  Findings: No rash  Neurological:      Mental Status: She is alert and oriented to person, place, and time  Sensory: No sensory deficit  Motor: No weakness         Ivelisse eDlatorre MD

## 2022-10-31 NOTE — PROGRESS NOTES
BMI Counseling: Body mass index is 26 7 kg/m²  The BMI is above normal  Nutrition recommendations include reducing portion sizes and 3-5 servings of fruits/vegetables daily

## 2022-11-03 ENCOUNTER — APPOINTMENT (OUTPATIENT)
Dept: LAB | Age: 82
End: 2022-11-03

## 2022-11-03 DIAGNOSIS — E11.69 HYPERLIPIDEMIA ASSOCIATED WITH TYPE 2 DIABETES MELLITUS (HCC): ICD-10-CM

## 2022-11-03 DIAGNOSIS — E78.5 HYPERLIPIDEMIA ASSOCIATED WITH TYPE 2 DIABETES MELLITUS (HCC): ICD-10-CM

## 2022-11-03 DIAGNOSIS — R00.2 PALPITATIONS: ICD-10-CM

## 2022-11-03 DIAGNOSIS — I10 ESSENTIAL HYPERTENSION: ICD-10-CM

## 2022-11-03 DIAGNOSIS — K21.9 GASTROESOPHAGEAL REFLUX DISEASE WITHOUT ESOPHAGITIS: ICD-10-CM

## 2022-11-03 LAB
25(OH)D3 SERPL-MCNC: 86.8 NG/ML (ref 30–100)
ALBUMIN SERPL BCP-MCNC: 3.3 G/DL (ref 3.5–5)
ALP SERPL-CCNC: 79 U/L (ref 46–116)
ALT SERPL W P-5'-P-CCNC: 68 U/L (ref 12–78)
ANION GAP SERPL CALCULATED.3IONS-SCNC: 1 MMOL/L (ref 4–13)
AST SERPL W P-5'-P-CCNC: 27 U/L (ref 5–45)
BASOPHILS # BLD AUTO: 0.06 THOUSANDS/ÂΜL (ref 0–0.1)
BASOPHILS NFR BLD AUTO: 1 % (ref 0–1)
BILIRUB SERPL-MCNC: 0.58 MG/DL (ref 0.2–1)
BILIRUB UR QL STRIP: NEGATIVE
BUN SERPL-MCNC: 13 MG/DL (ref 5–25)
CALCIUM ALBUM COR SERPL-MCNC: 10 MG/DL (ref 8.3–10.1)
CALCIUM SERPL-MCNC: 9.4 MG/DL (ref 8.3–10.1)
CHLORIDE SERPL-SCNC: 105 MMOL/L (ref 96–108)
CHOLEST SERPL-MCNC: 200 MG/DL
CLARITY UR: CLEAR
CO2 SERPL-SCNC: 31 MMOL/L (ref 21–32)
COLOR UR: COLORLESS
CREAT SERPL-MCNC: 1.16 MG/DL (ref 0.6–1.3)
EOSINOPHIL # BLD AUTO: 0.18 THOUSAND/ÂΜL (ref 0–0.61)
EOSINOPHIL NFR BLD AUTO: 3 % (ref 0–6)
ERYTHROCYTE [DISTWIDTH] IN BLOOD BY AUTOMATED COUNT: 13.2 % (ref 11.6–15.1)
GFR SERPL CREATININE-BSD FRML MDRD: 43 ML/MIN/1.73SQ M
GLUCOSE P FAST SERPL-MCNC: 97 MG/DL (ref 65–99)
GLUCOSE UR STRIP-MCNC: NEGATIVE MG/DL
HCT VFR BLD AUTO: 38.6 % (ref 34.8–46.1)
HDLC SERPL-MCNC: 64 MG/DL
HGB BLD-MCNC: 12 G/DL (ref 11.5–15.4)
HGB UR QL STRIP.AUTO: NEGATIVE
IMM GRANULOCYTES # BLD AUTO: 0.01 THOUSAND/UL (ref 0–0.2)
IMM GRANULOCYTES NFR BLD AUTO: 0 % (ref 0–2)
KETONES UR STRIP-MCNC: NEGATIVE MG/DL
LDLC SERPL CALC-MCNC: 112 MG/DL (ref 0–100)
LEUKOCYTE ESTERASE UR QL STRIP: NEGATIVE
LYMPHOCYTES # BLD AUTO: 1.62 THOUSANDS/ÂΜL (ref 0.6–4.47)
LYMPHOCYTES NFR BLD AUTO: 27 % (ref 14–44)
MAGNESIUM SERPL-MCNC: 2 MG/DL (ref 1.6–2.6)
MCH RBC QN AUTO: 30 PG (ref 26.8–34.3)
MCHC RBC AUTO-ENTMCNC: 31.1 G/DL (ref 31.4–37.4)
MCV RBC AUTO: 97 FL (ref 82–98)
MONOCYTES # BLD AUTO: 0.59 THOUSAND/ÂΜL (ref 0.17–1.22)
MONOCYTES NFR BLD AUTO: 10 % (ref 4–12)
NEUTROPHILS # BLD AUTO: 3.46 THOUSANDS/ÂΜL (ref 1.85–7.62)
NEUTS SEG NFR BLD AUTO: 59 % (ref 43–75)
NITRITE UR QL STRIP: NEGATIVE
NONHDLC SERPL-MCNC: 136 MG/DL
NRBC BLD AUTO-RTO: 0 /100 WBCS
PH UR STRIP.AUTO: 6 [PH]
PLATELET # BLD AUTO: 223 THOUSANDS/UL (ref 149–390)
PMV BLD AUTO: 12 FL (ref 8.9–12.7)
POTASSIUM SERPL-SCNC: 4.1 MMOL/L (ref 3.5–5.3)
PROT SERPL-MCNC: 7.3 G/DL (ref 6.4–8.4)
PROT UR STRIP-MCNC: NEGATIVE MG/DL
RBC # BLD AUTO: 4 MILLION/UL (ref 3.81–5.12)
SODIUM SERPL-SCNC: 137 MMOL/L (ref 135–147)
SP GR UR STRIP.AUTO: 1.01 (ref 1–1.03)
T4 FREE SERPL-MCNC: 1.04 NG/DL (ref 0.76–1.46)
TRIGL SERPL-MCNC: 122 MG/DL
TSH SERPL DL<=0.05 MIU/L-ACNC: 5.27 UIU/ML (ref 0.45–4.5)
UROBILINOGEN UR STRIP-ACNC: <2 MG/DL
VIT B12 SERPL-MCNC: 875 PG/ML (ref 100–900)
WBC # BLD AUTO: 5.92 THOUSAND/UL (ref 4.31–10.16)

## 2022-11-04 ENCOUNTER — TELEPHONE (OUTPATIENT)
Dept: FAMILY MEDICINE CLINIC | Facility: CLINIC | Age: 82
End: 2022-11-04

## 2022-11-04 NOTE — TELEPHONE ENCOUNTER
Patient's daughter is asking about UA results, to see if she has a UTI and if she does, to call something in before the weekend  Please advise

## 2022-11-15 ENCOUNTER — HOSPITAL ENCOUNTER (OUTPATIENT)
Dept: ULTRASOUND IMAGING | Facility: MEDICAL CENTER | Age: 82
Discharge: HOME | End: 2022-11-15

## 2022-11-15 DIAGNOSIS — N39.0 CHRONIC UTI: ICD-10-CM

## 2022-11-17 ENCOUNTER — HOSPITAL ENCOUNTER (EMERGENCY)
Facility: HOSPITAL | Age: 82
Discharge: HOME | End: 2022-11-17
Attending: EMERGENCY MEDICINE

## 2022-11-17 VITALS
TEMPERATURE: 98.7 F | RESPIRATION RATE: 16 BRPM | SYSTOLIC BLOOD PRESSURE: 205 MMHG | DIASTOLIC BLOOD PRESSURE: 87 MMHG | OXYGEN SATURATION: 98 % | HEART RATE: 62 BPM

## 2022-11-17 DIAGNOSIS — L03.113 CELLULITIS OF RIGHT UPPER EXTREMITY: ICD-10-CM
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
## 2022-11-17 RX ORDER — AMOXICILLIN AND CLAVULANATE POTASSIUM 875; 125 MG/1; MG/1
1 TABLET, FILM COATED ORAL ONCE
Status: COMPLETED | OUTPATIENT
Start: 2022-11-17 | End: 2022-11-17

## 2022-11-17 RX ORDER — AMOXICILLIN AND CLAVULANATE POTASSIUM 875; 125 MG/1; MG/1
1 TABLET, FILM COATED ORAL EVERY 12 HOURS
Qty: 14 TABLET | Refills: 0 | Status: SHIPPED | OUTPATIENT
Start: 2022-11-17 | End: 2022-11-24

## 2022-11-17 RX ADMIN — AMOXICILLIN AND CLAVULANATE POTASSIUM 1 TABLET: 875; 125 TABLET, FILM COATED ORAL at 18:48

## 2022-11-17 NOTE — ED PROVIDER NOTES
Chief Complaint   Patient presents with   • Cat Bite     Pt reports being bit on her right hand by the neighbors cat at 9:30 am trying to get the cat into the cage  Pt used peroxide and neosporin at home  History of Present Illness   This is a 79 y o  female PMH significant for HTN, HLD, OA coming in today with complaint of cat bite  She reports that this occurred at approximately 9:30 a m  This morning  She was bit by her neighbor's cat on the right hand  She reports associated swelling and redness at the site as well as pain  No alleviating factors  She does report the cat is up-to-date on vaccinations and it is her neighbor's cat and she reports the cat would be able to be monitored for the next 10 days  She otherwise denies any fevers, chest pain, shortness of breath, abdominal pain, leg swelling  She denies any history of diabetes or smoking or immunosuppression  No other complaints at this time     - No language barrier    - History obtained from patient and chart   - Reviewed relevant past medical/family/social history  - There are no limitations to the history obtained  Past Medical, Past Surgical History:    has a past medical history of Anxiety, Gastroesophageal reflux disease without esophagitis (8/28/2018), Hyperlipidemia, Hypertension, Hypothyroidism, and Osteoarthritis (10/5/2012)  has a past surgical history that includes Hysterectomy; Cholecystectomy; Cataract extraction; and Colonoscopy  Allergies: Allergies   Allergen Reactions   • No Known Allergies        Social and Family History:     Social History     Substance and Sexual Activity   Alcohol Use No     Social History     Tobacco Use   Smoking Status Never   Smokeless Tobacco Never     Social History     Substance and Sexual Activity   Drug Use No       Review of Systems:   Review of Systems   Constitutional: Negative for chills and fever  HENT: Negative for sore throat      Eyes: Negative for pain and visual disturbance  Respiratory: Negative for cough and shortness of breath  Cardiovascular: Negative for chest pain and palpitations  Gastrointestinal: Negative for abdominal pain and vomiting  Genitourinary: Negative for dysuria and hematuria  Musculoskeletal: Negative for back pain  Skin: Positive for wound  Negative for rash  Neurological: Negative for syncope  All other systems reviewed and are negative  Physical Examination     Vitals:    11/17/22 1818   BP: (!) 205/87   BP Location: Left arm   Pulse: 62   Resp: 16   Temp: 98 7 °F (37 1 °C)   TempSrc: Oral   SpO2: 98%     Vitals reviewed by me  Physical Exam  Vitals and nursing note reviewed  Constitutional:       General: She is not in acute distress  Appearance: She is well-developed  HENT:      Head: Normocephalic and atraumatic  Eyes:      Conjunctiva/sclera: Conjunctivae normal    Cardiovascular:      Rate and Rhythm: Normal rate and regular rhythm  Heart sounds: No murmur heard  Pulmonary:      Effort: Pulmonary effort is normal  No respiratory distress  Breath sounds: Normal breath sounds  Abdominal:      Palpations: Abdomen is soft  Tenderness: There is no abdominal tenderness  Musculoskeletal:         General: No swelling  Cervical back: Neck supple  Skin:     General: Skin is warm and dry  Capillary Refill: Capillary refill takes less than 2 seconds  Findings: Lesion present  Comments: R dorsal hand bite marks evident with surrounding erythema and edema, no tenosynovitis or lymphangitic tracing, no crepitus, ROM intact, n/v intact   Neurological:      General: No focal deficit present  Mental Status: She is alert  Psychiatric:         Mood and Affect: Mood normal             Risk Stratification Tools                No orders to display     No orders of the defined types were placed in this encounter        Labs:   Labs Reviewed - No data to display    Imaging:     No orders to display            Procedures   Procedures      MDM:   Miguel Han is a 79 y o  who presents with complaints of cat bite    Vital signs are remarkable for hypertension, physical exam shows wound as per above, overall well appearing no evidence of systemic disease    Ddx: Cat bite with concurrent cellulitic changes    Plan: Trial of Augmentin PO, TDap UTD in 2018 per chart review  Will photograph the wound and advise on strict return precautions  If any worsening, would require IV Abx and admission in that case  No indications for rabies shot at this time, advised on close monitoring of the cat and return precautions regarding this as well  MDM  Number of Diagnoses or Management Options  Cat bite, initial encounter: new, no workup  Cellulitis of right upper extremity: new, no workup  Risk of Complications, Morbidity, and/or Mortality  Presenting problems: low  Diagnostic procedures: minimal  Management options: low    Patient Progress  Patient progress: stable    Final Dispo   Discharge    Final Diagnosis:  1  Cat bite, initial encounter    2  Cellulitis of right upper extremity          Medications   amoxicillin-clavulanate (AUGMENTIN) 875-125 mg per tablet 1 tablet (1 tablet Oral Given 11/17/22 1848)     Time reflects when diagnosis was documented in both MDM as applicable and the Disposition within this note     Time User Action Codes Description Comment    11/17/2022  6:43 PM Londonette Heading Add [W55 01XA] Cat bite, initial encounter     11/17/2022  6:43 PM Neris Heading Add [N77 121] Cellulitis of right upper extremity       ED Disposition     ED Disposition   Discharge    Condition   Stable    Date/Time   Thu Nov 17, 2022  6:43 PM    Comment   Miguel Han discharge to home/self care                 Follow-up Information     Follow up With Specialties Details Why Contact Info Additional Information    Roberto Mc MD Internal Medicine Call   [address] [place] [address] [place]   [phone] [facility] Emergency Department Emergency Medicine  If symptoms worsen [facility] [id number]  [address] [facility] [place] [facility], [address]  , [facility] [address]        Discharge Medication List as of 11/17/2022  6:44 PM      START taking these medications    Details   amoxicillin-clavulanate (AUGMENTIN) 875-125 mg per tablet Take 1 tablet by mouth every 12 (twelve) hours for 7 days, Starting Thu 11/17/2022, Until Thu 11/24/2022, Normal         CONTINUE these medications which have NOT CHANGED    Details   amLODIPine (NORVASC) 2 5 mg tablet Take 1 tablet (2 5 mg total) by mouth daily, Starting Mon 10/31/2022, Normal      azilsartan medoxomil (Edarbi) 40 MG tablet Take 1 tablet (40 mg total) by mouth daily, Starting Mon 10/31/2022, Normal      donepezil (ARICEPT) 10 mg tablet Take 1 tablet (10 mg total) by mouth daily In the Morning with Breakfast, Starting Mon 3/14/2022, Normal      ergocalciferol (VITAMIN D2) 50,000 units Take 1 capsule (50,000 Units total) by mouth once a week, Starting Wed 7/20/2022, Normal      escitalopram (LEXAPRO) 10 mg tablet Take 1 tablet (10 mg total) by mouth daily, Starting Wed 7/20/2022, Normal      memantine (Namenda) 10 mg tablet Take 1 tablet (10 mg total) by mouth 2 (two) times a day, Starting Wed 3/30/2022, Normal      pantoprazole (PROTONIX) 40 mg tablet Take 1 tablet (40 mg total) by mouth daily, Starting Mon 10/31/2022, Normal      propranolol (INDERAL LA) 60 mg 24 hr capsule Take 1 capsule (60 mg total) by mouth daily Please stop Norvasc   , Starting Mon 10/31/2022, Normal      psyllium (METAMUCIL SMOOTH TEXTURE) 28 % packet Take 1 packet by mouth 2 (two) times a day, Starting Thu 11/11/2021, Until Tue 4/19/2022, Normal      QUEtiapine (SEROquel) 25 mg tablet TAKE ONE TABLET (25 MG TOTAL) BY MOUTH DAILY AT BEDTIME PLEASE STOP TRAZADONE, Normal      rosuvastatin (CRESTOR) 5 mg tablet Take 1 tablet (5 mg total) by mouth daily, Starting Mon 10/31/2022, Normal      vitamin B-12 (VITAMIN B-12) 1,000 mcg tablet Take 1 tablet (1,000 mcg total) by mouth daily, Starting Mon 10/31/2022, Normal           No discharge procedures on file  Prior to Admission Medications   Prescriptions Last Dose Informant Patient Reported? Taking? QUEtiapine (SEROquel) 25 mg tablet   No No   Sig: TAKE ONE TABLET (25 MG TOTAL) BY MOUTH DAILY AT BEDTIME PLEASE STOP TRAZADONE   amLODIPine (NORVASC) 2 5 mg tablet   No No   Sig: Take 1 tablet (2 5 mg total) by mouth daily   azilsartan medoxomil (Edarbi) 40 MG tablet   No No   Sig: Take 1 tablet (40 mg total) by mouth daily   donepezil (ARICEPT) 10 mg tablet   No No   Sig: Take 1 tablet (10 mg total) by mouth daily In the Morning with Breakfast   ergocalciferol (VITAMIN D2) 50,000 units   No No   Sig: Take 1 capsule (50,000 Units total) by mouth once a week   escitalopram (LEXAPRO) 10 mg tablet   No No   Sig: Take 1 tablet (10 mg total) by mouth daily   memantine (Namenda) 10 mg tablet   No No   Sig: Take 1 tablet (10 mg total) by mouth 2 (two) times a day   pantoprazole (PROTONIX) 40 mg tablet   No No   Sig: Take 1 tablet (40 mg total) by mouth daily   propranolol (INDERAL LA) 60 mg 24 hr capsule   No No   Sig: Take 1 capsule (60 mg total) by mouth daily Please stop Norvasc    psyllium (METAMUCIL SMOOTH TEXTURE) 28 % packet   No No   Sig: Take 1 packet by mouth 2 (two) times a day   rosuvastatin (CRESTOR) 5 mg tablet   No No   Sig: Take 1 tablet (5 mg total) by mouth daily   vitamin B-12 (VITAMIN B-12) 1,000 mcg tablet   No No   Sig: Take 1 tablet (1,000 mcg total) by mouth daily      Facility-Administered Medications: None         All details of the evaluation and treatment plan were made clear and additionally all questions and concerns were addressed while under my care  Portions of the record may have been created with voice recognition software   Occasional wrong word or "sound a like" substitutions may have occurred due to the inherent limitations of voice recognition software  Read the chart carefully and recognize, using context, where substitutions have occurred  The attending physician physically available and evaluated the above patient alongside myself          Lupis Evans MD  11/17/22 2123

## 2022-11-17 NOTE — DISCHARGE INSTRUCTIONS
Please take your antibiotics for 7 days  Use ice and Tylenol for symptomatic care  Please monitor the cat for 10 days, if any behavioral changes concerning for rabies, you will require a rabies vaccine       Please follow up with your Primary care physician

## 2022-11-17 NOTE — ED ATTENDING ATTESTATION
11/17/2022  IShanta DO, saw and evaluated the patient  I have discussed the patient with the resident/non-physician practitioner and agree with the resident's/non-physician practitioner's findings, Plan of Care, and MDM as documented in the resident's/non-physician practitioner's note, except where noted  All available labs and Radiology studies were reviewed  I was present for key portions of any procedure(s) performed by the resident/non-physician practitioner and I was immediately available to provide assistance  At this point I agree with the current assessment done in the Emergency Department  I have conducted an independent evaluation of this patient a history and physical is as follows:    ED Course      Patient is an 26-year-old female not diabetic not on steroids or on immunosuppression coming in today after her neighbor's cat had bit her early this morning around 930  Patient's well-known to this can states that the cat's monos have her immunizations up-to-date  She noticed swelling and warmth to that right hand  She has resigned predominant    On exam patient does have erythema and swelling to the right dorsal surface  There is multiple areas of open wounds that are less than a cm  Neurovascular intact  Active range of motion full throughout the right hand  No streaking past the wrist   Patient afebrile hemodynamically stable alert oriented x3, cranial nerves 2 through to grossly intact  Patient no respiratory distress  Patient's tetanus shot up-to-date  Long discussion with patient and family at bedside trial outpatient antibiotics  If any extension of swelling and warmth, return to ER instructions given as well as any other concerning symptoms  Will give 1st dose of Augmentin DC      Disclosure: Voice to text software was used in the preparation of this document and could have resulted in translational errors        Occasional wrong word or "sound a like" substitutions may have occurred due to the inherent limitations of voice recognition software  Read the chart carefully and recognize, using context, where substitutions have occurred               Critical Care Time  Procedures

## 2022-11-28 DIAGNOSIS — I10 ESSENTIAL HYPERTENSION: ICD-10-CM

## 2022-11-28 RX ORDER — AZILSARTAN KAMEDOXOMIL 40 MG/1
40 TABLET ORAL DAILY
Qty: 90 TABLET | Refills: 3 | Status: SHIPPED | OUTPATIENT
Start: 2022-11-28 | End: 2022-11-29 | Stop reason: SDUPTHER

## 2022-11-29 DIAGNOSIS — I10 ESSENTIAL HYPERTENSION: ICD-10-CM

## 2022-11-29 RX ORDER — AZILSARTAN KAMEDOXOMIL 40 MG/1
40 TABLET ORAL DAILY
Qty: 90 TABLET | Refills: 3 | Status: SHIPPED | OUTPATIENT
Start: 2022-11-29

## 2022-12-02 ENCOUNTER — TELEPHONE (OUTPATIENT)
Dept: FAMILY MEDICINE CLINIC | Facility: CLINIC | Age: 82
End: 2022-12-02

## 2022-12-02 DIAGNOSIS — N39.0 CHRONIC UTI: Primary | ICD-10-CM

## 2022-12-02 RX ORDER — NITROFURANTOIN 25; 75 MG/1; MG/1
100 CAPSULE ORAL
Qty: 30 CAPSULE | Refills: 0 | Status: SHIPPED | OUTPATIENT
Start: 2022-12-02

## 2022-12-02 NOTE — TELEPHONE ENCOUNTER
Patient is c/o UTI symptoms again  Pain, burning when urinating  X 2 days  They are asking if you can medication to pharmacy  please advise

## 2023-01-10 DIAGNOSIS — R00.2 PALPITATIONS: ICD-10-CM

## 2023-01-10 DIAGNOSIS — I10 ESSENTIAL HYPERTENSION: ICD-10-CM

## 2023-01-10 RX ORDER — PROPRANOLOL HCL 60 MG
60 CAPSULE, EXTENDED RELEASE 24HR ORAL DAILY
Qty: 90 CAPSULE | Refills: 3 | Status: SHIPPED | OUTPATIENT
Start: 2023-01-10

## 2023-01-23 DIAGNOSIS — F32.A DEPRESSION, UNSPECIFIED DEPRESSION TYPE: ICD-10-CM

## 2023-01-23 DIAGNOSIS — R00.2 PALPITATIONS: ICD-10-CM

## 2023-01-23 DIAGNOSIS — G47.00 INSOMNIA, UNSPECIFIED TYPE: ICD-10-CM

## 2023-01-23 RX ORDER — ESCITALOPRAM OXALATE 10 MG/1
TABLET ORAL
Qty: 90 TABLET | Refills: 1 | Status: SHIPPED | OUTPATIENT
Start: 2023-01-23

## 2023-01-23 RX ORDER — QUETIAPINE FUMARATE 25 MG/1
TABLET, FILM COATED ORAL
Qty: 90 TABLET | Refills: 1 | Status: SHIPPED | OUTPATIENT
Start: 2023-01-23

## 2023-01-25 DIAGNOSIS — E78.5 DYSLIPIDEMIA: ICD-10-CM

## 2023-01-25 DIAGNOSIS — I10 ESSENTIAL HYPERTENSION: ICD-10-CM

## 2023-01-25 RX ORDER — AMLODIPINE BESYLATE 2.5 MG/1
2.5 TABLET ORAL DAILY
Qty: 90 TABLET | Refills: 3 | Status: SHIPPED | OUTPATIENT
Start: 2023-01-25 | End: 2023-01-31 | Stop reason: SDUPTHER

## 2023-01-25 RX ORDER — ROSUVASTATIN CALCIUM 5 MG/1
5 TABLET, COATED ORAL DAILY
Qty: 90 TABLET | Refills: 3 | Status: SHIPPED | OUTPATIENT
Start: 2023-01-25 | End: 2023-01-31 | Stop reason: SDUPTHER

## 2023-01-28 ENCOUNTER — RA CDI HCC (OUTPATIENT)
Dept: OTHER | Facility: HOSPITAL | Age: 83
End: 2023-01-28

## 2023-01-28 NOTE — PROGRESS NOTES
[facility] 75  coding opportunities     E11 22     Chart Reviewed number of suggestions sent to Provider: 1     Patients Insurance     Medicare Insurance: [employer]

## 2023-01-31 ENCOUNTER — OFFICE VISIT (OUTPATIENT)
Dept: FAMILY MEDICINE CLINIC | Facility: CLINIC | Age: 83
End: 2023-01-31

## 2023-01-31 VITALS
TEMPERATURE: 97.1 F | WEIGHT: 163 LBS | HEIGHT: 66 IN | RESPIRATION RATE: 14 BRPM | SYSTOLIC BLOOD PRESSURE: 116 MMHG | HEART RATE: 68 BPM | BODY MASS INDEX: 26.2 KG/M2 | OXYGEN SATURATION: 96 % | DIASTOLIC BLOOD PRESSURE: 68 MMHG

## 2023-01-31 DIAGNOSIS — M17.0 OSTEOARTHRITIS OF BOTH KNEES, UNSPECIFIED OSTEOARTHRITIS TYPE: ICD-10-CM

## 2023-01-31 DIAGNOSIS — F32.A DEPRESSION, UNSPECIFIED DEPRESSION TYPE: ICD-10-CM

## 2023-01-31 DIAGNOSIS — E11.69 HYPERLIPIDEMIA ASSOCIATED WITH TYPE 2 DIABETES MELLITUS (HCC): ICD-10-CM

## 2023-01-31 DIAGNOSIS — R00.2 PALPITATIONS: ICD-10-CM

## 2023-01-31 DIAGNOSIS — I10 ESSENTIAL HYPERTENSION: ICD-10-CM

## 2023-01-31 DIAGNOSIS — R79.89 LOW VITAMIN D LEVEL: ICD-10-CM

## 2023-01-31 DIAGNOSIS — K21.9 GASTROESOPHAGEAL REFLUX DISEASE WITHOUT ESOPHAGITIS: ICD-10-CM

## 2023-01-31 DIAGNOSIS — E78.5 DYSLIPIDEMIA: ICD-10-CM

## 2023-01-31 DIAGNOSIS — E78.5 HYPERLIPIDEMIA ASSOCIATED WITH TYPE 2 DIABETES MELLITUS (HCC): ICD-10-CM

## 2023-01-31 DIAGNOSIS — M81.8 IDIOPATHIC OSTEOPOROSIS: Primary | ICD-10-CM

## 2023-01-31 DIAGNOSIS — M35.3 PMR (POLYMYALGIA RHEUMATICA) (HCC): ICD-10-CM

## 2023-01-31 LAB
SL AMB  POCT GLUCOSE, UA: ABNORMAL
SL AMB LEUKOCYTE ESTERASE,UA: ABNORMAL
SL AMB POCT BILIRUBIN,UA: ABNORMAL
SL AMB POCT BLOOD,UA: ABNORMAL
SL AMB POCT CLARITY,UA: CLEAR
SL AMB POCT COLOR,UA: YELLOW
SL AMB POCT KETONES,UA: ABNORMAL
SL AMB POCT NITRITE,UA: ABNORMAL
SL AMB POCT PH,UA: 5
SL AMB POCT SPECIFIC GRAVITY,UA: 1.02
SL AMB POCT URINE PROTEIN: ABNORMAL
SL AMB POCT UROBILINOGEN: 0.2

## 2023-01-31 RX ORDER — ERGOCALCIFEROL 1.25 MG/1
50000 CAPSULE ORAL WEEKLY
Qty: 13 CAPSULE | Refills: 1 | Status: SHIPPED | OUTPATIENT
Start: 2023-01-31

## 2023-01-31 RX ORDER — AMLODIPINE BESYLATE 2.5 MG/1
2.5 TABLET ORAL DAILY
Qty: 90 TABLET | Refills: 3 | Status: SHIPPED | OUTPATIENT
Start: 2023-01-31 | End: 2023-01-31 | Stop reason: SDUPTHER

## 2023-01-31 RX ORDER — AMLODIPINE BESYLATE 2.5 MG/1
2.5 TABLET ORAL DAILY
Qty: 90 TABLET | Refills: 3 | Status: SHIPPED | OUTPATIENT
Start: 2023-01-31

## 2023-01-31 RX ORDER — PANTOPRAZOLE SODIUM 40 MG/1
40 TABLET, DELAYED RELEASE ORAL DAILY
Qty: 90 TABLET | Refills: 3 | Status: SHIPPED | OUTPATIENT
Start: 2023-01-31 | End: 2023-01-31 | Stop reason: SDUPTHER

## 2023-01-31 RX ORDER — ROSUVASTATIN CALCIUM 5 MG/1
5 TABLET, COATED ORAL DAILY
Qty: 90 TABLET | Refills: 3 | Status: SHIPPED | OUTPATIENT
Start: 2023-01-31

## 2023-01-31 RX ORDER — LANOLIN ALCOHOL/MO/W.PET/CERES
1000 CREAM (GRAM) TOPICAL DAILY
Qty: 90 TABLET | Refills: 3 | Status: SHIPPED | OUTPATIENT
Start: 2023-01-31

## 2023-01-31 RX ORDER — PANTOPRAZOLE SODIUM 40 MG/1
40 TABLET, DELAYED RELEASE ORAL DAILY
Qty: 90 TABLET | Refills: 3 | Status: SHIPPED | OUTPATIENT
Start: 2023-01-31

## 2023-01-31 RX ORDER — LANOLIN ALCOHOL/MO/W.PET/CERES
1000 CREAM (GRAM) TOPICAL DAILY
Qty: 90 TABLET | Refills: 3 | Status: SHIPPED | OUTPATIENT
Start: 2023-01-31 | End: 2023-01-31 | Stop reason: SDUPTHER

## 2023-01-31 RX ORDER — ROSUVASTATIN CALCIUM 5 MG/1
5 TABLET, COATED ORAL DAILY
Qty: 90 TABLET | Refills: 3 | Status: SHIPPED | OUTPATIENT
Start: 2023-01-31 | End: 2023-01-31 | Stop reason: SDUPTHER

## 2023-01-31 RX ORDER — AZILSARTAN KAMEDOXOMIL 40 MG/1
40 TABLET ORAL DAILY
Qty: 90 TABLET | Refills: 3 | Status: SHIPPED | OUTPATIENT
Start: 2023-01-31

## 2023-01-31 RX ORDER — PROPRANOLOL HCL 60 MG
60 CAPSULE, EXTENDED RELEASE 24HR ORAL DAILY
Qty: 90 CAPSULE | Refills: 3 | Status: SHIPPED | OUTPATIENT
Start: 2023-01-31 | End: 2023-01-31 | Stop reason: SDUPTHER

## 2023-01-31 RX ORDER — AZILSARTAN KAMEDOXOMIL 40 MG/1
40 TABLET ORAL DAILY
Qty: 90 TABLET | Refills: 3 | Status: SHIPPED | OUTPATIENT
Start: 2023-01-31 | End: 2023-01-31 | Stop reason: SDUPTHER

## 2023-01-31 RX ORDER — PROPRANOLOL HCL 60 MG
60 CAPSULE, EXTENDED RELEASE 24HR ORAL DAILY
Qty: 90 CAPSULE | Refills: 3 | Status: SHIPPED | OUTPATIENT
Start: 2023-01-31

## 2023-01-31 RX ORDER — ERGOCALCIFEROL 1.25 MG/1
50000 CAPSULE ORAL WEEKLY
Qty: 13 CAPSULE | Refills: 1 | Status: SHIPPED | OUTPATIENT
Start: 2023-01-31 | End: 2023-01-31 | Stop reason: SDUPTHER

## 2023-01-31 NOTE — PROGRESS NOTES
BMI Counseling: Body mass index is 26 31 kg/m²  The BMI is above normal  Nutrition recommendations include reducing portion sizes

## 2023-01-31 NOTE — PROGRESS NOTES
Name: Simon Eller      : 1940      MRN: [id number]  Encounter Provider: Woody Lamar MD  Encounter Date: 2023   Encounter department: [address] [place] [address]     1  Idiopathic osteoporosis  -     denosumab (PROLIA) 60 mg/mL; Inject 1 mL (60 mg total) under the skin once for 1 dose    2  Essential hypertension  -     POCT urine dip  -     UA (URINE) with reflex to Scope; Future; Expected date: 2023  -     Comprehensive metabolic panel; Future  -     CBC and differential; Future  -     Lipid panel; Future  -     TSH, 3rd generation; Future; Expected date: 2023  -     T4, free; Future; Expected date: 2023    3  Low vitamin D level    4  Gastroesophageal reflux disease without esophagitis    5  Palpitations  -     UA (URINE) with reflex to Scope; Future; Expected date: 2023  -     Comprehensive metabolic panel; Future  -     CBC and differential; Future  -     Lipid panel; Future  -     TSH, 3rd generation; Future; Expected date: 2023  -     T4, free; Future; Expected date: 2023    6  Dyslipidemia    7  Hyperlipidemia 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)  -     POCT urine dip  -     UA (URINE) with reflex to Scope; Future; Expected date: 2023  -     Comprehensive metabolic panel; Future  -     CBC and differential; Future  -     Lipid panel; Future  -     TSH, 3rd generation; Future; Expected date: 2023  -     T4, free; Future; Expected date: 2023    8  PMR (polymyalgia rheumatica) (HCC)    9  Osteoarthritis of both knees, unspecified osteoarthritis type  -     XR knee 3 vw left non injury; Future; Expected date: 2023  -     XR knee 3 vw right non injury; Future; Expected date: 2023  -     Diclofenac Sodium (VOLTAREN) 1 %;  Apply 2 g topically 4 (four) times a day    continue and Renew Meds  RTC in 3 mos w Blood work       Subjective      79 y O lady is here for Regular Check Up, she feels ok, no recent blood work, Med list reviewed w Pt in Detail, she is here with her daughter,  Review of Systems   Constitutional: Negative for chills, fatigue and fever  HENT: Negative for congestion, facial swelling, sore throat, trouble swallowing and voice change  Eyes: Negative for pain, discharge and visual disturbance  Respiratory: Negative for cough, shortness of breath and wheezing  Cardiovascular: Negative for chest pain, palpitations and leg swelling  Gastrointestinal: Negative for abdominal pain, blood in stool, constipation, diarrhea and nausea  Endocrine: Negative for polydipsia, polyphagia and polyuria  Genitourinary: Negative for difficulty urinating, hematuria and urgency  Musculoskeletal: Negative for arthralgias and myalgias  Skin: Negative for rash  Neurological: Negative for dizziness, tremors, weakness and headaches  Hematological: Negative for adenopathy  Does not bruise/bleed easily  Psychiatric/Behavioral: Negative for dysphoric mood, sleep disturbance and suicidal ideas         Current Outpatient Medications on File Prior to Visit   Medication Sig   • donepezil (ARICEPT) 10 mg tablet Take 1 tablet (10 mg total) by mouth daily In the Morning with Breakfast   • escitalopram (LEXAPRO) 10 mg tablet TAKE ONE TABLET (10 MG TOTAL) BY MOUTH DAILY   • memantine (Namenda) 10 mg tablet Take 1 tablet (10 mg total) by mouth 2 (two) times a day   • nitrofurantoin (MACROBID) 100 mg capsule Take 1 capsule (100 mg total) by mouth daily with lunch   • QUEtiapine (SEROquel) 25 mg tablet TAKE ONE TABLET BY MOUTH DAILY AT BEDTIME  PLEASE STOP TRAZADONE   • [DISCONTINUED] amLODIPine (NORVASC) 2 5 mg tablet Take 1 tablet (2 5 mg total) by mouth daily   • [DISCONTINUED] azilsartan medoxomil (Edarbi) 40 MG tablet Take 1 tablet (40 mg total) by mouth daily   • [DISCONTINUED] ergocalciferol (VITAMIN D2) 50,000 units Take 1 capsule (50,000 Units total) by mouth once a week   • [DISCONTINUED] pantoprazole (PROTONIX) 40 mg tablet Take 1 tablet (40 mg total) by mouth daily   • [DISCONTINUED] propranolol (INDERAL LA) 60 mg 24 hr capsule Take 1 capsule (60 mg total) by mouth daily Please stop Norvasc      • [DISCONTINUED] rosuvastatin (CRESTOR) 5 mg tablet Take 1 tablet (5 mg total) by mouth daily   • [DISCONTINUED] vitamin B-12 (VITAMIN B-12) 1,000 mcg tablet Take 1 tablet (1,000 mcg total) by mouth daily   • [DISCONTINUED] psyllium (METAMUCIL SMOOTH TEXTURE) 28 % packet Take 1 packet by mouth 2 (two) times a day       Objective     /68 (BP Location: Left arm, Patient Position: Sitting, Cuff Size: Standard)   Pulse 68   Temp (!) 97 1 °F (36 2 °C) (Tympanic)   Resp 14   Ht 5' 6" (1 676 m)   Wt 73 9 kg (163 lb)   SpO2 96%   BMI 26 31 kg/m²     Physical Exam  Constitutional:       General: She is not in acute distress  HENT:      Head: Normocephalic  Mouth/Throat:      Pharynx: No oropharyngeal exudate  Eyes:      General: No scleral icterus  Conjunctiva/sclera: Conjunctivae normal       Pupils: Pupils are equal, round, and reactive to light  Neck:      Thyroid: No thyromegaly  Cardiovascular:      Rate and Rhythm: Normal rate and regular rhythm  Heart sounds: Murmur heard  Pulmonary:      Effort: Pulmonary effort is normal  No respiratory distress  Breath sounds: Normal breath sounds  No wheezing or rales  Abdominal:      General: Bowel sounds are normal  There is no distension  Palpations: Abdomen is soft  Tenderness: There is no abdominal tenderness  There is no guarding or rebound  Musculoskeletal:         General: No tenderness  Cervical back: Neck supple  Lymphadenopathy:      Cervical: No cervical adenopathy  Skin:     Coloration: Skin is not pale  Findings: No rash  Neurological:      Mental Status: She is alert and oriented to person, place, and time  Sensory: No sensory deficit  Motor: No weakness  Wanda Mena MD

## 2023-01-31 NOTE — PATIENT INSTRUCTIONS
Osteoporosis Education   Osteoporosis  is a long-term medical condition that causes your bones to become weak, brittle, and more likely to fracture  Osteoporosis occurs when your body absorbs more bone than it makes  It is also caused by a lack of calcium and estrogen (female hormone)  Common symptoms include the following: You may not have any signs or symptoms  You may break a bone after a muscle strain, bump, or fall  A break usually occurs in the hip, spine, or wrist  A collapsed vertebra (bone in your spine) may cause severe back pain or loss of height from bent posture  Call your doctor if:   ·  You have severe pain  ·  You have increasing pain after a fall  ·  You have pain when you do your daily activities  ·  You have questions or concerns about your condition or care  Diagnosis of osteoporosis:   · Blood and urine tests  measure your calcium, vitamin D, and estrogen levels  · An x-ray or CT may show thinned bones or a fracture  You may be given contrast liquid to help the bones show up better in the pictures  Tell the healthcare provider if you have ever had an allergic reaction to contrast liquid  Do not enter the MRI room with anything metal  Metal can cause serious injury  Tell the healthcare provider if you have any metal in or on your body  · A bone density test  compares your bone thickness with what is expected for someone of your age, gender, and ethnicity  Treatment for osteoporosis may include medicines to prevent bone loss, build new bone, and increase estrogen  These medicines help prevent fractures and may be given as a pill or injection  Ask your healthcare provider for more information on these medicines  Prevent bone loss:  · Eat healthy foods that are high in calcium  This helps keep your bones strong  Good sources of calcium are milk, cheese, broccoli, tofu, almonds, and canned salmon and sardines  Recommended to get at least 1200mg daily of calcium    · Increase your vitamin D intake  Vitamin D is in fish oils, some vegetables, and fortified milk, cereal, and bread  Vitamin D is also formed in the skin when it is exposed to the sun  Ask your healthcare provider how much sunlight is safe for you  You will require at least 800 units of vitamin D daily taken as a supplement  · Drink liquids as directed  Ask your healthcare provider how much liquid to drink each day and which liquids are best for you  Do not have alcohol or caffeine  They decrease bone mineral density, which can weaken your bones  · Exercise regularly  Ask your healthcare provider about the best exercise plan for you  Weight bearing exercise for 30 minutes, 3 times a week can help build and strengthen bone  · Do not smoke  Nicotine and other chemicals in cigarettes and cigars can cause lung damage  Ask your healthcare provider for information if you currently smoke and need help to quit  E-cigarettes or smokeless tobacco still contain nicotine  Talk to your healthcare provider before you use these products  · Go to physical therapy as directed  A physical therapist teaches you exercises to help improve movement and muscle strength  · Alcohol  It is recommended to avoid heavy alcohol use as increased consumption of alcohol is known to cause bone loss  © Copyright [employer] 2021 Information is for End User's use only and may not be sold, redistributed or otherwise used for commercial purposes  All illustrations and images included in CareNotes® are the copyrighted property of [employer]  or [facility] Rafael Gregorio     Calcium and vitamin D for bone health (Beyond the Basics)    CALCIUM AND VITAMIN D OVERVIEW  Osteoporosis is a common bone disorder that causes a progressive loss in bone density and mass  As a result, bones become thin, weakened, and easily fractured   It is estimated that more than 1 3 million osteoporosis-associated (or "osteoporotic") fractures occur every year in the [place], primarily of bone within the spine (the vertebrae), the hip, and the forearm near the wrist  =    A number of treatments can help to prevent loss of bone and treat low bone mass  However, the first step in preventing or treating osteoporosis is to consume foods and drinks that provide calcium, a mineral essential for bone strength, and vitamin D, which aids in calcium breakdown and absorption  =    CALCIUM AND VITAMIN D BENEFITS  Good nutrition is important at all ages to keep the bones healthy  · Taking calcium reduces bone loss and decreases the risk of fracturing the vertebrae (the bones that surround the spinal cord)  · Consuming calcium during childhood (eg, in milk) can lead to higher bone mass in adulthood  This increase in bone density can reduce the risk of fractures later in life  · Calcium may also have benefits in other body systems by reducing blood pressure and cholesterol levels  · Calcium and vitamin D supplements may help prevent tooth loss in older adults  RECOMMENDATIONS FOR CALCIUM    General recommendations -- Premenopausal women and men should consume at least 1000 mg of calcium, while postmenopausal women should consume 1200 mg (total diet plus supplement)  You should not consume more than 2000 mg of calcium per day (total diet plus supplement) due to the risk of side effects  Calcium in the diet -- The primary sources of calcium in the diet include milk and other dairy products, such as hard cheese, cottage cheese, or yogurt, as well as green vegetables, such as kale and broccoli (table 1)  Some cereals, soy products, and fruit juices are fortified with calcium  Calcium supplements -- The body is able to absorb calcium contained in supplements as well as from dietary sources  If it is not possible to get enough calcium from dietary sources, consult a health care provider to determine the best type, dose, and timing of calcium supplements       · Calcium carbonate is effective and is the least expensive form of calcium  It is best absorbed with a low-iron meal (such as breakfast)  Calcium carbonate may not be absorbed well in people who also take a specific medication for gastroesophageal reflux (called a proton pump inhibitor or H2 blocker), which blocks stomach acid  · Many natural calcium carbonate preparations such as oyster shells or bone meal contain some lead  · Calcium citrate is well absorbed in the fasting state as well as with a meal   · Calcium doses above 500 mg are not absorbed as well as smaller doses, so large doses of supplements should be taken in divided doses (eg, in the morning and evening)  · Calcium supplements do not replace other osteoporosis treatments such as hormone replacement, bisphosphonates (eg, risedronate [sample brand name: Actonel] and alendronate [brand name: Fosamax]), and raloxifene (brand name: Evista)  Calcium and vitamin D supplements alone are usually insufficient to prevent age-related bone loss, although they may be beneficial in some subgroups (older adults, those with very low intake)  In most patients with or at risk for osteoporosis, the addition of medication or hormonal therapy is necessary in order to slow the breakdown and removal of bone (ie, resorption)  Underlying gastrointestinal diseases -- Patients who do not adequately absorb nutrients from the gastrointestinal tract (due to malabsorption) may require more than 1000 to 1200 mg of calcium per day  In such cases, a health care provider can help to determine the optimal dose of calcium  Medications -- All medications should be discussed with a health care provider to ensure that possible interactions with calcium are identified  Certain medications change the amount of calcium that is absorbed and/or excreted  As an example, loop diuretics (eg, furosemide [sample brand name: Lasix]) increase the amount of calcium excreted in the urine      On the other hand, thiazide diuretics (eg, hydrochlorothiazide) can reduce levels of calcium in the urine, potentially reducing the risk of bone loss and kidney stones  Side effects of calcium -- Calcium is usually easily tolerated when it is taken in divided doses several times per day  Some people experience side effects related to calcium, including constipation and indigestion  Calcium supplements interfere with the absorption of iron and thyroid hormone, and therefore, these medications should be taken at different times  Kidney stones -- There is no evidence that consuming large amounts of calcium (from foods and drinks) increases the risk of kidney stones, or that avoiding dietary calcium decreases the risk  In fact, avoiding dairy products is likely to increase the risk of kidney stones  However, use of calcium supplements may increase the risk of kidney stones in susceptible individuals by raising the level of calcium in the urine  This is particularly true if the supplement is taken between meals or at bedtime, and this is one of the reasons we prefer for patients to get as much of their calcium requirement through dietary means  IMPORTANCE OF VITAMIN D  Vitamin D decreases bone loss and lowers the risk of fracture, especially in older men and women  Along with calcium, vitamin D also helps to prevent and treat osteoporosis  To absorb calcium efficiently, an adequate amount of vitamin D must be present  Vitamin D is normally made in the skin after exposure to sunlight  Recommendations for vitamin D -- The current recommendation is that men over 70 years and postmenopausal women consume at least 800 international units (20 micrograms) of vitamin D per day  Lower levels of vitamin D are not as effective, while high doses can be toxic, especially if taken for long periods of time   Although the optimal intake has not been clearly established in premenopausal women or in younger men with osteoporosis, 600 international units (15 micrograms) of vitamin D daily is generally suggested  Vitamin D is available as an individual supplement and is included in most multivitamins and some calcium supplements (table 2)  Milk is a relatively good dietary source of vitamin D, with approximately 100 international units (2 5 micrograms) per cup (240 mL), and salmon has 800 to 1000 international units (20 to 25 micrograms) of vitamin D per serving  Most healthy people don't need to check with their health care provider before taking standard doses of vitamin D and don't need monitoring of vitamin D levels if they are not being treated for vitamin D deficiency  However, recommendations for vitamin D supplementation may be different in people with certain underlying medical conditions, such as sarcoidosis or lymphoma  In these situations, a provider will determine if supplements are needed; if so, the person's vitamin D and calcium levels should be monitored with regular tests  ©1905 [employer], [address] rights reserved

## 2023-02-09 ENCOUNTER — OFFICE VISIT (OUTPATIENT)
Age: 83
End: 2023-02-09

## 2023-02-09 VITALS
OXYGEN SATURATION: 96 % | DIASTOLIC BLOOD PRESSURE: 80 MMHG | WEIGHT: 164 LBS | TEMPERATURE: 96.8 F | SYSTOLIC BLOOD PRESSURE: 150 MMHG | HEART RATE: 59 BPM | HEIGHT: 66 IN | BODY MASS INDEX: 26.36 KG/M2

## 2023-02-09 DIAGNOSIS — M81.8 OTHER OSTEOPOROSIS WITHOUT CURRENT PATHOLOGICAL FRACTURE: ICD-10-CM

## 2023-02-09 DIAGNOSIS — F03.90 DEMENTIA WITHOUT BEHAVIORAL DISTURBANCE (HCC): Primary | ICD-10-CM

## 2023-02-09 DIAGNOSIS — G47.09 OTHER INSOMNIA: ICD-10-CM

## 2023-02-09 DIAGNOSIS — M35.3 POLYMYALGIA RHEUMATICA (HCC): ICD-10-CM

## 2023-02-09 DIAGNOSIS — I10 ESSENTIAL HYPERTENSION: ICD-10-CM

## 2023-02-09 DIAGNOSIS — F41.9 ANXIETY DISORDER, UNSPECIFIED TYPE: ICD-10-CM

## 2023-02-09 RX ORDER — PHENOL 1.4 %
10 AEROSOL, SPRAY (ML) MUCOUS MEMBRANE DAILY
COMMUNITY

## 2023-02-09 NOTE — PROGRESS NOTES
Assessment & Plan:   1  Dementia without behavioral disturbance ([facility] )  Assessment & Plan:  1  Pt independent with adl, dependent for iadls  Memory loss:  STM stable, no agitations  ? [address] [facility] 17/30  Deficits in executive function, recall, attention, language  ? Continues with aricept, namenda  2  Cont supportive cares lives with , dgt supportive  ? Ensure pt has phone and reinforce to not use stove or shower while gone  ? SW needs this visit:  See note  3  Discussed safe environment  ? Some items to consider would be door video surveillance, ID bracelet, Tile GPS - in case wandering ever occurs  ? Will [place] [facility] when ready (end of summer)  4  Continue to engage in physical, cognitive, social activity  ? Cont doing games, puzzles, trivia, current event discussions  ? Cont daily walks or exercise video  ? Cont outings with friends and family  Avoid social isolation  5  Optimize chronic and acute conditions  ? Cont to f/u with providers and ensure medication compliance  ? Vascular risk factors:  htn  ? Monitor for changes in behavior/mood- if noted, notify provider for eval (per daughter, pt does get confused with UTIs)  ? Avoid medications that worsen cognition - check with provider or pharmacist before starting new or OTC meds  ? Ensure good nutrition, adequate hydration  6  Do not overwhelm pt with info  ? Do one task at a time  Use slower pace  Keep to one conversation at a time  Do not multitask  2  Other insomnia  Assessment & Plan:  · Denies of present  · Continue good sleep hygiene techniques including limiting at bedtime caffeine, avoiding daytime sleep, exercise regularly, provide quiet and calm environment for rest at night      3  Polymyalgia rheumatica ([facility] )  Assessment & Plan:  · Pain managed presently  · Continue as needed Tylenol/topical analgesics    Continue nonpharmacological methods of pain control  · Continue follow-ups as directed      4  Essential hypertension  Assessment & Plan:  · BP stable without headache or dizziness  · Continues on amlodipine, propranolol  · Continue dietary lifestyle interventions      5  Anxiety disorder, unspecified type  Assessment & Plan:  · Anxiety stable at present  · Remains on Lexapro, Seroquel  · Continue emotional support, relaxation techniques      6  Other osteoporosis without current pathological fracture  Assessment & Plan:  Recent dexa 4/2022 shows osteoporosis   *Cont dietary ca/d3 intakes, supplement as needed  Continue strength and balance training   *Patient was prescribed Prolia however there is a cost prohibitive copayment  Daughter will   follow-up with PCP for alternatives  F/U with pcp for osteoporotic eval and treatment plan      HPI:  We had the pleasure of evaluating Jabier Burks who is a 79 y o  female   in Geriatric follow up today  Previous MOCA:  13/30  Comorbidities include dementia, anxiety, htn  She lives with her   Ms Steele Favre is in the office with her daughter    Memory update per daughter:    End of summer will be moving into [address] in [facility] but no changes  No behaviors  Pt cooks and cleans, does laundry  Independent w/adls   manages pills  Vandana Cruz doing memory therapy  No exercise program now, but plans to when moved  [place] has all amenities  Enjoys going out for errands and visiting with family  Does fine alone 2-3 hours, dgt a mile away    She has difficulty finding the right word while speaking: Yes - only when flustered  Patient requires repeat information or ask the same question repeatedly: Yes  Do you drive: No       Do you handle your own financial affairs such as balancing your checkbook, paying bills, investments: No  Have you or your family noted any change in your mood or personality:No  Are you currently or have you been treated in the past for depression or anxiety: Yes  Have you noticed any gait or balance disorder: No  Uses :none, no recent falls  Any hallucination or delusion: No    Memory update per patient:  Doing ok, some STM loss, but doing ok  Independent with adls, cook/cleans  Doesn't go out to eat much, sometimes brings pizza home  Appetite fine  Does memory classes  Not much social= enjoys husbands company  Exercise: Works around [employer]  No longer going food shopping  Sleep Issues: No- once in a while to BR  Urinary/Stool Incontinence: No  Hearing and vision issue: No - mild [place], glasses- doing good  ADL/IADL:  Independent/dependent   Appetite/swallow:  Good/good  Dentition good  Pain:  No   Occasional bilat knee pain, not bad  Past Medical, surgical, social, medication and allergy history and patients previous records reviewed  Family Review of Behavior [facility]:    pacing  No    agressive/combative behavior  No    agitated  No   wandering  No   resistance to care  No   hoarding/hiding objects  No    suspicious  Yes  withdrawn No  misplacing/losing objects Yes  personal hygiene problems  No  forgetfulness of actions Yes    ROS: Review of Systems   Constitutional: Negative for activity change, appetite change, chills and fatigue  HENT: Positive for hearing loss  Negative for congestion and trouble swallowing  Eyes: Negative for visual disturbance  Respiratory: Negative for cough and shortness of breath  Cardiovascular: Negative for chest pain  Gastrointestinal: Negative for abdominal pain, constipation, diarrhea, nausea and vomiting  Genitourinary: Negative for difficulty urinating  Musculoskeletal: Positive for arthralgias (occ knee) and gait problem  Negative for back pain  Neurological: Negative for dizziness and light-headedness  Psychiatric/Behavioral: Positive for decreased concentration (forgetful)  Allergies:    Allergies   Allergen Reactions   • No Known Allergies        Medications:      Current Outpatient Medications:   •  amLODIPine (NORVASC) 2 5 mg tablet, Take 1 tablet (2 5 mg total) by mouth daily, Disp: 90 tablet, Rfl: 3  •  azilsartan medoxomil (Edarbi) 40 MG tablet, Take 1 tablet (40 mg total) by mouth daily, Disp: 90 tablet, Rfl: 3  •  Cholecalciferol (Vitamin D3) 20 MCG (800 UNIT) TABS, Take 20 mcg by mouth 2 (two) times a day, Disp: , Rfl:   •  donepezil (ARICEPT) 10 mg tablet, Take 1 tablet (10 mg total) by mouth daily In the Morning with Breakfast, Disp: 90 tablet, Rfl: 3  •  ergocalciferol (VITAMIN D2) 50,000 units, Take 1 capsule (50,000 Units total) by mouth once a week, Disp: 13 capsule, Rfl: 1  •  escitalopram (LEXAPRO) 10 mg tablet, TAKE ONE TABLET (10 MG TOTAL) BY MOUTH DAILY, Disp: 90 tablet, Rfl: 1  •  Melatonin 10 MG TABS, Take 10 mg by mouth daily, Disp: , Rfl:   •  memantine (Namenda) 10 mg tablet, Take 1 tablet (10 mg total) by mouth 2 (two) times a day, Disp: 180 tablet, Rfl: 3  •  nitrofurantoin (MACROBID) 100 mg capsule, Take 1 capsule (100 mg total) by mouth daily with lunch (Patient taking differently: Take 100 mg by mouth as needed), Disp: 30 capsule, Rfl: 0  •  pantoprazole (PROTONIX) 40 mg tablet, Take 1 tablet (40 mg total) by mouth daily, Disp: 90 tablet, Rfl: 3  •  propranolol (INDERAL LA) 60 mg 24 hr capsule, Take 1 capsule (60 mg total) by mouth daily Please stop Norvasc   , Disp: 90 capsule, Rfl: 3  •  QUEtiapine (SEROquel) 25 mg tablet, TAKE ONE TABLET BY MOUTH DAILY AT BEDTIME  PLEASE STOP TRAZADONE, Disp: 90 tablet, Rfl: 1  •  rosuvastatin (CRESTOR) 5 mg tablet, Take 1 tablet (5 mg total) by mouth daily, Disp: 90 tablet, Rfl: 3  •  vitamin B-12 (VITAMIN B-12) 1,000 mcg tablet, Take 1 tablet (1,000 mcg total) by mouth daily, Disp: 90 tablet, Rfl: 3  •  denosumab (PROLIA) 60 mg/mL, Inject 1 mL (60 mg total) under the skin once for 1 dose (Patient not taking: Reported on 2/9/2023), Disp: 1 mL, Rfl: 1  •  Diclofenac Sodium (VOLTAREN) 1 %, Apply 2 g topically 4 (four) times a day (Patient not taking: Reported on 2/9/2023), Disp: 200 g, Rfl: 2    Vitals:  Vitals:    02/09/23 0832   BP: 150/80   Pulse: 59   Temp: (!) 96 8 °F (36 °C)   SpO2: 96%       History:  Past Medical History:   Diagnosis Date   • Anxiety    • Gastroesophageal reflux disease without esophagitis 8/28/2018   • Hyperlipidemia    • Hypertension    • Hypothyroidism    • Osteoarthritis 10/5/2012   • Other osteoporosis without current pathological fracture 2/9/2023     Past Surgical History:   Procedure Laterality Date   • CATARACT EXTRACTION     • CHOLECYSTECTOMY     • COLONOSCOPY     • HYSTERECTOMY       Family History   Problem Relation Age of Onset   • No Known Problems Mother    • No Known Problems Father      Social History     Socioeconomic History   • Marital status: /Civil Union     Spouse name: Not on file   • Number of children: Not on file   • Years of education: Not on file   • Highest education level: Not on file   Occupational History   • Occupation: retired   Tobacco Use   • Smoking status: Never   • Smokeless tobacco: Never   Vaping Use   • Vaping Use: Never used   Substance and Sexual Activity   • Alcohol use: No   • Drug use: No   • Sexual activity: Not Currently   Other Topics Concern   • Not on file   Social History Narrative   • Not on file     Social Determinants of Health     Financial Resource Strain: Not on file   Food Insecurity: Not on file   Transportation Needs: Not on file   Physical Activity: Not on file   Stress: Not on file   Social Connections: Not on file   Intimate Partner Violence: Not on file   Housing Stability: Not on file     Past Surgical History:   Procedure Laterality Date   • CATARACT EXTRACTION     • CHOLECYSTECTOMY     • COLONOSCOPY     • HYSTERECTOMY           Physical Exam:  Observed ambulation:  No AD, steady, regular speed   Physical Exam  Vitals and nursing note reviewed  Constitutional:       General: She is not in acute distress  Appearance: Normal appearance  She is well-developed  She is not diaphoretic     HENT: Head: Normocephalic  Cardiovascular:      Rate and Rhythm: Normal rate  Heart sounds: No murmur heard  No friction rub  No gallop  Pulmonary:      Effort: Pulmonary effort is normal  No respiratory distress  Breath sounds: Normal breath sounds  No wheezing or rales  Abdominal:      General: Bowel sounds are normal  There is no distension  Palpations: Abdomen is soft  Tenderness: There is no abdominal tenderness  There is no rebound  Musculoskeletal:         General: Normal range of motion  Skin:     General: Skin is warm and dry  Neurological:      General: No focal deficit present  Mental Status: She is alert and oriented to person, place, and time  Mental status is at baseline     Psychiatric:         Mood and Affect: Mood normal          Behavior: Behavior normal

## 2023-02-09 NOTE — PROGRESS NOTES
[facility]  [address] [facility] [address] [facility], [address], [facility]  [phone]    Social Work Follow-Up    LSW met with Tonia Briscoe for follow up visit  Completed [place] Cognitive Assessment, score 18/30 (previously 17/30 in 7/19/22), and Geriatric Depression Screen, 0/15 (previously 1/15 in 7/19/22)  LSW also met with daughter Dulce Downey who reports that her mom has been doing well since her last appointment  Jennifer Bradford continues to come into her home 2x/week  Dulce Downey stated that she will be helping her parents move into independent living as soon as Shanthi Gaitan completes its construction  Dulce Downey states no needs for Georgine at this time  [place] Cognitive Assessment ([employer]) Version 8 3  Education: 12th grade    Points Earned POSSIBLE Points   Visuospatial/Executive   Alternating [place] Making 0 1   Visuoconstructional skills 0 1   Visuoconstructional skills (clock) 2 3   Naming   Naming Animals 3 3   Attention   Digit Span 2 2   Vigilance (letters) 1 1   Serial 7 subtraction 0 3   Language   Sentence Repetition 0 2   Verbal fluency 1 1   Abstraction   Abstraction (word pairings) 2 2   Delayed recall   Delayed recall 0 5   Memory index score: 5/15   Orientation   Orientation 6 6   TOTAL SCORE: 18/30  (Normal ?26/30)   Additional notes:      LSW to remain available as needed

## 2023-02-09 NOTE — ASSESSMENT & PLAN NOTE
1  Pt independent with adl, dependent for iadls  Memory loss:  STM stable, no agitations  ? [facility] [address]  Deficits in executive function, recall, attention, language  ? Continues with aricept, namenda  2  Cont supportive cares lives with , dgt supportive  ? Ensure pt has phone and reinforce to not use stove or shower while gone  ? SW needs this visit:  See note  3  Discussed safe environment  ? Some items to consider would be door video surveillance, ID [place] GPS - in case wandering ever occurs  ? [place] [facility] when ready (end of summer)  4  Continue to engage in physical, cognitive, social activity  ? Cont doing games, puzzles, trivia, current event discussions  ? Cont daily walks or exercise video  ? Cont outings with friends and family  Avoid social isolation  5  Optimize chronic and acute conditions  ? Cont to f/u with providers and ensure medication compliance  ? Vascular risk factors:  htn  ? Monitor for changes in behavior/mood- if noted, notify provider for eval (per daughter, pt does get confused with UTIs)  ? Avoid medications that worsen cognition - check with provider or pharmacist before starting new or OTC meds  ? Ensure good nutrition, adequate hydration  6  Do not overwhelm pt with info  ? Do one task at a time  Use slower pace  Keep to one conversation at a time  Do not multitask 
Recent dexa 4/2022 shows osteoporosis   *Cont dietary ca/d3 intakes, supplement as needed  Continue strength and balance training   *Patient was prescribed Prolia however there is a cost prohibitive copayment    Daughter will   follow-up with PCP for alternatives  F/U with pcp for osteoporotic eval and treatment plan
· Anxiety stable at present  · Remains on Lexapro, Seroquel  · Continue emotional support, relaxation techniques
· BP stable without headache or dizziness  · Continues on amlodipine, propranolol  · Continue dietary lifestyle interventions
· Denies of present  · Continue good sleep hygiene techniques including limiting at bedtime caffeine, avoiding daytime sleep, exercise regularly, provide quiet and calm environment for rest at night
· Pain managed presently  · Continue as needed Tylenol/topical analgesics    Continue nonpharmacological methods of pain control  · Continue follow-ups as directed
No

## 2023-02-13 DIAGNOSIS — F03.918 DEMENTIA WITH BEHAVIORAL DISTURBANCE: ICD-10-CM

## 2023-02-13 RX ORDER — DONEPEZIL HYDROCHLORIDE 10 MG/1
10 TABLET, FILM COATED ORAL DAILY
Qty: 90 TABLET | Refills: 3 | Status: SHIPPED | OUTPATIENT
Start: 2023-02-13

## 2023-03-21 DIAGNOSIS — R79.89 LOW VITAMIN D LEVEL: ICD-10-CM

## 2023-03-21 RX ORDER — ERGOCALCIFEROL 1.25 MG/1
50000 CAPSULE ORAL WEEKLY
Qty: 13 CAPSULE | Refills: 1 | Status: SHIPPED | OUTPATIENT
Start: 2023-03-21 | End: 2023-03-28 | Stop reason: SDUPTHER

## 2023-03-28 DIAGNOSIS — F03.918 DEMENTIA WITH BEHAVIORAL DISTURBANCE (HCC): ICD-10-CM

## 2023-03-28 DIAGNOSIS — R79.89 LOW VITAMIN D LEVEL: ICD-10-CM

## 2023-03-28 RX ORDER — MEMANTINE HYDROCHLORIDE 10 MG/1
10 TABLET ORAL 2 TIMES DAILY
Qty: 180 TABLET | Refills: 3 | Status: SHIPPED | OUTPATIENT
Start: 2023-03-28 | End: 2023-03-29 | Stop reason: SDUPTHER

## 2023-03-28 RX ORDER — ERGOCALCIFEROL 1.25 MG/1
50000 CAPSULE ORAL WEEKLY
Qty: 13 CAPSULE | Refills: 1 | Status: SHIPPED | OUTPATIENT
Start: 2023-03-28 | End: 2023-03-29 | Stop reason: SDUPTHER

## 2023-03-29 DIAGNOSIS — F03.918 DEMENTIA WITH BEHAVIORAL DISTURBANCE (HCC): ICD-10-CM

## 2023-03-29 DIAGNOSIS — R79.89 LOW VITAMIN D LEVEL: ICD-10-CM

## 2023-03-29 RX ORDER — ERGOCALCIFEROL 1.25 MG/1
50000 CAPSULE ORAL WEEKLY
Qty: 13 CAPSULE | Refills: 1 | Status: SHIPPED | OUTPATIENT
Start: 2023-03-29

## 2023-03-29 RX ORDER — MEMANTINE HYDROCHLORIDE 10 MG/1
10 TABLET ORAL 2 TIMES DAILY
Qty: 180 TABLET | Refills: 3 | Status: SHIPPED | OUTPATIENT
Start: 2023-03-29

## 2023-05-01 ENCOUNTER — OFFICE VISIT (OUTPATIENT)
Dept: FAMILY MEDICINE CLINIC | Facility: CLINIC | Age: 83
End: 2023-05-01

## 2023-05-01 ENCOUNTER — TELEPHONE (OUTPATIENT)
Dept: FAMILY MEDICINE CLINIC | Facility: CLINIC | Age: 83
End: 2023-05-01

## 2023-05-01 VITALS
OXYGEN SATURATION: 99 % | RESPIRATION RATE: 14 BRPM | SYSTOLIC BLOOD PRESSURE: 122 MMHG | HEART RATE: 74 BPM | DIASTOLIC BLOOD PRESSURE: 74 MMHG | TEMPERATURE: 97.9 F | BODY MASS INDEX: 26.2 KG/M2 | HEIGHT: 66 IN | WEIGHT: 163 LBS

## 2023-05-01 DIAGNOSIS — E04.2 MULTIPLE THYROID NODULES: ICD-10-CM

## 2023-05-01 DIAGNOSIS — R73.09 ELEVATED HEMOGLOBIN A1C: ICD-10-CM

## 2023-05-01 DIAGNOSIS — F03.918 DEMENTIA WITH BEHAVIORAL DISTURBANCE (HCC): ICD-10-CM

## 2023-05-01 DIAGNOSIS — E78.5 HYPERLIPIDEMIA ASSOCIATED WITH TYPE 2 DIABETES MELLITUS (HCC): Primary | ICD-10-CM

## 2023-05-01 DIAGNOSIS — R00.1 SINUS BRADYCARDIA: ICD-10-CM

## 2023-05-01 DIAGNOSIS — M81.8 IDIOPATHIC OSTEOPOROSIS: ICD-10-CM

## 2023-05-01 DIAGNOSIS — R94.31 ABNORMAL ECG: ICD-10-CM

## 2023-05-01 DIAGNOSIS — E11.69 HYPERLIPIDEMIA ASSOCIATED WITH TYPE 2 DIABETES MELLITUS (HCC): Primary | ICD-10-CM

## 2023-05-01 DIAGNOSIS — I10 ESSENTIAL HYPERTENSION: ICD-10-CM

## 2023-05-01 DIAGNOSIS — N39.0 ACUTE UTI: Primary | ICD-10-CM

## 2023-05-01 DIAGNOSIS — M35.3 PMR (POLYMYALGIA RHEUMATICA) (HCC): ICD-10-CM

## 2023-05-01 LAB
SL AMB  POCT GLUCOSE, UA: NORMAL
SL AMB LEUKOCYTE ESTERASE,UA: NORMAL
SL AMB POCT BILIRUBIN,UA: NORMAL
SL AMB POCT BLOOD,UA: NORMAL
SL AMB POCT CLARITY,UA: CLEAR
SL AMB POCT COLOR,UA: YELLOW
SL AMB POCT HEMOGLOBIN AIC: 5.3 (ref ?–6.5)
SL AMB POCT KETONES,UA: NORMAL
SL AMB POCT NITRITE,UA: NORMAL
SL AMB POCT PH,UA: 5
SL AMB POCT SPECIFIC GRAVITY,UA: 1.01
SL AMB POCT URINE PROTEIN: NORMAL
SL AMB POCT UROBILINOGEN: 0.2

## 2023-05-01 RX ORDER — VIT B12/LEVOMEFOLATE/VIT B6/B2 1-6-50-5MG
1 TABLET ORAL DAILY
Qty: 90 TABLET | Refills: 3 | Status: SHIPPED | OUTPATIENT
Start: 2023-05-01

## 2023-05-01 RX ORDER — DONEPEZIL HYDROCHLORIDE 10 MG/1
10 TABLET, FILM COATED ORAL DAILY
Qty: 90 TABLET | Refills: 3 | Status: SHIPPED | OUTPATIENT
Start: 2023-05-01

## 2023-05-01 RX ORDER — CIPROFLOXACIN 250 MG/1
250 TABLET, FILM COATED ORAL EVERY 12 HOURS SCHEDULED
Qty: 10 TABLET | Refills: 0 | Status: SHIPPED | OUTPATIENT
Start: 2023-05-01 | End: 2023-05-06

## 2023-05-01 NOTE — PROGRESS NOTES
Name: Fco Ohara      : 1940      MRN: [id number]  Encounter Provider: Blake Anguiano MD  Encounter Date: 2023   Encounter department: [address] [place] [address]     1  Hyperlipidemia associated with type 2 diabetes mellitus ([address] )  -     POCT ECG    2  PMR (polymyalgia rheumatica) (HCC)    3  Multiple thyroid nodules    4  Elevated hemoglobin A1c  -     POCT urine dip  -     POCT hemoglobin A1c  -     POCT ECG    5  Idiopathic osteoporosis  -     denosumab (PROLIA) 60 mg/mL; Inject 1 mL (60 mg total) under the skin once for 1 dose    6  Essential hypertension  -     POCT ECG    7  Sinus bradycardia  -     Echo complete w/ contrast if indicated; Future; Expected date: 2023  -     Holter monitor; Future; Expected date: 2023  -     Ambulatory Referral to Cardiology; Future    8  Abnormal ECG  -     Echo complete w/ contrast if indicated; Future; Expected date: 2023  -     Holter monitor; Future; Expected date: 2023  -     Ambulatory Referral to Cardiology; Future     Life style Mod  Continue and renew Meds  Consider cardiology consult  RTC in 3 mos w Blood work       Subjective      Paayl 89 is here for Regular Check up, she feels OK< Recent Blood work and med list reviewed w pt and his daughter in detail    Review of Systems   Constitutional: Negative for chills, fatigue and fever  HENT: Negative for congestion, facial swelling, sore throat, trouble swallowing and voice change  Eyes: Negative for pain, discharge and visual disturbance  Respiratory: Negative for cough, shortness of breath and wheezing  Cardiovascular: Negative for chest pain, palpitations and leg swelling  Gastrointestinal: Negative for abdominal pain, blood in stool, constipation, diarrhea and nausea  Endocrine: Negative for polydipsia, polyphagia and polyuria  Genitourinary: Negative for difficulty urinating, hematuria and urgency  Musculoskeletal: Negative for arthralgias and myalgias  Skin: Negative for rash  Neurological: Negative for dizziness, tremors, weakness and headaches  Hematological: Negative for adenopathy  Does not bruise/bleed easily  Psychiatric/Behavioral: Negative for dysphoric mood, sleep disturbance and suicidal ideas  Current Outpatient Medications on File Prior to Visit   Medication Sig    amLODIPine (NORVASC) 2 5 mg tablet Take 1 tablet (2 5 mg total) by mouth daily    azilsartan medoxomil (Edarbi) 40 MG tablet Take 1 tablet (40 mg total) by mouth daily    Cholecalciferol (Vitamin D3) 20 MCG (800 UNIT) TABS Take 20 mcg by mouth 2 (two) times a day    donepezil (ARICEPT) 10 mg tablet Take 1 tablet (10 mg total) by mouth daily In the Morning with Breakfast    ergocalciferol (VITAMIN D2) 50,000 units Take 1 capsule (50,000 Units total) by mouth once a week    escitalopram (LEXAPRO) 10 mg tablet TAKE ONE TABLET (10 MG TOTAL) BY MOUTH DAILY    Melatonin 10 MG TABS Take 10 mg by mouth daily    memantine (Namenda) 10 mg tablet Take 1 tablet (10 mg total) by mouth 2 (two) times a day    pantoprazole (PROTONIX) 40 mg tablet Take 1 tablet (40 mg total) by mouth daily    propranolol (INDERAL LA) 60 mg 24 hr capsule Take 1 capsule (60 mg total) by mouth daily Please stop Norvasc       QUEtiapine (SEROquel) 25 mg tablet TAKE ONE TABLET BY MOUTH DAILY AT BEDTIME  PLEASE STOP TRAZADONE    rosuvastatin (CRESTOR) 5 mg tablet Take 1 tablet (5 mg total) by mouth daily    vitamin B-12 (VITAMIN B-12) 1,000 mcg tablet Take 1 tablet (1,000 mcg total) by mouth daily    Diclofenac Sodium (VOLTAREN) 1 % Apply 2 g topically 4 (four) times a day (Patient not taking: Reported on 2/9/2023)    nitrofurantoin (MACROBID) 100 mg capsule Take 1 capsule (100 mg total) by mouth daily with lunch (Patient not taking: Reported on 5/1/2023)    [DISCONTINUED] denosumab (PROLIA) 60 mg/mL Inject 1 mL (60 mg total) under the skin "once for 1 dose (Patient not taking: Reported on 2/9/2023)       Objective     /74 (BP Location: Left arm, Patient Position: Sitting, Cuff Size: Standard)   Pulse 74   Temp 97 9 °F (36 6 °C) (Tympanic)   Resp 14   Ht 5' 6\" (1 676 m)   Wt 73 9 kg (163 lb)   SpO2 99%   BMI 26 31 kg/m²     Physical Exam  Constitutional:       General: She is not in acute distress  HENT:      Head: Normocephalic  Mouth/Throat:      Pharynx: No oropharyngeal exudate  Eyes:      General: No scleral icterus  Conjunctiva/sclera: Conjunctivae normal       Pupils: Pupils are equal, round, and reactive to light  Neck:      Thyroid: No thyromegaly  Cardiovascular:      Rate and Rhythm: Regular rhythm  Bradycardia present  Heart sounds: Normal heart sounds  No murmur heard  Pulmonary:      Effort: Pulmonary effort is normal  No respiratory distress  Breath sounds: Normal breath sounds  No wheezing or rales  Abdominal:      General: Bowel sounds are normal  There is no distension  Palpations: Abdomen is soft  Tenderness: There is no abdominal tenderness  There is no guarding or rebound  Musculoskeletal:      Cervical back: Neck supple  Lymphadenopathy:      Cervical: No cervical adenopathy  Skin:     Coloration: Skin is not pale  Findings: No rash  Neurological:      Mental Status: She is alert and oriented to person, place, and time  Sensory: No sensory deficit  Motor: No weakness         Judy Pena MD  "

## 2023-05-01 NOTE — PATIENT INSTRUCTIONS
Osteoporosis Education   Osteoporosis  is a long-term medical condition that causes your bones to become weak, brittle, and more likely to fracture  Osteoporosis occurs when your body absorbs more bone than it makes  It is also caused by a lack of calcium and estrogen (female hormone)  Common symptoms include the following: You may not have any signs or symptoms  You may break a bone after a muscle strain, bump, or fall  A break usually occurs in the hip, spine, or wrist  A collapsed vertebra (bone in your spine) may cause severe back pain or loss of height from bent posture  Call your doctor if:   ·  You have severe pain  ·  You have increasing pain after a fall  ·  You have pain when you do your daily activities  ·  You have questions or concerns about your condition or care  Diagnosis of osteoporosis:   · Blood and urine tests  measure your calcium, vitamin D, and estrogen levels  · An x-ray or CT may show thinned bones or a fracture  You may be given contrast liquid to help the bones show up better in the pictures  Tell the healthcare provider if you have ever had an allergic reaction to contrast liquid  Do not enter the MRI room with anything metal  Metal can cause serious injury  Tell the healthcare provider if you have any metal in or on your body  · A bone density test  compares your bone thickness with what is expected for someone of your age, gender, and ethnicity  Treatment for osteoporosis may include medicines to prevent bone loss, build new bone, and increase estrogen  These medicines help prevent fractures and may be given as a pill or injection  Ask your healthcare provider for more information on these medicines  Prevent bone loss:  · Eat healthy foods that are high in calcium  This helps keep your bones strong  Good sources of calcium are milk, cheese, broccoli, tofu, almonds, and canned salmon and sardines  Recommended to get at least 1200mg daily of calcium    · Increase your "vitamin D intake  Vitamin D is in fish oils, some vegetables, and fortified milk, cereal, and bread  Vitamin D is also formed in the skin when it is exposed to the sun  Ask your healthcare provider how much sunlight is safe for you  You will require at least 800 units of vitamin D daily taken as a supplement  · Drink liquids as directed  Ask your healthcare provider how much liquid to drink each day and which liquids are best for you  Do not have alcohol or caffeine  They decrease bone mineral density, which can weaken your bones  · Exercise regularly  Ask your healthcare provider about the best exercise plan for you  Weight bearing exercise for 30 minutes, 3 times a week can help build and strengthen bone  · Do not smoke  Nicotine and other chemicals in cigarettes and cigars can cause lung damage  Ask your healthcare provider for information if you currently smoke and need help to quit  E-cigarettes or smokeless tobacco still contain nicotine  Talk to your healthcare provider before you use these products  · Go to physical therapy as directed  A physical therapist teaches you exercises to help improve movement and muscle strength  · Alcohol  It is recommended to avoid heavy alcohol use as increased consumption of alcohol is known to cause bone loss  © Copyright [employer] 2021 Information is for End User's use only and may not be sold, redistributed or otherwise used for commercial purposes  All illustrations and images included in CareNotes® are the copyrighted property of [employer]  or [facility] Rafael Conti    Calcium and vitamin D for bone health (Beyond the Basics)    CALCIUM AND VITAMIN D OVERVIEW  Osteoporosis is a common bone disorder that causes a progressive loss in bone density and mass  As a result, bones become thin, weakened, and easily fractured   It is estimated that more than 1 3 million osteoporosis-associated (or \"osteoporotic\") fractures occur every year in the [place], " primarily of bone within the spine (the vertebrae), the hip, and the forearm near the wrist  =    A number of treatments can help to prevent loss of bone and treat low bone mass  However, the first step in preventing or treating osteoporosis is to consume foods and drinks that provide calcium, a mineral essential for bone strength, and vitamin D, which aids in calcium breakdown and absorption  =    CALCIUM AND VITAMIN D BENEFITS  Good nutrition is important at all ages to keep the bones healthy  · Taking calcium reduces bone loss and decreases the risk of fracturing the vertebrae (the bones that surround the spinal cord)  · Consuming calcium during childhood (eg, in milk) can lead to higher bone mass in adulthood  This increase in bone density can reduce the risk of fractures later in life  · Calcium may also have benefits in other body systems by reducing blood pressure and cholesterol levels  · Calcium and vitamin D supplements may help prevent tooth loss in older adults  RECOMMENDATIONS FOR CALCIUM    General recommendations -- Premenopausal women and men should consume at least 1000 mg of calcium, while postmenopausal women should consume 1200 mg (total diet plus supplement)  You should not consume more than 2000 mg of calcium per day (total diet plus supplement) due to the risk of side effects  Calcium in the diet -- The primary sources of calcium in the diet include milk and other dairy products, such as hard cheese, cottage cheese, or yogurt, as well as green vegetables, such as kale and broccoli (table 1)  Some cereals, soy products, and fruit juices are fortified with calcium  Calcium supplements -- The body is able to absorb calcium contained in supplements as well as from dietary sources  If it is not possible to get enough calcium from dietary sources, consult a health care provider to determine the best type, dose, and timing of calcium supplements       · Calcium carbonate is effective and is the least expensive form of calcium  It is best absorbed with a low-iron meal (such as breakfast)  Calcium carbonate may not be absorbed well in people who also take a specific medication for gastroesophageal reflux (called a proton pump inhibitor or H2 blocker), which blocks stomach acid  · Many natural calcium carbonate preparations such as oyster shells or bone meal contain some lead  · Calcium citrate is well absorbed in the fasting state as well as with a meal   · Calcium doses above 500 mg are not absorbed as well as smaller doses, so large doses of supplements should be taken in divided doses (eg, in the morning and evening)  · Calcium supplements do not replace other osteoporosis treatments such as hormone replacement, bisphosphonates (eg, risedronate [sample brand name: Actonel] and alendronate [brand name: Fosamax]), and raloxifene (brand name: Evista)  Calcium and vitamin D supplements alone are usually insufficient to prevent age-related bone loss, although they may be beneficial in some subgroups (older adults, those with very low intake)  In most patients with or at risk for osteoporosis, the addition of medication or hormonal therapy is necessary in order to slow the breakdown and removal of bone (ie, resorption)  Underlying gastrointestinal diseases -- Patients who do not adequately absorb nutrients from the gastrointestinal tract (due to malabsorption) may require more than 1000 to 1200 mg of calcium per day  In such cases, a health care provider can help to determine the optimal dose of calcium  Medications -- All medications should be discussed with a health care provider to ensure that possible interactions with calcium are identified  Certain medications change the amount of calcium that is absorbed and/or excreted  As an example, loop diuretics (eg, furosemide [sample brand name: Lasix]) increase the amount of calcium excreted in the urine      On the other hand, thiazide diuretics (eg, hydrochlorothiazide) can reduce levels of calcium in the urine, potentially reducing the risk of bone loss and kidney stones  Side effects of calcium -- Calcium is usually easily tolerated when it is taken in divided doses several times per day  Some people experience side effects related to calcium, including constipation and indigestion  Calcium supplements interfere with the absorption of iron and thyroid hormone, and therefore, these medications should be taken at different times  Kidney stones -- There is no evidence that consuming large amounts of calcium (from foods and drinks) increases the risk of kidney stones, or that avoiding dietary calcium decreases the risk  In fact, avoiding dairy products is likely to increase the risk of kidney stones  However, use of calcium supplements may increase the risk of kidney stones in susceptible individuals by raising the level of calcium in the urine  This is particularly true if the supplement is taken between meals or at bedtime, and this is one of the reasons we prefer for patients to get as much of their calcium requirement through dietary means  IMPORTANCE OF VITAMIN D  Vitamin D decreases bone loss and lowers the risk of fracture, especially in older men and women  Along with calcium, vitamin D also helps to prevent and treat osteoporosis  To absorb calcium efficiently, an adequate amount of vitamin D must be present  Vitamin D is normally made in the skin after exposure to sunlight  Recommendations for vitamin D -- The current recommendation is that men over 70 years and postmenopausal women consume at least 800 international units (20 micrograms) of vitamin D per day  Lower levels of vitamin D are not as effective, while high doses can be toxic, especially if taken for long periods of time   Although the optimal intake has not been clearly established in premenopausal women or in younger men with osteoporosis, 600 international units (15 micrograms) of vitamin D daily is generally suggested  Vitamin D is available as an individual supplement and is included in most multivitamins and some calcium supplements (table 2)  Milk is a relatively good dietary source of vitamin D, with approximately 100 international units (2 5 micrograms) per cup (240 mL), and salmon has 800 to 1000 international units (20 to 25 micrograms) of vitamin D per serving  Most healthy people don't need to check with their health care provider before taking standard doses of vitamin D and don't need monitoring of vitamin D levels if they are not being treated for vitamin D deficiency  However, recommendations for vitamin D supplementation may be different in people with certain underlying medical conditions, such as sarcoidosis or lymphoma  In these situations, a provider will determine if supplements are needed; if so, the person's vitamin D and calcium levels should be monitored with regular tests  ©3740 [employer], [address] rights reserved

## 2023-05-03 DIAGNOSIS — R00.2 PALPITATIONS: ICD-10-CM

## 2023-05-03 DIAGNOSIS — G47.00 INSOMNIA, UNSPECIFIED TYPE: ICD-10-CM

## 2023-05-03 DIAGNOSIS — F32.A DEPRESSION, UNSPECIFIED DEPRESSION TYPE: ICD-10-CM

## 2023-05-03 RX ORDER — ESCITALOPRAM OXALATE 10 MG/1
10 TABLET ORAL DAILY
Qty: 90 TABLET | Refills: 1 | Status: SHIPPED | OUTPATIENT
Start: 2023-05-03

## 2023-05-03 RX ORDER — QUETIAPINE FUMARATE 25 MG/1
25 TABLET, FILM COATED ORAL
Qty: 90 TABLET | Refills: 1 | Status: SHIPPED | OUTPATIENT
Start: 2023-05-03

## 2023-05-24 ENCOUNTER — CLINICAL SUPPORT (OUTPATIENT)
Dept: FAMILY MEDICINE CLINIC | Facility: CLINIC | Age: 83
End: 2023-05-24

## 2023-05-24 DIAGNOSIS — M81.8 OTHER OSTEOPOROSIS WITHOUT CURRENT PATHOLOGICAL FRACTURE: Primary | ICD-10-CM

## 2023-06-13 ENCOUNTER — TELEPHONE (OUTPATIENT)
Dept: FAMILY MEDICINE CLINIC | Facility: CLINIC | Age: 83
End: 2023-06-13

## 2023-06-13 DIAGNOSIS — N39.0 CHRONIC UTI: ICD-10-CM

## 2023-06-13 RX ORDER — NITROFURANTOIN 25; 75 MG/1; MG/1
100 CAPSULE ORAL
Qty: 30 CAPSULE | Refills: 2 | Status: SHIPPED | OUTPATIENT
Start: 2023-06-13

## 2023-06-20 DIAGNOSIS — R79.89 LOW VITAMIN D LEVEL: ICD-10-CM

## 2023-06-20 RX ORDER — ERGOCALCIFEROL 1.25 MG/1
CAPSULE ORAL
Qty: 12 CAPSULE | Refills: 1 | Status: SHIPPED | OUTPATIENT
Start: 2023-06-20

## 2023-06-22 ENCOUNTER — TELEPHONE (OUTPATIENT)
Age: 83
End: 2023-06-22

## 2023-06-22 NOTE — TELEPHONE ENCOUNTER
Is this an acute change (then would need eval by pcp first to ensure not infection, etc) or worsening over time (then could we bump up 6 mo appt to discuss med changes?   Ty

## 2023-06-22 NOTE — TELEPHONE ENCOUNTER
Patient's daughter, Chacha Code ([phone]) called regarding patient being more foggy, agitated and forgetful  Nato Chahal would like to know if patient can either have any of her medications increased or call in something else  Patient takes donezpezil and memantine     memantine is taken 2 times a day am & pm

## 2023-06-22 NOTE — TELEPHONE ENCOUNTER
Spoke with patient's daughter, Elvis Nuñez ([phone]) to provide information from provider  Jess Dumont advised patient has already been seen by PCP and is on an antibiotic  Jess Dumont moved up 6 month follow up   Schedule appointment for 6/27/2023

## 2023-06-27 ENCOUNTER — OFFICE VISIT (OUTPATIENT)
Age: 83
End: 2023-06-27

## 2023-06-27 VITALS
SYSTOLIC BLOOD PRESSURE: 152 MMHG | HEART RATE: 60 BPM | OXYGEN SATURATION: 97 % | WEIGHT: 164.2 LBS | BODY MASS INDEX: 26.39 KG/M2 | DIASTOLIC BLOOD PRESSURE: 80 MMHG | HEIGHT: 66 IN | TEMPERATURE: 97.2 F

## 2023-06-27 DIAGNOSIS — F03.90 DEMENTIA WITHOUT BEHAVIORAL DISTURBANCE (HCC): Primary | ICD-10-CM

## 2023-06-27 DIAGNOSIS — I10 ESSENTIAL HYPERTENSION: ICD-10-CM

## 2023-06-27 DIAGNOSIS — G47.09 OTHER INSOMNIA: ICD-10-CM

## 2023-06-27 DIAGNOSIS — M19.90 OSTEOARTHRITIS, UNSPECIFIED OSTEOARTHRITIS TYPE, UNSPECIFIED SITE: ICD-10-CM

## 2023-06-27 DIAGNOSIS — F41.9 ANXIETY DISORDER, UNSPECIFIED TYPE: ICD-10-CM

## 2023-06-27 NOTE — PROGRESS NOTES
Assessment & Plan:   1  Dementia without behavioral disturbance (HCC)  Assessment & Plan:  • [place]/5   o Pt's current cognitive level is consistent with mild-mod dementia  o Pt is independent adl/dependent iadls  Lives with   o Mobility:  No AD, no recent falls  • Continue to stay active  Current activity level:  Fair Participates in some social and cognitive, good physical activity  o Encourage pt to incorporate brain exercises into things she enjoys (such as naming birds and plants when she is outside swinging)  • Monitor for changes in mood, sleep, pain control  Notify provider if any concerns  • Medication review:  Seem appropriate for current conditions  o Would continue aricept and namenda at current doses at this time  o Check with pharmacist/provider before starting any new OTC/prescription medications for potential cognitive side effects  • Optimize all acute and chronic conditions  Continue to follow-up with PCP and specialist as directed for management  • Safety concerns:  None identified  • Caregiver stress:  none  • Ensure adequate hydration, good nutrition  • [place]      2  Essential hypertension  Assessment & Plan:  · BP stable, no ha/dizziness  · H/o white coat syndrome per dgt  · Continues on propranolol, amlodipine  · Cont dietary and lifestyle interventions  Cont regular f/u with pcp for chronic monitoring  3  Anxiety disorder, unspecified type  Assessment & Plan:  · Had some increased behaviors when first dx with UTi, this has improved somewhat  · Cont lexapro at 10mg (ritika dose), and seroquel @ 25mg (qtc 375ms)  · Cont to reorient, redirect, reassure  · Dgt concerned with possibility of increased anxiety when [place]  · May need to temporarily inc seroquel around that time if anxiety increases - will cont to monitor  · Can utilized ritika therapist as needed if pt interested in talk therapy    · Will check to see if SW has any tip sheets  4  Osteoarthritis, unspecified osteoarthritis type, unspecified site  Assessment & Plan:  · No complaints at present  · Commend as needed Tylenol/topical analgesics for occasional pains  · Notify provider if increased or change in pain level      5  Other insomnia  Assessment & Plan:  · No complaints at present  · Continues on Seroquel at nighttime (for agitations per daughter)  · Continue good sleep hygiene techniques      HPI:  We had the pleasure of evaluating Jerica Velasquez who is a 79 y o  female in Geriatric follow up today  Previous MOCA: 17/30  Comorbidities include dementia (takes aricept/namenda), insomnia, anxiety  She lives with her   Ms Judyth Schaumann is in the office with her daughter    Memory update per patient:  Memory about the same as long as routine stays the same  Enjoys going out and swinging  Is not doing a lot of cognitive exercises  [place] end of [place]   She has difficulty finding the right word while speaking: No  Patient requires repeat information or ask the same question repeatedly: Yes  Do you do your own bathing, dressing, feeding yourself No  Can you make your own meals and do light cleaning/chores No  Do you take care of your own medications Yes -  sets out  Do you drive: No       Do you handle your own financial affairs such as balancing your checkbook, paying bills, investments: No   Never took care of  Have you or your family noted any change in your mood or personality:Yes  Daughter doesn't see as much as rest of family  Are you currently or have you been treated in the past for depression or anxiety: Yes  Have you noticed any gait or balance disorder: No  Uses :no AD  Any hallucination or delusion: No  Sleep Issues: No  Urinary/Stool Incontinence: No   Frequent UTI, does notice mood changes  Hearing and vision issue: Yes  Glasses, no HAs  ADL/IADL:  Independent/dependent  Appetite/swallow:  Good  Pain:  None  Past Medical, surgical, social, medication and allergy history and patients previous records reviewed  Memory update per family:  Gets frustrated with increased memory loss  Sense of humor is intact  Doesn't remember to tell daughter that she's been out places  Pt's  and son notice confusion more than daughter  She recently started on macrobid for chronic repeated UTI  During that time behaviors were worse, they are better now that she's been on medication  Daughter is noticing increased memory loss and wondering about changing medications  Pt has been tolerating current regimen without apparent side effects  Daughter is concerned that her mom will have increased anxiety and agitations when [place] (they have lived in home about 70 years)  Family Review of Behavior [facility]:    pacing  No    agressive/combative behavior  No    agitated  No   wandering  No   resistance to care  No   hoarding/hiding objects  No    misplacing/losing objects No  personal hygiene problems  No  forgetfulness of actions Yes - forgets about going places    ROS: Review of Systems   Constitutional: Negative for activity change, appetite change, chills and fatigue  HENT: Negative for congestion, hearing loss and trouble swallowing  Eyes: Negative for visual disturbance  Respiratory: Negative for cough and shortness of breath  Cardiovascular: Negative for chest pain  Gastrointestinal: Negative for abdominal pain, constipation, diarrhea, nausea and vomiting  Genitourinary: Negative for difficulty urinating  Musculoskeletal: Negative for arthralgias, back pain and gait problem  Neurological: Negative for dizziness and light-headedness  Psychiatric/Behavioral: Positive for decreased concentration (forgetful)  Negative for dysphoric mood and sleep disturbance  The patient is not nervous/anxious  Allergies:    Allergies   Allergen Reactions   • No Known Allergies Medications:      Current Outpatient Medications:   •  amLODIPine (NORVASC) 2 5 mg tablet, Take 1 tablet (2 5 mg total) by mouth daily, Disp: 90 tablet, Rfl: 3  •  azilsartan medoxomil (Edarbi) 40 MG tablet, Take 1 tablet (40 mg total) by mouth daily, Disp: 90 tablet, Rfl: 3  •  Cholecalciferol (Vitamin D3) 20 MCG (800 UNIT) TABS, Take 20 mcg by mouth 2 (two) times a day, Disp: , Rfl:   •  denosumab (PROLIA) 60 mg/mL, Inject 1 mL (60 mg total) under the skin once for 1 dose, Disp: 1 mL, Rfl: 1  •  donepezil (ARICEPT) 10 mg tablet, Take 1 tablet (10 mg total) by mouth daily In the Morning with Breakfast, Disp: 90 tablet, Rfl: 3  •  escitalopram (LEXAPRO) 10 mg tablet, Take 1 tablet (10 mg total) by mouth daily, Disp: 90 tablet, Rfl: 1  •  L-Methylfolate-F76-L0-U7 (Cerefolin) 6-1-50-5 MG TABS, Take 1 tablet by mouth in the morning, Disp: 90 tablet, Rfl: 3  •  Melatonin 10 MG TABS, Take 10 mg by mouth daily, Disp: , Rfl:   •  memantine (Namenda) 10 mg tablet, Take 1 tablet (10 mg total) by mouth 2 (two) times a day, Disp: 180 tablet, Rfl: 3  •  nitrofurantoin (MACROBID) 100 mg capsule, Take 1 capsule (100 mg total) by mouth daily with lunch, Disp: 30 capsule, Rfl: 2  •  pantoprazole (PROTONIX) 40 mg tablet, Take 1 tablet (40 mg total) by mouth daily, Disp: 90 tablet, Rfl: 3  •  propranolol (INDERAL LA) 60 mg 24 hr capsule, Take 1 capsule (60 mg total) by mouth daily Please stop Norvasc   , Disp: 90 capsule, Rfl: 3  •  QUEtiapine (SEROquel) 25 mg tablet, Take 1 tablet (25 mg total) by mouth daily at bedtime, Disp: 90 tablet, Rfl: 1  •  rosuvastatin (CRESTOR) 5 mg tablet, Take 1 tablet (5 mg total) by mouth daily, Disp: 90 tablet, Rfl: 3  •  vitamin B-12 (VITAMIN B-12) 1,000 mcg tablet, Take 1 tablet (1,000 mcg total) by mouth daily, Disp: 90 tablet, Rfl: 3  •  Diclofenac Sodium (VOLTAREN) 1 %, Apply 2 g topically 4 (four) times a day (Patient not taking: Reported on 2/9/2023), Disp: 200 g, Rfl: 2  • ergocalciferol (VITAMIN D2) 50,000 units, TAKE ONE CAPSULE (50,000 UNITS TOTAL) BY MOUTH ONCE A WEEK (Patient not taking: Reported on 6/27/2023), Disp: 12 capsule, Rfl: 1    Vitals:  Vitals:    06/27/23 1114   BP: 152/80   Pulse: 60   Temp: (!) 97 2 °F (36 2 °C)   SpO2: 97%       History:  Past Medical History:   Diagnosis Date   • Anxiety    • Gastroesophageal reflux disease without esophagitis 8/28/2018   • Hyperlipidemia    • Hypertension    • Hypothyroidism    • Osteoarthritis 10/5/2012   • Other osteoporosis without current pathological fracture 2/9/2023     Past Surgical History:   Procedure Laterality Date   • CATARACT EXTRACTION     • CHOLECYSTECTOMY     • COLONOSCOPY     • HYSTERECTOMY       Family History   Problem Relation Age of Onset   • No Known Problems Mother    • No Known Problems Father      Social History     Socioeconomic History   • Marital status: /Civil Union     Spouse name: Not on file   • Number of children: Not on file   • Years of education: Not on file   • Highest education level: Not on file   Occupational History   • Occupation: retired   Tobacco Use   • Smoking status: Never   • Smokeless tobacco: Never   Vaping Use   • Vaping Use: Never used   Substance and Sexual Activity   • Alcohol use: No   • Drug use: No   • Sexual activity: Not Currently   Other Topics Concern   • Not on file   Social History Narrative   • Not on file     Social Determinants of Health     Financial Resource Strain: Low Risk  (4/14/2021)    Overall Financial Resource Strain (CARDIA)    • Difficulty of Paying Living Expenses: Not hard at all   Food Insecurity: No Food Insecurity (4/14/2021)    Hunger Vital Sign    • Worried About Running Out of Food in the Last Year: Never true    • Ran Out of Food in the Last Year: Never true   Transportation Needs: No Transportation Needs (4/14/2021)    PRAPARE - Transportation    • Lack of Transportation (Medical): No    • Lack of Transportation (Non-Medical):  No Physical Activity: Insufficiently Active (7/7/2020)    Exercise Vital Sign    • Days of Exercise per Week: 2 days    • Minutes of Exercise per Session: 10 min   Stress: No Stress Concern Present (7/7/2020)    [address]    • Feeling of Stress : Not at all   Social Connections: Unknown (7/7/2020)    Social Connection and Isolation Panel [NHANES]    • Frequency of Communication with Friends and Family: Patient refused    • Frequency of Social Gatherings with Friends and Family: Patient refused    • Attends Mosque Services: Patient refused    • Active Member of Clubs or Organizations: Patient refused    • Attends Club or Organization Meetings: Patient refused    • Marital Status: Patient refused   Intimate Partner Violence: Not At Risk (7/7/2020)    Humiliation, Afraid, Rape, and Kick questionnaire    • Fear of Current or Ex-Partner: No    • Emotionally Abused: No    • Physically Abused: No    • Sexually Abused: No   Housing Stability: Not on file     Past Surgical History:   Procedure Laterality Date   • CATARACT EXTRACTION     • CHOLECYSTECTOMY     • COLONOSCOPY     • HYSTERECTOMY           Physical Exam:  Observed ambulation:  No AD, steady   Physical Exam  Vitals and nursing note reviewed  Constitutional:       General: She is not in acute distress  Appearance: Normal appearance  She is well-developed  She is not diaphoretic  HENT:      Head: Normocephalic  Cardiovascular:      Rate and Rhythm: Normal rate  Heart sounds: No murmur heard  No friction rub  No gallop  Pulmonary:      Effort: Pulmonary effort is normal  No respiratory distress  Breath sounds: Normal breath sounds  No wheezing or rales  Abdominal:      General: Bowel sounds are normal  There is no distension  Palpations: Abdomen is soft  Tenderness: There is no abdominal tenderness  There is no rebound     Musculoskeletal:         General: Normal range of motion  Skin:     General: Skin is warm and dry  Neurological:      General: No focal deficit present  Mental Status: She is alert  Mental status is at baseline  Comments: Oriented to person, partial tps  Pleasant, cooperative, forgetful  Engages in [place]  [place]/5  With cues, pt able to figure out time, remember 2/3 recall words     Psychiatric:         Mood and Affect: Mood normal          Behavior: Behavior normal

## 2023-06-27 NOTE — ASSESSMENT & PLAN NOTE
· No complaints at present  · Commend as needed Tylenol/topical analgesics for occasional pains  · Notify provider if increased or change in pain level

## 2023-06-27 NOTE — ASSESSMENT & PLAN NOTE
· No complaints at present  · Continues on Seroquel at nighttime (for agitations per daughter)  · Continue good sleep hygiene techniques

## 2023-06-27 NOTE — ASSESSMENT & PLAN NOTE
· BP stable, no ha/dizziness  · H/o white coat syndrome per dgt  · Continues on propranolol, amlodipine  · Cont dietary and lifestyle interventions  Cont regular f/u with pcp for chronic monitoring

## 2023-06-27 NOTE — ASSESSMENT & PLAN NOTE
· Had some increased behaviors when first dx with UTi, this has improved somewhat  · Cont lexapro at 10mg (ritika dose), and seroquel @ 25mg (qtc 375ms)  · Cont to reorient, redirect, reassure  · Dgt concerned with possibility of increased [place]  · May need to temporarily inc seroquel around that time if anxiety increases - will cont to monitor  · Can utilized ritika therapist as needed if pt interested in talk therapy  · Will check to see if SW has any tip sheets

## 2023-06-27 NOTE — ASSESSMENT & PLAN NOTE
• [place]/5   o Pt's current cognitive level is consistent with mild-mod dementia  o Pt is independent adl/dependent iadls  Lives with   o Mobility:  No AD, no recent falls  • Continue to stay active  Current activity level:  Fair Participates in some social and cognitive, good physical activity  o Encourage pt to incorporate brain exercises into things she enjoys (such as naming birds and plants when she is outside swinging)  • Monitor for changes in mood, sleep, pain control  Notify provider if any concerns  • Medication review:  Seem appropriate for current conditions  o Would continue aricept and namenda at current doses at this time  o Check with pharmacist/provider before starting any new OTC/prescription medications for potential cognitive side effects  • Optimize all acute and chronic conditions    Continue to follow-up with PCP and specialist as directed for management  • Safety concerns:  None identified  • Caregiver stress:  none  • Ensure adequate hydration, good nutrition  • [place]

## 2023-06-28 ENCOUNTER — TELEPHONE (OUTPATIENT)
Age: 83
End: 2023-06-28

## 2023-06-28 NOTE — TELEPHONE ENCOUNTER
SVETA Chong requested LSW send resources to pt's daughter concerning pt's upcoming move into independent living at [facility] in [place]  LSW called pt's daughter Phil Cox to ask for address where she would like resources sent  Phil Cox provided the following address:    [address], [address]     LSW sent the following resources to:     [address], [address] [facility]    - Understanding and Preventing Relocation Stress Syndrome from [facility]  - Prevent Elder Transfer Trauma: Tips to Ease Relocation Stress from [employer]  - Caring  com A Guide to Moving for Seniors  - Caring  com Senior Moving Checklist  - Avoid Relocation Stress: Helping Seniors Move to a [place] from [facility]  -18 Tips for Moving Someone with Dementia: What You Need to Know from [facility]

## 2023-08-08 ENCOUNTER — RA CDI HCC (OUTPATIENT)
Dept: OTHER | Facility: HOSPITAL | Age: 83
End: 2023-08-08

## 2023-08-09 ENCOUNTER — OFFICE VISIT (OUTPATIENT)
Dept: FAMILY MEDICINE CLINIC | Facility: CLINIC | Age: 83
End: 2023-08-09
Payer: COMMERCIAL

## 2023-08-09 VITALS
HEART RATE: 60 BPM | RESPIRATION RATE: 14 BRPM | SYSTOLIC BLOOD PRESSURE: 132 MMHG | WEIGHT: 165 LBS | DIASTOLIC BLOOD PRESSURE: 80 MMHG | BODY MASS INDEX: 26.52 KG/M2 | OXYGEN SATURATION: 97 % | TEMPERATURE: 98.9 F | HEIGHT: 66 IN

## 2023-08-09 DIAGNOSIS — I10 ESSENTIAL HYPERTENSION: ICD-10-CM

## 2023-08-09 DIAGNOSIS — M35.3 POLYMYALGIA RHEUMATICA (HCC): ICD-10-CM

## 2023-08-09 DIAGNOSIS — N39.0 CHRONIC UTI: Primary | ICD-10-CM

## 2023-08-09 DIAGNOSIS — E78.5 DYSLIPIDEMIA: ICD-10-CM

## 2023-08-09 DIAGNOSIS — M31.6 TEMPORAL ARTERITIS (HCC): ICD-10-CM

## 2023-08-09 DIAGNOSIS — R79.89 LOW VITAMIN D LEVEL: ICD-10-CM

## 2023-08-09 DIAGNOSIS — R00.2 PALPITATIONS: ICD-10-CM

## 2023-08-09 DIAGNOSIS — M81.8 IDIOPATHIC OSTEOPOROSIS: ICD-10-CM

## 2023-08-09 DIAGNOSIS — Z00.01 ENCOUNTER FOR GENERAL ADULT MEDICAL EXAMINATION WITH ABNORMAL FINDINGS: ICD-10-CM

## 2023-08-09 PROBLEM — F03.90 DEMENTIA WITHOUT BEHAVIORAL DISTURBANCE (HCC): Status: RESOLVED | Noted: 2020-08-24 | Resolved: 2023-08-09

## 2023-08-09 LAB
SL AMB  POCT GLUCOSE, UA: NEGATIVE
SL AMB LEUKOCYTE ESTERASE,UA: ABNORMAL
SL AMB POCT BILIRUBIN,UA: NEGATIVE
SL AMB POCT BLOOD,UA: NEGATIVE
SL AMB POCT CLARITY,UA: CLEAR
SL AMB POCT COLOR,UA: YELLOW
SL AMB POCT KETONES,UA: NEGATIVE
SL AMB POCT NITRITE,UA: NEGATIVE
SL AMB POCT PH,UA: 5.5
SL AMB POCT SPECIFIC GRAVITY,UA: 1.01
SL AMB POCT URINE PROTEIN: NEGATIVE
SL AMB POCT UROBILINOGEN: 0.2

## 2023-08-09 PROCEDURE — 99214 OFFICE O/P EST MOD 30 MIN: CPT | Performed by: INTERNAL MEDICINE

## 2023-08-09 PROCEDURE — 81002 URINALYSIS NONAUTO W/O SCOPE: CPT | Performed by: INTERNAL MEDICINE

## 2023-08-09 PROCEDURE — G0439 PPPS, SUBSEQ VISIT: HCPCS | Performed by: INTERNAL MEDICINE

## 2023-08-09 RX ORDER — AMLODIPINE BESYLATE 2.5 MG/1
2.5 TABLET ORAL DAILY
Qty: 90 TABLET | Refills: 3 | Status: SHIPPED | OUTPATIENT
Start: 2023-08-09

## 2023-08-09 RX ORDER — ROSUVASTATIN CALCIUM 5 MG/1
5 TABLET, COATED ORAL DAILY
Qty: 90 TABLET | Refills: 3 | Status: SHIPPED | OUTPATIENT
Start: 2023-08-09

## 2023-08-09 RX ORDER — AZILSARTAN KAMEDOXOMIL 40 MG/1
40 TABLET ORAL DAILY
Qty: 90 TABLET | Refills: 3 | Status: SHIPPED | OUTPATIENT
Start: 2023-08-09

## 2023-08-09 RX ORDER — PROPRANOLOL HCL 60 MG
60 CAPSULE, EXTENDED RELEASE 24HR ORAL DAILY
Qty: 90 CAPSULE | Refills: 3 | Status: SHIPPED | OUTPATIENT
Start: 2023-08-09

## 2023-08-09 RX ORDER — ERGOCALCIFEROL 1.25 MG/1
50000 CAPSULE ORAL WEEKLY
Qty: 12 CAPSULE | Refills: 1 | Status: SHIPPED | OUTPATIENT
Start: 2023-08-09

## 2023-08-09 NOTE — PROGRESS NOTES
BMI Counseling: Body mass index is 26.36 kg/m². The BMI is above normal. Nutrition recommendations include reducing portion sizes. Assessment and Plan:     Problem List Items Addressed This Visit        Cardiovascular and Mediastinum    Essential hypertension    Relevant Medications    propranolol (INDERAL LA) 60 mg 24 hr capsule    amLODIPine (NORVASC) 2.5 mg tablet    azilsartan medoxomil (Edarbi) 40 MG tablet    Other Relevant Orders    UA (URINE) with reflex to Scope    Comprehensive metabolic panel    CBC and differential    Magnesium    Lipid panel    TSH, 3rd generation    T4, free    Temporal arteritis (HCC) - Primary       Other    Polymyalgia rheumatica ([address])   Other Visit Diagnoses     Encounter for general adult medical examination with abnormal findings        Relevant Orders    UA (URINE) with reflex to Scope    Comprehensive metabolic panel    CBC and differential    Magnesium    Lipid panel    TSH, 3rd generation    T4, free    Dyslipidemia        Relevant Medications    rosuvastatin (CRESTOR) 5 mg tablet    Palpitations        Relevant Medications    propranolol (INDERAL LA) 60 mg 24 hr capsule    Low vitamin D level        Relevant Medications    ergocalciferol (VITAMIN D2) 50,000 units    Idiopathic osteoporosis        Relevant Medications    denosumab (PROLIA) 60 mg/mL           Preventive health issues were discussed with patient, and age appropriate screening tests were ordered as noted in patient's After Visit Summary. Personalized health advice and appropriate referrals for health education or preventive services given if needed, as noted in patient's After Visit Summary.      History of Present Illness:     Patient presents for a Medicare Wellness Visit    HPI   Patient Care Team:  Izabel Greenberg MD as PCP - General (Internal Medicine)  [facility]     Review of Systems:     Review of Systems     Problem List:     Patient Active Problem List Diagnosis   • Gastroesophageal reflux disease without esophagitis   • Essential hypertension   • Temporal arteritis (HCC)   • Abnormal CT scan, head   • Goiter   • Osteoarthritis   • Hyperlipidemia   • Chest pressure   • Hyponatremia   • Polymyalgia rheumatica (HCC)   • Other chest pain   • Anxiety disorder   • Encounter for medication review   • Bradycardia   • Other insomnia   • Other osteoporosis without current pathological fracture      Past Medical and Surgical History:     Past Medical History:   Diagnosis Date   • Anxiety    • Gastroesophageal reflux disease without esophagitis 8/28/2018   • Hyperlipidemia    • Hypertension    • Hypothyroidism    • Osteoarthritis 10/5/2012   • Other osteoporosis without current pathological fracture 2/9/2023     Past Surgical History:   Procedure Laterality Date   • CATARACT EXTRACTION     • CHOLECYSTECTOMY     • COLONOSCOPY     • HYSTERECTOMY        Family History:     Family History   Problem Relation Age of Onset   • No Known Problems Mother    • No Known Problems Father       Social History:     Social History     Socioeconomic History   • Marital status: /Civil Union     Spouse name: None   • Number of children: None   • Years of education: None   • Highest education level: None   Occupational History   • Occupation: retired   Tobacco Use   • Smoking status: Never   • Smokeless tobacco: Never   Vaping Use   • Vaping Use: Never used   Substance and Sexual Activity   • Alcohol use: No   • Drug use: No   • Sexual activity: Not Currently   Other Topics Concern   • None   Social History Narrative   • None     Social Determinants of Health     Financial Resource Strain: Low Risk  (4/14/2021)    Overall Financial Resource Strain (CARDIA)    • Difficulty of Paying Living Expenses: Not hard at all   Food Insecurity: No Food Insecurity (4/14/2021)    Hunger Vital Sign    • Worried About Running Out of Food in the Last Year: Never true    • Ran Out of Food in the Last Year: Never true   Transportation Needs: No Transportation Needs (4/14/2021)    PRAPARE - Transportation    • Lack of Transportation (Medical): No    • Lack of Transportation (Non-Medical):  No   Physical Activity: Insufficiently Active (7/7/2020)    Exercise Vital Sign    • Days of Exercise per Week: 2 days    • Minutes of Exercise per Session: 10 min   Stress: No Stress Concern Present (7/7/2020)    [facility]    • Feeling of Stress : Not at all   Social Connections: Unknown (7/7/2020)    Social Connection and Isolation Panel [NHANES]    • Frequency of Communication with Friends and Family: Patient refused    • Frequency of Social Gatherings with Friends and Family: Patient refused    • Attends [employer] Services: Patient refused    • Active Member of Clubs or Organizations: Patient refused    • Attends Club or Organization Meetings: Patient refused    • Marital Status: Patient refused   Intimate Partner Violence: Not At Risk (7/7/2020)    Humiliation, Afraid, Rape, and Kick questionnaire    • Fear of Current or Ex-Partner: No    • Emotionally Abused: No    • Physically Abused: No    • Sexually Abused: No   Housing Stability: Not on file      Medications and Allergies:     Current Outpatient Medications   Medication Sig Dispense Refill   • amLODIPine (NORVASC) 2.5 mg tablet Take 1 tablet (2.5 mg total) by mouth daily 90 tablet 3   • azilsartan medoxomil (Edarbi) 40 MG tablet Take 1 tablet (40 mg total) by mouth daily 90 tablet 3   • Cholecalciferol (Vitamin D3) 20 MCG (800 UNIT) TABS Take 20 mcg by mouth 2 (two) times a day     • denosumab (PROLIA) 60 mg/mL Inject 1 mL (60 mg total) under the skin once for 1 dose 1 mL 1   • donepezil (ARICEPT) 10 mg tablet Take 1 tablet (10 mg total) by mouth daily In the Morning with Breakfast 90 tablet 3   • ergocalciferol (VITAMIN D2) 50,000 units Take 1 capsule (50,000 Units total) by mouth once a week 12 capsule 1   • escitalopram (LEXAPRO) 10 mg tablet Take 1 tablet (10 mg total) by mouth daily 90 tablet 1   • L-Methylfolate-B68-[id number]-X5 (Cerefolin) 6-1-50-5 MG TABS Take 1 tablet by mouth in the morning 90 tablet 3   • Melatonin 10 MG TABS Take 10 mg by mouth daily     • memantine (Namenda) 10 mg tablet Take 1 tablet (10 mg total) by mouth 2 (two) times a day 180 tablet 3   • nitrofurantoin (MACROBID) 100 mg capsule Take 1 capsule (100 mg total) by mouth daily with lunch 30 capsule 2   • pantoprazole (PROTONIX) 40 mg tablet Take 1 tablet (40 mg total) by mouth daily 90 tablet 3   • propranolol (INDERAL LA) 60 mg 24 hr capsule Take 1 capsule (60 mg total) by mouth daily Please stop Norvasc. .. 90 capsule 3   • QUEtiapine (SEROquel) 25 mg tablet Take 1 tablet (25 mg total) by mouth daily at bedtime 90 tablet 1   • rosuvastatin (CRESTOR) 5 mg tablet Take 1 tablet (5 mg total) by mouth daily 90 tablet 3   • vitamin B-12 (VITAMIN B-12) 1,000 mcg tablet Take 1 tablet (1,000 mcg total) by mouth daily 90 tablet 3   • Diclofenac Sodium (VOLTAREN) 1 % Apply 2 g topically 4 (four) times a day (Patient not taking: Reported on 2/9/2023) 200 g 2     No current facility-administered medications for this visit.      Allergies   Allergen Reactions   • No Known Allergies       Immunizations:     Immunization History   Administered Date(s) Administered   • COVID-19 MODERNA VACC 0.5 ML IM 01/21/2021, 02/17/2021, 11/19/2021, 04/18/2022   • COVID-19 Moderna Vac BIVALENT 12 Yr+ IM (BOOSTER ONLY) 0.5 ML 11/28/2022   • INFLUENZA 09/21/2015, 11/01/2016, 10/24/2017, 10/12/2018   • Influenza Split 09/03/2013, 09/02/2014   • Influenza Split High Dose Preservative Free IM 11/01/2016, 10/24/2017   • Influenza, high dose seasonal 0.7 mL 09/25/2019, 11/06/2020, 09/22/2021, 10/31/2022   • Influenza, seasonal, injectable 09/21/2015   • Pneumococcal Conjugate 13-Valent 10/24/2017   • Pneumococcal Polysaccharide PPV23 12/01/2008   • Td (adult), adsorbed 01/01/2004 • Tdap 12/19/2018      Health Maintenance: There are no preventive care reminders to display for this patient. Topic Date Due   • COVID-19 Vaccine (6 - Moderna series) 03/28/2023   • Influenza Vaccine (1) 09/01/2023      Medicare Screening Tests and Risk Assessments:     Chasity Euceda is here for her Subsequent Wellness visit. Health Risk Assessment:   Patient rates overall health as fair. Patient feels that their physical health rating is same. Patient is satisfied with their life. Eyesight was rated as same. Hearing was rated as same. Patient feels that their emotional and mental health rating is same. Patients states they are never, rarely angry. Patient states they are never, rarely unusually tired/fatigued. Pain experienced in the last 7 days has been none. Patient states that she has experienced no weight loss or gain in last 6 months. Depression Screening:   PHQ-2 Score: 0      Fall Risk Screening: In the past year, patient has experienced: no history of falling in past year      Urinary Incontinence Screening:   Patient has not leaked urine accidently in the last six months. Home Safety:  Patient does not have trouble with stairs inside or outside of their home. Patient has working smoke alarms and has working carbon monoxide detector. Home safety hazards include: none. Nutrition:   Current diet is Regular. Medications:   Patient is not currently taking any over-the-counter supplements. Patient is not able to manage medications. Activities of Daily Living (ADLs)/Instrumental Activities of Daily Living (IADLs):   Walk and transfer into and out of bed and chair?: Yes  Dress and groom yourself?: Yes    Bathe or shower yourself?: Yes    Feed yourself?  Yes  Do your laundry/housekeeping?: Yes  Manage your money, pay your bills and track your expenses?: Yes  Make your own meals?: Yes    Do your own shopping?: Yes    Previous Hospitalizations:   Any hospitalizations or ED visits within the last 12 months?: No      PREVENTIVE SCREENINGS      Cardiovascular Screening:    General: Screening Not Indicated, History Lipid Disorder and Risks and Benefits Discussed      Diabetes Screening:     General: Screening Not Indicated, History Diabetes, Risks and Benefits Discussed and Screening Current      Colorectal Cancer Screening:     General: Risks and Benefits Discussed      Breast Cancer Screening:     General: Risks and Benefits Discussed      Cervical Cancer Screening:    General: Screening Not Indicated and Risks and Benefits Discussed      Osteoporosis Screening:    General: Screening Not Indicated, History Osteoporosis and Risks and Benefits Discussed      Abdominal Aortic Aneurysm (AAA) Screening:        General: Risks and Benefits Discussed      Lung Cancer Screening:     General: Screening Not Indicated and Risks and Benefits Discussed      Hepatitis C Screening:    General: Risks and Benefits Discussed    Screening, Brief Intervention, and Referral to Treatment (SBIRT)    Screening      AUDIT-C Screenin) How often did you have a drink containing alcohol in the past year? never  2) How many drinks did you have on a typical day when you were drinking in the past year? 0  3) How often did you have 6 or more drinks on one occasion in the past year? never    AUDIT-C Score: 0  Interpretation: Score 0-2 (female): Negative screen for alcohol misuse    Other Counseling Topics:   Car/seat belt/driving safety, skin self-exam, sunscreen and calcium and vitamin D intake and regular weightbearing exercise. No results found.      Physical Exam:     /80 (BP Location: Left arm, Patient Position: Sitting, Cuff Size: Standard)   Pulse (!) 54   Temp (!) 96.9 °F (36.1 °C) (Tympanic)   Resp 14   Ht 5' 6.34" (1.685 m)   Wt 74.8 kg (165 lb)   SpO2 97%   BMI 26.36 kg/m²     Physical Exam     Pedro Mckeon MD

## 2023-08-09 NOTE — PATIENT INSTRUCTIONS
Medicare Preventive Visit Patient Instructions  Thank you for completing your Welcome to Medicare Visit or Medicare Annual Wellness Visit today. Your next wellness visit will be due in one year (8/9/2024). The screening/preventive services that you may require over the next 5-10 years are detailed below. Some tests may not apply to you based off risk factors and/or age. Screening tests ordered at today's visit but not completed yet may show as past due. Also, please note that scanned in results may not display below. Preventive Screenings:  Service Recommendations Previous Testing/Comments   Colorectal Cancer Screening  * Colonoscopy    * Fecal Occult Blood Test (FOBT)/Fecal Immunochemical Test (FIT)  * Fecal DNA/Cologuard Test  * Flexible Sigmoidoscopy Age: 43-73 years old   Colonoscopy: every 10 years (may be performed more frequently if at higher risk)  OR  FOBT/FIT: every 1 year  OR  Cologuard: every 3 years  OR  Sigmoidoscopy: every 5 years  Screening may be recommended earlier than age 39 if at higher risk for colorectal cancer. Also, an individualized decision between you and your healthcare provider will decide whether screening between the ages of 77-80 would be appropriate. Colonoscopy: Not on file  FOBT/FIT: 11/02/2021  Cologuard: Not on file  Sigmoidoscopy: Not on file          Breast Cancer Screening Age: 36 years old  Frequency: every 1-2 years  Not required if history of left and right mastectomy Mammogram: 04/04/2018        Cervical Cancer Screening Between the ages of 21-29, pap smear recommended once every 3 years. Between the ages of 32-69, can perform pap smear with HPV co-testing every 5 years.    Recommendations may differ for women with a history of total hysterectomy, cervical cancer, or abnormal pap smears in past. Pap Smear: Not on file    Screening Not Indicated   Hepatitis C Screening Once for adults born between [address] [place] [address]  More frequently in patients at high risk for Hepatitis C Hep C Antibody: Not on file        Diabetes Screening 1-2 times per year if you're at risk for diabetes or have pre-diabetes Fasting glucose: 97 mg/dL (11/3/2022)  A1C: 5.3 (5/1/2023)  Screening Not Indicated  History Diabetes   Cholesterol Screening Once every 5 years if you don't have a lipid disorder. May order more often based on risk factors. Lipid panel: 11/03/2022    Screening Not Indicated  History Lipid Disorder     Other Preventive Screenings Covered by Medicare:  1. Abdominal Aortic Aneurysm (AAA) Screening: covered once if your at risk. You're considered to be at risk if you have a family history of AAA. 2. Lung Cancer Screening: covers low dose CT scan once per year if you meet all of the following conditions: (1) Age 48-67; (2) No signs or symptoms of lung cancer; (3) Current smoker or have quit smoking within the last 15 years; (4) You have a tobacco smoking history of at least 20 pack years (packs per day multiplied by number of years you smoked); (5) You get a written order from a healthcare provider. 3. Glaucoma Screening: covered annually if you're considered high risk: (1) You have diabetes OR (2) Family history of glaucoma OR (3)  aged 48 and older OR (3)  American aged 72 and older  3. Osteoporosis Screening: covered every 2 years if you meet one of the following conditions: (1) You're estrogen deficient and at risk for osteoporosis based off medical history and other findings; (2) Have a vertebral abnormality; (3) On glucocorticoid therapy for more than 3 months; (4) Have primary hyperparathyroidism; (5) On osteoporosis medications and need to assess response to drug therapy. · Last bone density test (DXA Scan): 04/11/2022.  5. HIV Screening: covered annually if you're between the age of 15-65. Also covered annually if you are younger than 13 and older than 72 with risk factors for HIV infection.  For pregnant patients, it is covered up to 3 times per pregnancy. Immunizations:  Immunization Recommendations   Influenza Vaccine Annual influenza vaccination during flu season is recommended for all persons aged >= 6 months who do not have contraindications   Pneumococcal Vaccine   * Pneumococcal conjugate vaccine = PCV13 (Prevnar 13), PCV15 (Vaxneuvance), PCV20 (Prevnar 20)  * Pneumococcal polysaccharide vaccine = PPSV23 (Pneumovax) Adults 20-63 years old: 1-3 doses may be recommended based on certain risk factors  Adults 72 years old: 1-2 doses may be recommended based off what pneumonia vaccine you previously received   Hepatitis B Vaccine 3 dose series if at intermediate or high risk (ex: diabetes, end stage renal disease, liver disease)   Tetanus (Td) Vaccine - COST NOT COVERED BY MEDICARE PART B Following completion of primary series, a booster dose should be given every 10 years to maintain immunity against tetanus. Td may also be given as tetanus wound prophylaxis. Tdap Vaccine - COST NOT COVERED BY MEDICARE PART B Recommended at least once for all adults. For pregnant patients, recommended with each pregnancy. Shingles Vaccine (Shingrix) - COST NOT COVERED BY MEDICARE PART B  2 shot series recommended in those aged 48 and above     Health Maintenance Due:  There are no preventive care reminders to display for this patient. Immunizations Due:      Topic Date Due   • COVID-19 Vaccine (6 - Moderna series) 03/28/2023   • Influenza Vaccine (1) 09/01/2023     Advance Directives   What are advance directives? Advance directives are legal documents that state your wishes and plans for medical care. These plans are made ahead of time in case you lose your ability to make decisions for yourself. Advance directives can apply to any medical decision, such as the treatments you want, and if you want to donate organs. What are the types of advance directives? There are many types of advance directives, and each state has rules about how to use them.  You may choose a combination of any of the following:  · Living will: This is a written record of the treatment you want. You can also choose which treatments you do not want, which to limit, and which to stop at a certain time. This includes surgery, medicine, IV fluid, and tube feedings. · Durable power of  for healthcare [facility]): This is a written record that states who you want to make healthcare choices for you when you are unable to make them for yourself. This person, called a proxy, is usually a family member or a friend. You may choose more than 1 proxy. · Do not resuscitate (DNR) order:  A DNR order is used in case your heart stops beating or you stop breathing. It is a request not to have certain forms of treatment, such as CPR. A DNR order may be included in other types of advance directives. · Medical directive: This covers the care that you want if you are in a coma, near death, or unable to make decisions for yourself. You can list the treatments you want for each condition. Treatment may include pain medicine, surgery, blood transfusions, dialysis, IV or tube feedings, and a ventilator (breathing machine). · Values history: This document has questions about your views, beliefs, and how you feel and think about life. This information can help others choose the care that you would choose. Why are advance directives important? An advance directive helps you control your care. Although spoken wishes may be used, it is better to have your wishes written down. Spoken wishes can be misunderstood, or not followed. Treatments may be given even if you do not want them. An advance directive may make it easier for your family to make difficult choices about your care. Weight Management   Why it is important to manage your weight:  Being overweight increases your risk of health conditions such as heart disease, high blood pressure, type 2 diabetes, and certain types of cancer.  It can also increase your risk for osteoarthritis, sleep apnea, and other respiratory problems. Aim for a slow, steady weight loss. Even a small amount of weight loss can lower your risk of health problems. How to lose weight safely:  A safe and healthy way to lose weight is to eat fewer calories and get regular exercise. You can lose up about 1 pound a week by decreasing the number of calories you eat by 500 calories each day. Healthy meal plan for weight management:  A healthy meal plan includes a variety of foods, contains fewer calories, and helps you stay healthy. A healthy meal plan includes the following:  · Eat whole-grain foods more often. A healthy meal plan should contain fiber. Fiber is the part of grains, fruits, and vegetables that is not broken down by your body. Whole-grain foods are healthy and provide extra fiber in your diet. Some examples of whole-grain foods are whole-wheat breads and pastas, oatmeal, brown rice, and bulgur. · Eat a variety of vegetables every day. Include dark, leafy greens such as spinach, kale, grace greens, and mustard greens. Eat yellow and orange vegetables such as carrots, sweet potatoes, and winter squash. · Eat a variety of fruits every day. Choose fresh or canned fruit (canned in its own juice or light syrup) instead of juice. Fruit juice has very little or no fiber. · Eat low-fat dairy foods. Drink fat-free (skim) milk or 1% milk. Eat fat-free yogurt and low-fat cottage cheese. Try low-fat cheeses such as mozzarella and other reduced-fat cheeses. · Choose meat and other protein foods that are low in fat. Choose beans or other legumes such as split peas or lentils. Choose fish, skinless poultry (chicken or turkey), or lean cuts of red meat (beef or pork). Before you cook meat or poultry, cut off any visible fat. · Use less fat and oil. Try baking foods instead of frying them. Add less fat, such as margarine, sour cream, regular salad dressing and mayonnaise to foods.  Eat fewer high-fat foods. Some examples of high-fat foods include french fries, doughnuts, ice cream, and cakes. · Eat fewer sweets. Limit foods and drinks that are high in sugar. This includes candy, cookies, regular soda, and sweetened drinks. Exercise:  Exercise at least 30 minutes per day on most days of the week. Some examples of exercise include walking, biking, dancing, and swimming. You can also fit in more physical activity by taking the stairs instead of the elevator or parking farther away from stores. Ask your healthcare provider about the best exercise plan for you. © Copyright [employer] 2018 Information is for End User's use only and may not be sold, redistributed or otherwise used for commercial purposes. All illustrations and images included in CareNotes® are the copyrighted property of [employer] or [address] [place] [address]   Osteoporosis  is a long-term medical condition that causes your bones to become weak, brittle, and more likely to fracture. Osteoporosis occurs when your body absorbs more bone than it makes. It is also caused by a lack of calcium and estrogen (female hormone). Common symptoms include the following: You may not have any signs or symptoms. You may break a bone after a muscle strain, bump, or fall. A break usually occurs in the hip, spine, or wrist. A collapsed vertebra (bone in your spine) may cause severe back pain or loss of height from bent posture. Call your doctor if:   ·  You have severe pain. ·  You have increasing pain after a fall. ·  You have pain when you do your daily activities. ·  You have questions or concerns about your condition or care. Diagnosis of osteoporosis:   · Blood and urine tests  measure your calcium, vitamin D, and estrogen levels. · An x-ray or CT may show thinned bones or a fracture. You may be given contrast liquid to help the bones show up better in the pictures.  Tell the healthcare provider if you have ever had an allergic reaction to contrast liquid. Do not enter the MRI room with anything metal. Metal can cause serious injury. Tell the healthcare provider if you have any metal in or on your body. · A bone density test  compares your bone thickness with what is expected for someone of your age, gender, and ethnicity. Treatment for osteoporosis may include medicines to prevent bone loss, build new bone, and increase estrogen. These medicines help prevent fractures and may be given as a pill or injection. Ask your healthcare provider for more information on these medicines. Prevent bone loss:  · Eat healthy foods that are high in calcium. This helps keep your bones strong. Good sources of calcium are milk, cheese, broccoli, tofu, almonds, and canned salmon and sardines. Recommended to get at least 1200mg daily of calcium. · Increase your vitamin D intake. Vitamin D is in fish oils, some vegetables, and fortified milk, cereal, and bread. Vitamin D is also formed in the skin when it is exposed to the sun. Ask your healthcare provider how much sunlight is safe for you. You will require at least 800 units of vitamin D daily taken as a supplement. · Drink liquids as directed. Ask your healthcare provider how much liquid to drink each day and which liquids are best for you. Do not have alcohol or caffeine. They decrease bone mineral density, which can weaken your bones. · Exercise regularly. Ask your healthcare provider about the best exercise plan for you. Weight bearing exercise for 30 minutes, 3 times a week can help build and strengthen bone. · Do not smoke. Nicotine and other chemicals in cigarettes and cigars can cause lung damage. Ask your healthcare provider for information if you currently smoke and need help to quit. E-cigarettes or smokeless tobacco still contain nicotine. Talk to your healthcare provider before you use these products. · Go to physical therapy as directed.   A physical therapist teaches you exercises to help improve movement and muscle strength. · Alcohol. It is recommended to avoid heavy alcohol use as increased consumption of alcohol is known to cause bone loss  © Copyright [employer] 2021 Information is for End User's use only and may not be sold, redistributed or otherwise used for commercial purposes. All illustrations and images included in CareNotes® are the copyrighted property of [employer]. or [address] [place] [address]    Calcium and vitamin D for bone health (Beyond the Basics)    CALCIUM AND VITAMIN D OVERVIEW  Osteoporosis is a common bone disorder that causes a progressive loss in bone density and mass. As a result, bones become thin, weakened, and easily fractured. It is estimated that more than 1.3 million osteoporosis-associated (or "osteoporotic") fractures occur every year in the [place], primarily of bone within the spine (the vertebrae), the hip, and the forearm near the wrist. =    A number of treatments can help to prevent loss of bone and treat low bone mass. However, the first step in preventing or treating osteoporosis is to consume foods and drinks that provide calcium, a mineral essential for bone strength, and vitamin D, which aids in calcium breakdown and absorption. =    CALCIUM AND VITAMIN D BENEFITS  Good nutrition is important at all ages to keep the bones healthy. · Taking calcium reduces bone loss and decreases the risk of fracturing the vertebrae (the bones that surround the spinal cord). · Consuming calcium during childhood (eg, in milk) can lead to higher bone mass in adulthood. This increase in bone density can reduce the risk of fractures later in life. · Calcium may also have benefits in other body systems by reducing blood pressure and cholesterol levels. · Calcium and vitamin D supplements may help prevent tooth loss in older adults.     RECOMMENDATIONS FOR CALCIUM    General recommendations -- Premenopausal women and men should consume at least 1000 mg of calcium, while postmenopausal women should consume 1200 mg (total diet plus supplement). You should not consume more than 2000 mg of calcium per day (total diet plus supplement) due to the risk of side effects. Calcium in the diet -- The primary sources of calcium in the diet include milk and other dairy products, such as hard cheese, cottage cheese, or yogurt, as well as green vegetables, such as kale and broccoli (table 1). Some cereals, soy products, and fruit juices are fortified with calcium. Calcium supplements -- The body is able to absorb calcium contained in supplements as well as from dietary sources. If it is not possible to get enough calcium from dietary sources, consult a health care provider to determine the best type, dose, and timing of calcium supplements. · Calcium carbonate is effective and is the least expensive form of calcium. It is best absorbed with a low-iron meal (such as breakfast). Calcium carbonate may not be absorbed well in people who also take a specific medication for gastroesophageal reflux (called a proton pump inhibitor or H2 blocker), which blocks stomach acid. · Many natural calcium carbonate preparations such as oyster shells or bone meal contain some lead. · Calcium citrate is well absorbed in the fasting state as well as with a meal.  · Calcium doses above 500 mg are not absorbed as well as smaller doses, so large doses of supplements should be taken in divided doses (eg, in the morning and evening). · Calcium supplements do not replace other osteoporosis treatments such as hormone replacement, bisphosphonates (eg, risedronate [sample brand name: Actonel] and alendronate [brand name: Fosamax]), and raloxifene (brand name: Evista). Calcium and vitamin D supplements alone are usually insufficient to prevent age-related bone loss, although they may be beneficial in some subgroups (older adults, those with very low intake).  In most patients with or at risk for osteoporosis, the addition of medication or hormonal therapy is necessary in order to slow the breakdown and removal of bone (ie, resorption). Underlying gastrointestinal diseases -- Patients who do not adequately absorb nutrients from the gastrointestinal tract (due to malabsorption) may require more than 1000 to 1200 mg of calcium per day. In such cases, a health care provider can help to determine the optimal dose of calcium. Medications -- All medications should be discussed with a health care provider to ensure that possible interactions with calcium are identified. Certain medications change the amount of calcium that is absorbed and/or excreted. As an example, loop diuretics (eg, furosemide [sample brand name: Lasix]) increase the amount of calcium excreted in the urine. On the other hand, thiazide diuretics (eg, hydrochlorothiazide) can reduce levels of calcium in the urine, potentially reducing the risk of bone loss and kidney stones. Side effects of calcium -- Calcium is usually easily tolerated when it is taken in divided doses several times per day. Some people experience side effects related to calcium, including constipation and indigestion. Calcium supplements interfere with the absorption of iron and thyroid hormone, and therefore, these medications should be taken at different times. Kidney stones -- There is no evidence that consuming large amounts of calcium (from foods and drinks) increases the risk of kidney stones, or that avoiding dietary calcium decreases the risk. In fact, avoiding dairy products is likely to increase the risk of kidney stones. However, use of calcium supplements may increase the risk of kidney stones in susceptible individuals by raising the level of calcium in the urine.  This is particularly true if the supplement is taken between meals or at bedtime, and this is one of the reasons we prefer for patients to get as much of their calcium requirement through dietary means. IMPORTANCE OF VITAMIN D  Vitamin D decreases bone loss and lowers the risk of fracture, especially in older men and women. Along with calcium, vitamin D also helps to prevent and treat osteoporosis. To absorb calcium efficiently, an adequate amount of vitamin D must be present. Vitamin D is normally made in the skin after exposure to sunlight. Recommendations for vitamin D -- The current recommendation is that men over 70 years and postmenopausal women consume at least 800 international units (20 micrograms) of vitamin D per day. Lower levels of vitamin D are not as effective, while high doses can be toxic, especially if taken for long periods of time. Although the optimal intake has not been clearly established in premenopausal women or in younger men with osteoporosis, 600 international units (15 micrograms) of vitamin D daily is generally suggested. Vitamin D is available as an individual supplement and is included in most multivitamins and some calcium supplements (table 2). Milk is a relatively good dietary source of vitamin D, with approximately 100 international units (2.5 micrograms) per cup (240 mL), and salmon has 800 to 1000 international units (20 to 25 micrograms) of vitamin D per serving. Most healthy people don't need to check with their health care provider before taking standard doses of vitamin D and don't need monitoring of vitamin D levels if they are not being treated for vitamin D deficiency. However, recommendations for vitamin D supplementation may be different in people with certain underlying medical conditions, such as sarcoidosis or lymphoma. In these situations, a provider will determine if supplements are needed; if so, the person's vitamin D and calcium levels should be monitored with regular tests. ©6769 [facility], [address] [facility] rights reserved.

## 2023-08-09 NOTE — PROGRESS NOTES
[address] [facility] coding opportunities     E11.22     Chart Reviewed number of suggestions sent to Provider: 1     Patients Insurance     Medicare Insurance: [employer]

## 2023-08-09 NOTE — PROGRESS NOTES
Name: Gill Gillespie      : 1940      MRN: [id number]  Encounter Provider: Hemant Moody MD  Encounter Date: 2023   Encounter department: [address] [place] [address]     1. Chronic UTI  -     POCT urine dip    2. Temporal arteritis ([facility])    3. Polymyalgia rheumatica ([address])    4. Encounter for general adult medical examination with abnormal findings  -     UA (URINE) with reflex to Scope; Future; Expected date: 2023  -     Comprehensive metabolic panel; Future  -     CBC and differential; Future  -     Magnesium; Future  -     Lipid panel; Future  -     TSH, 3rd generation; Future; Expected date: 2023  -     T4, free; Future; Expected date: 2023    5. Dyslipidemia  -     rosuvastatin (CRESTOR) 5 mg tablet; Take 1 tablet (5 mg total) by mouth daily    6. Essential hypertension  -     propranolol (INDERAL LA) 60 mg 24 hr capsule; Take 1 capsule (60 mg total) by mouth daily Please stop Norvasc. ..  -     amLODIPine (NORVASC) 2.5 mg tablet; Take 1 tablet (2.5 mg total) by mouth daily  -     azilsartan medoxomil (Edarbi) 40 MG tablet; Take 1 tablet (40 mg total) by mouth daily  -     UA (URINE) with reflex to Scope; Future; Expected date: 2023  -     Comprehensive metabolic panel; Future  -     CBC and differential; Future  -     Magnesium; Future  -     Lipid panel; Future  -     TSH, 3rd generation; Future; Expected date: 2023  -     T4, free; Future; Expected date: 2023    7. Palpitations  -     propranolol (INDERAL LA) 60 mg 24 hr capsule; Take 1 capsule (60 mg total) by mouth daily Please stop Norvasc. ..    8. Low vitamin D level  -     ergocalciferol (VITAMIN D2) 50,000 units; Take 1 capsule (50,000 Units total) by mouth once a week    9. Idiopathic osteoporosis  -     denosumab (PROLIA) 60 mg/mL; Inject 1 mL (60 mg total) under the skin once for 1 dose    stable  Continue same  Life style mod  AWV : done in detail. ..   RTC in 3 mos w blood work       Subjective      [address] is here with her care giver, her daughter, for AWV and regular Check up, she feels ok, recent Blood work and med list reviewed w pt in detail. .. Review of Systems   Constitutional: Negative for chills, fatigue and fever. HENT: Positive for postnasal drip. Negative for congestion, facial swelling, sore throat, trouble swallowing and voice change. Eyes: Negative for pain, discharge and visual disturbance. Respiratory: Negative for cough, shortness of breath and wheezing. Cardiovascular: Negative for chest pain, palpitations and leg swelling. Gastrointestinal: Negative for abdominal pain, blood in stool, constipation, diarrhea and nausea. Endocrine: Negative for polydipsia, polyphagia and polyuria. Genitourinary: Negative for difficulty urinating, hematuria and urgency. Musculoskeletal: Negative for arthralgias and myalgias. Skin: Negative for rash. Neurological: Negative for dizziness, tremors, weakness and headaches. Hematological: Negative for adenopathy. Does not bruise/bleed easily. Psychiatric/Behavioral: Negative for dysphoric mood, sleep disturbance and suicidal ideas.        Current Outpatient Medications on File Prior to Visit   Medication Sig   • Cholecalciferol (Vitamin D3) 20 MCG (800 UNIT) TABS Take 20 mcg by mouth 2 (two) times a day   • donepezil (ARICEPT) 10 mg tablet Take 1 tablet (10 mg total) by mouth daily In the Morning with Breakfast   • escitalopram (LEXAPRO) 10 mg tablet Take 1 tablet (10 mg total) by mouth daily   • L-Methylfolate-O93-J2-A5 (Cerefolin) 6-1-50-5 MG TABS Take 1 tablet by mouth in the morning   • Melatonin 10 MG TABS Take 10 mg by mouth daily   • memantine (Namenda) 10 mg tablet Take 1 tablet (10 mg total) by mouth 2 (two) times a day   • nitrofurantoin (MACROBID) 100 mg capsule Take 1 capsule (100 mg total) by mouth daily with lunch   • pantoprazole (PROTONIX) 40 mg tablet Take 1 tablet (40 mg total) by mouth daily   • QUEtiapine (SEROquel) 25 mg tablet Take 1 tablet (25 mg total) by mouth daily at bedtime   • vitamin B-12 (VITAMIN B-12) 1,000 mcg tablet Take 1 tablet (1,000 mcg total) by mouth daily   • [DISCONTINUED] amLODIPine (NORVASC) 2.5 mg tablet Take 1 tablet (2.5 mg total) by mouth daily   • [DISCONTINUED] azilsartan medoxomil (Edarbi) 40 MG tablet Take 1 tablet (40 mg total) by mouth daily   • [DISCONTINUED] propranolol (INDERAL LA) 60 mg 24 hr capsule Take 1 capsule (60 mg total) by mouth daily Please stop Norvasc. ..   • [DISCONTINUED] rosuvastatin (CRESTOR) 5 mg tablet Take 1 tablet (5 mg total) by mouth daily   • Diclofenac Sodium (VOLTAREN) 1 % Apply 2 g topically 4 (four) times a day (Patient not taking: Reported on 2/9/2023)   • [DISCONTINUED] denosumab (PROLIA) 60 mg/mL Inject 1 mL (60 mg total) under the skin once for 1 dose   • [DISCONTINUED] ergocalciferol (VITAMIN D2) 50,000 units TAKE ONE CAPSULE (50,000 UNITS TOTAL) BY MOUTH ONCE A WEEK (Patient not taking: Reported on 6/27/2023)       Objective     /80 (BP Location: Left arm, Patient Position: Sitting, Cuff Size: Standard)   Pulse 60   Temp 98.9 °F (37.2 °C) (Tympanic)   Resp 14   Ht 5' 6.34" (1.685 m)   Wt 74.8 kg (165 lb)   SpO2 97%   BMI 26.36 kg/m²     Physical Exam  Constitutional:       General: She is not in acute distress. HENT:      Head: Normocephalic. Mouth/Throat:      Pharynx: No oropharyngeal exudate. Eyes:      General: No scleral icterus. Conjunctiva/sclera: Conjunctivae normal.      Pupils: Pupils are equal, round, and reactive to light. Neck:      Thyroid: No thyromegaly. Cardiovascular:      Rate and Rhythm: Normal rate and regular rhythm. Heart sounds: Normal heart sounds. No murmur heard. Pulmonary:      Effort: Pulmonary effort is normal. No respiratory distress. Breath sounds: Normal breath sounds. No wheezing or rales.    Abdominal:      General: Bowel sounds are normal. There is no distension. Palpations: Abdomen is soft. Tenderness: There is no abdominal tenderness. There is no guarding or rebound. Musculoskeletal:         General: No tenderness. Cervical back: Neck supple. Lymphadenopathy:      Cervical: No cervical adenopathy. Skin:     Coloration: Skin is not pale. Findings: No rash. Neurological:      Mental Status: She is alert and oriented to person, place, and time. Sensory: No sensory deficit. Motor: No weakness.        Skylar Medley MD

## 2023-08-11 ENCOUNTER — APPOINTMENT (OUTPATIENT)
Dept: LAB | Age: 83
End: 2023-08-11
Payer: COMMERCIAL

## 2023-08-11 DIAGNOSIS — I10 ESSENTIAL HYPERTENSION: ICD-10-CM

## 2023-08-11 DIAGNOSIS — Z00.01 ENCOUNTER FOR GENERAL ADULT MEDICAL EXAMINATION WITH ABNORMAL FINDINGS: ICD-10-CM

## 2023-08-11 LAB
ALBUMIN SERPL BCP-MCNC: 3.6 G/DL (ref 3.5–5)
ALP SERPL-CCNC: 43 U/L (ref 46–116)
ALT SERPL W P-5'-P-CCNC: 19 U/L (ref 12–78)
ANION GAP SERPL CALCULATED.3IONS-SCNC: 6 MMOL/L
AST SERPL W P-5'-P-CCNC: 15 U/L (ref 5–45)
BACTERIA UR QL AUTO: ABNORMAL /HPF
BASOPHILS # BLD AUTO: 0.06 THOUSANDS/ÂΜL (ref 0–0.1)
BASOPHILS NFR BLD AUTO: 1 % (ref 0–1)
BILIRUB SERPL-MCNC: 0.52 MG/DL (ref 0.2–1)
BILIRUB UR QL STRIP: NEGATIVE
BUN SERPL-MCNC: 15 MG/DL (ref 5–25)
CALCIUM SERPL-MCNC: 9.7 MG/DL (ref 8.3–10.1)
CHLORIDE SERPL-SCNC: 103 MMOL/L (ref 96–108)
CHOLEST SERPL-MCNC: 208 MG/DL
CLARITY UR: CLEAR
CO2 SERPL-SCNC: 27 MMOL/L (ref 21–32)
COLOR UR: ABNORMAL
CREAT SERPL-MCNC: 1.11 MG/DL (ref 0.6–1.3)
EOSINOPHIL # BLD AUTO: 0.14 THOUSAND/ÂΜL (ref 0–0.61)
EOSINOPHIL NFR BLD AUTO: 3 % (ref 0–6)
ERYTHROCYTE [DISTWIDTH] IN BLOOD BY AUTOMATED COUNT: 13.3 % (ref 11.6–15.1)
GFR SERPL CREATININE-BSD FRML MDRD: 46 ML/MIN/1.73SQ M
GLUCOSE P FAST SERPL-MCNC: 90 MG/DL (ref 65–99)
GLUCOSE UR STRIP-MCNC: NEGATIVE MG/DL
HCT VFR BLD AUTO: 39.4 % (ref 34.8–46.1)
HDLC SERPL-MCNC: 64 MG/DL
HGB BLD-MCNC: 12.7 G/DL (ref 11.5–15.4)
HGB UR QL STRIP.AUTO: NEGATIVE
IMM GRANULOCYTES # BLD AUTO: 0.01 THOUSAND/UL (ref 0–0.2)
IMM GRANULOCYTES NFR BLD AUTO: 0 % (ref 0–2)
KETONES UR STRIP-MCNC: NEGATIVE MG/DL
LDLC SERPL CALC-MCNC: 123 MG/DL (ref 0–100)
LEUKOCYTE ESTERASE UR QL STRIP: ABNORMAL
LYMPHOCYTES # BLD AUTO: 1.61 THOUSANDS/ÂΜL (ref 0.6–4.47)
LYMPHOCYTES NFR BLD AUTO: 32 % (ref 14–44)
MAGNESIUM SERPL-MCNC: 2.4 MG/DL (ref 1.6–2.6)
MCH RBC QN AUTO: 31 PG (ref 26.8–34.3)
MCHC RBC AUTO-ENTMCNC: 32.2 G/DL (ref 31.4–37.4)
MCV RBC AUTO: 96 FL (ref 82–98)
MONOCYTES # BLD AUTO: 0.53 THOUSAND/ÂΜL (ref 0.17–1.22)
MONOCYTES NFR BLD AUTO: 11 % (ref 4–12)
NEUTROPHILS # BLD AUTO: 2.66 THOUSANDS/ÂΜL (ref 1.85–7.62)
NEUTS SEG NFR BLD AUTO: 53 % (ref 43–75)
NITRITE UR QL STRIP: NEGATIVE
NON-SQ EPI CELLS URNS QL MICRO: ABNORMAL /HPF
NONHDLC SERPL-MCNC: 144 MG/DL
NRBC BLD AUTO-RTO: 0 /100 WBCS
PH UR STRIP.AUTO: 6.5 [PH]
PLATELET # BLD AUTO: 258 THOUSANDS/UL (ref 149–390)
PMV BLD AUTO: 12.5 FL (ref 8.9–12.7)
POTASSIUM SERPL-SCNC: 4 MMOL/L (ref 3.5–5.3)
PROT SERPL-MCNC: 7.1 G/DL (ref 6.4–8.4)
PROT UR STRIP-MCNC: NEGATIVE MG/DL
RBC # BLD AUTO: 4.1 MILLION/UL (ref 3.81–5.12)
RBC #/AREA URNS AUTO: ABNORMAL /HPF
SODIUM SERPL-SCNC: 136 MMOL/L (ref 135–147)
SP GR UR STRIP.AUTO: 1.01 (ref 1–1.03)
T4 FREE SERPL-MCNC: 1.06 NG/DL (ref 0.61–1.12)
TRIGL SERPL-MCNC: 104 MG/DL
TSH SERPL DL<=0.05 MIU/L-ACNC: 4.21 UIU/ML (ref 0.45–4.5)
UROBILINOGEN UR STRIP-ACNC: <2 MG/DL
WBC # BLD AUTO: 5.01 THOUSAND/UL (ref 4.31–10.16)
WBC #/AREA URNS AUTO: ABNORMAL /HPF

## 2023-08-11 PROCEDURE — 83735 ASSAY OF MAGNESIUM: CPT

## 2023-08-11 PROCEDURE — 36415 COLL VENOUS BLD VENIPUNCTURE: CPT

## 2023-08-11 PROCEDURE — 81001 URINALYSIS AUTO W/SCOPE: CPT

## 2023-08-11 PROCEDURE — 80053 COMPREHEN METABOLIC PANEL: CPT

## 2023-08-11 PROCEDURE — 84439 ASSAY OF FREE THYROXINE: CPT

## 2023-08-11 PROCEDURE — 84443 ASSAY THYROID STIM HORMONE: CPT

## 2023-08-11 PROCEDURE — 80061 LIPID PANEL: CPT

## 2023-08-11 PROCEDURE — 85025 COMPLETE CBC W/AUTO DIFF WBC: CPT

## 2023-08-23 ENCOUNTER — HOSPITAL ENCOUNTER (OUTPATIENT)
Dept: NON INVASIVE DIAGNOSTICS | Facility: HOSPITAL | Age: 83
Discharge: HOME | End: 2023-08-23
Payer: COMMERCIAL

## 2023-08-23 DIAGNOSIS — R94.31 ABNORMAL ECG: ICD-10-CM

## 2023-08-23 DIAGNOSIS — R00.1 SINUS BRADYCARDIA: ICD-10-CM

## 2023-08-23 PROCEDURE — 93226 XTRNL ECG REC<48 HR SCAN A/R: CPT

## 2023-08-23 PROCEDURE — 93225 XTRNL ECG REC<48 HRS REC: CPT

## 2023-08-29 DIAGNOSIS — I10 ESSENTIAL HYPERTENSION: ICD-10-CM

## 2023-08-29 PROCEDURE — 93227 XTRNL ECG REC<48 HR R&I: CPT | Performed by: INTERNAL MEDICINE

## 2023-08-29 RX ORDER — AZILSARTAN KAMEDOXOMIL 40 MG/1
40 TABLET ORAL DAILY
Qty: 90 TABLET | Refills: 3 | Status: SHIPPED | OUTPATIENT
Start: 2023-08-29 | End: 2023-08-30 | Stop reason: SDUPTHER

## 2023-08-30 DIAGNOSIS — I10 ESSENTIAL HYPERTENSION: ICD-10-CM

## 2023-08-30 RX ORDER — AZILSARTAN KAMEDOXOMIL 40 MG/1
40 TABLET ORAL DAILY
Qty: 90 TABLET | Refills: 3 | Status: SHIPPED | OUTPATIENT
Start: 2023-08-30

## 2023-09-05 DIAGNOSIS — N39.0 CHRONIC UTI: ICD-10-CM

## 2023-09-05 RX ORDER — NITROFURANTOIN 25; 75 MG/1; MG/1
100 CAPSULE ORAL
Qty: 30 CAPSULE | Refills: 2 | Status: SHIPPED | OUTPATIENT
Start: 2023-09-05

## 2023-09-12 DIAGNOSIS — R79.89 LOW VITAMIN D LEVEL: ICD-10-CM

## 2023-09-12 RX ORDER — ERGOCALCIFEROL 1.25 MG/1
50000 CAPSULE ORAL WEEKLY
Qty: 12 CAPSULE | Refills: 1 | Status: SHIPPED | OUTPATIENT
Start: 2023-09-12

## 2023-09-20 DIAGNOSIS — R41.3 MEMORY LOSS: Primary | ICD-10-CM

## 2023-09-29 ENCOUNTER — HOSPITAL ENCOUNTER (OUTPATIENT)
Dept: NON INVASIVE DIAGNOSTICS | Facility: HOSPITAL | Age: 83
Discharge: HOME | End: 2023-09-29
Payer: COMMERCIAL

## 2023-09-29 VITALS
SYSTOLIC BLOOD PRESSURE: 132 MMHG | BODY MASS INDEX: 26.52 KG/M2 | HEART RATE: 60 BPM | HEIGHT: 66 IN | WEIGHT: 165 LBS | DIASTOLIC BLOOD PRESSURE: 80 MMHG

## 2023-09-29 DIAGNOSIS — R00.1 SINUS BRADYCARDIA: ICD-10-CM

## 2023-09-29 DIAGNOSIS — R94.31 ABNORMAL ECG: ICD-10-CM

## 2023-09-29 DIAGNOSIS — R93.1 ABNORMAL ECHOCARDIOGRAM: Primary | ICD-10-CM

## 2023-09-29 LAB
AORTIC ROOT: 3.3 CM
AORTIC VALVE MEAN VELOCITY: 10.9 M/S
APICAL FOUR CHAMBER EJECTION FRACTION: 54 %
ASCENDING AORTA: 3.4 CM
AV AREA BY CONTINUOUS VTI: 1.7 CM2
AV AREA PEAK VELOCITY: 1.6 CM2
AV LVOT MEAN GRADIENT: 2 MMHG
AV LVOT PEAK GRADIENT: 3 MMHG
AV MEAN GRADIENT: 5 MMHG
AV PEAK GRADIENT: 10 MMHG
AV REGURGITATION PRESSURE HALF TIME: 405 MS
AV VALVE AREA: 1.67 CM2
AV VELOCITY RATIO: 0.58
DOP CALC AO PEAK VEL: 1.59 M/S
DOP CALC AO VTI: 38.79 CM
DOP CALC LVOT AREA: 2.83 CM2
DOP CALC LVOT CARDIAC INDEX: 2.07 L/MIN/M2
DOP CALC LVOT CARDIAC OUTPUT: 3.83 L/MIN
DOP CALC LVOT DIAMETER: 1.9 CM
DOP CALC LVOT PEAK VEL VTI: 22.85 CM
DOP CALC LVOT PEAK VEL: 0.92 M/S
DOP CALC LVOT STROKE INDEX: 36.8 ML/M2
DOP CALC LVOT STROKE VOLUME: 64.75 CM3
E WAVE DECELERATION TIME: 226 MS
FRACTIONAL SHORTENING: 33 % (ref 28–44)
INTERVENTRICULAR SEPTUM IN DIASTOLE (PARASTERNAL SHORT AXIS VIEW): 1.1 CM
INTERVENTRICULAR SEPTUM: 1.1 CM (ref 0.6–1.1)
LAAS-AP2: 23.3 CM2
LAAS-AP4: 17.4 CM2
LEFT ATRIUM SIZE: 3.4 CM
LEFT ATRIUM VOLUME (MOD BIPLANE): 62 ML
LEFT INTERNAL DIMENSION IN SYSTOLE: 3.3 CM (ref 2.1–4)
LEFT VENTRICLE DIASTOLIC VOLUME (MOD BIPLANE): 110 ML
LEFT VENTRICLE SYSTOLIC VOLUME (MOD BIPLANE): 47 ML
LEFT VENTRICULAR INTERNAL DIMENSION IN DIASTOLE: 4.9 CM (ref 3.5–6)
LEFT VENTRICULAR POSTERIOR WALL IN END DIASTOLE: 1.1 CM
LEFT VENTRICULAR STROKE VOLUME: 65 ML
LV EF: 57 %
LVSV (TEICH): 65 ML
MV PEAK A VEL: 0.84 M/S
MV PEAK E VEL: 81 CM/S
RA PRESSURE ESTIMATED: 5 MMHG
RIGHT ATRIUM AREA SYSTOLE A4C: 17 CM2
RIGHT VENTRICLE ID DIMENSION: 3.4 CM
RV PSP: 56 MMHG
SINOTUBULAR JUNCTION: 2.6 CM
SL CV AV DECELERATION TIME RETROGRADE: 1398 MS
SL CV AV PEAK GRADIENT RETROGRADE: 56 MMHG
SL CV LEFT ATRIUM LENGTH A2C: 6.1 CM
SL CV LV EF: 60
SL CV PED ECHO LEFT VENTRICLE DIASTOLIC VOLUME (MOD BIPLANE) 2D: 111 ML
SL CV PED ECHO LEFT VENTRICLE SYSTOLIC VOLUME (MOD BIPLANE) 2D: 45 ML
STJ: 2.6 CM
TR MAX PG: 51 MMHG
TR PEAK VELOCITY: 3.6 M/S
TRICUSPID ANNULAR PLANE SYSTOLIC EXCURSION: 2.3 CM
TRICUSPID VALVE PEAK REGURGITATION VELOCITY: 3.57 M/S

## 2023-09-29 PROCEDURE — 93306 TTE W/DOPPLER COMPLETE: CPT

## 2023-09-29 PROCEDURE — 93306 TTE W/DOPPLER COMPLETE: CPT | Performed by: STUDENT IN AN ORGANIZED HEALTH CARE EDUCATION/TRAINING PROGRAM

## 2023-10-17 DIAGNOSIS — E78.5 DYSLIPIDEMIA: ICD-10-CM

## 2023-10-17 DIAGNOSIS — I10 ESSENTIAL HYPERTENSION: ICD-10-CM

## 2023-10-17 RX ORDER — AMLODIPINE BESYLATE 2.5 MG/1
2.5 TABLET ORAL DAILY
Qty: 90 TABLET | Refills: 3 | Status: SHIPPED | OUTPATIENT
Start: 2023-10-17

## 2023-10-17 RX ORDER — ROSUVASTATIN CALCIUM 5 MG/1
5 TABLET, COATED ORAL DAILY
Qty: 90 TABLET | Refills: 3 | Status: SHIPPED | OUTPATIENT
Start: 2023-10-17

## 2023-10-19 DIAGNOSIS — R00.2 PALPITATIONS: ICD-10-CM

## 2023-10-19 DIAGNOSIS — I10 ESSENTIAL HYPERTENSION: ICD-10-CM

## 2023-10-19 RX ORDER — PROPRANOLOL HCL 60 MG
60 CAPSULE, EXTENDED RELEASE 24HR ORAL DAILY
Qty: 30 CAPSULE | Refills: 0 | Status: SHIPPED | OUTPATIENT
Start: 2023-10-19

## 2023-10-20 RX ORDER — ROSUVASTATIN CALCIUM 10 MG/1
TABLET, COATED ORAL
Qty: 90 TABLET | Refills: 3 | Status: SHIPPED | OUTPATIENT
Start: 2023-10-20

## 2023-10-31 DIAGNOSIS — R00.2 PALPITATIONS: ICD-10-CM

## 2023-10-31 DIAGNOSIS — G47.00 INSOMNIA, UNSPECIFIED TYPE: ICD-10-CM

## 2023-10-31 DIAGNOSIS — F32.A DEPRESSION, UNSPECIFIED DEPRESSION TYPE: ICD-10-CM

## 2023-10-31 RX ORDER — QUETIAPINE FUMARATE 25 MG/1
25 TABLET, FILM COATED ORAL
Qty: 90 TABLET | Refills: 1 | Status: SHIPPED | OUTPATIENT
Start: 2023-10-31

## 2023-10-31 RX ORDER — ESCITALOPRAM OXALATE 10 MG/1
10 TABLET ORAL DAILY
Qty: 90 TABLET | Refills: 1 | Status: SHIPPED | OUTPATIENT
Start: 2023-10-31

## 2023-11-08 DIAGNOSIS — R00.2 PALPITATIONS: ICD-10-CM

## 2023-11-08 DIAGNOSIS — I10 ESSENTIAL HYPERTENSION: ICD-10-CM

## 2023-11-08 RX ORDER — PROPRANOLOL HCL 60 MG
60 CAPSULE, EXTENDED RELEASE 24HR ORAL DAILY
Qty: 90 CAPSULE | Refills: 3 | Status: SHIPPED | OUTPATIENT
Start: 2023-11-08

## 2023-11-13 ENCOUNTER — TELEPHONE (OUTPATIENT)
Dept: NEUROLOGY | Facility: CLINIC | Age: 83
End: 2023-11-13

## 2023-11-13 NOTE — TELEPHONE ENCOUNTER
I called and spoke with  patient daughter to scheduled appointment per referral. Patient daughter stated that she will take appointment in [place]  and she is aware of that they will be seeing a resident and attending. Add on.    [facility]    Code Status:  FULL CODE   Visit Type:   Chief Complaint   Patient presents with     Discharge Summary Nursing Home     Facility:  [facility] ([facility]) [[id number]]           Transitional Care Course: Crystal Ibarra is a 85 year old female who I am seeing today for discharge from the TCU.  Patient recently hospitalized on 5/11/2022 status post fall with fracture of the superior pubic Ramus on the left.  Past medical history includes multiple falls, SAH, hypertension, hyponatremia, recent pneumonia/bronchitis and frequent PVCs/bigeminy.  Patient had a mechanical fall on 5/11 in which she tripped and fell backwards.  She presented with significant left hip pain and underwent CT which showed a lateral compression type I left pelvic ring fracture with a small left pelvic sidewall hematoma.  Orthopedics was consulted and treated patient conservatively.  Patient weightbearing as tolerated.  Essential hypertension.  She continues on lisinopril and amlodipine.  Her hydrochlorothiazide was discontinued due to hyponatremia.  Sodium 130.  Acute hypoxic respiratory failure.  Patient had been seen by her PCP on 5/9/2022 with 3 weeks of cough and was treated for possible bronchitis.  She completed her Augmentin of 7 days.  D-dimer was slightly elevated.  She underwent CT of the chest which was negative for PE.  She was initially treated with oxygen but weaned off.  Frequent PVCs/bigeminy.  Patient asymptomatic.  Echo reviewed which showed some LA dilatation otherwise normal LV and RV function.  She did have some tricuspid regurgitation.  Protein malnutrition.  Patient very thin.  It was suggested for primary care provider that she start on boost.    On today's visit patient sitting up in wheelchair. Her son n law and daughter are present on exam.  Patient with recent left pelvic fracture.  Patient today denying any pain.  She is ambulatory with standby assist with a rolling walker.  Appetite  greatly improved.  She continues on supplement.  She does have lower extremity edema that waxes and wanes.  Currently in [place].  Hypertension.  Blood pressure satisfactory.  Her hydration chlorothiazide was discontinued during hospitalization secondary to hyponatremia.  This is now resolved.  Patient plans to discharge home to her independent living and have her daughter stay with her until her assisted living is ready in 1 month.  Patient with some underlying cognitive impairment however attempts to cover quite well.  Therapy is recommending 24-hour care.    Active Ambulatory Problems     Diagnosis Date Noted     Subarachnoid bleed (H) 02/21/2018     SAH (subarachnoid hemorrhage) (H) 02/21/2018     Hypertensive emergency 02/21/2018     Closed fracture of greater trochanter of left femur (H) 02/21/2018     Scalp laceration 03/06/2018     Fracture of superior pubic ramus, left, closed, initial encounter (H) 05/11/2022     Closed fracture of left inferior pubic ramus, initial encounter (H) 05/11/2022     Scoliosis 05/18/2022     Protein-calorie malnutrition, unspecified severity (H) 05/09/2022     Osteoporosis 05/18/2022     OA (osteoarthritis) 05/18/2022     HTN (hypertension) 05/18/2022     Resolved Ambulatory Problems     Diagnosis Date Noted     No Resolved Ambulatory Problems     No Additional Past Medical History       Current Outpatient Medications:      acetaminophen (TYLENOL) 325 MG tablet, Take 650 mg by mouth every 6 hours, Disp: , Rfl:      amLODIPine (NORVASC) 5 MG tablet, [AMLODIPINE (NORVASC) 5 MG TABLET] Take 1 tablet (5 mg total) by mouth daily., Disp: 30 tablet, Rfl: 1     b complex vitamins tablet, [B COMPLEX VITAMINS TABLET] Take 1 tablet by mouth daily., Disp: , Rfl:      ipratropium - albuterol 0.5 mg/2.5 mg/3 mL (DUONEB) 0.5-2.5 (3) MG/3ML neb solution, Take 1 vial by nebulization 3 times daily, Disp: , Rfl:      lisinopril (PRINIVIL,ZESTRIL) 20 MG tablet, [LISINOPRIL (PRINIVIL,ZESTRIL) 20 MG  TABLET] Take 20 mg by mouth daily., Disp: , Rfl:      MULTIVITAMIN WITH MINERALS (HAIR,SKIN AND NAILS ORAL), [MULTIVITAMIN WITH MINERALS (HAIR,SKIN AND NAILS ORAL)] Take 1 tablet by mouth daily., Disp: , Rfl:      OMEGA-3/DHA/EPA/FISH OIL (FISH OIL-OMEGA-3 FATTY ACIDS) 300-1,000 mg capsule, [OMEGA-3/DHA/EPA/FISH OIL (FISH OIL-OMEGA-3 FATTY ACIDS) 300-1,000 MG CAPSULE] Take 1 g by mouth daily., Disp: , Rfl:   Allergies   Allergen Reactions     Atenolol      Other reaction(s): Sedation  Intol. Fatigue  At 50 mg or more  25 mg OK       All Meds and Allergies reviewed in the record at the facility and is the most up-to-date.    Current Outpatient Medications   Medication Sig     acetaminophen (TYLENOL) 325 MG tablet Take 650 mg by mouth every 6 hours     amLODIPine (NORVASC) 5 MG tablet [AMLODIPINE (NORVASC) 5 MG TABLET] Take 1 tablet (5 mg total) by mouth daily.     b complex vitamins tablet [B COMPLEX VITAMINS TABLET] Take 1 tablet by mouth daily.     ipratropium - albuterol 0.5 mg/2.5 mg/3 mL (DUONEB) 0.5-2.5 (3) MG/3ML neb solution Take 1 vial by nebulization 3 times daily     lisinopril (PRINIVIL,ZESTRIL) 20 MG tablet [LISINOPRIL (PRINIVIL,ZESTRIL) 20 MG TABLET] Take 20 mg by mouth daily.     MULTIVITAMIN WITH MINERALS (HAIR,SKIN AND NAILS ORAL) [MULTIVITAMIN WITH MINERALS (HAIR,SKIN AND NAILS ORAL)] Take 1 tablet by mouth daily.     OMEGA-3/DHA/EPA/FISH OIL (FISH OIL-OMEGA-3 FATTY ACIDS) 300-1,000 mg capsule [OMEGA-3/DHA/EPA/FISH OIL (FISH OIL-OMEGA-3 FATTY ACIDS) 300-1,000 MG CAPSULE] Take 1 g by mouth daily.     No current facility-administered medications for this visit.       REVIEW OF SYSTEMS:   Review of Systems  No fevers or chills. No headache, lightheadedness or dizziness. No SOB, chest pains or palpitations. Appetite varies. No nausea, vomiting, constipation or diarrhea. No dysuria, frequency, burning or pain with urination. Pain denies any pain on today's visit.  Otherwise review of systems are  negative.     PHYSICAL EXAMINATION:  Physical Exam     Vital signs: BP (!) 175/101   Pulse 86   Temp 97.8  F (36.6  C)   Resp 14   Ht 1.524 m (5')   Wt 43.5 kg (95 lb 12.8 oz)   SpO2 91%   BMI 18.71 kg/m    General: Awake, Alert, oriented x3, appropriately, follows simple commands  HEENT: Pink conjunctiva, anicteric sclerae, moist oral mucosa  NECK: Supple  CVS:  S1  S2, without murmur or gallop.   LUNG: Clear to auscultation, No wheezes, rales or rhonci.  BACK: No kyphosis of the thoracic spine  ABDOMEN: Soft, thin, nontender to palpation, with positive bowel sounds  EXTREMITIES: Moves both upper and lower extremities with generalized weakness.  Trace pedal edema, no calf tenderness  SKIN: Bruising to bilateral lower extremities with large scabbed area on the left lower extremity healing.  NEUROLOGIC: Intact, pulses palpable  PSYCHIATRIC: Cognitive impairment noted.      Labs:  All labs reviewed in the nursing home record and Epic   @  Lab Results   Component Value Date    WBC 9.5 05/16/2022     Lab Results   Component Value Date    RBC 4.08 05/16/2022     Lab Results   Component Value Date    HGB 13.3 05/16/2022     Lab Results   Component Value Date    HCT 37.9 05/16/2022     Lab Results   Component Value Date    MCV 93 05/16/2022     Lab Results   Component Value Date    MCH 32.6 05/16/2022     Lab Results   Component Value Date    MCHC 35.1 05/16/2022     Lab Results   Component Value Date    RDW 13.4 05/16/2022     Lab Results   Component Value Date     05/16/2022        @Last Comprehensive Metabolic Panel:  Sodium   Date Value Ref Range Status   06/13/2022 136 136 - 145 mmol/L Final     Potassium   Date Value Ref Range Status   06/13/2022 3.9 3.5 - 5.0 mmol/L Final     Chloride   Date Value Ref Range Status   06/13/2022 102 98 - 107 mmol/L Final     Carbon Dioxide (CO2)   Date Value Ref Range Status   06/13/2022 21 (L) 22 - 31 mmol/L Final     Anion Gap   Date Value Ref Range Status   06/13/2022  13 5 - 18 mmol/L Final     Glucose   Date Value Ref Range Status   06/13/2022 82 70 - 125 mg/dL Final     Urea Nitrogen   Date Value Ref Range Status   06/13/2022 18 8 - 28 mg/dL Final     Creatinine   Date Value Ref Range Status   06/13/2022 0.62 0.60 - 1.10 mg/dL Final     GFR Estimate   Date Value Ref Range Status   06/13/2022 86 >60 mL/min/1.73m2 Final     Comment:     Effective December 21, 2021 eGFRcr in adults is calculated using the 2021 CKD-EPI creatinine equation which includes age and gender (Neeru et al., NEJ, DOI: 10.1056/DEVZrx3883238)   02/23/2018 >60 >60 mL/min/1.73m2 Final     Calcium   Date Value Ref Range Status   06/13/2022 9.3 8.5 - 10.5 mg/dL Final     Assessment/plan:      ICD-10-CM    1. Closed fracture of multiple pubic rami, left, sequela  S32.592S  Scheduled Tylenol 650 mg p.o. every 6 hours.  DC tramadol.  Follow-up CBC unremarkable.    2. Protein-calorie malnutrition, unspecified severity (H)  E46  RD consult.  Continues on MDS.  Edema improved on today's visit.   3. Bronchitis  J40  patient completed a 7-day course of Augmentin.  She continues with a very coarse wet nonproductive cough.  CT of the chest negative during hospitalization.  Duo nebs 3 times daily x5 days completed.  Encourage cough and deep breathing.  Incentive spirometer every 4 hours while awake.  Chest x-ray obtained which showed atelectasis.   4. Hypertension, unspecified type  I10  satisfactory.  Hydrochlorothiazide discontinued during hospitalization.   5. Hyponatremia  E87.1  132.  Hydrochlorothiazide discontinued.  Repeat BMP with sodium of 136.   6. Other secondary osteoarthritis of multiple sites  M15.3  Continue Tylenol.     Okay to DC home home with current meds and treatments.  Follow-up with primary care provider in 1 week.  Patient plans to go to her independent living with her daughter until her assisted living apartment becomes available in 1 month.  We are recommending 24-hour care secondary to  underlying cognitive impairment.    DISCHARGE PLAN/FACE TO FACE:  I certify that this patient is under my care and that I, or a nurse practitioner or physician's assistant working with me, had a face-to-face encounter that meets the physician face-to-face encounter requirements with this patient.       I certify that, based on my findings, the following services are medically necessary home health services.    My clinical findings support the need for the above skilled services.    This patient is homebound because: Recent fall with pelvic fracture.    The patient is, or has been, under my care and I have initiated the establishment of the plan of care. This patient will be followed by a physician who will periodically review the plan of care.    Total time spent for this visit was 35 minutes with greater than 50% of time spent face-to-face patient and family reviewing discharge medications, home care services and follow-ups.    This note has been dictated using voice recognition software. Any grammatical or context distortions are unintentional and inherent to the software    Electronically signed by: Daisy Watts CNP

## 2023-11-16 ENCOUNTER — OFFICE VISIT (OUTPATIENT)
Dept: FAMILY MEDICINE CLINIC | Facility: CLINIC | Age: 83
End: 2023-11-16
Payer: COMMERCIAL

## 2023-11-16 VITALS
BODY MASS INDEX: 26.07 KG/M2 | DIASTOLIC BLOOD PRESSURE: 86 MMHG | SYSTOLIC BLOOD PRESSURE: 136 MMHG | HEIGHT: 66 IN | HEART RATE: 63 BPM | RESPIRATION RATE: 14 BRPM | TEMPERATURE: 97.8 F | WEIGHT: 162.2 LBS | OXYGEN SATURATION: 99 %

## 2023-11-16 DIAGNOSIS — M81.8 IDIOPATHIC OSTEOPOROSIS: ICD-10-CM

## 2023-11-16 DIAGNOSIS — Z23 ENCOUNTER FOR IMMUNIZATION: ICD-10-CM

## 2023-11-16 DIAGNOSIS — N39.0 CHRONIC UTI: ICD-10-CM

## 2023-11-16 DIAGNOSIS — M31.6 TEMPORAL ARTERITIS (HCC): Primary | ICD-10-CM

## 2023-11-16 PROCEDURE — 90677 PCV20 VACCINE IM: CPT

## 2023-11-16 PROCEDURE — G0009 ADMIN PNEUMOCOCCAL VACCINE: HCPCS

## 2023-11-16 PROCEDURE — 90662 IIV NO PRSV INCREASED AG IM: CPT

## 2023-11-16 PROCEDURE — G0008 ADMIN INFLUENZA VIRUS VAC: HCPCS

## 2023-11-16 PROCEDURE — 99214 OFFICE O/P EST MOD 30 MIN: CPT | Performed by: INTERNAL MEDICINE

## 2023-11-16 RX ORDER — NITROFURANTOIN 25; 75 MG/1; MG/1
100 CAPSULE ORAL
Qty: 30 CAPSULE | Refills: 5 | Status: SHIPPED | OUTPATIENT
Start: 2023-11-16 | End: 2023-11-21 | Stop reason: SDUPTHER

## 2023-11-16 RX ORDER — CAL/D3/MAG11/ZINC/COP/MANG/BOR 600 MG-800
1 TABLET ORAL 2 TIMES DAILY WITH MEALS
Qty: 200 TABLET | Refills: 6 | Status: SHIPPED | OUTPATIENT
Start: 2023-11-16

## 2023-11-16 NOTE — PROGRESS NOTES
Name: Maite Jackman      : 1940      MRN: [id number]  Encounter Provider: Mary Vega MD  Encounter Date: 2023   Encounter department: [address] [place] [address]     1. Temporal arteritis ([address])  Comments:  in remission  continue same  RTC in 3 mos w Blood work    2. Encounter for immunization  -     Pneumococcal Conjugate Vaccine 20-valent (Pcv20)  -     influenza vaccine, high-dose, PF 0.7 mL (FLUZONE HIGH-DOSE)    3. Idiopathic osteoporosis  -     denosumab (PROLIA) 60 mg/mL; Inject 1 mL (60 mg total) under the skin once for 1 dose  -     Calcium Carbonate-Vit D-Min (Caltrate 600+D Plus Minerals) 600-800 MG-UNIT TABS; Take 1 tablet by mouth 2 (two) times a day with meals    4. Chronic UTI  Comments:  improved on macrobid daily  Orders:  -     nitrofurantoin (MACROBID) 100 mg capsule; Take 1 capsule (100 mg total) by mouth daily with lunch    RTC in 3 mos w Blood work    Depression Screening and Follow-up Plan: Patient was screened for depression during today's encounter. They screened negative with a PHQ-2 score of 0. Subjective      [id number] Lázaro Pan is here for Regular check up, she feels OK, recent Blood work and med list reviewed w Pt ,... Review of Systems   Constitutional:  Negative for chills, fatigue and fever. HENT:  Negative for congestion, facial swelling, sore throat, trouble swallowing and voice change. Eyes:  Negative for pain, discharge and visual disturbance. Respiratory:  Negative for cough, shortness of breath and wheezing. Cardiovascular:  Negative for chest pain, palpitations and leg swelling. Gastrointestinal:  Negative for abdominal pain, blood in stool, constipation, diarrhea and nausea. Endocrine: Negative for polydipsia, polyphagia and polyuria. Genitourinary:  Negative for difficulty urinating, hematuria and urgency. Musculoskeletal:  Negative for arthralgias and myalgias. Skin:  Negative for rash. Neurological:  Negative for dizziness, tremors, weakness and headaches. Hematological:  Negative for adenopathy. Does not bruise/bleed easily. Psychiatric/Behavioral:  Negative for dysphoric mood, sleep disturbance and suicidal ideas. Current Outpatient Medications on File Prior to Visit   Medication Sig    amLODIPine (NORVASC) 2.5 mg tablet Take 1 tablet (2.5 mg total) by mouth daily    azilsartan medoxomil (Edarbi) 40 MG tablet Take 1 tablet (40 mg total) by mouth daily    Cholecalciferol (Vitamin D3) 20 MCG (800 UNIT) TABS Take 20 mcg by mouth 2 (two) times a day    donepezil (ARICEPT) 10 mg tablet Take 1 tablet (10 mg total) by mouth daily In the Morning with Breakfast    ergocalciferol (VITAMIN D2) 50,000 units Take 1 capsule (50,000 Units total) by mouth once a week    escitalopram (LEXAPRO) 10 mg tablet Take 1 tablet (10 mg total) by mouth daily    L-Methylfolate-I42-M4-T5 (Cerefolin) 6-1-50-5 MG TABS Take 1 tablet by mouth in the morning    Melatonin 10 MG TABS Take 10 mg by mouth daily    memantine (Namenda) 10 mg tablet Take 1 tablet (10 mg total) by mouth 2 (two) times a day    pantoprazole (PROTONIX) 40 mg tablet Take 1 tablet (40 mg total) by mouth daily    propranolol (INDERAL LA) 60 mg 24 hr capsule Take 1 capsule (60 mg total) by mouth daily Please stop Norvasc. ..     QUEtiapine (SEROquel) 25 mg tablet Take 1 tablet (25 mg total) by mouth daily at bedtime    rosuvastatin (CRESTOR) 10 MG tablet TAKE ONE TABLET (10 MG TOTAL) BY MOUTH DAILY    rosuvastatin (CRESTOR) 5 mg tablet Take 1 tablet (5 mg total) by mouth daily    vitamin B-12 (VITAMIN B-12) 1,000 mcg tablet Take 1 tablet (1,000 mcg total) by mouth daily    [DISCONTINUED] nitrofurantoin (MACROBID) 100 mg capsule Take 1 capsule (100 mg total) by mouth daily with lunch    [DISCONTINUED] denosumab (PROLIA) 60 mg/mL Inject 1 mL (60 mg total) under the skin once for 1 dose    [DISCONTINUED] Diclofenac Sodium (VOLTAREN) 1 % Apply 2 g topically 4 (four) times a day (Patient not taking: Reported on 2/9/2023)       Objective     /86 (BP Location: Left arm, Patient Position: Sitting, Cuff Size: Standard)   Pulse 63   Temp 97.8 °F (36.6 °C) (Tympanic)   Resp 14   Ht 5' 6.34" (1.685 m)   Wt 73.6 kg (162 lb 3.2 oz)   SpO2 99%   BMI 25.91 kg/m²     Physical Exam  Constitutional:       General: She is not in acute distress. HENT:      Head: Normocephalic. Mouth/Throat:      Pharynx: No oropharyngeal exudate. Eyes:      General: No scleral icterus. Conjunctiva/sclera: Conjunctivae normal.      Pupils: Pupils are equal, round, and reactive to light. Neck:      Thyroid: No thyromegaly. Cardiovascular:      Rate and Rhythm: Normal rate and regular rhythm. Heart sounds: Normal heart sounds. No murmur heard. Pulmonary:      Effort: Pulmonary effort is normal. No respiratory distress. Breath sounds: Normal breath sounds. No wheezing or rales. Abdominal:      General: Bowel sounds are normal. There is no distension. Palpations: Abdomen is soft. Tenderness: There is no abdominal tenderness. There is no guarding or rebound. Musculoskeletal:         General: No tenderness. Cervical back: Neck supple. Lymphadenopathy:      Cervical: No cervical adenopathy. Skin:     Coloration: Skin is not pale. Findings: No rash. Neurological:      Mental Status: She is alert and oriented to person, place, and time. Sensory: No sensory deficit. Motor: No weakness.        Nadiya Goetz MD

## 2023-11-16 NOTE — PROGRESS NOTES
BMI Counseling: Body mass index is 25.91 kg/m². The BMI is above normal. Nutrition recommendations include reducing portion sizes.

## 2023-11-16 NOTE — PATIENT INSTRUCTIONS

## 2023-11-18 ENCOUNTER — TELEPHONE (OUTPATIENT)
Dept: OTHER | Facility: OTHER | Age: 83
End: 2023-11-18

## 2023-11-18 NOTE — TELEPHONE ENCOUNTER
Patient is calling regarding cancelling an appointment.      Date/Time: 11/21/23 at 930 am    Patient was rescheduled: YES [] NO [x]    Patient requesting call back to reschedule: YES [x] NO []

## 2023-11-21 ENCOUNTER — TELEPHONE (OUTPATIENT)
Dept: FAMILY MEDICINE CLINIC | Facility: CLINIC | Age: 83
End: 2023-11-21

## 2023-11-21 DIAGNOSIS — N39.0 CHRONIC UTI: ICD-10-CM

## 2023-11-21 RX ORDER — NITROFURANTOIN 25; 75 MG/1; MG/1
100 CAPSULE ORAL
Qty: 90 CAPSULE | Refills: 5 | Status: SHIPPED | OUTPATIENT
Start: 2023-11-21

## 2023-11-21 NOTE — TELEPHONE ENCOUNTER
I called and spoke with the daughter; requesting appt to be scheduled after the holidays; appt scheduled for 1/11/24 with Dr. Betsy Martell. Address reconfirmed.

## 2023-11-21 NOTE — TELEPHONE ENCOUNTER
[phone], they will ship to patient's house on Wednesday Nov 29.     Patient's last injection was May 23, 2023

## 2023-11-29 ENCOUNTER — CONSULT (OUTPATIENT)
Dept: CARDIOLOGY CLINIC | Facility: CLINIC | Age: 83
End: 2023-11-29
Payer: COMMERCIAL

## 2023-11-29 VITALS
WEIGHT: 162.2 LBS | SYSTOLIC BLOOD PRESSURE: 144 MMHG | HEART RATE: 54 BPM | DIASTOLIC BLOOD PRESSURE: 80 MMHG | BODY MASS INDEX: 26.07 KG/M2 | HEIGHT: 66 IN

## 2023-11-29 DIAGNOSIS — I51.89 DIASTOLIC DYSFUNCTION WITHOUT HEART FAILURE: ICD-10-CM

## 2023-11-29 DIAGNOSIS — R00.1 BRADYCARDIA: ICD-10-CM

## 2023-11-29 DIAGNOSIS — R93.1 ABNORMAL ECHOCARDIOGRAM: Primary | ICD-10-CM

## 2023-11-29 DIAGNOSIS — I27.29 PULMONARY HYPERTENSION ASSOCIATED WITH SYSTEMIC DISORDER (HCC): ICD-10-CM

## 2023-11-29 DIAGNOSIS — I10 ESSENTIAL HYPERTENSION: ICD-10-CM

## 2023-11-29 DIAGNOSIS — I36.1 NONRHEUMATIC TRICUSPID VALVE REGURGITATION: ICD-10-CM

## 2023-11-29 PROCEDURE — 93000 ELECTROCARDIOGRAM COMPLETE: CPT | Performed by: INTERNAL MEDICINE

## 2023-11-29 PROCEDURE — 99204 OFFICE O/P NEW MOD 45 MIN: CPT | Performed by: INTERNAL MEDICINE

## 2023-11-29 NOTE — PROGRESS NOTES
[facility]  [address] [place] [address]  Phone#  [phone]. Fax#  [phone]  *-*-*-*-*-*-*-*-*-*-*-*-*-*-*-*-*-*-*-*-*-*-*-*-*-*-*-*-*-*-*-*-*-*-*-*-*-*-*-*-*-*-*-*-*-*-*-*-*-*-*-*-*-*  ENCOUNTER DATE: 11/29/23 9:16 AM  PATIENT NAME: Katja Kern   1940    [id number]  Age: 79 y.o. Sex: female  AUTHOR: Andrez Mckeon MD  [facility] PHYSICIAN: Franko gM MD  REFERRING PHYSICIAN: Franko Mg MD  [address] [place] [address] Franko Mg MD   *-*-*-*-*-*-*-*-*-*-*-*-*-*-*-*-*-*-*-*-*-*-*-*-*-*-*-*-*-*-*-*-*-*-*-*-*-*-*-*-*-*-*-*-*-*-*-*-*-*-*-*-*-*-  REASON FOR REFERRAL: Evaluation for abnormal echocardiogram, palpitations    *-*-*-*-*-*-*-*-*-*-*-*-*-*-*-*-*-*-*-*-*-*-*-*-*-*-*-*-*-*-*-*-*-*-*-*-*-*-*-*-*-*-*-*-*-*-*-*-*-*-*-*-*-*-  CARDIOLOGY ASSESSMENT & PLAN:   Diagnosis ICD-10-CM Associated Orders   1. Abnormal echocardiogram  R93.1 Ambulatory Referral to Cardiology     POCT ECG      2. Pulmonary hypertension associated with systemic disorder (HCC)  I27.29       3. Essential hypertension  I10       4. Diastolic dysfunction without heart failure  I51.89       5. Bradycardia  R00.1       6. Nonrheumatic tricuspid valve regurgitation  H23.4         Diastolic dysfunction without heart failure  Ms. Hightower Chamber recently had an echocardiogram and this was significant for presence of diastolic dysfunction and pulmonary hypertension and tricuspid valve regurgitation. Though she was experiencing palpitations in the past she says that these have not recurred recently. She gives no history that suggests decompensated heart failure. Her blood pressure is reasonably well-controlled. She does have asymptomatic sinus bradycardia. On physical examination there is no evidence of pulmonary or peripheral vascular congestion or significant valvular heart disease.   Her renal function and thyroid function are normal.    Today we reviewed the findings of her echocardiogram and Holter test performed. I advised that in the absence of symptoms and signs at this time no further testing is recommended. No additional medications are recommended. The goal would be to continue monitoring for symptoms. We discussed the warning signs including shortness of breath with activity decreasing exercise tolerance and ability to lay down on a flat surface without getting short of breath or palpitations or passing out or near passing out. She is advised to call us if she develops any such symptoms. We will plan to see her back in 6 months 1 more time. Repeat echocardiogram 1 year following the previous study would be reasonable. I am advising her to continue to follow regularly with her primary care physician, exercise and walk regularly and eat healthy. *-*-*-*-*-*-*-*-*-*-*-*-*-*-*-*-*-*-*-*-*-*-*-*-*-*-*-*-*-*-*-*-*-*-*-*-*-*-*-*-*-*-*-*-*-*-*-*-*-*-*-*-*-*-  CURRENT ECG:  Results for orders placed or performed in visit on 11/29/23   POCT ECG    Narrative    Sinus bradycardia, HR 54 bpm, normal axis and intervals, no significant chamber hypertrophy or enlargement. No definite evidence of prior infarction. There are nonspecific ST-T wave abnormalities without diagnostic changes of ischemia. *-*-*-*-*-*-*-*-*-*-*-*-*-*-*-*-*-*-*-*-*-*-*-*-*-*-*-*-*-*-*-*-*-*-*-*-*-*-*-*-*-*-*-*-*-*-*-*-*-*-*-*-*-*-  HISTORY OF PRESENT ILLNESS:  Patient is an 79-year-old female with medical history significant for:  1. Primary hypertension  2. Dyslipidemia  3. Hypothyroidism  4. GERD  5. Degenerative joint disease with osteoarthritis  6. Osteoporosis  7. History of cholecystectomy, history of hysterectomy and  8. History of gait abnormalities and dizziness, intracranial atherosclerosis  9. Memory dysfunction  10. Anxiety    She is here for visit accompanied with her daughter Giovanny December.   Patient was experiencing some palpitations which were occurring in the setting of anxiety she was referred to undergo a Holter test and an echocardiogram.  The echocardiogram was reportedly abnormal with evidence of valvular regurgitations and pulmonary hypertension's and hence she is referred here. From a symptom perspective patient denies any unusual chest pain or shortness of breath or dizziness or lightheadedness or palpitations or orthopnea or PND or pedal edema. She reports that she used to get some palpitations when she was anxious about something but they have not recurred lately. She has had no passing out or near passing out episodes. She reports that her gait is now normal and she does not need a cane or walker or any other assistance. She is independent. According to her daughter she does have some memory issues. She  denies any headache. Has some visual symptoms and has been seen by ophthalmologist and a new prescription has been recommended it seems. She has no previous history of coronary artery disease or congestive heart failure or arrhythmia or TIA/CVA. Functional capacity status: Good   (Excellent- >10 METs; Good: (7-10 METs); Moderate (4-7 METs); Poor (<= 4 METs)    Any chronic stressors: None   (feeling of poor health, financial problems, and social isolation etc). Tobacco or alcohol dependence: She has never been a smoker. She denies any alcohol use. Her family history significant for coronary artery disease in her dad and siblings but at a later age. There is no family history of premature CAD or sudden cardiac death.     Current cardiac meds: Rosuvastatin 10 mg daily amlodipine 2.5 mg daily Edarbi 40 mg daily    Previous blood work:   Blood work 8/11/2023:  Sodium 136 potassium 4.0 chloride 103 bicarb 27 BUN 15 creatinine 1.11 GFR 46  Normal LFTs  Total cholesterol 208 triglyceride 104 HDL 64 calculated   Hemoglobin A1c 5.3 in May 2023  Recent TSH 4.2103 T4 1.06    Previous cardiac studies:   Echocardiogram 9/29/2023:  Mild concentric left ventriculography, normal left ankle systolic function, grade 1 diastolic dysfunction EF 92%  Normal right ventricle size and systolic function. Biatrial enlargement  Moderately calcified aortic valve with aortic valve sclerosis and mild aortic valve regurgitation. Mitral annular calcification with mild mitral valve regurgitation  Moderate tricuspid valve regurgitation  Moderate pulmonary hypertension with estimated RVSP/PASP is 56 mmHg  Mild pulmonic valve regurgitation  No pericardial effusion  Normal aortic root    Holter test 8/23/2023 performed to evaluate palpitations:  Predominant rhythm was normal sinus with average heart rate 61 bpm minimum 49 and maximum 99 bpm  There were 3952 single premature ventricular contractions with no nonsustained or sustained ventricular tachycardia events. There were 84 supraventricular ectopic beats and 2 runs of atrial tachycardia with the longest run lasting for 5 beats. No significant bradycardia arrhythmia or advanced AV block or atrial fibrillation was noted. No symptoms were reported. Regadenoson nuclear stress test 7/15/2020:  -  Stress results: There was no chest pain during stress. -  ECG conclusions: The stress ECG was negative for ischemia and normal.  -  Perfusion imaging: There was a small, mild in severity, fixed myocardial perfusion defect of the apex. There was normal wall motion on gated imaging indicating likely artifact from breast attenuation.  -  Gated SPECT: The calculated left ventricular ejection fraction was 72 %. Left ventricular ejection fraction was within normal limits by visual estimate. There was no left ventricular regional abnormality. Conclusion: Normal study after pharmacologic vasodilation. There was image artifact, without diagnostic evidence for perfusion abnormality.  Left ventricular systolic function was normal.      *-*-*-*-*-*-*-*-*-*-*-*-*-*-*-*-*-*-*-*-*-*-*-*-*-*-*-*-*-*-*-*-*-*-*-*-*-*-*-*-*-*-*-*-*-*-*-*-*-*-*-*-*-*  PAST MEDICAL HISTORY:  Past Medical History:   Diagnosis Date    Anxiety     Gastroesophageal reflux disease without esophagitis 8/28/2018    Hyperlipidemia     Hypertension     Hypothyroidism     Osteoarthritis 10/5/2012    Other osteoporosis without current pathological fracture 2/9/2023    PAST SURGICAL HISTORY:   Past Surgical History:   Procedure Laterality Date    CATARACT EXTRACTION      CHOLECYSTECTOMY      COLONOSCOPY      HYSTERECTOMY           FAMILY HISTORY:  Family History   Problem Relation Age of Onset    No Known Problems Mother     No Known Problems Father     SOCIAL HISTORY:  Social History     Tobacco Use   Smoking Status Never   Smokeless Tobacco Never      Social History     Substance and Sexual Activity   Alcohol Use No     Social History     Substance and Sexual Activity   Drug Use No    [unfilled]     *-*-*-*-*-*-*-*-*-*-*-*-*-*-*-*-*-*-*-*-*-*-*-*-*-*-*-*-*-*-*-*-*-*-*-*-*-*-*-*-*-*-*-*-*-*-*-*-*-*-*-*-*-*  ALLERGIES:  Allergies   Allergen Reactions    No Known Allergies     CURRENT SCHEDULED MEDICATIONS:    Current Outpatient Medications:     amLODIPine (NORVASC) 2.5 mg tablet, Take 1 tablet (2.5 mg total) by mouth daily, Disp: 90 tablet, Rfl: 3    azilsartan medoxomil (Edarbi) 40 MG tablet, Take 1 tablet (40 mg total) by mouth daily, Disp: 90 tablet, Rfl: 3    Calcium Carbonate-Vit D-Min (Caltrate 600+D Plus Minerals) 600-800 MG-UNIT TABS, Take 1 tablet by mouth 2 (two) times a day with meals, Disp: 200 tablet, Rfl: 6    Cholecalciferol (Vitamin D3) 20 MCG (800 UNIT) TABS, Take 20 mcg by mouth 2 (two) times a day, Disp: , Rfl:     denosumab (PROLIA) 60 mg/mL, Inject 1 mL (60 mg total) under the skin once for 1 dose, Disp: 1 mL, Rfl: 1    donepezil (ARICEPT) 10 mg tablet, Take 1 tablet (10 mg total) by mouth daily In the Morning with Breakfast, Disp: 90 tablet, Rfl: 3    escitalopram (LEXAPRO) 10 mg tablet, Take 1 tablet (10 mg total) by mouth daily, Disp: 90 tablet, Rfl: 1 L-Methylfolate-O46-G2-O9 (Cerefolin) 6-1-50-5 MG TABS, Take 1 tablet by mouth in the morning, Disp: 90 tablet, Rfl: 3    Melatonin 10 MG TABS, Take 10 mg by mouth daily, Disp: , Rfl:     memantine (Namenda) 10 mg tablet, Take 1 tablet (10 mg total) by mouth 2 (two) times a day, Disp: 180 tablet, Rfl: 3    nitrofurantoin (MACROBID) 100 mg capsule, Take 1 capsule (100 mg total) by mouth daily with lunch, Disp: 90 capsule, Rfl: 5    pantoprazole (PROTONIX) 40 mg tablet, Take 1 tablet (40 mg total) by mouth daily, Disp: 90 tablet, Rfl: 3    propranolol (INDERAL LA) 60 mg 24 hr capsule, Take 1 capsule (60 mg total) by mouth daily Please stop Norvasc. .., Disp: 90 capsule, Rfl: 3    QUEtiapine (SEROquel) 25 mg tablet, Take 1 tablet (25 mg total) by mouth daily at bedtime, Disp: 90 tablet, Rfl: 1    rosuvastatin (CRESTOR) 10 MG tablet, TAKE ONE TABLET (10 MG TOTAL) BY MOUTH DAILY, Disp: 90 tablet, Rfl: 3    vitamin B-12 (VITAMIN B-12) 1,000 mcg tablet, Take 1 tablet (1,000 mcg total) by mouth daily, Disp: 90 tablet, Rfl: 3    ergocalciferol (VITAMIN D2) 50,000 units, Take 1 capsule (50,000 Units total) by mouth once a week (Patient not taking: Reported on 11/29/2023), Disp: 12 capsule, Rfl: 1    rosuvastatin (CRESTOR) 5 mg tablet, Take 1 tablet (5 mg total) by mouth daily, Disp: 90 tablet, Rfl: 3     *-*-*-*-*-*-*-*-*-*-*-*-*-*-*-*-*-*-*-*-*-*-*-*-*-*-*-*-*-*-*-*-*-*-*-*-*-*-*-*-*-*-*-*-*-*-*-*-*-*-*-*-*-*  REVIEW OF SYMPTOMS:    Positive for: As noted above in HPI  Negative for: All remaining as reviewed below and in HPI.  SYSTEM SYMPTOMS REVIEWED:  General--weight change, fever, night sweats  Respiratoryl-- Wheezing, shortness of breath, cough, URI symptoms, sputum, blood  Cardiovascular--chest pain, syncope, dyspnea on exertion, edema, decline in exercise tolerance, claudication   Gastrointestinal--persistent vomiting, diarrhea, abdominal distention, blood in stool   Muscular or skeletal--joint pain or swelling Neurologic--headaches, syncope, abnormal movement  Hematologic--history of easy bruising and bleeding   Endocrine--thyroid enlargement, heat or cold intolerance, polyuria   Psychiatric--anxiety, depression      *-*-*-*-*-*-*-*-*-*-*-*-*-*-*-*-*-*-*-*-*-*-*-*-*-*-*-*-*-*-*-*-*-*-*-*-*-*-*-*-*-*-*-*-*-*-*-*-*-*-*-*-*-*  CURRENT OUTPATIENT MEDICATIONS:     Current Outpatient Medications:     amLODIPine (NORVASC) 2.5 mg tablet, Take 1 tablet (2.5 mg total) by mouth daily, Disp: 90 tablet, Rfl: 3    azilsartan medoxomil (Edarbi) 40 MG tablet, Take 1 tablet (40 mg total) by mouth daily, Disp: 90 tablet, Rfl: 3    Calcium Carbonate-Vit D-Min (Caltrate 600+D Plus Minerals) 600-800 MG-UNIT TABS, Take 1 tablet by mouth 2 (two) times a day with meals, Disp: 200 tablet, Rfl: 6    Cholecalciferol (Vitamin D3) 20 MCG (800 UNIT) TABS, Take 20 mcg by mouth 2 (two) times a day, Disp: , Rfl:     denosumab (PROLIA) 60 mg/mL, Inject 1 mL (60 mg total) under the skin once for 1 dose, Disp: 1 mL, Rfl: 1    donepezil (ARICEPT) 10 mg tablet, Take 1 tablet (10 mg total) by mouth daily In the Morning with Breakfast, Disp: 90 tablet, Rfl: 3    escitalopram (LEXAPRO) 10 mg tablet, Take 1 tablet (10 mg total) by mouth daily, Disp: 90 tablet, Rfl: 1    L-Methylfolate-X69-K0-A2 (Cerefolin) 6-1-50-5 MG TABS, Take 1 tablet by mouth in the morning, Disp: 90 tablet, Rfl: 3    Melatonin 10 MG TABS, Take 10 mg by mouth daily, Disp: , Rfl:     memantine (Namenda) 10 mg tablet, Take 1 tablet (10 mg total) by mouth 2 (two) times a day, Disp: 180 tablet, Rfl: 3    nitrofurantoin (MACROBID) 100 mg capsule, Take 1 capsule (100 mg total) by mouth daily with lunch, Disp: 90 capsule, Rfl: 5    pantoprazole (PROTONIX) 40 mg tablet, Take 1 tablet (40 mg total) by mouth daily, Disp: 90 tablet, Rfl: 3    propranolol (INDERAL LA) 60 mg 24 hr capsule, Take 1 capsule (60 mg total) by mouth daily Please stop Norvasc. .., Disp: 90 capsule, Rfl: 3    QUEtiapine (SEROquel) 25 mg tablet, Take 1 tablet (25 mg total) by mouth daily at bedtime, Disp: 90 tablet, Rfl: 1    rosuvastatin (CRESTOR) 10 MG tablet, TAKE ONE TABLET (10 MG TOTAL) BY MOUTH DAILY, Disp: 90 tablet, Rfl: 3    vitamin B-12 (VITAMIN B-12) 1,000 mcg tablet, Take 1 tablet (1,000 mcg total) by mouth daily, Disp: 90 tablet, Rfl: 3    ergocalciferol (VITAMIN D2) 50,000 units, Take 1 capsule (50,000 Units total) by mouth once a week (Patient not taking: Reported on 11/29/2023), Disp: 12 capsule, Rfl: 1    rosuvastatin (CRESTOR) 5 mg tablet, Take 1 tablet (5 mg total) by mouth daily, Disp: 90 tablet, Rfl: 3    *-*-*-*-*-*-*-*-*-*-*-*-*-*-*-*-*-*-*-*-*-*-*-*-*-*-*-*-*-*-*-*-*-*-*-*-*-*-*-*-*-*-*-*-*-*-*-*-*-*-*-*-*-*  VITAL SIGNS:  Vitals:    11/29/23 0843   BP: 144/80   Pulse: (!) 54   Weight: 73.6 kg (162 lb 3.2 oz)   Height: 5' 6" (1.676 m)       BMI: Body mass index is 26.18 kg/m².     WEIGHTS:   Wt Readings from Last 25 Encounters:   11/29/23 73.6 kg (162 lb 3.2 oz)   11/16/23 73.6 kg (162 lb 3.2 oz)   09/29/23 74.8 kg (165 lb)   08/09/23 74.8 kg (165 lb)   06/27/23 74.5 kg (164 lb 3.2 oz)   05/01/23 73.9 kg (163 lb)   02/09/23 74.4 kg (164 lb)   01/31/23 73.9 kg (163 lb)   10/31/22 75 kg (165 lb 6.4 oz)   07/20/22 70.9 kg (156 lb 3.2 oz)   07/19/22 71.4 kg (157 lb 6.4 oz)   04/19/22 69.6 kg (153 lb 6.4 oz)   03/14/22 68.8 kg (151 lb 9.6 oz)   01/14/22 61.7 kg (136 lb)   12/22/21 64 kg (141 lb)   12/21/21 63.6 kg (140 lb 3.2 oz)   12/17/21 63.3 kg (139 lb 9.6 oz)   11/18/21 63.3 kg (139 lb 9.6 oz)   11/11/21 64.3 kg (141 lb 12.8 oz)   10/27/21 64.9 kg (143 lb)   09/22/21 66.7 kg (147 lb)   09/02/21 67.8 kg (149 lb 6.4 oz)   08/18/21 64.9 kg (143 lb)   08/11/21 64.9 kg (143 lb)   07/14/21 64.9 kg (143 lb)        *-*-*-*-*-*-*-*-*-*-*-*-*-*-*-*-*-*-*-*-*-*-*-*-*-*-*-*-*-*-*-*-*-*-*-*-*-*-*-*-*-*-*-*-*-*-*-*-*-*-*-*-*-*-  PHYSICAL EXAM:  General Appearance:    Alert, cooperative, no distress, appears stated age Head, Eyes, ENT:    No obvious abnormality, moist mucous mebranes. Neck:   Supple, no carotid bruit or JVD   Back:     Symmetric, no curvature. Lungs:     Respirations unlabored. Clear to auscultation bilaterally,    Chest wall:    No tenderness or deformity   Heart:    Regular rate and rhythm, S1 and S2 normal, no murmur, rub  or gallop. Abdomen:     Soft, non-tender, No obvious masses, or organomegaly   Extremities:   Extremities warm, no cyanosis or edema    Skin:   No venostatic changes in lower extremities. Normal skin color, texture, and turgor. No rashes or lesions     *-*-*-*-*-*-*-*-*-*-*-*-*-*-*-*-*-*-*-*-*-*-*-*-*-*-*-*-*-*-*-*-*-*-*-*-*-*-*-*-*-*-*-*-*-*-*-*-*-*-*-*-*-*-  LABORATORY DATA: I have personally reviewed the available laboratory data.         Potassium   Date Value Ref Range Status   08/11/2023 4.0 3.5 - 5.3 mmol/L Final   11/03/2022 4.1 3.5 - 5.3 mmol/L Final   07/21/2022 4.0 3.5 - 5.3 mmol/L Final   08/22/2018 4.1 3.5 - 5.3 mmol/L Final     Chloride   Date Value Ref Range Status   08/11/2023 103 96 - 108 mmol/L Final   11/03/2022 105 96 - 108 mmol/L Final   07/21/2022 101 96 - 108 mmol/L Final   08/22/2018 98 98 - 110 mmol/L Final     CO2   Date Value Ref Range Status   08/11/2023 27 21 - 32 mmol/L Final   11/03/2022 31 21 - 32 mmol/L Final   07/21/2022 29 21 - 32 mmol/L Final   08/22/2018 31 20 - 32 mmol/L Final     BUN   Date Value Ref Range Status   08/11/2023 15 5 - 25 mg/dL Final   11/03/2022 13 5 - 25 mg/dL Final   07/21/2022 17 5 - 25 mg/dL Final   08/22/2018 15 7 - 25 mg/dL Final     Creatinine   Date Value Ref Range Status   08/11/2023 1.11 0.60 - 1.30 mg/dL Final     Comment:     Standardized to IDMS reference method   11/03/2022 1.16 0.60 - 1.30 mg/dL Final     Comment:     Standardized to IDMS reference method   07/21/2022 1.12 0.60 - 1.30 mg/dL Final     Comment:     Standardized to IDMS reference method     eGFR   Date Value Ref Range Status   08/11/2023 46 ml/min/1.73sq m Final   11/03/2022 43 ml/min/1.73sq m Final   07/21/2022 45 ml/min/1.73sq m Final     Calcium   Date Value Ref Range Status   08/11/2023 9.7 8.3 - 10.1 mg/dL Final   11/03/2022 9.4 8.3 - 10.1 mg/dL Final   07/21/2022 9.0 8.3 - 10.1 mg/dL Final   08/22/2018 9.8 8.6 - 10.4 mg/dL Final     AST   Date Value Ref Range Status   08/11/2023 15 5 - 45 U/L Final     Comment:     Specimen collection should occur prior to Sulfasalazine administration due to the potential for falsely depressed results. 11/03/2022 27 5 - 45 U/L Final     Comment:     Specimen collection should occur prior to Sulfasalazine administration due to the potential for falsely depressed results. 07/21/2022 16 5 - 45 U/L Final     Comment:     Specimen collection should occur prior to Sulfasalazine administration due to the potential for falsely depressed results. 08/22/2018 20 10 - 35 U/L Final     ALT   Date Value Ref Range Status   08/11/2023 19 12 - 78 U/L Final     Comment:     Specimen collection should occur prior to Sulfasalazine and/or Sulfapyridine administration due to the potential for falsely depressed results. 11/03/2022 68 12 - 78 U/L Final     Comment:     Specimen collection should occur prior to Sulfasalazine and/or Sulfapyridine administration due to the potential for falsely depressed results. 07/21/2022 23 12 - 78 U/L Final     Comment:     Specimen collection should occur prior to Sulfasalazine and/or Sulfapyridine administration due to the potential for falsely depressed results.     08/22/2018 16 6 - 29 U/L Final     Alkaline Phosphatase   Date Value Ref Range Status   08/11/2023 43 (L) 46 - 116 U/L Final   11/03/2022 79 46 - 116 U/L Final   07/21/2022 48 46 - 116 U/L Final   08/22/2018 53 33 - 130 U/L Final     Magnesium   Date Value Ref Range Status   08/11/2023 2.4 1.6 - 2.6 mg/dL Final   11/03/2022 2.0 1.6 - 2.6 mg/dL Final   04/06/2021 2.1 1.6 - 2.6 mg/dL Final     WBC   Date Value Ref Range Status   08/11/2023 5. 01 4.31 - 10.16 Thousand/uL Final   11/03/2022 5.92 4.31 - 10.16 Thousand/uL Final   07/21/2022 6.48 4.31 - 10.16 Thousand/uL Final     Hemoglobin   Date Value Ref Range Status   08/11/2023 12.7 11.5 - 15.4 g/dL Final   11/03/2022 12.0 11.5 - 15.4 g/dL Final   07/21/2022 11.8 11.5 - 15.4 g/dL Final     Platelets   Date Value Ref Range Status   08/11/2023 258 149 - 390 Thousands/uL Final   11/03/2022 223 149 - 390 Thousands/uL Final   07/21/2022 278 149 - 390 Thousands/uL Final     PTT   Date Value Ref Range Status   06/24/2020 28 23 - 37 seconds Final     Comment:     Therapeutic Heparin Range =  60-90 seconds     INR   Date Value Ref Range Status   06/24/2020 0.96 0.84 - 1.19 Final     No results found for: "CKMB", "DIGOXIN"  TSH   Date Value Ref Range Status   08/22/2018 4.62 (H) 0.40 - 4.50 mIU/L Final     HDL   Date Value Ref Range Status   08/22/2018 72 >50 mg/dL Final     HDL, Direct   Date Value Ref Range Status   08/11/2023 64 >=50 mg/dL Final     Comment:     Specimen collection should occur prior to Metamizole administration due to the potential for falsley depressed results. Triglycerides   Date Value Ref Range Status   08/11/2023 104 See Comment mg/dL Final     Comment:     Triglyceride:     0-9Y            <75mg/dL     10Y-17Y         <90 mg/dL       >=18Y     Normal          <150 mg/dL     Borderline High 150-199 mg/dL     High            200-499 mg/dL        Very High       >499 mg/dL    Specimen collection should occur prior to N-Acetylcysteine or Metamizole administration due to the potential for falsely depressed results. 08/22/2018 101 <150 mg/dL Final      Hemoglobin A1C   Date Value Ref Range Status   05/01/2023 5.3 6.5 Final   05/11/2019 5.4 4.2 - 6.3 % Final   08/22/2018 5.2 <5.7 % of total Hgb Final     Comment:      For the purpose of screening for the presence of  diabetes:     <5.7%       Consistent with the absence of diabetes  5.7-6.4%    Consistent with increased risk for diabetes              (prediabetes)  > or =6.5%  Consistent with diabetes     This assay result is consistent with a decreased risk  of diabetes. Currently, no consensus exists regarding use of  hemoglobin A1c for diagnosis of diabetes in children. According to American Diabetes Association (ADA)  guidelines, hemoglobin A1c <7.0% represents optimal  control in non-pregnant diabetic patients. Different  metrics may apply to specific patient populations. Standards of Medical Care in Diabetes(ADA). C.difficile toxin by PCR   Date Value Ref Range Status   2021 Negative Negative Final     Comment:     No evidence for C. difficile colonization or infection. No special contact precautions required. *-*-*-*-*-*-*-*-*-*-*-*-*-*-*-*-*-*-*-*-*-*-*-*-*-*-*-*-*-*-*-*-*-*-*-*-*-*-*-*-*-*-*-*-*-*-*-*-*-*-*-*-*-*-  RADIOLOGY RESULTS:  No results found. *-*-*-*-*-*-*-*-*-*-*-*-*-*-*-*-*-*-*-*-*-*-*-*-*-*-*-*-*-*-*-*-*-*-*-*-*-*-*-*-*-*-*-*-*-*-*-*-*-*-*-*-*-*-  LAST ECHOCARDIOGRAM AND OTHER CARDIOLOGY RESULTS:  Results for orders placed during the hospital encounter of 19    Echo complete with contrast if indicated    Narrative  [address] [place] [address]. [address]  [phone]    Transthoracic Echocardiogram  2D, M-mode, Doppler, and Color Doppler    Study date:  2019    Patient: Pawan Mcgrath  MR number: [id number]  Account number: [de-identified]  : 1940  Age: 79 years  Gender: Female  Status: Outpatient  Location: [facility] [place]  Height: 65 in  Weight: 132.7 lb  BP: 112/ 84 mmHg    Indications: Hypertension.     Diagnoses: I11.9 - Hypertensive heart disease without heart failure    Sonographer:  Andreas Gonsalves RDCS  Primary Physician:  Shagufta Duffy MD  Referring Physician:  Shagufta Duffy MD  Group:  [facility]  Interpreting Physician:  Emerita Nava DO    SUMMARY    LEFT VENTRICLE:  Systolic function was normal. Ejection fraction was estimated to be 60 %. There were no regional wall motion abnormalities. Wall thickness was at the upper limits of normal.  Features were consistent with a pseudonormal left ventricular filling pattern, with concomitant abnormal relaxation and increased filling pressure (grade 2 diastolic dysfunction). Doppler parameters were consistent with high ventricular filling pressure. RIGHT VENTRICLE:  The size was normal.  Systolic function was normal.    LEFT ATRIUM:  The atrium was mildly dilated. MITRAL VALVE:  There was mild annular calcification. There was mild regurgitation. AORTIC VALVE:  There was trace regurgitation. TRICUSPID VALVE:  There was mild to moderate regurgitation. HISTORY: PRIOR HISTORY: GERD, hypertension, hyperlipidemia. PROCEDURE: The study was performed in the [address] [place] [address]. This was a routine study. The transthoracic approach was used. The study included complete 2D imaging, M-mode, complete spectral Doppler, and color Doppler. The  heart rate was 78 bpm, at the start of the study. Images were obtained from the parasternal, apical, subcostal, and suprasternal notch acoustic windows. Image quality was adequate. LEFT VENTRICLE: Size was normal. Systolic function was normal. Ejection fraction was estimated to be 60 %. There were no regional wall motion abnormalities. Wall thickness was at the upper limits of normal. DOPPLER: Features were  consistent with a pseudonormal left ventricular filling pattern, with concomitant abnormal relaxation and increased filling pressure (grade 2 diastolic dysfunction). Doppler parameters were consistent with high ventricular filling pressure. RIGHT VENTRICLE: The size was normal. Systolic function was normal. Wall thickness was normal.    LEFT ATRIUM: The atrium was mildly dilated.     RIGHT ATRIUM: Size was normal.    MITRAL VALVE: There was mild annular calcification. There was normal leaflet separation. DOPPLER: The transmitral velocity was within the normal range. There was no evidence for stenosis. There was mild regurgitation. AORTIC VALVE: The valve was trileaflet. Leaflets exhibited normal cuspal separation and sclerosis. DOPPLER: Transaortic velocity was within the normal range. There was no evidence for stenosis. There was trace regurgitation. TRICUSPID VALVE: The valve structure was normal. There was normal leaflet separation. DOPPLER: The transtricuspid velocity was within the normal range. There was no evidence for stenosis. There was mild to moderate regurgitation. Pulmonary  artery systolic pressure was within the normal range. PULMONIC VALVE: Leaflets exhibited normal thickness, no calcification, and normal cuspal separation. DOPPLER: The transpulmonic velocity was within the normal range. There was trace regurgitation. PERICARDIUM: There was no pericardial effusion. The pericardium was normal in appearance. AORTA: The root exhibited normal size. SYSTEMIC VEINS: IVC: The inferior vena cava was normal in size and course. Respirophasic changes were blunted (less than 50% variation). SYSTEM MEASUREMENT TABLES    2D  %FS: 29.9 %  Ao Diam: 2.9 cm  EDV(Teich): 95.6 ml  EF(Teich): 57.2 %  ESV(Teich): 40.9 ml  HR_4Ch_Q: 74.5 BPM  IVSd: 0.9 cm  LA Diam: 3.6 cm  LAAs A4C: 15.8 cm2  LAESV A-L A4C: 47.6 ml  LAESV MOD A4C: 44.8 ml  LALs A4C: 4.5 cm  LVCO_4Ch_Q: 3.4 L/min  LVEDV MOD A4C: 99.8 ml  LVEF MOD A4C: 58.9 %  LVEF_4Ch_Q: 57.7 %  LVESV MOD A4C: 41 ml  LVIDd: 4.6 cm  LVIDs: 3.2 cm  LVLd A4C: 6.7 cm  LVLd_4Ch_Q: 6.4 cm  LVLs A4C: 5.6 cm  LVLs_4Ch_Q: 5.5 cm  LVPWd: 0.9 cm  LVSV_4Ch_Q: 45.5 ml  LVVED_4Ch_Q: 78.7 ml  LVVES_4Ch_Q: 33.3 ml  RAAs: 15.5 cm2  RAESV A-L: 46.1 ml  RAESV MOD: 44.9 ml  RALs: 4.4 cm  RVIDd: 2.3 cm  SV MOD A4C: 58.8 ml  SV(Teich): 54.7 ml    CF  TR Als. [place]: 0.4 m/s  TR Flow: 10.1 ml/s  TR Rad: 0.2 cm    CW  TR Vmax: 2.4 m/s  TR maxP.7 mmHg    MM  TAPSE: 2.9 cm    PW  E': 0 m/s  E/E': 22.8  MV A Rogers: 0.9 m/s  MV Dec [place]: 4.8 m/s2  MV DecT: 199 ms  MV E [place]: 0.9 m/s  MV E/A Ratio: 1.1  MV PHT: 57.7 ms  MVA By PHT: 3.8 cm2    [employer] Accredited Echocardiography Laboratory    Prepared and electronically signed by    David Murray DO  Signed [id number] 11:53:54    No results found for this or any previous visit. No results found for this or any previous visit. No results found for this or any previous visit.        *-*-*-*-*-*-*-*-*-*-*-*-*-*-*-*-*-*-*-*-*-*-*-*-*-*-*-*-*-*-*-*-*-*-*-*-*-*-*-*-*-*-*-*-*-*-*-*-*-*-*-*-*-*-  SIGNATURES:   @LTT@   Sherri Pretty MD     CC:   MD Isatu Limon MD

## 2023-11-29 NOTE — PATIENT INSTRUCTIONS
CARDIOLOGY ASSESSMENT & PLAN:   Diagnosis ICD-10-CM Associated Orders   1. Abnormal echocardiogram  R93.1 Ambulatory Referral to Cardiology     POCT ECG      2. Pulmonary hypertension associated with systemic disorder (HCC)  I27.29       3. Essential hypertension  I10       4. Diastolic dysfunction without heart failure  I51.89       5. Bradycardia  R00.1       6. Nonrheumatic tricuspid valve regurgitation  J41.3         Diastolic dysfunction without heart failure  Ms. Juan Lazcano recently had an echocardiogram and this was significant for presence of diastolic dysfunction and pulmonary hypertension and tricuspid valve regurgitation. Though she was experiencing palpitations in the past she says that these have not recurred recently. She gives no history that suggests decompensated heart failure. Her blood pressure is reasonably well-controlled. She does have asymptomatic sinus bradycardia. On physical examination there is no evidence of pulmonary or peripheral vascular congestion or significant valvular heart disease. Her renal function and thyroid function are normal.    Today we reviewed the findings of her echocardiogram and Holter test performed. I advised that in the absence of symptoms and signs at this time no further testing is recommended. No additional medications are recommended. The goal would be to continue monitoring for symptoms. We discussed the warning signs including shortness of breath with activity decreasing exercise tolerance and ability to lay down on a flat surface without getting short of breath or palpitations or passing out or near passing out. She is advised to call us if she develops any such symptoms. We will plan to see her back in 6 months 1 more time. Repeat echocardiogram 1 year following the previous study would be reasonable. I am advising her to continue to follow regularly with her primary care physician, exercise and walk regularly and eat healthy.

## 2023-11-29 NOTE — ASSESSMENT & PLAN NOTE
Ms. Umair Garza recently had an echocardiogram and this was significant for presence of diastolic dysfunction and pulmonary hypertension and tricuspid valve regurgitation. Though she was experiencing palpitations in the past she says that these have not recurred recently. She gives no history that suggests decompensated heart failure. Her blood pressure is reasonably well-controlled. She does have asymptomatic sinus bradycardia. On physical examination there is no evidence of pulmonary or peripheral vascular congestion or significant valvular heart disease. Her renal function and thyroid function are normal.    Today we reviewed the findings of her echocardiogram and Holter test performed. I advised that in the absence of symptoms and signs at this time no further testing is recommended. No additional medications are recommended. The goal would be to continue monitoring for symptoms. We discussed the warning signs including shortness of breath with activity decreasing exercise tolerance and ability to lay down on a flat surface without getting short of breath or palpitations or passing out or near passing out. She is advised to call us if she develops any such symptoms. We will plan to see her back in 6 months 1 more time. Repeat echocardiogram 1 year following the previous study would be reasonable. I am advising her to continue to follow regularly with her primary care physician, exercise and walk regularly and eat healthy.

## 2023-12-04 ENCOUNTER — CLINICAL SUPPORT (OUTPATIENT)
Dept: FAMILY MEDICINE CLINIC | Facility: CLINIC | Age: 83
End: 2023-12-04
Payer: COMMERCIAL

## 2023-12-04 DIAGNOSIS — M81.8 IDIOPATHIC OSTEOPOROSIS: Primary | ICD-10-CM

## 2023-12-04 PROCEDURE — 96372 THER/PROPH/DIAG INJ SC/IM: CPT

## 2023-12-21 ENCOUNTER — OFFICE VISIT (OUTPATIENT)
Age: 83
End: 2023-12-21
Payer: COMMERCIAL

## 2023-12-21 VITALS
TEMPERATURE: 98.2 F | OXYGEN SATURATION: 98 % | SYSTOLIC BLOOD PRESSURE: 134 MMHG | HEIGHT: 66 IN | DIASTOLIC BLOOD PRESSURE: 70 MMHG | WEIGHT: 165.2 LBS | HEART RATE: 56 BPM | BODY MASS INDEX: 26.55 KG/M2

## 2023-12-21 DIAGNOSIS — R26.89 BALANCE PROBLEMS: ICD-10-CM

## 2023-12-21 DIAGNOSIS — F41.9 ANXIETY DISORDER, UNSPECIFIED TYPE: ICD-10-CM

## 2023-12-21 DIAGNOSIS — G47.09 OTHER INSOMNIA: ICD-10-CM

## 2023-12-21 DIAGNOSIS — F03.A11 MILD DEMENTIA WITH AGITATION, UNSPECIFIED DEMENTIA TYPE (HCC): Primary | ICD-10-CM

## 2023-12-21 DIAGNOSIS — E78.49 OTHER HYPERLIPIDEMIA: ICD-10-CM

## 2023-12-21 DIAGNOSIS — M19.90 OSTEOARTHRITIS, UNSPECIFIED OSTEOARTHRITIS TYPE, UNSPECIFIED SITE: ICD-10-CM

## 2023-12-21 PROCEDURE — 99214 OFFICE O/P EST MOD 30 MIN: CPT | Performed by: NURSE PRACTITIONER

## 2023-12-21 NOTE — ASSESSMENT & PLAN NOTE
Doing ok w/out AD, but occ off balance  Dgt would like PT eval  Referral for [facility] submitted  Cont fall precautions

## 2023-12-21 NOTE — ASSESSMENT & PLAN NOTE
MOCA:  14/30.  Deficits in:  all domains  Pt is occ assist, mostly independent adl/dependent iadls.  Lives with , dgt supportive  Pt's current cognitive level is consistent with mild-mod dementia  Memory medications: aricept/namenda continue  Discussed increasing seroquel for agitation - see note under anxiety  Mobility:  no AD, does get off balance occ  Continue to stay active.  Current activity level:  fair.  Participates in some social, cognitive, physical activity.   *MOCA decreased 3 points- will restart ST/OT    For cognitive therapy  Monitor for changes in mood, sleep, pain control.  Notify provider if any concerns  Medication review:  seems appropriate for current conditions  Check with pharmacist/provider before starting any new OTC/prescription medications for potential cognitive side effects  Optimize all acute and chronic conditions.  Continue to follow-up with PCP and specialist as directed for management  Safety concerns:  none  Caregiver stress:  agitation, nayeli in evenings  Ensure adequate hydration, good nutrition  Goal: to move to [place]

## 2023-12-21 NOTE — PROGRESS NOTES
[facility]  [address], [address]  [place] [address]  [phone]    Social Work Follow-Up    LSW met with Dangelo Gunter for follow up visit. Completed [place] Cognitive Assessment, score 14/30 (previously 18/30 in 2/9/23), and Geriatric Depression Screen, 1/15 (previously 0/15 in 2/9/23).     [place] Cognitive Assessment (MoCA) Version 8.1  Education: HS    Points Earned POSSIBLE Points   Visuospatial/Executive   Alternating [place] Making 0 1   Visuoconstructional skills 0 1   Visuoconstructional skills (clock) 2 3   Naming   Naming Animals 1 3   Attention   Digit Span 2 2   Vigilance (letters) 1 1   Serial 7 subtraction 1 3   Language   Sentence Repetition 2 2   Verbal fluency 0 1   Abstraction   Abstraction (word pairings) 1 2   Delayed recall   Delayed recall 0 5   Memory index score: 3/15   Orientation   Orientation 3 6   TOTAL SCORE: 14/30  (Normal ?26/30)   Additional notes:      LSW also met with pt's daughter Aida. LSW provided the following resources in office:     General Information  - Memory loss ladder  - Packet with Alzheimer's Association information including: definition of dementia, communication tips for different stages, common behaviors and response strategies    Community Resources  - [employer] [facility] [employer] [facility]: What is Dementia & Where to Begin brochure & Dementia Resource Guide    Other  - Alzheimer's [place] Caregiver Tip Sheets: Sundowning, Keeping Home Safe, IDEA! Strategy, Anger/Frustration/Fighting  - Memory aids and tools info packet from the Alzheimer's Society  - A Guide to Moving for Seniors from [email or web address]  - Senior Moving Checklist from [email or web address]  - Avoid Relocation Stress: Helping Seniors Move to a [place] from [facility]  - [facility] Care: Caregiver Website QR code to scan for resources/education    LSW to remain available as needed.

## 2023-12-21 NOTE — ASSESSMENT & PLAN NOTE
Pt with h/o anxiety, now with increasing agitations, nayeli at night  She is on ritika dose lexapro - rec no changes  She cont w/aricept/namenda  She continues with seroquel.  Discussed at length with dgt:  would try to increase seroquel to 37.5mg (1 1/2 tab of 25mg) to help with evening agitation.   Would trial this dose at 4pm to see if it helps evening agitaitons.  Would monitor sleep pattern with that.  Could give additional 12.5mg (1/2 tab) at night if she doesn't sleep well (max daily dose 50mg).  Alternatively could cont 37.5mg at night and give additional 12.5mg in evening when pt is having bad night.  Would be dependent on sedation.  We did discuss potential SE including cardiac mortality, fall risk, and sedation.  Dgt verbalized understanding.    Requested /dgt check in with office so we know how med is working.  Cont emotional support, relaxation techniques  Cont reorientation, redirection, reassurance  Will give geriatric therapist info for talk therapy, nayeli with upcoming move that will likely be big stressors.

## 2023-12-21 NOTE — PROGRESS NOTES
Assessment & Plan:   1. Mild dementia with agitation, unspecified dementia type (HCC)  Assessment & Plan:  MOCA:  14/30.  Deficits in:  all domains  Pt is occ assist, mostly independent adl/dependent iadls.  Lives with , dgt supportive  Pt's current cognitive level is consistent with mild-mod dementia  Memory medications: aricept/namenda continue  Discussed increasing seroquel for agitation - see note under anxiety  Mobility:  no AD, does get off balance occ  Continue to stay active.  Current activity level:  fair.  Participates in some social, cognitive, physical activity.   *MOCA decreased 3 points- will restart ST/OT    For cognitive therapy  Monitor for changes in mood, sleep, pain control.  Notify provider if any concerns  Medication review:  seems appropriate for current conditions  Check with pharmacist/provider before starting any new OTC/prescription medications for potential cognitive side effects  Optimize all acute and chronic conditions.  Continue to follow-up with PCP and specialist as directed for management  Safety concerns:  none  Caregiver stress:  agitation, nayeli in evenings  Ensure adequate hydration, good nutrition  [place]        2. Anxiety disorder, unspecified type  Assessment & Plan:  Pt with h/o anxiety, now with increasing agitations, nayeli at night  She is on ritika dose lexapro - rec no changes  She cont w/aricept/namenda  She continues with seroquel.  Discussed at length with dgt:  would try to increase seroquel to 37.5mg (1 1/2 tab of 25mg) to help with evening agitation.   Would trial this dose at 4pm to see if it helps evening agitaitons.  Would monitor sleep pattern with that.  Could give additional 12.5mg (1/2 tab) at night if she doesn't sleep well (max daily dose 50mg).  Alternatively could cont 37.5mg at night and give additional 12.5mg in evening when pt is having bad night.  Would be dependent on sedation.  We did discuss potential SE including cardiac  mortality, fall risk, and sedation.  Dgt verbalized understanding.    Requested /dgt check in with office so we know how med is working.  Cont emotional support, relaxation techniques  Cont reorientation, redirection, reassurance  Will give geriatric therapist info for talk therapy, nayeli with upcoming move that will likely be big stressors.      3. Other insomnia  Assessment & Plan:  Doing better with sleeping  Remains on melatonin  Cont good sleep hygiene techniques      4. Osteoarthritis, unspecified osteoarthritis type, unspecified site  Assessment & Plan:  Patient denies pain at present  Cont prn tylenol/topical analgesic  Cont nonpharm methods pain control  Notify pcp if increased or change in pain type      5. Other hyperlipidemia  Assessment & Plan:  Pt reports she does not take crestor 5mg as dose was inc by cards.  5mg crestor dc'd per dgt's request      6. Balance problems  Assessment & Plan:  Doing ok w/out AD, but occ off balance  Dgt would like PT eval  Referral for [facility] submitted  Cont fall precautions      HPI:  We had the pleasure of evaluating Dangelo Gunter who is a 83 y.o. female   in Geriatric follow up today.  Previous MOCA:  17/30.  Comorbidities include dementia, anxiety, insomnia.  She lives with her   Ms. Gunter is in the office with her daughter    Memory update per patient:  Patient feels she is doing pretty good without complaints.  She states appetite is good, sleep pretty good.  Her balance is good, eyes and hears are fine.  She states mood is fine.  She states she would like to move more, but is limited by 's mobility.  She enjoys her cats- she has 3 and finds their company comforting.  One is named Derrick after Derrick Henriquez.  Cognitive:  not doing much.  Physical:  less walks, Social:  enjoys going out   She has difficulty finding the right word while speaking: No -   Patient requires repeat information or ask the same question repeatedly: Yes  Do you do  your own bathing, dressing, feeding yourself Yes - occ needs reminders  Can you make your own meals and do light cleaning/chores Yes  Do you take care of your own medications No - her  takes care of.   Do you drive: No       Do you handle your own financial affairs such as balancing your checkbook, paying bills, investments: No  Have you or your family noted any change in your mood or personality:Yes - increased evening agitations  Are you currently or have you been treated in the past for depression or anxiety: Yes  Have you noticed any gait or balance disorder: No  Uses : no AD, no recent falls  Any hallucination or delusion: No - more story telling  Sleep Isues: No  Urinary/Stool Incontinence: No  Hearing and vision issue: Yes - left eye changed,new lenses, no HAs  ADL/IADL:  independent w/cues/dependent  Appetite/swallow:  good/good  Pain:  no    Memory update per family:  Having more memory loss, more forgetful, more agitated at night - she is crying more, questioning why she's still here and aware of memory loss.  She is more resistant to her , does better with dgt.  Uses white board, forgot she went [facility].  Her appetite remains stable.  Sleep is ok.  She is doing more story telling.  Family Review of Behavior [facility]:    pacing. No - does more talking to herself   agressive/combative behavior. No    agitated. Yes - nayeli in evening  wandering. No   resistance to care. No   hoarding/hiding objects.No    suspicious  No  withdrawn No  misplacing/losing objects Yes  personal hygiene problems.No  forgetfulness of actions Yes - events and details    ROS: Review of Systems   Constitutional:  Negative for activity change, appetite change, chills and fatigue.   HENT:  Negative for congestion, hearing loss and trouble swallowing.    Eyes:  Negative for visual disturbance.   Respiratory:  Negative for cough and shortness of breath.    Cardiovascular:  Negative for chest pain.   Gastrointestinal:   Negative for abdominal pain, constipation, diarrhea, nausea and vomiting.   Genitourinary:  Negative for difficulty urinating.   Musculoskeletal:  Negative for arthralgias, back pain and gait problem.   Neurological:  Negative for dizziness and light-headedness.   Psychiatric/Behavioral:  Positive for decreased concentration (forgetful). Negative for dysphoric mood and sleep disturbance. The patient is not nervous/anxious.        Past Medical, surgical, social, medication and allergy history and patients previous records reviewed.  Allergies:   Allergies   Allergen Reactions    No Known Allergies        Medications:      Current Outpatient Medications:     amLODIPine (NORVASC) 2.5 mg tablet, Take 1 tablet (2.5 mg total) by mouth daily, Disp: 90 tablet, Rfl: 3    azilsartan medoxomil (Edarbi) 40 MG tablet, Take 1 tablet (40 mg total) by mouth daily, Disp: 90 tablet, Rfl: 3    Calcium Carbonate-Vit D-Min (Caltrate 600+D Plus Minerals) 600-800 MG-UNIT TABS, Take 1 tablet by mouth 2 (two) times a day with meals, Disp: 200 tablet, Rfl: 6    Cholecalciferol (Vitamin D3) 20 MCG (800 UNIT) TABS, Take 20 mcg by mouth 2 (two) times a day, Disp: , Rfl:     denosumab (PROLIA) 60 mg/mL, Inject 1 mL (60 mg total) under the skin once for 1 dose, Disp: 1 mL, Rfl: 1    donepezil (ARICEPT) 10 mg tablet, Take 1 tablet (10 mg total) by mouth daily In the Morning with Breakfast, Disp: 90 tablet, Rfl: 3    ergocalciferol (VITAMIN D2) 50,000 units, Take 1 capsule (50,000 Units total) by mouth once a week, Disp: 12 capsule, Rfl: 1    escitalopram (LEXAPRO) 10 mg tablet, Take 1 tablet (10 mg total) by mouth daily, Disp: 90 tablet, Rfl: 1    L-Methylfolate-A81-R2-Z6 (Cerefolin) 6-1-50-5 MG TABS, Take 1 tablet by mouth in the morning, Disp: 90 tablet, Rfl: 3    Melatonin 10 MG TABS, Take 10 mg by mouth daily, Disp: , Rfl:     memantine (Namenda) 10 mg tablet, Take 1 tablet (10 mg total) by mouth 2 (two) times a day, Disp: 180 tablet, Rfl: 3     nitrofurantoin (MACROBID) 100 mg capsule, Take 1 capsule (100 mg total) by mouth daily with lunch, Disp: 90 capsule, Rfl: 5    pantoprazole (PROTONIX) 40 mg tablet, Take 1 tablet (40 mg total) by mouth daily, Disp: 90 tablet, Rfl: 3    propranolol (INDERAL LA) 60 mg 24 hr capsule, Take 1 capsule (60 mg total) by mouth daily Please stop Norvasc..., Disp: 90 capsule, Rfl: 3    QUEtiapine (SEROquel) 25 mg tablet, Take 1 tablet (25 mg total) by mouth daily at bedtime, Disp: 90 tablet, Rfl: 1    rosuvastatin (CRESTOR) 10 MG tablet, TAKE ONE TABLET (10 MG TOTAL) BY MOUTH DAILY, Disp: 90 tablet, Rfl: 3    vitamin B-12 (VITAMIN B-12) 1,000 mcg tablet, Take 1 tablet (1,000 mcg total) by mouth daily, Disp: 90 tablet, Rfl: 3    Vitals:  Vitals:    12/21/23 0824   BP: 134/70   Pulse: 56   Temp: 98.2 °F (36.8 °C)   SpO2: 98%       History:  Past Medical History:   Diagnosis Date    Anxiety     Gastroesophageal reflux disease without esophagitis 8/28/2018    Hyperlipidemia     Hypertension     Hypothyroidism     Osteoarthritis 10/5/2012    Other osteoporosis without current pathological fracture 2/9/2023     Past Surgical History:   Procedure Laterality Date    CATARACT EXTRACTION      CHOLECYSTECTOMY      COLONOSCOPY      HYSTERECTOMY       Family History   Problem Relation Age of Onset    No Known Problems Mother     No Known Problems Father      Social History     Socioeconomic History    Marital status: /Civil Union     Spouse name: Not on file    Number of children: Not on file    Years of education: Not on file    Highest education level: Not on file   Occupational History    Occupation: retired   Tobacco Use    Smoking status: Never    Smokeless tobacco: Never   Vaping Use    Vaping status: Never Used   Substance and Sexual Activity    Alcohol use: No    Drug use: No    Sexual activity: Not Currently   Other Topics Concern    Not on file   Social History Narrative    Not on file     Social Determinants of Health      Financial Resource Strain: Low Risk  (8/9/2023)    Overall Financial Resource Strain (CARDIA)     Difficulty of Paying Living Expenses: Not hard at all   Food Insecurity: No Food Insecurity (4/14/2021)    Hunger Vital Sign     Worried About Running Out of Food in the Last Year: Never true     Ran Out of Food in the Last Year: Never true   Transportation Needs: No Transportation Needs (8/9/2023)    PRAPARE - Transportation     Lack of Transportation (Medical): No     Lack of Transportation (Non-Medical): No   Physical Activity: Insufficiently Active (7/7/2020)    Exercise Vital Sign     Days of Exercise per Week: 2 days     Minutes of Exercise per Session: 10 min   Stress: No Stress Concern Present (7/7/2020)    [employer] - Occupational Stress Questionnaire     Feeling of Stress : Not at all   Social Connections: Unknown (7/7/2020)    Social Connection and Isolation Panel [NHANES]     Frequency of Communication with Friends and Family: Patient declined     Frequency of Social Gatherings with Friends and Family: Patient declined     Attends Cheondoism Services: Patient declined     Active Member of Clubs or Organizations: Patient declined     Attends Club or Organization Meetings: Patient declined     Marital Status: Patient declined   Intimate Partner Violence: Not At Risk (7/7/2020)    Humiliation, Afraid, Rape, and Kick questionnaire     Fear of Current or Ex-Partner: No     Emotionally Abused: No     Physically Abused: No     Sexually Abused: No   Housing Stability: Not on file     Past Surgical History:   Procedure Laterality Date    CATARACT EXTRACTION      CHOLECYSTECTOMY      COLONOSCOPY      HYSTERECTOMY           Physical Exam:  Observed ambulation:  slower, no AD, fairly stable   Physical Exam  Vitals and nursing note reviewed.   Constitutional:       General: She is not in acute distress.     Appearance: Normal appearance. She is well-developed. She is not diaphoretic.    HENT:      Head: Normocephalic.   Cardiovascular:      Rate and Rhythm: Normal rate.      Heart sounds: No murmur heard.     No friction rub. No gallop.   Pulmonary:      Effort: Pulmonary effort is normal. No respiratory distress.      Breath sounds: Normal breath sounds. No wheezing or rales.   Abdominal:      General: Bowel sounds are normal. There is no distension.      Palpations: Abdomen is soft.      Tenderness: There is no abdominal tenderness. There is no rebound.   Musculoskeletal:         General: Normal range of motion.   Skin:     General: Skin is warm and dry.   Neurological:      General: No focal deficit present.      Mental Status: She is alert. Mental status is at baseline.      Comments: Oriented to person, partial tps  Pleasant, cooperative, forgetful   Psychiatric:         Mood and Affect: Mood normal.         Behavior: Behavior normal.

## 2023-12-21 NOTE — ASSESSMENT & PLAN NOTE
Patient denies pain at present  Cont prn tylenol/topical analgesic  Cont nonpharm methods pain control  Notify pcp if increased or change in pain type

## 2023-12-22 ENCOUNTER — TELEPHONE (OUTPATIENT)
Age: 83
End: 2023-12-22

## 2023-12-22 NOTE — TELEPHONE ENCOUNTER
Patient's daughter, Aida Alex ([phone]) called for clarification.    At yesterday's visit increasing the dosage of Seroquel was discussed, but the After Visit Summary indicates patient's Rosuvastatin was changed.    Please advise.

## 2023-12-22 NOTE — TELEPHONE ENCOUNTER
Spoke with patient's daughter, Aida, to relayed provider's recommendations regarding Seroquel.      Daughter expressed understanding.  Notes that patient only has 30 pills left.  Advised to try the new dosage recommendations for the next week.  If this improves the patients behaviors, contact this office for a refill at that time.    Also explained the removed of Rosuvastatin 5 mg as patient is no longer taking.  Daughter expressed understanding.

## 2023-12-22 NOTE — TELEPHONE ENCOUNTER
I took off the rosuvastatin that was 5mg because she reported to [facility] that she was taking 10mg (that should still be on there.  I didn't change anything with rosuvastatin, I was just tidying up the med list.    For the seroquel:  we did discuss increasing the dose.   has seroquel 25mg at home that she takes at night.  We discussed trying 1 1/2 tab (that's a total of 37.5mg) to see if it would help with the evening agitations a little.  We also discussed trying to give it earlier in the evening, before the agitations start.  We further discussed that she could increase the dose to 50mg (two tabs), so if her  thought the 1 1/2 tab wasn't quite enough on a given night, he could give her the extra 1/2 tab (12.5mg) later on, closer to bedtime.  They were going to trial this to see if it helped before we changed any doses, so I left the seroquel alone on the med list.    I hope this helps, sorry it was confusing.

## 2023-12-26 DIAGNOSIS — F03.918 DEMENTIA WITH BEHAVIORAL DISTURBANCE (HCC): ICD-10-CM

## 2023-12-26 RX ORDER — MEMANTINE HYDROCHLORIDE 10 MG/1
TABLET ORAL
Qty: 180 TABLET | Refills: 3 | Status: SHIPPED | OUTPATIENT
Start: 2023-12-26

## 2024-01-02 DIAGNOSIS — G47.00 INSOMNIA, UNSPECIFIED TYPE: ICD-10-CM

## 2024-01-02 DIAGNOSIS — R00.2 PALPITATIONS: ICD-10-CM

## 2024-01-02 RX ORDER — QUETIAPINE FUMARATE 25 MG/1
37.5 TABLET, FILM COATED ORAL
Qty: 90 TABLET | Refills: 1 | Status: SHIPPED | OUTPATIENT
Start: 2024-01-02

## 2024-01-02 NOTE — TELEPHONE ENCOUNTER
Patient's daughter, Aida Alex ([phone]) called for a medication refill for Quetiapine    Patient is doing very well on the medication.  Patient takes  1 tablet at night and 1/2 tablet at dinner.     Please call into [facility]  [phone]

## 2024-01-08 ENCOUNTER — TELEPHONE (OUTPATIENT)
Dept: NEUROLOGY | Facility: CLINIC | Age: 84
End: 2024-01-08

## 2024-01-08 NOTE — TELEPHONE ENCOUNTER
Called spoke to daughter Aida who confirmed appointment with  on 1/11/24, but she may r/s due to having another appointment with provider.

## 2024-01-16 DIAGNOSIS — I10 ESSENTIAL HYPERTENSION: ICD-10-CM

## 2024-01-16 RX ORDER — AMLODIPINE BESYLATE 2.5 MG/1
TABLET ORAL
Qty: 90 TABLET | Refills: 1 | Status: SHIPPED | OUTPATIENT
Start: 2024-01-16 | End: 2024-01-18 | Stop reason: SDUPTHER

## 2024-01-16 NOTE — TELEPHONE ENCOUNTER
Patient called requesting refill for Amlodipine. Patient made aware medication was requested on 01/16/24 by [facility] for 90 dsp Patient informed that it is pending approval . Patient verbalized understanding.

## 2024-01-18 RX ORDER — AMLODIPINE BESYLATE 2.5 MG/1
2.5 TABLET ORAL DAILY
Qty: 90 TABLET | Refills: 1 | Status: SHIPPED | OUTPATIENT
Start: 2024-01-18

## 2024-01-18 NOTE — TELEPHONE ENCOUNTER
Patient's  called the pharmacy and they said that they did not receive the order sent 01/16/24. Resending.    Reason for call:   [x] Refill   [] Prior Auth  [] Other:     Office:   [x] PCP/Provider - BRIAN Meek / [facility]  [] Specialty/Provider -     Medication: amlodipine    Dose/Frequency: 2.5mg daily    Quantity: 90    Pharmacy: [facility] - [facility] [address]     Does the patient have enough for 3 days?   [x] Yes   [] No - Send as HP to POD

## 2024-02-06 DIAGNOSIS — K21.9 GASTROESOPHAGEAL REFLUX DISEASE WITHOUT ESOPHAGITIS: ICD-10-CM

## 2024-02-06 RX ORDER — PANTOPRAZOLE SODIUM 40 MG/1
40 TABLET, DELAYED RELEASE ORAL DAILY
Qty: 90 TABLET | Refills: 1 | Status: SHIPPED | OUTPATIENT
Start: 2024-02-06

## 2024-02-06 NOTE — TELEPHONE ENCOUNTER
Reason for call:   [x] Refill   [] Prior Auth  [] Other:     Office:   [x] PCP/Provider - Parag Meyers  [] Specialty/Provider -     Medication: Pantoprazole    Dose/Frequency: 40 mg Daily    Quantity: 90    Pharmacy: [facility] [place] [address] [address]    Does the patient have enough for 3 days?   [x] Yes   [] No - Send as HP to POD

## 2024-02-09 ENCOUNTER — TELEPHONE (OUTPATIENT)
Age: 84
End: 2024-02-09

## 2024-02-09 ENCOUNTER — OFFICE VISIT (OUTPATIENT)
Dept: URGENT CARE | Age: 84
End: 2024-02-09
Payer: COMMERCIAL

## 2024-02-09 ENCOUNTER — OFFICE VISIT (OUTPATIENT)
Dept: OBGYN CLINIC | Facility: CLINIC | Age: 84
End: 2024-02-09
Payer: COMMERCIAL

## 2024-02-09 ENCOUNTER — APPOINTMENT (OUTPATIENT)
Dept: RADIOLOGY | Age: 84
End: 2024-02-09
Payer: COMMERCIAL

## 2024-02-09 VITALS
BODY MASS INDEX: 26.52 KG/M2 | HEIGHT: 66 IN | OXYGEN SATURATION: 94 % | RESPIRATION RATE: 18 BRPM | TEMPERATURE: 97.7 F | WEIGHT: 165 LBS | HEART RATE: 54 BPM

## 2024-02-09 VITALS — HEIGHT: 66 IN | BODY MASS INDEX: 26.52 KG/M2 | WEIGHT: 165 LBS

## 2024-02-09 DIAGNOSIS — M25.562 ACUTE PAIN OF LEFT KNEE: Primary | ICD-10-CM

## 2024-02-09 DIAGNOSIS — M25.562 ACUTE PAIN OF LEFT KNEE: ICD-10-CM

## 2024-02-09 DIAGNOSIS — M17.12 PRIMARY OSTEOARTHRITIS OF LEFT KNEE: Primary | ICD-10-CM

## 2024-02-09 PROCEDURE — 99203 OFFICE O/P NEW LOW 30 MIN: CPT | Performed by: PHYSICIAN ASSISTANT

## 2024-02-09 PROCEDURE — 99213 OFFICE O/P EST LOW 20 MIN: CPT | Performed by: EMERGENCY MEDICINE

## 2024-02-09 PROCEDURE — 73564 X-RAY EXAM KNEE 4 OR MORE: CPT

## 2024-02-09 PROCEDURE — 20610 DRAIN/INJ JOINT/BURSA W/O US: CPT | Performed by: PHYSICIAN ASSISTANT

## 2024-02-09 RX ORDER — SENNOSIDES 8.6 MG
650 CAPSULE ORAL EVERY 8 HOURS PRN
Qty: 30 TABLET | Refills: 0 | Status: SHIPPED | OUTPATIENT
Start: 2024-02-09

## 2024-02-09 RX ORDER — BUPIVACAINE HYDROCHLORIDE 2.5 MG/ML
2 INJECTION, SOLUTION INFILTRATION; PERINEURAL
Status: COMPLETED | OUTPATIENT
Start: 2024-02-09 | End: 2024-02-09

## 2024-02-09 RX ORDER — LIDOCAINE HYDROCHLORIDE 10 MG/ML
2 INJECTION, SOLUTION INFILTRATION; PERINEURAL
Status: COMPLETED | OUTPATIENT
Start: 2024-02-09 | End: 2024-02-09

## 2024-02-09 RX ORDER — METHYLPREDNISOLONE ACETATE 40 MG/ML
1 INJECTION, SUSPENSION INTRA-ARTICULAR; INTRALESIONAL; INTRAMUSCULAR; SOFT TISSUE
Status: COMPLETED | OUTPATIENT
Start: 2024-02-09 | End: 2024-02-09

## 2024-02-09 RX ADMIN — METHYLPREDNISOLONE ACETATE 1 ML: 40 INJECTION, SUSPENSION INTRA-ARTICULAR; INTRALESIONAL; INTRAMUSCULAR; SOFT TISSUE at 13:30

## 2024-02-09 RX ADMIN — BUPIVACAINE HYDROCHLORIDE 2 ML: 2.5 INJECTION, SOLUTION INFILTRATION; PERINEURAL at 13:30

## 2024-02-09 RX ADMIN — LIDOCAINE HYDROCHLORIDE 2 ML: 10 INJECTION, SOLUTION INFILTRATION; PERINEURAL at 13:30

## 2024-02-09 NOTE — PROGRESS NOTES
"Patient Name:  Dangelo Gunter  MRN:  [id number]    Assessment & Plan     Left knee DJD.  Corticosteroid injection performed today into the left knee.  Tylenol as needed for pain.  Continue hinged knee brace while active.  Activities as tolerated with modification to avoid pain.  Advised intra-articular corticosteroid injections may be performed every 3 months as indicated for pain.  Follow-up in 3 months.    Chief Complaint     Left knee pain    History of the Present Illness     Dangelo Gunter is a 83 y.o. female who reports to the office today for evaluation of her left knee.  Patient notes an onset of pain a few days ago.  She denies any injury or trauma.  She does report a chronic history of discomfort in the left knee but notes worsening pain over the past few days.  She notes generalized pain in the knee most noticeably about the medial aspect.  She notes associated swelling as well.  Pain is worse with increased activity and weightbearing.  She does note some stiffness.  She does note some giving way but denies any instability.  She currently takes nothing for pain.  No numbness or tingling.  No fevers or chills.  She was seen earlier today at urgent care.  At that time x-rays were performed and she was provided with a hinged knee brace and prescribed Tylenol.    Physical Exam     Ht 5' 6\" (1.676 m)   Wt 74.8 kg (165 lb)   BMI 26.63 kg/m²     Left knee: No gross deformity.  Skin intact.  No erythema or ecchymosis.  Mild soft tissue swelling.  No effusion.  There is tenderness along the medial joint line.  No lateral line tenderness.  Range of motion is full extension and flexion to 120 degrees with some crepitation appreciated.  There is slight discomfort with terminal flexion as well.  Stable to varus and valgus stress without pain.  Stable Lachman test.  Negative posterior drawer test.  Negative Bi's test.  Extensor mechanism is intact.    Eyes: Anicteric sclerae.  ENT: Trachea " midline.  Lungs: Normal respiratory effort.  CV: Capillary refill is less than 2 seconds.  Skin: Intact without erythema.  Lymph: No palpable lymphadenopathy.  Neuro: Sensation is grossly intact to light touch.  Psych: Mood and affect are appropriate.    Data Review     I have personally reviewed pertinent films in PACS, and my interpretation follows:    X-rays left knee 2/9/2024: No acute osseous abnormality.  No fracture or dislocation.  Tricompartmental degenerative changes most notably in the medial compartment    Past Medical History:   Diagnosis Date    Anxiety     Gastroesophageal reflux disease without esophagitis 08/28/2018    Hyperlipidemia     Hypertension     Hypothyroidism     Mild dementia with agitation (HCC) 08/24/2020    Osteoarthritis 10/05/2012    Other osteoporosis without current pathological fracture 02/09/2023       Past Surgical History:   Procedure Laterality Date    CATARACT EXTRACTION      CHOLECYSTECTOMY      COLONOSCOPY      HYSTERECTOMY         Allergies   Allergen Reactions    No Known Allergies        Current Outpatient Medications on File Prior to Visit   Medication Sig Dispense Refill    acetaminophen (TYLENOL) 650 mg CR tablet Take 1 tablet (650 mg total) by mouth every 8 (eight) hours as needed for mild pain 30 tablet 0    amLODIPine (NORVASC) 2.5 mg tablet Take 1 tablet (2.5 mg total) by mouth daily 90 tablet 1    azilsartan medoxomil (Edarbi) 40 MG tablet Take 1 tablet (40 mg total) by mouth daily 90 tablet 3    Calcium Carbonate-Vit D-Min (Caltrate 600+D Plus Minerals) 600-800 MG-UNIT TABS Take 1 tablet by mouth 2 (two) times a day with meals 200 tablet 6    Cholecalciferol (Vitamin D3) 20 MCG (800 UNIT) TABS Take 20 mcg by mouth 2 (two) times a day      donepezil (ARICEPT) 10 mg tablet Take 1 tablet (10 mg total) by mouth daily In the Morning with Breakfast 90 tablet 3    ergocalciferol (VITAMIN D2) 50,000 units Take 1 capsule (50,000 Units total) by mouth once a week 12  capsule 1    escitalopram (LEXAPRO) 10 mg tablet Take 1 tablet (10 mg total) by mouth daily 90 tablet 1    L-Methylfolate-H66-C8-U6 (Cerefolin) 6-1-50-5 MG TABS Take 1 tablet by mouth in the morning 90 tablet 3    Melatonin 10 MG TABS Take 10 mg by mouth daily      memantine (NAMENDA) 10 mg tablet TAKE ONE TABLET (10 MG TOTAL) BY MOUTH TWO (2) TIMES A  tablet 3    nitrofurantoin (MACROBID) 100 mg capsule Take 1 capsule (100 mg total) by mouth daily with lunch 90 capsule 5    pantoprazole (PROTONIX) 40 mg tablet Take 1 tablet (40 mg total) by mouth daily 90 tablet 1    propranolol (INDERAL LA) 60 mg 24 hr capsule Take 1 capsule (60 mg total) by mouth daily Please stop Norvasc... 90 capsule 3    QUEtiapine (SEROquel) 25 mg tablet Take 1.5 tablets (37.5 mg total) by mouth daily at bedtime 90 tablet 1    rosuvastatin (CRESTOR) 10 MG tablet TAKE ONE TABLET (10 MG TOTAL) BY MOUTH DAILY 90 tablet 3    vitamin B-12 (VITAMIN B-12) 1,000 mcg tablet Take 1 tablet (1,000 mcg total) by mouth daily 90 tablet 3    denosumab (PROLIA) 60 mg/mL Inject 1 mL (60 mg total) under the skin once for 1 dose 1 mL 1     No current facility-administered medications on file prior to visit.       Social History     Tobacco Use    Smoking status: Never    Smokeless tobacco: Never   Vaping Use    Vaping status: Never Used   Substance Use Topics    Alcohol use: No    Drug use: No       Family History   Problem Relation Age of Onset    No Known Problems Mother     No Known Problems Father        Review of Systems     As stated in the HPI. All other systems reviewed and are negative.      Large joint arthrocentesis: L knee  Procedure Details  Location: knee - L knee  Needle size: 22 G  Ultrasound guidance: no  Approach: anterolateral  Medications administered: 2 mL bupivacaine 0.25 %; 2 mL lidocaine 1 %; 1 mL methylPREDNISolone acetate 40 mg/mL    Patient tolerance: patient tolerated the procedure well with no immediate  complications  Dressing:  Sterile dressing applied           Xray Clavicle, Left

## 2024-02-09 NOTE — PROGRESS NOTES
"  [facility]        NAME: Dangelo uGnter is a 83 y.o. female  : 1940    MRN: [id number]  DATE: 2024  TIME: 5:35 PM    Assessment and Plan   Acute pain of left knee [M25.562]  1. Acute pain of left knee  XR knee 4+ vw left injury    acetaminophen (TYLENOL) 650 mg CR tablet    Ambulatory Referral to Orthopedic Surgery        No acute fracture or dislocation noted on imaging.  Patient otherwise neurovascular intact in the lower extremity and in no acute distress.  Patient's that she has an appointment orthopedic surgery immediately after this visit which I encouraged her to keep.  Patient given prescription for Tylenol as needed for pain.  Advised patient's son and daughter that if patient develops worsening pain to seek care in the ED otherwise follow-up closely with orthopedic surgery and PCP as directed.  All questions were answered at bedside, patient's and children were amenable to plan and  voiced understanding.    Patient Instructions       Follow up with PCP in 3-5 days.  Proceed to  ER if symptoms worsen.    Chief Complaint     Chief Complaint   Patient presents with    Knee Pain     Patient receives OT/PT at home x 6 weeks.  Was finishing PT session when suddenly her knee collapsed on left side yesterday in the am. Went to breakfast, got out of car and incident happened again. Did at home remedies.  No known trauma to that knee.Patient resides in her own home with her . This is not a normal baseline         History of Present Illness       83-year-old female with history of GERD, dementia, hypertension, temporal arteritis, osteoarthritis, osteoporosis, polymyalgia rheumatica presents with a chief complaint of left knee pain.  Patient's accompanied by daughter and son at bedside who states that for the last 6 weeks she has had home physical therapy for known left knee osteoarthritis.  Patient's family states that during her session today her left knee \"gave out\" several " times.  They deny fall to the ground or head strike.  Patient not currently on anticoagulation.  Patient denies any numbness tingling or weakness in the affected extremity.  Additionally, family deny any speech changes, facial droop.    Knee Pain   The incident occurred 12 to 24 hours ago. The incident occurred at home. The pain is at a severity of 2/10. The pain is mild. The pain has been Constant since onset. Pertinent negatives include no numbness. The symptoms are aggravated by weight bearing. She has tried nothing for the symptoms.       Review of Systems   Review of Systems   Constitutional:  Negative for activity change, appetite change, chills, diaphoresis, fatigue, fever and unexpected weight change.   HENT:  Positive for congestion. Negative for dental problem, drooling, ear discharge, ear pain, facial swelling, hearing loss, mouth sores, nosebleeds, postnasal drip, rhinorrhea, sinus pressure, sinus pain, sneezing, sore throat, tinnitus, trouble swallowing and voice change.    Eyes:  Negative for pain and visual disturbance.   Respiratory:  Negative for apnea, cough, choking, chest tightness, shortness of breath, wheezing and stridor.    Cardiovascular:  Negative for chest pain, palpitations and leg swelling.   Gastrointestinal:  Negative for abdominal pain, rectal pain and vomiting.   Genitourinary:  Negative for difficulty urinating, dyspareunia, dysuria, enuresis, flank pain, frequency, genital sores, hematuria, menstrual problem, pelvic pain, urgency, vaginal discharge and vaginal pain.   Musculoskeletal:  Positive for arthralgias. Negative for back pain, gait problem, joint swelling, myalgias, neck pain and neck stiffness.   Skin:  Negative for color change and rash.   Neurological:  Negative for dizziness, tremors, seizures, syncope, facial asymmetry, speech difficulty, weakness, light-headedness, numbness and headaches.   All other systems reviewed and are negative.        Current Medications        Current Outpatient Medications:     acetaminophen (TYLENOL) 650 mg CR tablet, Take 1 tablet (650 mg total) by mouth every 8 (eight) hours as needed for mild pain, Disp: 30 tablet, Rfl: 0    amLODIPine (NORVASC) 2.5 mg tablet, Take 1 tablet (2.5 mg total) by mouth daily, Disp: 90 tablet, Rfl: 1    azilsartan medoxomil (Edarbi) 40 MG tablet, Take 1 tablet (40 mg total) by mouth daily, Disp: 90 tablet, Rfl: 3    Calcium Carbonate-Vit D-Min (Caltrate 600+D Plus Minerals) 600-800 MG-UNIT TABS, Take 1 tablet by mouth 2 (two) times a day with meals, Disp: 200 tablet, Rfl: 6    Cholecalciferol (Vitamin D3) 20 MCG (800 UNIT) TABS, Take 20 mcg by mouth 2 (two) times a day, Disp: , Rfl:     donepezil (ARICEPT) 10 mg tablet, Take 1 tablet (10 mg total) by mouth daily In the Morning with Breakfast, Disp: 90 tablet, Rfl: 3    ergocalciferol (VITAMIN D2) 50,000 units, Take 1 capsule (50,000 Units total) by mouth once a week, Disp: 12 capsule, Rfl: 1    escitalopram (LEXAPRO) 10 mg tablet, Take 1 tablet (10 mg total) by mouth daily, Disp: 90 tablet, Rfl: 1    L-Methylfolate-W22-I6-V0 (Cerefolin) 6-1-50-5 MG TABS, Take 1 tablet by mouth in the morning, Disp: 90 tablet, Rfl: 3    Melatonin 10 MG TABS, Take 10 mg by mouth daily, Disp: , Rfl:     memantine (NAMENDA) 10 mg tablet, TAKE ONE TABLET (10 MG TOTAL) BY MOUTH TWO (2) TIMES A DAY, Disp: 180 tablet, Rfl: 3    nitrofurantoin (MACROBID) 100 mg capsule, Take 1 capsule (100 mg total) by mouth daily with lunch, Disp: 90 capsule, Rfl: 5    pantoprazole (PROTONIX) 40 mg tablet, Take 1 tablet (40 mg total) by mouth daily, Disp: 90 tablet, Rfl: 1    propranolol (INDERAL LA) 60 mg 24 hr capsule, Take 1 capsule (60 mg total) by mouth daily Please stop Norvasc..., Disp: 90 capsule, Rfl: 3    QUEtiapine (SEROquel) 25 mg tablet, Take 1.5 tablets (37.5 mg total) by mouth daily at bedtime, Disp: 90 tablet, Rfl: 1    rosuvastatin (CRESTOR) 10 MG tablet, TAKE ONE TABLET (10 MG TOTAL) BY  "MOUTH DAILY, Disp: 90 tablet, Rfl: 3    vitamin B-12 (VITAMIN B-12) 1,000 mcg tablet, Take 1 tablet (1,000 mcg total) by mouth daily, Disp: 90 tablet, Rfl: 3    denosumab (PROLIA) 60 mg/mL, Inject 1 mL (60 mg total) under the skin once for 1 dose, Disp: 1 mL, Rfl: 1  No current facility-administered medications for this visit.    Current Allergies     Allergies as of 02/09/2024 - Reviewed 02/09/2024   Allergen Reaction Noted    No known allergies  10/02/2012            The following portions of the patient's history were reviewed and updated as appropriate: allergies, current medications, past family history, past medical history, past social history, past surgical history and problem list.     Past Medical History:   Diagnosis Date    Anxiety     Gastroesophageal reflux disease without esophagitis 08/28/2018    Hyperlipidemia     Hypertension     Hypothyroidism     Mild dementia with agitation (HCC) 08/24/2020    Osteoarthritis 10/05/2012    Other osteoporosis without current pathological fracture 02/09/2023       Past Surgical History:   Procedure Laterality Date    CATARACT EXTRACTION      CHOLECYSTECTOMY      COLONOSCOPY      HYSTERECTOMY         Family History   Problem Relation Age of Onset    No Known Problems Mother     No Known Problems Father          Medications have been verified.        Objective   Pulse (!) 54   Temp 97.7 °F (36.5 °C) (Tympanic)   Resp 18   Ht 5' 6\" (1.676 m)   Wt 74.8 kg (165 lb)   SpO2 94%   BMI 26.63 kg/m²   No LMP recorded. Patient has had a hysterectomy.       Physical Exam     Physical Exam  Vitals and nursing note reviewed.   Constitutional:       General: She is not in acute distress.     Appearance: Normal appearance. She is not ill-appearing, toxic-appearing or diaphoretic.   HENT:      Head: Normocephalic and atraumatic.      Right Ear: External ear normal.      Left Ear: External ear normal.      Nose: Nose normal.      Mouth/Throat:      Mouth: Mucous membranes are " moist.      Pharynx: No oropharyngeal exudate or posterior oropharyngeal erythema.   Eyes:      General: No scleral icterus.        Right eye: No discharge.         Left eye: No discharge.      Extraocular Movements: Extraocular movements intact.      Pupils: Pupils are equal, round, and reactive to light.   Cardiovascular:      Rate and Rhythm: Regular rhythm. Bradycardia present.      Pulses: Normal pulses.           Carotid pulses are 2+ on the right side and 2+ on the left side.       Radial pulses are 2+ on the right side and 2+ on the left side.      Heart sounds: No murmur heard.     No friction rub. No gallop.   Pulmonary:      Effort: Pulmonary effort is normal. No respiratory distress.      Breath sounds: Normal breath sounds. No stridor. No wheezing, rhonchi or rales.   Chest:      Chest wall: No tenderness.   Abdominal:      General: Abdomen is flat. There is no distension.      Palpations: Abdomen is soft. There is no mass.      Tenderness: There is no abdominal tenderness. There is no right CVA tenderness, left CVA tenderness, guarding or rebound.      Hernia: No hernia is present.   Musculoskeletal:         General: Tenderness present. No swelling, deformity or signs of injury.      Cervical back: Normal range of motion and neck supple.      Right knee: Normal.      Left knee: Swelling and bony tenderness present. No deformity, erythema, ecchymosis, lacerations or crepitus. Normal range of motion. Tenderness present over the medial joint line and MCL. No lateral joint line, LCL, ACL, PCL or patellar tendon tenderness. No LCL laxity, MCL laxity, ACL laxity or PCL laxity.Normal alignment, normal meniscus and normal patellar mobility. Normal pulse.      Right lower leg: No edema.      Left lower leg: Normal. No swelling, deformity, lacerations, tenderness or bony tenderness. No edema.   Skin:     General: Skin is warm and dry.   Neurological:      General: No focal deficit present.      Mental Status:  She is alert and oriented to person, place, and time.      Cranial Nerves: No cranial nerve deficit.      Sensory: No sensory deficit.      Motor: No weakness.      Coordination: Coordination normal.      Gait: Gait normal.      Deep Tendon Reflexes: Reflexes normal.   Psychiatric:         Mood and Affect: Mood normal.         Behavior: Behavior normal.

## 2024-02-09 NOTE — TELEPHONE ENCOUNTER
Patients daughter is requesting a urine sample and and some blood work to be placed since patient is very confused and has had 4 almost falls and was caught by family. Patient was seen at the urgent care today. Please advise

## 2024-02-11 ENCOUNTER — HOSPITAL ENCOUNTER (EMERGENCY)
Facility: HOSPITAL | Age: 84
Discharge: HOME | End: 2024-02-11
Attending: EMERGENCY MEDICINE | Admitting: EMERGENCY MEDICINE
Payer: COMMERCIAL

## 2024-02-11 ENCOUNTER — APPOINTMENT (EMERGENCY)
Dept: CT IMAGING | Facility: HOSPITAL | Age: 84
End: 2024-02-11
Payer: COMMERCIAL

## 2024-02-11 VITALS
TEMPERATURE: 97.3 F | OXYGEN SATURATION: 96 % | DIASTOLIC BLOOD PRESSURE: 81 MMHG | RESPIRATION RATE: 19 BRPM | BODY MASS INDEX: 26.26 KG/M2 | WEIGHT: 162.7 LBS | HEART RATE: 53 BPM | SYSTOLIC BLOOD PRESSURE: 176 MMHG

## 2024-02-11 DIAGNOSIS — R41.82 ALTERED MENTAL STATUS, UNSPECIFIED ALTERED MENTAL STATUS TYPE: Primary | ICD-10-CM

## 2024-02-11 LAB
ANION GAP SERPL CALCULATED.3IONS-SCNC: 6 MMOL/L
ATRIAL RATE: 55 BPM
BASOPHILS # BLD AUTO: 0.05 THOUSANDS/ÂΜL (ref 0–0.1)
BASOPHILS NFR BLD AUTO: 1 % (ref 0–1)
BILIRUB UR QL STRIP: NEGATIVE
BUN SERPL-MCNC: 21 MG/DL (ref 5–25)
CALCIUM SERPL-MCNC: 9.2 MG/DL (ref 8.4–10.2)
CHLORIDE SERPL-SCNC: 107 MMOL/L (ref 96–108)
CLARITY UR: CLEAR
CO2 SERPL-SCNC: 28 MMOL/L (ref 21–32)
COLOR UR: NORMAL
CREAT SERPL-MCNC: 1.18 MG/DL (ref 0.6–1.3)
EOSINOPHIL # BLD AUTO: 0.11 THOUSAND/ÂΜL (ref 0–0.61)
EOSINOPHIL NFR BLD AUTO: 2 % (ref 0–6)
ERYTHROCYTE [DISTWIDTH] IN BLOOD BY AUTOMATED COUNT: 13.9 % (ref 11.6–15.1)
GFR SERPL CREATININE-BSD FRML MDRD: 42 ML/MIN/1.73SQ M
GLUCOSE SERPL-MCNC: 105 MG/DL (ref 65–140)
GLUCOSE UR STRIP-MCNC: NEGATIVE MG/DL
HCT VFR BLD AUTO: 39.1 % (ref 34.8–46.1)
HGB BLD-MCNC: 13.1 G/DL (ref 11.5–15.4)
HGB UR QL STRIP.AUTO: NEGATIVE
IMM GRANULOCYTES # BLD AUTO: 0.01 THOUSAND/UL (ref 0–0.2)
IMM GRANULOCYTES NFR BLD AUTO: 0 % (ref 0–2)
KETONES UR STRIP-MCNC: NEGATIVE MG/DL
LEUKOCYTE ESTERASE UR QL STRIP: NEGATIVE
LYMPHOCYTES # BLD AUTO: 1.38 THOUSANDS/ÂΜL (ref 0.6–4.47)
LYMPHOCYTES NFR BLD AUTO: 22 % (ref 14–44)
MCH RBC QN AUTO: 30.8 PG (ref 26.8–34.3)
MCHC RBC AUTO-ENTMCNC: 33.5 G/DL (ref 31.4–37.4)
MCV RBC AUTO: 92 FL (ref 82–98)
MONOCYTES # BLD AUTO: 0.37 THOUSAND/ÂΜL (ref 0.17–1.22)
MONOCYTES NFR BLD AUTO: 6 % (ref 4–12)
NEUTROPHILS # BLD AUTO: 4.33 THOUSANDS/ÂΜL (ref 1.85–7.62)
NEUTS SEG NFR BLD AUTO: 69 % (ref 43–75)
NITRITE UR QL STRIP: NEGATIVE
NRBC BLD AUTO-RTO: 0 /100 WBCS
P AXIS: 49 DEGREES
PH UR STRIP.AUTO: 6.5 [PH]
PLATELET # BLD AUTO: 233 THOUSANDS/UL (ref 149–390)
PMV BLD AUTO: 10.8 FL (ref 8.9–12.7)
POTASSIUM SERPL-SCNC: 3.6 MMOL/L (ref 3.5–5.3)
PR INTERVAL: 160 MS
PROT UR STRIP-MCNC: NEGATIVE MG/DL
QRS AXIS: 32 DEGREES
QRSD INTERVAL: 86 MS
QT INTERVAL: 414 MS
QTC INTERVAL: 396 MS
RBC # BLD AUTO: 4.26 MILLION/UL (ref 3.81–5.12)
SODIUM SERPL-SCNC: 141 MMOL/L (ref 135–147)
SP GR UR STRIP.AUTO: 1.01 (ref 1–1.03)
T WAVE AXIS: 112 DEGREES
UROBILINOGEN UR STRIP-ACNC: <2 MG/DL
VENTRICULAR RATE: 55 BPM
WBC # BLD AUTO: 6.25 THOUSAND/UL (ref 4.31–10.16)

## 2024-02-11 PROCEDURE — 99285 EMERGENCY DEPT VISIT HI MDM: CPT

## 2024-02-11 PROCEDURE — 81003 URINALYSIS AUTO W/O SCOPE: CPT | Performed by: EMERGENCY MEDICINE

## 2024-02-11 PROCEDURE — 70450 CT HEAD/BRAIN W/O DYE: CPT

## 2024-02-11 PROCEDURE — 36415 COLL VENOUS BLD VENIPUNCTURE: CPT | Performed by: EMERGENCY MEDICINE

## 2024-02-11 PROCEDURE — 85025 COMPLETE CBC W/AUTO DIFF WBC: CPT | Performed by: EMERGENCY MEDICINE

## 2024-02-11 PROCEDURE — 93005 ELECTROCARDIOGRAM TRACING: CPT

## 2024-02-11 PROCEDURE — G1004 CDSM NDSC: HCPCS

## 2024-02-11 PROCEDURE — 80048 BASIC METABOLIC PNL TOTAL CA: CPT | Performed by: EMERGENCY MEDICINE

## 2024-02-11 PROCEDURE — 99285 EMERGENCY DEPT VISIT HI MDM: CPT | Performed by: EMERGENCY MEDICINE

## 2024-02-11 NOTE — ED PROVIDER NOTES
History  Chief Complaint   Patient presents with    Altered Mental Status     Pt daughter states the pt has been altered for the past x 4 days. Pt denies cp/sob/n/v/d or fevers.      83-year-old female, presents from home with daughter for reported altered mental status and increased difficulty walking.  Patient has history of dementia, lives at home with , daughter lives close by.  Patient gets home PT and OT as well as memory care.  Daughter reports patient in process of moving to independent living place.  Patient had fall several days ago, injured knee at that time, was evaluated afterwards and had injection to left knee.  No head injury.  Daughter reports since fall, patient has had difficulty walking and has been more confused.  Patient is currently awake and alert, denies any complaints or pain when asked.      History provided by:  Patient and relative   used: No    Altered Mental Status  Associated symptoms: no fever        Prior to Admission Medications   Prescriptions Last Dose Informant Patient Reported? Taking?   Calcium Carbonate-Vit D-Min (Caltrate 600+D Plus Minerals) 600-800 MG-UNIT TABS  Self, Child No No   Sig: Take 1 tablet by mouth 2 (two) times a day with meals   Cholecalciferol (Vitamin D3) 20 MCG (800 UNIT) TABS  Self, Child Yes No   Sig: Take 20 mcg by mouth 2 (two) times a day   L-Methylfolate-K12-L8-Y4 (Cerefolin) 6-1-50-5 MG TABS  Self, Child No No   Sig: Take 1 tablet by mouth in the morning   Melatonin 10 MG TABS  Self, Child Yes No   Sig: Take 10 mg by mouth daily   QUEtiapine (SEROquel) 25 mg tablet   No No   Sig: Take 1.5 tablets (37.5 mg total) by mouth daily at bedtime   acetaminophen (TYLENOL) 650 mg CR tablet   No No   Sig: Take 1 tablet (650 mg total) by mouth every 8 (eight) hours as needed for mild pain   amLODIPine (NORVASC) 2.5 mg tablet   No No   Sig: Take 1 tablet (2.5 mg total) by mouth daily   azilsartan medoxomil (Edarbi) 40 MG tablet  Self,  Child No No   Sig: Take 1 tablet (40 mg total) by mouth daily   denosumab (PROLIA) 60 mg/mL  Self, Child No No   Sig: Inject 1 mL (60 mg total) under the skin once for 1 dose   donepezil (ARICEPT) 10 mg tablet  Self, Child No No   Sig: Take 1 tablet (10 mg total) by mouth daily In the Morning with Breakfast   ergocalciferol (VITAMIN D2) 50,000 units  Self, Child No No   Sig: Take 1 capsule (50,000 Units total) by mouth once a week   escitalopram (LEXAPRO) 10 mg tablet  Self, Child No No   Sig: Take 1 tablet (10 mg total) by mouth daily   memantine (NAMENDA) 10 mg tablet   No No   Sig: TAKE ONE TABLET (10 MG TOTAL) BY MOUTH TWO (2) TIMES A DAY   nitrofurantoin (MACROBID) 100 mg capsule  Self, Child No No   Sig: Take 1 capsule (100 mg total) by mouth daily with lunch   pantoprazole (PROTONIX) 40 mg tablet   No No   Sig: Take 1 tablet (40 mg total) by mouth daily   propranolol (INDERAL LA) 60 mg 24 hr capsule  Self, Child No No   Sig: Take 1 capsule (60 mg total) by mouth daily Please stop Norvasc...   rosuvastatin (CRESTOR) 10 MG tablet  Self, Child No No   Sig: TAKE ONE TABLET (10 MG TOTAL) BY MOUTH DAILY   vitamin B-12 (VITAMIN B-12) 1,000 mcg tablet  Self, Child No No   Sig: Take 1 tablet (1,000 mcg total) by mouth daily      Facility-Administered Medications: None       Past Medical History:   Diagnosis Date    Anxiety     Gastroesophageal reflux disease without esophagitis 08/28/2018    Hyperlipidemia     Hypertension     Hypothyroidism     Mild dementia with agitation (HCC) 08/24/2020    Osteoarthritis 10/05/2012    Other osteoporosis without current pathological fracture 02/09/2023       Past Surgical History:   Procedure Laterality Date    CATARACT EXTRACTION      CHOLECYSTECTOMY      COLONOSCOPY      HYSTERECTOMY         Family History   Problem Relation Age of Onset    No Known Problems Mother     No Known Problems Father      I have reviewed and agree with the history as documented.    E-Cigarette/Vaping     E-Cigarette Use Never User      E-Cigarette/Vaping Substances    Nicotine No     THC No     CBD No     Flavoring No      Social History     Tobacco Use    Smoking status: Never    Smokeless tobacco: Never   Vaping Use    Vaping status: Never Used   Substance Use Topics    Alcohol use: No    Drug use: No       Review of Systems   Constitutional: Negative.  Negative for fever.   Respiratory: Negative.     Gastrointestinal: Negative.        Physical Exam  Physical Exam  Vitals and nursing note reviewed.   Constitutional:       General: She is not in acute distress.  HENT:      Head: Normocephalic and atraumatic.   Cardiovascular:      Rate and Rhythm: Regular rhythm. Bradycardia present.   Pulmonary:      Effort: Pulmonary effort is normal.      Breath sounds: Normal breath sounds.   Chest:      Chest wall: No tenderness.   Abdominal:      Palpations: Abdomen is soft.      Tenderness: There is no abdominal tenderness.   Musculoskeletal:         General: No tenderness. Normal range of motion.      Cervical back: Normal range of motion and neck supple.      Comments: Left knee with no swelling or erythema, no tenderness   Skin:     General: Skin is warm and dry.   Neurological:      General: No focal deficit present.      Mental Status: She is alert. Mental status is at baseline.      Comments: Normal speech, no facial weakness  5 out of 5 strength all 4 extremities, no drift         Vital Signs  ED Triage Vitals [02/11/24 0907]   Temperature Pulse Respirations Blood Pressure SpO2   (!) 97.3 °F (36.3 °C) (!) 54 16 165/78 96 %      Temp Source Heart Rate Source Patient Position - Orthostatic VS BP Location FiO2 (%)   Oral Monitor Sitting Right arm --      Pain Score       No Pain           Vitals:    02/11/24 0907 02/11/24 1000 02/11/24 1145   BP: 165/78 169/79 (!) 176/81   Pulse: (!) 54 (!) 54 (!) 53   Patient Position - Orthostatic VS: Sitting Lying Lying         Visual Acuity      ED Medications  Medications - No data  to display    Diagnostic Studies  Results Reviewed       Procedure Component Value Units Date/Time    UA w Reflex to Microscopic w Reflex to Culture [[id number]] Collected: 02/11/24 1136    Lab Status: Final result Specimen: Urine, Clean Catch Updated: 02/11/24 1217     Color, UA Light Yellow     Clarity, UA Clear     Specific Gravity, UA 1.015     pH, UA 6.5     Leukocytes, UA Negative     Nitrite, UA Negative     Protein, UA Negative mg/dl      Glucose, UA Negative mg/dl      Ketones, UA Negative mg/dl      Urobilinogen, UA <2.0 mg/dl      Bilirubin, UA Negative     Occult Blood, UA Negative    Basic metabolic panel [[id number]] Collected: 02/11/24 0924    Lab Status: Final result Specimen: Blood from Arm, Right Updated: 02/11/24 1018     Sodium 141 mmol/L      Potassium 3.6 mmol/L      Chloride 107 mmol/L      CO2 28 mmol/L      ANION GAP 6 mmol/L      BUN 21 mg/dL      Creatinine 1.18 mg/dL      Glucose 105 mg/dL      Calcium 9.2 mg/dL      eGFR 42 ml/min/1.73sq m     Narrative:      National Kidney Disease Foundation guidelines for Chronic Kidney Disease (CKD):     Stage 1 with normal or high GFR (GFR > 90 mL/min/1.73 square meters)    Stage 2 Mild CKD (GFR = 60-89 mL/min/1.73 square meters)    Stage 3A Moderate CKD (GFR = 45-59 mL/min/1.73 square meters)    Stage 3B Moderate CKD (GFR = 30-44 mL/min/1.73 square meters)    Stage 4 Severe CKD (GFR = 15-29 mL/min/1.73 square meters)    Stage 5 End Stage CKD (GFR <15 mL/min/1.73 square meters)  Note: GFR calculation is accurate only with a steady state creatinine    CBC and differential [[id number]] Collected: 02/11/24 0924    Lab Status: Final result Specimen: Blood from Arm, Right Updated: 02/11/24 0931     WBC 6.25 Thousand/uL      RBC 4.26 Million/uL      Hemoglobin 13.1 g/dL      Hematocrit 39.1 %      MCV 92 fL      MCH 30.8 pg      MCHC 33.5 g/dL      RDW 13.9 %      MPV 10.8 fL      Platelets 233 Thousands/uL      nRBC 0 /100 WBCs      Neutrophils Relative 69  %      Immat GRANS % 0 %      Lymphocytes Relative 22 %      Monocytes Relative 6 %      Eosinophils Relative 2 %      Basophils Relative 1 %      Neutrophils Absolute 4.33 Thousands/µL      Immature Grans Absolute 0.01 Thousand/uL      Lymphocytes Absolute 1.38 Thousands/µL      Monocytes Absolute 0.37 Thousand/µL      Eosinophils Absolute 0.11 Thousand/µL      Basophils Absolute 0.05 Thousands/µL                    CT head without contrast   Final Result by Salena Falcon MD (02/11 0947)      No acute intracranial abnormality. Chronic and nonemergent findings above.         Workstation performed: [id number]                    Procedures  ECG 12 Lead Documentation Only    Date/Time: 2/11/2024 9:29 AM    Performed by: Victor M Hunt MD  Authorized by: Victor M Hunt MD    ECG reviewed by me, the ED Provider: yes    Patient location:  ED  Previous ECG:     Previous ECG:  Compared to current  Rate:     ECG rate assessment: bradycardic    Rhythm:     Rhythm: sinus bradycardia    Ectopy:     Ectopy: none    QRS:     QRS axis:  Normal    QRS intervals:  Normal  Conduction:     Conduction: normal    Comments:      Sinus bradycardia at 55, no acute changes           ED Course  ED Course as of 02/11/24 1238   Sun Feb 11, 2024   1113 Patient comfortable, awake and alert and in no distress, has no complaints.  Head CT and lab results reviewed and discussed with family, no acute abnormalities, still waiting to obtain urine from patient.   1234 Patient has been awake and alert, comfortable since arrival.  Results reviewed and discussed with family, no acute abnormalities.  Family would like to take patient home, instructed to follow-up with primary doctor, follow-up or return for any worsening or new concerning symptoms.                               SBIRT 22yo+      Flowsheet Row Most Recent Value   Initial Alcohol Screen: US AUDIT-C     1. How often do you have a drink containing alcohol? 0 Filed at: 02/11/2024 0916   2.  How many drinks containing alcohol do you have on a typical day you are drinking?  0 Filed at: 02/11/2024 0916   3a. Male UNDER 65: How often do you have five or more drinks on one occasion? 0 Filed at: 02/11/2024 0916   3b. FEMALE Any Age, or MALE 65+: How often do you have 4 or more drinks on one occassion? 0 Filed at: 02/11/2024 0916   Audit-C Score 0 Filed at: 02/11/2024 0916   DEBORAH: How many times in the past year have you...    Used an illegal drug or used a prescription medication for non-medical reasons? Never Filed at: 02/11/2024 0916                      Medical Decision Making  83-year-old female, presents from home with daughter for reported altered mental status.  Differential diagnosis includes infection, dehydration, worsening dementia among other diagnoses.  Patient awake and alert, answering questions appropriately, in no distress.  Daughter reports symptoms started after fall several days ago, did have knee injury at that time, had outpatient x-ray and orthopedic follow-up.  EKG, labs including urinalysis, head CT ordered.  Will continue to monitor in ED and reevaluate.    Daughter reports patient is able to ambulate using a walker but is hesitant due to recent fall, recent injection to left knee.  Patient is already receiving physical therapy, Occupational Therapy at home.  Has already been evaluated by orthopedics for left knee injury, pain.    Amount and/or Complexity of Data Reviewed  External Data Reviewed: radiology and notes.     Details: Left knee x-ray 2/9, osteoarthritis, no acute fracture  Labs: ordered. Decision-making details documented in ED Course.  Radiology: ordered. Decision-making details documented in ED Course.  ECG/medicine tests: ordered and independent interpretation performed. Decision-making details documented in ED Course.           I have reviewed test results and diagnosis with daughter.  Follow-up plan reviewed.  Precautions for acute return for re-evaluation are  reviewed.  Opportunity to ask questions was provided. Daughter verbalizes understanding.    Disposition  Final diagnoses:   Altered mental status, unspecified altered mental status type     Time reflects when diagnosis was documented in both MDM as applicable and the Disposition within this note       Time User Action Codes Description Comment    2/11/2024 12:30 PM Victor M Hunt Add [R41.82] Altered mental status, unspecified altered mental status type           ED Disposition       ED Disposition   Discharge    Condition   Stable    Date/Time   Sun Feb 11, 2024 1230    Comment   Dangelo Gunter discharge to home/self care.                   Follow-up Information       Follow up With Specialties Details Why Contact Info    Parag Meyers MD Internal Medicine Schedule an appointment as soon as possible for a visit   [address] [place] [id number] [phone]              Patient's Medications   Discharge Prescriptions    No medications on file       No discharge procedures on file.    PDMP Review         Value Time User    PDMP Reviewed  Yes 7/23/2020  4:49 PM Parag Meyers MD            ED Provider  Electronically Signed by             Victor M Hunt MD  02/11/24 1239

## 2024-02-19 ENCOUNTER — TELEPHONE (OUTPATIENT)
Age: 84
End: 2024-02-19

## 2024-02-19 NOTE — TELEPHONE ENCOUNTER
Daughter calling in with report that patient has a drastic change in ADLs in 2 weeks. Becoming incontinent, holding on to walls when walking, can communicate stating that she knows things aren't right. Daughter is wondering if she is over medicated. She had her at ER on 2/11 all test came back normal. Please advise [phone]

## 2024-02-20 NOTE — TELEPHONE ENCOUNTER
Called pt daughter, she stated that pt has already been to ER all tests that were done in ER were normal. Pt daughter stated ER discharged her and stated to follow up with PCP. Pt would like to be seen ASAP. Please advise.

## 2024-02-22 ENCOUNTER — RA CDI HCC (OUTPATIENT)
Dept: OTHER | Facility: HOSPITAL | Age: 84
End: 2024-02-22

## 2024-02-23 ENCOUNTER — OFFICE VISIT (OUTPATIENT)
Dept: FAMILY MEDICINE CLINIC | Facility: CLINIC | Age: 84
End: 2024-02-23
Payer: COMMERCIAL

## 2024-02-23 VITALS
HEIGHT: 66 IN | OXYGEN SATURATION: 97 % | HEART RATE: 68 BPM | RESPIRATION RATE: 14 BRPM | DIASTOLIC BLOOD PRESSURE: 70 MMHG | SYSTOLIC BLOOD PRESSURE: 124 MMHG | TEMPERATURE: 98.3 F | BODY MASS INDEX: 25.07 KG/M2 | WEIGHT: 156 LBS

## 2024-02-23 DIAGNOSIS — R41.3 MEMORY LOSS: Primary | ICD-10-CM

## 2024-02-23 DIAGNOSIS — R79.89 LOW VITAMIN B12 LEVEL: ICD-10-CM

## 2024-02-23 DIAGNOSIS — M31.6 TEMPORAL ARTERITIS (HCC): ICD-10-CM

## 2024-02-23 PROBLEM — F03.A11 MILD DEMENTIA WITH AGITATION (HCC): Status: RESOLVED | Noted: 2020-08-24 | Resolved: 2024-02-23

## 2024-02-23 PROBLEM — I27.29 PULMONARY HYPERTENSION ASSOCIATED WITH SYSTEMIC DISORDER (HCC): Status: RESOLVED | Noted: 2023-11-29 | Resolved: 2024-02-23

## 2024-02-23 PROCEDURE — 99214 OFFICE O/P EST MOD 30 MIN: CPT | Performed by: INTERNAL MEDICINE

## 2024-02-23 PROCEDURE — 96372 THER/PROPH/DIAG INJ SC/IM: CPT | Performed by: INTERNAL MEDICINE

## 2024-02-23 RX ORDER — CYANOCOBALAMIN 1000 UG/ML
1000 INJECTION, SOLUTION INTRAMUSCULAR; SUBCUTANEOUS ONCE
Status: COMPLETED | OUTPATIENT
Start: 2024-02-23 | End: 2024-02-23

## 2024-02-23 RX ADMIN — CYANOCOBALAMIN 1000 MCG: 1000 INJECTION, SOLUTION INTRAMUSCULAR; SUBCUTANEOUS at 12:54

## 2024-02-23 NOTE — PATIENT INSTRUCTIONS

## 2024-02-23 NOTE — PROGRESS NOTES
Name: Dangelo Gunter      : 1940      MRN: [id number]  Encounter Provider: Parag Meyers MD  Encounter Date: 2024   Encounter department: [place] [facility] (formerly [facility])    Assessment & Plan     1. Memory loss  Comments:  getting Worse ??  B12 IM Now  Hold of on Seroquil  FU w Neurology  RTC in 4 weeks w Blood work  Orders:  -     Vitamin B1 (Thiamine), Serum/Plasma, LC/MS/MS; Future  -     Vitamin B6; Future  -     Ferritin; Future  -     Magnesium; Future  -     TSH, 3rd generation; Future; Expected date: 2024  -     T4, free; Future; Expected date: 2024  -     Folate; Future  -     Vitamin B12; Future  -     VAS carotid complete study; Future; Expected date: 2024  -     cyanocobalamin injection 1,000 mcg  -     UA (URINE) with reflex to Scope; Future  -     CBC and differential; Future; Expected date: 2024  -     Comprehensive metabolic panel; Future; Expected date: 2024  -     Sedimentation rate, automated; Future  -     XR chest pa & lateral; Future; Expected date: 2024    2. Low vitamin B12 level  -     Vitamin B12; Future  -     cyanocobalamin injection 1,000 mcg    3. Temporal arteritis (HCC)  -     UA (URINE) with reflex to Scope; Future  -     CBC and differential; Future; Expected date: 2024  -     Comprehensive metabolic panel; Future; Expected date: 2024  -     Sedimentation rate, automated; Future    Prognosis; poor  RTC in 1 mow  Blood work       Subjective      83 Y O Lady is here for Regular check Up and post E R Visit, her memory loss / Confusion is getting worse, lately ? She is here with her daughter..      Review of Systems   Constitutional:  Negative for chills, fatigue and fever.   HENT:  Negative for congestion, facial swelling, sore throat, trouble swallowing and voice change.    Eyes:  Negative for pain, discharge and visual disturbance.   Respiratory:  Negative for cough, shortness of breath and wheezing.     Cardiovascular:  Negative for chest pain, palpitations and leg swelling.   Gastrointestinal:  Negative for abdominal pain, blood in stool, constipation, diarrhea and nausea.   Endocrine: Negative for polydipsia, polyphagia and polyuria.   Genitourinary:  Negative for difficulty urinating, hematuria and urgency.   Musculoskeletal:  Negative for arthralgias and myalgias.   Skin:  Negative for rash.   Neurological:  Negative for dizziness, tremors, weakness and headaches.   Hematological:  Negative for adenopathy. Does not bruise/bleed easily.   Psychiatric/Behavioral:  Negative for dysphoric mood, sleep disturbance and suicidal ideas.        Current Outpatient Medications on File Prior to Visit   Medication Sig    acetaminophen (TYLENOL) 650 mg CR tablet Take 1 tablet (650 mg total) by mouth every 8 (eight) hours as needed for mild pain    amLODIPine (NORVASC) 2.5 mg tablet Take 1 tablet (2.5 mg total) by mouth daily    azilsartan medoxomil (Edarbi) 40 MG tablet Take 1 tablet (40 mg total) by mouth daily    Calcium Carbonate-Vit D-Min (Caltrate 600+D Plus Minerals) 600-800 MG-UNIT TABS Take 1 tablet by mouth 2 (two) times a day with meals    Cholecalciferol (Vitamin D3) 20 MCG (800 UNIT) TABS Take 20 mcg by mouth 2 (two) times a day    donepezil (ARICEPT) 10 mg tablet Take 1 tablet (10 mg total) by mouth daily In the Morning with Breakfast    ergocalciferol (VITAMIN D2) 50,000 units Take 1 capsule (50,000 Units total) by mouth once a week    escitalopram (LEXAPRO) 10 mg tablet Take 1 tablet (10 mg total) by mouth daily    L-Methylfolate-D00-I9-Q5 (Cerefolin) 6-1-50-5 MG TABS Take 1 tablet by mouth in the morning    Melatonin 10 MG TABS Take 10 mg by mouth daily    memantine (NAMENDA) 10 mg tablet TAKE ONE TABLET (10 MG TOTAL) BY MOUTH TWO (2) TIMES A DAY    nitrofurantoin (MACROBID) 100 mg capsule Take 1 capsule (100 mg total) by mouth daily with lunch    pantoprazole (PROTONIX) 40 mg tablet Take 1 tablet (40 mg  "total) by mouth daily    propranolol (INDERAL LA) 60 mg 24 hr capsule Take 1 capsule (60 mg total) by mouth daily Please stop Norvasc...    QUEtiapine (SEROquel) 25 mg tablet Take 1.5 tablets (37.5 mg total) by mouth daily at bedtime    rosuvastatin (CRESTOR) 10 MG tablet TAKE ONE TABLET (10 MG TOTAL) BY MOUTH DAILY    vitamin B-12 (VITAMIN B-12) 1,000 mcg tablet Take 1 tablet (1,000 mcg total) by mouth daily    denosumab (PROLIA) 60 mg/mL Inject 1 mL (60 mg total) under the skin once for 1 dose       Objective     /70 (BP Location: Left arm, Patient Position: Sitting, Cuff Size: Standard)   Pulse 68   Temp 98.3 °F (36.8 °C) (Tympanic)   Resp 14   Ht 5' 6\" (1.676 m)   Wt 70.8 kg (156 lb)   SpO2 97%   BMI 25.18 kg/m²     Physical Exam  Constitutional:       General: She is not in acute distress.  HENT:      Head: Normocephalic.      Mouth/Throat:      Pharynx: No oropharyngeal exudate.   Eyes:      General: No scleral icterus.     Conjunctiva/sclera: Conjunctivae normal.      Pupils: Pupils are equal, round, and reactive to light.   Neck:      Thyroid: No thyromegaly.   Cardiovascular:      Rate and Rhythm: Normal rate and regular rhythm.      Heart sounds: Normal heart sounds. No murmur heard.  Pulmonary:      Effort: Pulmonary effort is normal. No respiratory distress.      Breath sounds: Normal breath sounds. No wheezing or rales.   Abdominal:      General: Bowel sounds are normal. There is no distension.      Palpations: Abdomen is soft.      Tenderness: There is no abdominal tenderness. There is no guarding or rebound.   Musculoskeletal:         General: Tenderness present.      Cervical back: Neck supple.   Lymphadenopathy:      Cervical: No cervical adenopathy.   Skin:     Coloration: Skin is not pale.      Findings: No rash.   Neurological:      Mental Status: She is alert and oriented to person, place, and time.      Sensory: No sensory deficit.      Motor: No weakness.       Parag Meyers MD    "

## 2024-02-29 ENCOUNTER — HOSPITAL ENCOUNTER (OUTPATIENT)
Dept: NON INVASIVE DIAGNOSTICS | Facility: HOSPITAL | Age: 84
Discharge: HOME | End: 2024-02-29
Payer: COMMERCIAL

## 2024-02-29 ENCOUNTER — APPOINTMENT (OUTPATIENT)
Dept: LAB | Facility: HOSPITAL | Age: 84
End: 2024-02-29
Payer: COMMERCIAL

## 2024-02-29 ENCOUNTER — HOSPITAL ENCOUNTER (OUTPATIENT)
Dept: RADIOLOGY | Facility: HOSPITAL | Age: 84
Discharge: HOME | End: 2024-02-29
Payer: COMMERCIAL

## 2024-02-29 DIAGNOSIS — R41.3 MEMORY LOSS: ICD-10-CM

## 2024-02-29 DIAGNOSIS — R79.89 LOW VITAMIN B12 LEVEL: ICD-10-CM

## 2024-02-29 DIAGNOSIS — M31.6 TEMPORAL ARTERITIS (HCC): ICD-10-CM

## 2024-02-29 LAB
BASOPHILS # BLD AUTO: 0.06 THOUSANDS/ÂΜL (ref 0–0.1)
BASOPHILS NFR BLD AUTO: 1 % (ref 0–1)
EOSINOPHIL # BLD AUTO: 0.15 THOUSAND/ÂΜL (ref 0–0.61)
EOSINOPHIL NFR BLD AUTO: 2 % (ref 0–6)
ERYTHROCYTE [DISTWIDTH] IN BLOOD BY AUTOMATED COUNT: 13.1 % (ref 11.6–15.1)
ERYTHROCYTE [SEDIMENTATION RATE] IN BLOOD: 41 MM/HOUR (ref 0–29)
FERRITIN SERPL-MCNC: 182 NG/ML (ref 11–307)
FOLATE SERPL-MCNC: 11.8 NG/ML
HCT VFR BLD AUTO: 42.6 % (ref 34.8–46.1)
HGB BLD-MCNC: 14 G/DL (ref 11.5–15.4)
IMM GRANULOCYTES # BLD AUTO: 0.02 THOUSAND/UL (ref 0–0.2)
IMM GRANULOCYTES NFR BLD AUTO: 0 % (ref 0–2)
LYMPHOCYTES # BLD AUTO: 1.21 THOUSANDS/ÂΜL (ref 0.6–4.47)
LYMPHOCYTES NFR BLD AUTO: 18 % (ref 14–44)
MAGNESIUM SERPL-MCNC: 2.1 MG/DL (ref 1.9–2.7)
MCH RBC QN AUTO: 30.2 PG (ref 26.8–34.3)
MCHC RBC AUTO-ENTMCNC: 32.9 G/DL (ref 31.4–37.4)
MCV RBC AUTO: 92 FL (ref 82–98)
MONOCYTES # BLD AUTO: 0.46 THOUSAND/ÂΜL (ref 0.17–1.22)
MONOCYTES NFR BLD AUTO: 7 % (ref 4–12)
NEUTROPHILS # BLD AUTO: 4.92 THOUSANDS/ÂΜL (ref 1.85–7.62)
NEUTS SEG NFR BLD AUTO: 72 % (ref 43–75)
NRBC BLD AUTO-RTO: 0 /100 WBCS
PLATELET # BLD AUTO: 308 THOUSANDS/UL (ref 149–390)
PMV BLD AUTO: 10.9 FL (ref 8.9–12.7)
RBC # BLD AUTO: 4.63 MILLION/UL (ref 3.81–5.12)
T4 FREE SERPL-MCNC: 1.14 NG/DL (ref 0.61–1.12)
TSH SERPL DL<=0.05 MIU/L-ACNC: 2.7 UIU/ML (ref 0.45–4.5)
VIT B12 SERPL-MCNC: 3916 PG/ML (ref 180–914)
WBC # BLD AUTO: 6.82 THOUSAND/UL (ref 4.31–10.16)

## 2024-02-29 PROCEDURE — 71046 X-RAY EXAM CHEST 2 VIEWS: CPT

## 2024-02-29 PROCEDURE — 93880 EXTRACRANIAL BILAT STUDY: CPT

## 2024-02-29 PROCEDURE — 93880 EXTRACRANIAL BILAT STUDY: CPT | Performed by: SURGERY

## 2024-02-29 PROCEDURE — 84207 ASSAY OF VITAMIN B-6: CPT

## 2024-02-29 PROCEDURE — 85652 RBC SED RATE AUTOMATED: CPT

## 2024-02-29 PROCEDURE — 82746 ASSAY OF FOLIC ACID SERUM: CPT

## 2024-02-29 PROCEDURE — 83735 ASSAY OF MAGNESIUM: CPT

## 2024-02-29 PROCEDURE — 36415 COLL VENOUS BLD VENIPUNCTURE: CPT

## 2024-02-29 PROCEDURE — 84425 ASSAY OF VITAMIN B-1: CPT

## 2024-02-29 PROCEDURE — 84443 ASSAY THYROID STIM HORMONE: CPT

## 2024-02-29 PROCEDURE — 85025 COMPLETE CBC W/AUTO DIFF WBC: CPT

## 2024-02-29 PROCEDURE — 84439 ASSAY OF FREE THYROXINE: CPT

## 2024-02-29 PROCEDURE — 82607 VITAMIN B-12: CPT

## 2024-02-29 PROCEDURE — 82728 ASSAY OF FERRITIN: CPT

## 2024-03-01 ENCOUNTER — TELEPHONE (OUTPATIENT)
Age: 84
End: 2024-03-01

## 2024-03-01 ENCOUNTER — TELEPHONE (OUTPATIENT)
Dept: FAMILY MEDICINE CLINIC | Facility: CLINIC | Age: 84
End: 2024-03-01

## 2024-03-01 NOTE — TELEPHONE ENCOUNTER
vd call from patient's daughter (who had a sudden onset of confusion 3 weeks ago) wanted Dr. Meyers to know that they got all of the testing done yesterday and she just needs to collect and drop off her urine specimen. She is hoping that Dr. Meyers can call her today with results.

## 2024-03-01 NOTE — TELEPHONE ENCOUNTER
----- Message from Parag Meyers MD sent at 3/1/2024  7:37 AM EST -----  Hold OFF on Vit B12 supplements for 3 mos  ----- Message -----  From: Lab, Background User  Sent: 2/29/2024   5:21 PM EST  To: Parag Meyers MD

## 2024-03-04 ENCOUNTER — TELEPHONE (OUTPATIENT)
Age: 84
End: 2024-03-04

## 2024-03-04 ENCOUNTER — APPOINTMENT (OUTPATIENT)
Dept: LAB | Facility: HOSPITAL | Age: 84
End: 2024-03-04
Payer: COMMERCIAL

## 2024-03-04 LAB
BILIRUB UR QL STRIP: NEGATIVE
CLARITY UR: CLEAR
COLOR UR: COLORLESS
GLUCOSE UR STRIP-MCNC: NEGATIVE MG/DL
HGB UR QL STRIP.AUTO: NEGATIVE
KETONES UR STRIP-MCNC: NEGATIVE MG/DL
LEUKOCYTE ESTERASE UR QL STRIP: NEGATIVE
NITRITE UR QL STRIP: NEGATIVE
PH UR STRIP.AUTO: 5 [PH]
PROT UR STRIP-MCNC: NEGATIVE MG/DL
SP GR UR STRIP.AUTO: 1.01 (ref 1–1.03)
UROBILINOGEN UR STRIP-ACNC: <2 MG/DL

## 2024-03-04 PROCEDURE — 81003 URINALYSIS AUTO W/O SCOPE: CPT

## 2024-03-04 NOTE — TELEPHONE ENCOUNTER
Rcvd call from patients daughter/Lauren who said that she wanted Dr Meyers to know that she dropped off her UA today and she had labs done this past Friday.

## 2024-03-06 ENCOUNTER — TELEPHONE (OUTPATIENT)
Age: 84
End: 2024-03-06

## 2024-03-06 DIAGNOSIS — F03.918 DEMENTIA WITH BEHAVIORAL DISTURBANCE (HCC): ICD-10-CM

## 2024-03-06 DIAGNOSIS — R41.3 MEMORY LOSS: Primary | ICD-10-CM

## 2024-03-06 LAB
VIT B1 BLD-SCNC: 110.4 NMOL/L (ref 66.5–200)
VIT B6 SERPL-MCNC: 3.6 UG/L (ref 3.4–65.2)

## 2024-03-07 ENCOUNTER — TELEPHONE (OUTPATIENT)
Age: 84
End: 2024-03-07

## 2024-03-07 NOTE — TELEPHONE ENCOUNTER
Called the patient's daughter.  Informed her     Pt should be seen and Evaluated by Neurology,memory specialist ? Or Geriatrician ?ASAP     She will call today for an appointment.

## 2024-03-07 NOTE — TELEPHONE ENCOUNTER
Patient's daughter states that she is still off.  She wanted you to know that she sometimes doesn't know where her legs are, she had cat food on the counter with bread on it, and she just seems confused, etc sometimes.  When she gets nervous, her right leg aryan and her right hand shakes.  Random and crazy stuff happens from time to time.  Her follow up is April 4.

## 2024-03-07 NOTE — TELEPHONE ENCOUNTER
LSW received a call from patient's daughter-Aida. Aida expressed concerns with how patient has been recently and provided an example of patient putting cat food on the counter then bread on top for her cat. Daughter also stated she is not eating as well, and has been shaking when she is being taken to the bathroom ever since her knee buckled. Daughter stated she did take her to ER and everything came back normal.     LSW stated that with the move upcoming that could be causing patient distress, disorientation, confusion with her dementia and that patient has history of anxiety that could also be playing a role. LSW stated that sometimes with a big life change it is common for someone with dementia to have these changes. Daughter agreed that there has been a lot of activity in the home. Daughter stated she is working with [facility]ST and that they will continue at her new home in [place]. LSW stated that hopefully she will adjust overtime to her new home, and that they offer meals throughout the day to hopefully help with her not eating as well. LSW offered sooner appointment as daughter was concerned about medical changes although daughter stated all labs and everything was normal, and mentioned another family member doing well taking buspar. Daughter was interested in moving up appointment and have it in person. LSW stated that someone from scheduling will reach out to her. LSW to remain available as needed.

## 2024-03-07 NOTE — TELEPHONE ENCOUNTER
Spoke with Daughter, Zarina Alex ([phone]) to provide providers advice.    Provider suggested an appointment in person or Virtually to discuss issues.   Provider also suggested speaking with the .     I offered appt for 3/18/2024 patient will be moving that day.  I transferred call to .

## 2024-03-07 NOTE — TELEPHONE ENCOUNTER
Patient's daughter, Aida Alex ([phone]) called with medical concerns.    Aida advised that [facility] and just when she was ready to be discharged her knee buckled and ever since the drama the patient seems to be declining.  Aida advised the PCP did all kinds of test on the patient.  EKG, Blood Work, Urine analysis, test for carotid artery. Everything came out normal.  Aida also advised that the patient is moving in 7 days to [facility]. The patient doesn't seem to know where she is living.     Aida also advised that with the patient having the issue with her knee she would need assistance to move her.     I advised Aida I will contact provider and someone will call her back.

## 2024-03-08 NOTE — TELEPHONE ENCOUNTER
Left message with patient's daughter, Aida Alex ([phone]) to schedule an appointment for patient as per .

## 2024-03-22 ENCOUNTER — OFFICE VISIT (OUTPATIENT)
Age: 84
End: 2024-03-22
Payer: COMMERCIAL

## 2024-03-22 ENCOUNTER — TELEPHONE (OUTPATIENT)
Age: 84
End: 2024-03-22

## 2024-03-22 VITALS
WEIGHT: 153.2 LBS | OXYGEN SATURATION: 98 % | BODY MASS INDEX: 24.62 KG/M2 | HEART RATE: 57 BPM | SYSTOLIC BLOOD PRESSURE: 122 MMHG | DIASTOLIC BLOOD PRESSURE: 78 MMHG | HEIGHT: 66 IN | TEMPERATURE: 97.5 F

## 2024-03-22 DIAGNOSIS — R26.89 BALANCE PROBLEMS: ICD-10-CM

## 2024-03-22 DIAGNOSIS — G47.09 OTHER INSOMNIA: ICD-10-CM

## 2024-03-22 DIAGNOSIS — F03.90 DEMENTIA WITHOUT BEHAVIORAL DISTURBANCE (HCC): ICD-10-CM

## 2024-03-22 DIAGNOSIS — F41.9 ANXIETY DISORDER, UNSPECIFIED TYPE: Primary | ICD-10-CM

## 2024-03-22 PROCEDURE — 99214 OFFICE O/P EST MOD 30 MIN: CPT | Performed by: NURSE PRACTITIONER

## 2024-03-22 PROCEDURE — G2211 COMPLEX E/M VISIT ADD ON: HCPCS | Performed by: NURSE PRACTITIONER

## 2024-03-22 RX ORDER — BUSPIRONE HYDROCHLORIDE 5 MG/1
5 TABLET ORAL 2 TIMES DAILY
Qty: 60 TABLET | Refills: 0 | Status: SHIPPED | OUTPATIENT
Start: 2024-03-22 | End: 2024-04-11 | Stop reason: ALTCHOICE

## 2024-03-22 NOTE — PROGRESS NOTES
Assessment & Plan:   1. Anxiety disorder, unspecified type  Assessment & Plan:  Having increased anxiety at home since knee injury.  [place] a lot of commotion recently  Would add tylenol twice a day for few weeks to see if agitation could be related to pain that pt is unable to verbalize  Currently taking lexapro.  Seroquel stopped 2/23   would like to add buspar - added  Cont emotional support, relaxation techniques  May need  referral  If any agitation that is not redirectable, or that is safety issue for pt or other, please proceed to ED    Orders:  -     busPIRone (BUSPAR) 5 mg tablet; Take 1 tablet (5 mg total) by mouth 2 (two) times a day    2. Dementia without behavioral disturbance (HCC)  Assessment & Plan:  MOCA:  6/30.  Deficits in:  all domains.  Increased confusions since hurt knee.  Recently moved.    Pt is assist adl/dependent iadls.  Lives with , dgt assisting  Pt's current cognitive level is consistent with moderate dementia  Memory medications: aricept/namenda  As pt is having inc agitation and progressing dementia, would recommend start titrating off aricept to see if mood improves  Mobility:  use of wlker, PT starting  Continue to stay active.  Current activity level:  fair.  Participates in social, cognitive, physical activity.  Monitor for changes in mood, sleep, pain control.  Notify provider if any concerns  Medication review:  seems appropriate for current conditions  Check with pharmacist/provider before starting any new OTC/prescription medications for potential cognitive side effects  Optimize all acute and chronic conditions.  Continue to follow-up with PCP and specialist as directed for management  Safety concerns:  agitations, falls  Pt requiring 24/7 cares between  and dgt.  May need HHA to help with adl assistance vs inc cares.    Caregiver stress:   also older,  taking care of wife - dgt assisting  Ensure adequate hydration, good  nutrition  Recommended next follow up:  in 3 months to see how pt is doing in new apartment, reassess meds      3. Balance problems  Assessment & Plan:  Walker with fear of falling  PT to start  Fall precautions      4. Other insomnia  Assessment & Plan:  Melatonin for sleep.  Cont good sleep hygiene techniques.            HPI:  We had the pleasure of evaluating Dangelo Gunter who is a 83 y.o. female   in Geriatric follow up today.  Previous MOCA:  14/30.  Comorbidities include dementia, ambulatory dysfunction, anxiety  She lives with her   Ms. Gunter is in the office with her daughter    Memory update per patient (poor historian secondary to dementia)  STM loss is ok, not really having harder time remembering things.  Knows people, place.  Doesn't follow time too much.  Does chores when they need to be done.  Not cooking too much.   takes care of meals.  He takes care of bills.  Still drives a little.  Doing ok for cell phone.  Doesn't use walker (there is one in the room).  No recent falls.  No major pain.  Appetite is good, swallow good.  Both take care of pills together.  Not repeating or name finding issues.  Not losing things too much.  Sleeps well at night, rare naps. Not more anxious - does come from an anxious family.  Occ sadness, cats help (older cats).  Vision and hearing - wears glasses, no HAs.      Current activities:  Cognitive:  not doing too much  Physical:  enjoys walks.  Social:  family    She has difficulty finding the right word while speaking: Yes  Patient requires repeat information or ask the same question repeatedly: Yes  Do you do your own bathing, dressing, feeding yourself No  Can you make your own meals and do light cleaning/chores No  Do you take care of your own medications No  Do you drive: No  Do you handle your own financial affairs such as balancing your checkbook, paying bills, investments: No  Have you or your family noted any change in your mood or  personality:Yes  Are you currently or have you been treated in the past for depression or anxiety: Yes  Have you noticed any gait or balance disorder: Yes  Uses :Walker:    Any hallucination or delusion: No  Sleep Issues: Yes  Urinary/Stool Incontinence:   Hearing and vision issue:   ADL/IADL:  assist/dependent  Appetite:  ok  Pain:  ?knee?    Memory update per family:  She is very confused since 2/20 when her knee got hurt.  She is getting things mixed up.  She did hit her  today.  Started on liquid IV (electrolyte).  Stopped PT/OT/ST - ST coming at night which doesn't work so well.  She has major fear of falling.  Moved recently.  Was seen in ED after fall.  Family Review of Behavior [facility]:    pacing. No    agressive/combative behavior. Yes    agitated. Yes   wandering. No   resistance to care. Yes   misplacing/losing objects   personal hygiene problems.No - assist  forgetfulness of actions Yes    ROS: Review of Systems   Constitutional:  Negative for activity change, appetite change, chills and fatigue.   HENT:  Negative for congestion and hearing loss.    Eyes:  Negative for visual disturbance.   Respiratory:  Negative for cough and shortness of breath.    Cardiovascular:  Negative for chest pain.   Gastrointestinal:  Negative for abdominal pain, constipation, diarrhea, nausea and vomiting.   Genitourinary:  Negative for difficulty urinating.   Musculoskeletal:  Negative for arthralgias, back pain and gait problem.   Neurological:  Negative for dizziness and light-headedness.   Psychiatric/Behavioral:  Positive for decreased concentration (forgetful). Negative for dysphoric mood and sleep disturbance. The patient is not nervous/anxious.        Past Medical, surgical, social, medication and allergy history and patients previous records reviewed.  Allergies:   Allergies   Allergen Reactions    No Known Allergies        Medications:      Current Outpatient Medications:     amLODIPine (NORVASC) 2.5 mg  tablet, Take 1 tablet (2.5 mg total) by mouth daily, Disp: 90 tablet, Rfl: 1    azilsartan medoxomil (Edarbi) 40 MG tablet, Take 1 tablet (40 mg total) by mouth daily, Disp: 90 tablet, Rfl: 3    busPIRone (BUSPAR) 5 mg tablet, Take 1 tablet (5 mg total) by mouth 2 (two) times a day, Disp: 60 tablet, Rfl: 0    Calcium Carbonate-Vit D-Min (Caltrate 600+D Plus Minerals) 600-800 MG-UNIT TABS, Take 1 tablet by mouth 2 (two) times a day with meals (Patient not taking: Reported on 4/7/2024), Disp: 200 tablet, Rfl: 6    Cholecalciferol (Vitamin D3) 20 MCG (800 UNIT) TABS, Take 20 mcg by mouth 2 (two) times a day, Disp: , Rfl:     donepezil (ARICEPT) 10 mg tablet, Take 1 tablet (10 mg total) by mouth daily In the Morning with Breakfast, Disp: 90 tablet, Rfl: 3    ergocalciferol (VITAMIN D2) 50,000 units, Take 1 capsule (50,000 Units total) by mouth once a week (Patient not taking: Reported on 4/7/2024), Disp: 12 capsule, Rfl: 1    escitalopram (LEXAPRO) 10 mg tablet, Take 1 tablet (10 mg total) by mouth daily, Disp: 90 tablet, Rfl: 1    Melatonin 10 MG TABS, Take 10 mg by mouth daily, Disp: , Rfl:     nitrofurantoin (MACROBID) 100 mg capsule, Take 1 capsule (100 mg total) by mouth daily with lunch, Disp: 90 capsule, Rfl: 5    pantoprazole (PROTONIX) 40 mg tablet, Take 1 tablet (40 mg total) by mouth daily, Disp: 90 tablet, Rfl: 1    propranolol (INDERAL LA) 60 mg 24 hr capsule, Take 1 capsule (60 mg total) by mouth daily Please stop Norvasc..., Disp: 90 capsule, Rfl: 3    rosuvastatin (CRESTOR) 10 MG tablet, TAKE ONE TABLET (10 MG TOTAL) BY MOUTH DAILY, Disp: 90 tablet, Rfl: 3    acetaminophen (TYLENOL) 650 mg CR tablet, Take 1 tablet (650 mg total) by mouth every 8 (eight) hours as needed for mild pain, Disp: 30 tablet, Rfl: 0    denosumab (PROLIA) 60 mg/mL, Inject 1 mL (60 mg total) under the skin once for 1 dose, Disp: 1 mL, Rfl: 1    L-Methylfolate-Q48-N6-U0 (Cerefolin) 6-1-50-5 MG TABS, Take 1 tablet by mouth in the  morning (Patient not taking: Reported on 3/22/2024), Disp: 90 tablet, Rfl: 3    memantine (NAMENDA) 10 mg tablet, Take 1 tablet (10 mg total) by mouth 2 (two) times a day, Disp: 180 tablet, Rfl: 3    mirtazapine (REMERON) 7.5 MG tablet, Take 1 tablet (7.5 mg total) by mouth daily at bedtime, Disp: 30 tablet, Rfl: 0    pyridoxine (VITAMIN B6) 100 mg tablet, Take 1 tablet (100 mg total) by mouth daily (Patient not taking: Reported on 4/7/2024), Disp: 90 tablet, Rfl: 0    QUEtiapine (SEROquel) 25 mg tablet, Take 25 mg by mouth daily at bedtime, Disp: , Rfl:   No current facility-administered medications for this visit.    Vitals:  Vitals:    03/22/24 1507   BP: 122/78   Pulse: 57   Temp: 97.5 °F (36.4 °C)   SpO2: 98%       History:  Past Medical History:   Diagnosis Date    Anxiety     Gastroesophageal reflux disease without esophagitis 08/28/2018    Hyperlipidemia     Hypertension     Hypothyroidism     Mild dementia with agitation (HCC) 08/24/2020    Osteoarthritis 10/05/2012    Other osteoporosis without current pathological fracture 02/09/2023     Past Surgical History:   Procedure Laterality Date    CATARACT EXTRACTION      CHOLECYSTECTOMY      COLONOSCOPY      HYSTERECTOMY       Family History   Problem Relation Age of Onset    No Known Problems Mother     No Known Problems Father      Social History     Socioeconomic History    Marital status: /Civil Union     Spouse name: Not on file    Number of children: Not on file    Years of education: Not on file    Highest education level: Not on file   Occupational History    Occupation: retired   Tobacco Use    Smoking status: Never    Smokeless tobacco: Never   Vaping Use    Vaping status: Never Used   Substance and Sexual Activity    Alcohol use: No    Drug use: No    Sexual activity: Not Currently   Other Topics Concern    Not on file   Social History Narrative    Not on file     Social Determinants of Health     Financial Resource Strain: Low Risk   (8/9/2023)    Overall Financial Resource Strain (CARDIA)     Difficulty of Paying Living Expenses: Not hard at all   Food Insecurity: No Food Insecurity (4/14/2021)    Hunger Vital Sign     Worried About Running Out of Food in the Last Year: Never true     Ran Out of Food in the Last Year: Never true   Transportation Needs: No Transportation Needs (8/9/2023)    PRAPARE - Transportation     Lack of Transportation (Medical): No     Lack of Transportation (Non-Medical): No   Physical Activity: Insufficiently Active (7/7/2020)    Exercise Vital Sign     Days of Exercise per Week: 2 days     Minutes of Exercise per Session: 10 min   Stress: No Stress Concern Present (7/7/2020)    [employer] - Occupational Stress Questionnaire     Feeling of Stress : Not at all   Social Connections: Unknown (7/7/2020)    Social Connection and Isolation Panel [NHANES]     Frequency of Communication with Friends and Family: Patient declined     Frequency of Social Gatherings with Friends and Family: Patient declined     Attends [employer] Services: Patient declined     Active Member of Clubs or Organizations: Patient declined     Attends Club or Organization Meetings: Patient declined     Marital Status: Patient declined   Intimate Partner Violence: Not At Risk (7/7/2020)    Humiliation, Afraid, Rape, and Kick questionnaire     Fear of Current or Ex-Partner: No     Emotionally Abused: No     Physically Abused: No     Sexually Abused: No   Housing Stability: Not on file     Past Surgical History:   Procedure Laterality Date    CATARACT EXTRACTION      CHOLECYSTECTOMY      COLONOSCOPY      HYSTERECTOMY           Physical Exam:   Physical Exam  Vitals and nursing note reviewed.   Constitutional:       General: She is not in acute distress.     Appearance: Normal appearance. She is well-developed. She is not diaphoretic.   HENT:      Head: Normocephalic.   Cardiovascular:      Rate and Rhythm: Normal rate.       Heart sounds: No murmur heard.     No friction rub. No gallop.   Pulmonary:      Effort: Pulmonary effort is normal. No respiratory distress.      Breath sounds: Normal breath sounds. No wheezing or rales.   Abdominal:      General: Bowel sounds are normal. There is no distension.      Palpations: Abdomen is soft.      Tenderness: There is no abdominal tenderness. There is no rebound.   Musculoskeletal:         General: Normal range of motion.   Skin:     General: Skin is warm and dry.   Neurological:      General: No focal deficit present.      Mental Status: She is alert. Mental status is at baseline.   Psychiatric:         Mood and Affect: Mood normal.         Behavior: Behavior normal.

## 2024-03-22 NOTE — PROGRESS NOTES
[facility]  [address], [address]  [place] [address]  [phone]    Social Work Follow-Up    LSW met with Dangelo Gunter for follow up visit. Completed [place] Cognitive Assessment, score 6/30 (previously 14/30 in 12/21/23).    LSW also met with patient's daughter. Daughter stated that patient &  just moved into [facility] where patient has  with her 24/7 as well as daughter there assisting her during the day. Daughter stated they also have [facility] starting soon. LSW stated that patient may benefit from higher level of care if patient's care needs increase or that they may be able to get an aide in to assist. Daughter considered an aide to help with getting patient to meals, and daughter hopes to get her to attend activities provided at the facility. LSW offered caregiver support group for daughter/, patient's daughter stated that patient's  has attended some in person before.    [place] Cognitive Assessment (MoCA) Version 8.2  Education: 12th grade    Points Earned POSSIBLE Points   Visuospatial/Executive   Alternating [place] Making 0 1   Visuoconstructional skills 0 1   Visuoconstructional skills (clock) 0 3   Naming   Naming Animals 2 3   Attention   Digit Span 1 2   Vigilance (letters) 0 1   Serial 7 subtraction 0 3   Language   Sentence Repetition 1 2   Verbal fluency 0 1   Abstraction   Abstraction (word pairings) 0 2   Delayed recall   Delayed recall 0 5   Memory index score: -/15   Orientation   Orientation 1 6   TOTAL SCORE: 6/30  (Normal ?26/30)   Additional notes:      LSW to remain available as needed.

## 2024-03-22 NOTE — PATIENT INSTRUCTIONS
Start buspar 5mg twice a day - monitor for gi upset, confusion, sleepiness.  Start to decrease donepezil - 5mg (1/2 tab) for next 4 weeks.  Two tylenol twice a day - might have pain that she's not verbalizing.

## 2024-03-22 NOTE — TELEPHONE ENCOUNTER
I didn't see Reece Gunter () on as emergency contact or anywhere on communication consent. He called to update her mailing address to [address] [facility] [address]. Can we check with daughter Aida to make sure this is correct? And we should get Reece and Aida together on a new communication consent.

## 2024-03-25 DIAGNOSIS — F03.918 DEMENTIA WITH BEHAVIORAL DISTURBANCE (HCC): ICD-10-CM

## 2024-03-25 RX ORDER — MEMANTINE HYDROCHLORIDE 10 MG/1
10 TABLET ORAL 2 TIMES DAILY
Qty: 180 TABLET | Refills: 3 | Status: SHIPPED | OUTPATIENT
Start: 2024-03-25

## 2024-03-25 NOTE — TELEPHONE ENCOUNTER
Said no refills on bottle  Reason for call:   [x] Refill   [] Prior Auth  [] Other:     Office:   [x] PCP/Provider -   [] Specialty/Provider -     Medication: Namenda    Dose/Frequency: 10 mg     Quantity: #180    Pharmacy: [facility]    Does the patient have enough for 3 days?   [] Yes   [x] No - Send as HP to POD

## 2024-03-26 ENCOUNTER — TELEPHONE (OUTPATIENT)
Age: 84
End: 2024-03-26

## 2024-03-26 NOTE — TELEPHONE ENCOUNTER
PA for memantine (NAMENDA) 10 mg tablet     Submitted via    []CMM-KEY   [x]Predikt-Case ID # [id number]   []Faxed to plan   []Other website   []Phone call Case ID #     Office notes sent, clinical questions answered. Awaiting determination    Turnaround time for your insurance to make a decision on your Prior Authorization can take 7-21 business days.

## 2024-03-27 NOTE — TELEPHONE ENCOUNTER
PA for memantine (NAMENDA) 10 mg tablet  not required     Reason- (screenshot if applicable)              Message sent to provider pool No

## 2024-03-28 ENCOUNTER — TELEPHONE (OUTPATIENT)
Age: 84
End: 2024-03-28

## 2024-03-28 DIAGNOSIS — G47.00 INSOMNIA, UNSPECIFIED TYPE: Primary | ICD-10-CM

## 2024-03-28 RX ORDER — MIRTAZAPINE 7.5 MG/1
7.5 TABLET, FILM COATED ORAL
Qty: 30 TABLET | Refills: 0 | Status: SHIPPED | OUTPATIENT
Start: 2024-03-28

## 2024-03-28 NOTE — TELEPHONE ENCOUNTER
"Patient's daughter, Aida Alex ([phone]) called to say patient was recently started on Buspirone 5 mg; however patient is not getting \"fitful sleep\".    Daughter would like to know if something could be prescribed for sleep to help relax the patient's mind?    Advised provider out of the office until Monday, 4/1/24.  Daughter would like covering provider to address as she feels it would be along weekend for the patient without medication to address sleep.  "
Called Pt's Dgt, Aida, and relayed Dr. Boogie's note also verified Pharmacy for Aida ([facility]). Stated it was sent in for a fill of 30, to give us a call back if there are any questions or concerns, and Aida expressed understanding.  
Please call.  Rx for remeron 7.5 mg nightly is sent.   Please try this for her sleep.   
normal

## 2024-04-03 ENCOUNTER — OFFICE VISIT (OUTPATIENT)
Dept: FAMILY MEDICINE CLINIC | Facility: CLINIC | Age: 84
End: 2024-04-03
Payer: COMMERCIAL

## 2024-04-03 VITALS
HEIGHT: 66 IN | OXYGEN SATURATION: 95 % | TEMPERATURE: 98.2 F | HEART RATE: 65 BPM | RESPIRATION RATE: 14 BRPM | DIASTOLIC BLOOD PRESSURE: 74 MMHG | BODY MASS INDEX: 24.11 KG/M2 | WEIGHT: 150 LBS | SYSTOLIC BLOOD PRESSURE: 114 MMHG

## 2024-04-03 DIAGNOSIS — I87.2 VENOUS INSUFFICIENCY OF LEFT LOWER EXTREMITY: ICD-10-CM

## 2024-04-03 DIAGNOSIS — G89.29 CHRONIC PAIN OF RIGHT KNEE: Primary | ICD-10-CM

## 2024-04-03 DIAGNOSIS — M25.561 CHRONIC PAIN OF RIGHT KNEE: Primary | ICD-10-CM

## 2024-04-03 DIAGNOSIS — F03.918 DEMENTIA WITH BEHAVIORAL DISTURBANCE (HCC): ICD-10-CM

## 2024-04-03 PROCEDURE — G2211 COMPLEX E/M VISIT ADD ON: HCPCS | Performed by: INTERNAL MEDICINE

## 2024-04-03 PROCEDURE — 99214 OFFICE O/P EST MOD 30 MIN: CPT | Performed by: INTERNAL MEDICINE

## 2024-04-03 RX ORDER — MULTIVITAMIN WITH IRON
100 TABLET ORAL DAILY
Qty: 90 TABLET | Refills: 0 | Status: SHIPPED | OUTPATIENT
Start: 2024-04-03

## 2024-04-03 NOTE — PROGRESS NOTES
Name: Dangelo Gunter      : 1940      MRN: [id number]  Encounter Provider: Parag Meyers MD  Encounter Date: 4/3/2024   Encounter department: [facility] CARE [facility]    Assessment & Plan     1. Chronic pain of right knee  Comments:  moist Heat  Home P.T.  Orthopedic Consult ASAP  Orders:  -     XR knee 3 vw right non injury; Future; Expected date: 2024  -     Ambulatory Referral to Orthopedic Surgery; Future    2. Dementia with behavioral disturbance (HCC)  Comments:  stable  continue same  FU w Geriatric  FU w neurology  Orders:  -     Comprehensive metabolic panel; Future; Expected date: 10/03/2024  -     pyridoxine (VITAMIN B6) 100 mg tablet; Take 1 tablet (100 mg total) by mouth daily  -     TSH, 3rd generation; Future; Expected date: 2024  -     T4, free; Future; Expected date: 2024  -     Sedimentation rate, automated; Future  -     C-reactive protein; Future  -     Lipid panel; Future; Expected date: 04/10/2024  -     Ambulatory Referral to Orthopedic Surgery; Future    3. Venous insufficiency of left lower extremity  -     Comprehensive metabolic panel; Future; Expected date: 10/03/2024  -     pyridoxine (VITAMIN B6) 100 mg tablet; Take 1 tablet (100 mg total) by mouth daily  -     TSH, 3rd generation; Future; Expected date: 2024  -     T4, free; Future; Expected date: 2024  -     Sedimentation rate, automated; Future  -     C-reactive protein; Future  -     Lipid panel; Future; Expected date: 04/10/2024  -     Jobst Stockings  -     XR knee 3 vw right non injury; Future; Expected date: 2024  -     Ambulatory Referral to Orthopedic Surgery; Future    RTC in 3 mos w  Blood work       Subjective      83 Y O Lady is here for Regular check Up, she is here with Home health Aid, recent blood work and med list reviewed w  Pt,and Daughter, ...      Review of Systems   Constitutional:  Negative for chills, fatigue and fever.   HENT:  Negative  for congestion, facial swelling, sore throat, trouble swallowing and voice change.    Eyes:  Negative for pain, discharge and visual disturbance.   Respiratory:  Negative for cough, shortness of breath and wheezing.    Cardiovascular:  Negative for chest pain, palpitations and leg swelling.   Gastrointestinal:  Negative for abdominal pain, blood in stool, constipation, diarrhea and nausea.   Endocrine: Negative for polydipsia, polyphagia and polyuria.   Genitourinary:  Negative for difficulty urinating, hematuria and urgency.   Musculoskeletal:  Negative for arthralgias and myalgias.   Skin:  Negative for rash.   Neurological:  Negative for dizziness, tremors, weakness and headaches.   Hematological:  Negative for adenopathy. Does not bruise/bleed easily.   Psychiatric/Behavioral:  Negative for dysphoric mood, sleep disturbance and suicidal ideas.        Current Outpatient Medications on File Prior to Visit   Medication Sig    amLODIPine (NORVASC) 2.5 mg tablet Take 1 tablet (2.5 mg total) by mouth daily    azilsartan medoxomil (Edarbi) 40 MG tablet Take 1 tablet (40 mg total) by mouth daily    busPIRone (BUSPAR) 5 mg tablet Take 1 tablet (5 mg total) by mouth 2 (two) times a day    Calcium Carbonate-Vit D-Min (Caltrate 600+D Plus Minerals) 600-800 MG-UNIT TABS Take 1 tablet by mouth 2 (two) times a day with meals    Cholecalciferol (Vitamin D3) 20 MCG (800 UNIT) TABS Take 20 mcg by mouth 2 (two) times a day    donepezil (ARICEPT) 10 mg tablet Take 1 tablet (10 mg total) by mouth daily In the Morning with Breakfast    ergocalciferol (VITAMIN D2) 50,000 units Take 1 capsule (50,000 Units total) by mouth once a week    escitalopram (LEXAPRO) 10 mg tablet Take 1 tablet (10 mg total) by mouth daily    Melatonin 10 MG TABS Take 10 mg by mouth daily    memantine (NAMENDA) 10 mg tablet Take 1 tablet (10 mg total) by mouth 2 (two) times a day    mirtazapine (REMERON) 7.5 MG tablet Take 1 tablet (7.5 mg total) by mouth daily  "at bedtime    nitrofurantoin (MACROBID) 100 mg capsule Take 1 capsule (100 mg total) by mouth daily with lunch    pantoprazole (PROTONIX) 40 mg tablet Take 1 tablet (40 mg total) by mouth daily    propranolol (INDERAL LA) 60 mg 24 hr capsule Take 1 capsule (60 mg total) by mouth daily Please stop Norvasc...    rosuvastatin (CRESTOR) 10 MG tablet TAKE ONE TABLET (10 MG TOTAL) BY MOUTH DAILY    acetaminophen (TYLENOL) 650 mg CR tablet Take 1 tablet (650 mg total) by mouth every 8 (eight) hours as needed for mild pain (Patient not taking: Reported on 3/22/2024)    denosumab (PROLIA) 60 mg/mL Inject 1 mL (60 mg total) under the skin once for 1 dose    L-Methylfolate-W31-[id number]-F5 (Cerefolin) 6-1-50-5 MG TABS Take 1 tablet by mouth in the morning (Patient not taking: Reported on 3/22/2024)       Objective     /74 (BP Location: Left arm, Patient Position: Sitting, Cuff Size: Standard)   Pulse 65   Temp 98.2 °F (36.8 °C) (Tympanic)   Resp 14   Ht 5' 6\" (1.676 m)   Wt 68 kg (150 lb)   SpO2 95%   BMI 24.21 kg/m²     Physical Exam  Constitutional:       General: She is not in acute distress.  HENT:      Head: Normocephalic.      Mouth/Throat:      Pharynx: No oropharyngeal exudate.   Eyes:      General: No scleral icterus.     Conjunctiva/sclera: Conjunctivae normal.      Pupils: Pupils are equal, round, and reactive to light.   Neck:      Thyroid: No thyromegaly.   Cardiovascular:      Rate and Rhythm: Normal rate and regular rhythm.      Heart sounds: Normal heart sounds. No murmur heard.  Pulmonary:      Effort: Pulmonary effort is normal. No respiratory distress.      Breath sounds: Normal breath sounds. No wheezing or rales.   Abdominal:      General: Bowel sounds are normal. There is no distension.      Palpations: Abdomen is soft.      Tenderness: There is no abdominal tenderness. There is no guarding or rebound.   Musculoskeletal:         General: Swelling and tenderness present.      Cervical back: Neck " supple.      Comments: Knee,right   Lymphadenopathy:      Cervical: No cervical adenopathy.   Skin:     Coloration: Skin is not pale.      Findings: No rash.   Neurological:      Mental Status: She is alert and oriented to person, place, and time.      Sensory: No sensory deficit.      Motor: No weakness.       Parag Meyers MD

## 2024-04-03 NOTE — PATIENT INSTRUCTIONS

## 2024-04-07 ENCOUNTER — HOSPITAL ENCOUNTER (EMERGENCY)
Facility: HOSPITAL | Age: 84
Discharge: HOME | End: 2024-04-08
Attending: EMERGENCY MEDICINE
Payer: COMMERCIAL

## 2024-04-07 DIAGNOSIS — F03.90 DEMENTIA (HCC): Primary | ICD-10-CM

## 2024-04-07 RX ORDER — MIRTAZAPINE 7.5 MG/1
7.5 TABLET, FILM COATED ORAL
Status: DISCONTINUED | OUTPATIENT
Start: 2024-04-07 | End: 2024-04-08 | Stop reason: HOSPADM

## 2024-04-07 RX ORDER — LANOLIN ALCOHOL/MO/W.PET/CERES
9 CREAM (GRAM) TOPICAL
Status: DISCONTINUED | OUTPATIENT
Start: 2024-04-07 | End: 2024-04-08 | Stop reason: HOSPADM

## 2024-04-07 RX ORDER — BUSPIRONE HYDROCHLORIDE 5 MG/1
5 TABLET ORAL 2 TIMES DAILY
Status: DISCONTINUED | OUTPATIENT
Start: 2024-04-08 | End: 2024-04-08 | Stop reason: HOSPADM

## 2024-04-07 RX ORDER — ESCITALOPRAM OXALATE 10 MG/1
10 TABLET ORAL DAILY
Status: DISCONTINUED | OUTPATIENT
Start: 2024-04-08 | End: 2024-04-08 | Stop reason: HOSPADM

## 2024-04-07 RX ORDER — QUETIAPINE FUMARATE 25 MG/1
25 TABLET, FILM COATED ORAL
Status: DISCONTINUED | OUTPATIENT
Start: 2024-04-07 | End: 2024-04-08 | Stop reason: HOSPADM

## 2024-04-07 RX ORDER — PANTOPRAZOLE SODIUM 40 MG/1
40 TABLET, DELAYED RELEASE ORAL DAILY
Status: DISCONTINUED | OUTPATIENT
Start: 2024-04-08 | End: 2024-04-08 | Stop reason: HOSPADM

## 2024-04-07 RX ORDER — PROPRANOLOL HCL 60 MG
60 CAPSULE, EXTENDED RELEASE 24HR ORAL DAILY
Status: DISCONTINUED | OUTPATIENT
Start: 2024-04-08 | End: 2024-04-08 | Stop reason: HOSPADM

## 2024-04-07 RX ORDER — DONEPEZIL HYDROCHLORIDE 5 MG/1
10 TABLET, FILM COATED ORAL DAILY
Status: DISCONTINUED | OUTPATIENT
Start: 2024-04-08 | End: 2024-04-08 | Stop reason: HOSPADM

## 2024-04-07 RX ORDER — QUETIAPINE FUMARATE 25 MG/1
25 TABLET, FILM COATED ORAL
COMMUNITY
End: 2024-04-11 | Stop reason: ALTCHOICE

## 2024-04-07 RX ORDER — MEMANTINE HYDROCHLORIDE 5 MG/1
10 TABLET ORAL 2 TIMES DAILY
Status: DISCONTINUED | OUTPATIENT
Start: 2024-04-08 | End: 2024-04-08 | Stop reason: HOSPADM

## 2024-04-07 RX ORDER — AMLODIPINE BESYLATE 2.5 MG/1
2.5 TABLET ORAL DAILY
Status: DISCONTINUED | OUTPATIENT
Start: 2024-04-08 | End: 2024-04-08 | Stop reason: HOSPADM

## 2024-04-07 RX ADMIN — QUETIAPINE FUMARATE 25 MG: 25 TABLET ORAL at 23:03

## 2024-04-07 RX ADMIN — Medication 9 MG: at 23:03

## 2024-04-07 RX ADMIN — MIRTAZAPINE 7.5 MG: 7.5 TABLET, FILM COATED ORAL at 23:03

## 2024-04-08 VITALS
TEMPERATURE: 98 F | DIASTOLIC BLOOD PRESSURE: 74 MMHG | SYSTOLIC BLOOD PRESSURE: 156 MMHG | OXYGEN SATURATION: 94 % | HEART RATE: 60 BPM | RESPIRATION RATE: 18 BRPM

## 2024-04-08 RX ADMIN — MEMANTINE 10 MG: 5 TABLET ORAL at 09:07

## 2024-04-08 RX ADMIN — AMLODIPINE BESYLATE 2.5 MG: 2.5 TABLET ORAL at 09:06

## 2024-04-08 RX ADMIN — PANTOPRAZOLE SODIUM 40 MG: 40 TABLET, DELAYED RELEASE ORAL at 09:06

## 2024-04-08 RX ADMIN — BUSPIRONE HYDROCHLORIDE 5 MG: 5 TABLET ORAL at 09:06

## 2024-04-08 RX ADMIN — ESCITALOPRAM OXALATE 10 MG: 10 TABLET ORAL at 09:06

## 2024-04-08 RX ADMIN — DONEPEZIL HYDROCHLORIDE 10 MG: 5 TABLET ORAL at 09:10

## 2024-04-08 RX ADMIN — PROPRANOLOL HYDROCHLORIDE 60 MG: 60 CAPSULE, EXTENDED RELEASE ORAL at 09:06

## 2024-04-08 NOTE — ASSESSMENT & PLAN NOTE
Having increased anxiety at home since knee injury.  [place] a lot of commotion recently  Would add tylenol twice a day for few weeks to see if agitation could be related to pain that pt is unable to verbalize  Currently taking lexapro.  Seroquel stopped 2/23   would like to add buspar - added  Cont emotional support, relaxation techniques  May need  referral  If any agitation that is not redirectable, or that is safety issue for pt or other, please proceed to ED

## 2024-04-08 NOTE — ED NOTES
Pts  called asking for an update, pt updated him on the plan of care.      Melody Sawyer RN  04/07/24 [id number]

## 2024-04-08 NOTE — ASSESSMENT & PLAN NOTE
MOCA:  6/30.  Deficits in:  all domains.  Increased confusions since hurt knee.  Recently moved.    Pt is assist adl/dependent iadls.  Lives with , dgt assisting  Pt's current cognitive level is consistent with moderate dementia  Memory medications: aricept/namenda  As pt is having inc agitation and progressing dementia, would recommend start titrating off aricept to see if mood improves  Mobility:  use of wlker, PT starting  Continue to stay active.  Current activity level:  fair.  Participates in social, cognitive, physical activity.  Monitor for changes in mood, sleep, pain control.  Notify provider if any concerns  Medication review:  seems appropriate for current conditions  Check with pharmacist/provider before starting any new OTC/prescription medications for potential cognitive side effects  Optimize all acute and chronic conditions.  Continue to follow-up with PCP and specialist as directed for management  Safety concerns:  agitations, falls  Pt requiring 24/7 cares between  and dgt.  May need HHA to help with adl assistance vs inc cares.    Caregiver stress:   also older,  taking care of wife - dgt assisting  Ensure adequate hydration, good nutrition  Recommended next follow up:  in 3 months to see how pt is doing in new apartment, reassess meds

## 2024-04-08 NOTE — ED NOTES
RN spoke to pt's daughter Aida who reports she will pick the patient up around 10am today. She is looking to set up a home health attendant for the patient prior to her coming home d/t increased confusion at home.      Melody Sawyer RN  04/08/24 0636
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
## 2024-04-09 DIAGNOSIS — F32.A DEPRESSION, UNSPECIFIED DEPRESSION TYPE: ICD-10-CM

## 2024-04-09 RX ORDER — ESCITALOPRAM OXALATE 10 MG/1
10 TABLET ORAL DAILY
Qty: 90 TABLET | Refills: 1 | Status: SHIPPED | OUTPATIENT
Start: 2024-04-09

## 2024-04-09 NOTE — TELEPHONE ENCOUNTER
Reason for call:   [x] Refill   [] Prior Auth  [] Other:     Office: [facility]   [x] PCP/Provider - nilsa   [] Specialty/Provider -     Medication: escitalopram      Dose/Frequency: 10 mg/ daily     Quantity: 90 day supply     Pharmacy: [facility]     Does the patient have enough for 3 days?   [x] Yes   [] No - Send as HP to POD

## 2024-04-10 ENCOUNTER — TELEPHONE (OUTPATIENT)
Age: 84
End: 2024-04-10

## 2024-04-10 NOTE — TELEPHONE ENCOUNTER
Received a call from the patient's daughter Aida just touching base about the Buspar that was started.  She stated that there have been no significant changes and she would like to know if there is possibly a different medication that can be tried, or maybe a higher dose? The sundowning has gotten worse and is now starting around 3pm every day.     The mirtazapine is helping the patient sleep better, however now when the patient becomes agitated, it seems to not have as much of an effect. She would just like something to make her a bit more comfortable and maybe less agitated.     Can you please review.    Thank you

## 2024-04-11 ENCOUNTER — TELEMEDICINE (OUTPATIENT)
Age: 84
End: 2024-04-11

## 2024-04-11 DIAGNOSIS — G47.09 OTHER INSOMNIA: ICD-10-CM

## 2024-04-11 DIAGNOSIS — M19.90 OSTEOARTHRITIS, UNSPECIFIED OSTEOARTHRITIS TYPE, UNSPECIFIED SITE: ICD-10-CM

## 2024-04-11 DIAGNOSIS — F02.B11 MODERATE LATE ONSET ALZHEIMER'S DEMENTIA WITH AGITATION (HCC): Primary | ICD-10-CM

## 2024-04-11 DIAGNOSIS — G30.1 MODERATE LATE ONSET ALZHEIMER'S DEMENTIA WITH AGITATION (HCC): Primary | ICD-10-CM

## 2024-04-11 DIAGNOSIS — F41.9 ANXIETY DISORDER, UNSPECIFIED TYPE: ICD-10-CM

## 2024-04-11 PROBLEM — F03.911 DEMENTIA WITH AGITATION (HCC): Status: ACTIVE | Noted: 2024-03-22

## 2024-04-11 RX ORDER — DIVALPROEX SODIUM 125 MG/1
125 TABLET, DELAYED RELEASE ORAL
Qty: 30 TABLET | Refills: 0 | Status: SHIPPED | OUTPATIENT
Start: 2024-04-11 | End: 2024-04-12

## 2024-04-11 NOTE — PROGRESS NOTES
Virtual Regular Visit    Verification of patient location:    Patient is located at Home in the following state in which I hold an active license [place]      Assessment/Plan:    Problem List Items Addressed This Visit          Nervous and Auditory    Dementia with agitation (HCC) - Primary     Plan of care reviewed with patient's  and dgt during visit yesterday.  Discussed with Dr. López this am and then called pt's dgt back this am to review changes  Continue lexapro, mirtazapine, namenda  Stop buspar, aricept  (OK to restart buspar if it is felt there was a benefit after it was stopped)  Start depakote 125mg bid at 12pm and 6pm  If pt having agitations, try to reorient, redirect  Look for causative factors such as pain, hunger, cold  Office to call in at end of next week for eval, monitor for dizziness, worsening agitation, lethargy or any other concerning factors and call as needed.  If needed, will slowly titrate up depakote to total 250mg bid, then add gabapentin up to 100mg bid if agitations continue.  Will check LFT's in about a month if depakote is continued  Pt's dgt verbalized understanding.         Relevant Medications    divalproex sodium (Depakote) 125 mg DR tablet    Other Relevant Orders    Ambulatory referral to Psych Services       Musculoskeletal and Integument    Osteoarthritis     Cont bid tylenol for possible knee pain that is contributing to agitations            Behavioral Health    Anxiety disorder     Pt having increased anxiety/agitation in evening.  Becoming combative towards  after dgt leaves.    This prompted ED visit on 4/7  [facility] planned to start tonight from 6-8pm to help pt's   Cont emotional support, relaxation techniques  BH referral placed for med management if needed.            Neurology/Sleep    Other insomnia     For now will stop melatonin, cont mirtazapine  Cont good sleep hygiene techniques                 Reason for visit is   Chief Complaint   Patient  presents with    Virtual Regular Visit    Follow-up    Virtual Regular Visit          Encounter provider [facility]    Provider located at [facility]  [address] [address] [phone]      Recent Visits  Date Type Provider Dept   04/11/24 Telemedicine [facility]   04/10/24 Telephone Kyra Baumann [facility]   Showing recent visits within past 7 days and meeting all other requirements  Future Appointments  No visits were found meeting these conditions.  Showing future appointments within next 150 days and meeting all other requirements       The patient was identified by name and date of birth. Dangelo Gunter was informed that this is a telemedicine visit and that the visit is being conducted through the Epic Embedded platform. She agrees to proceed..  My office door was closed. No one else was in the room.  She acknowledged consent and understanding of privacy and security of the video platform. The patient has agreed to participate and understands they can discontinue the visit at any time.    Patient is aware this is a billable service.     Subjective  Dangelo Gunter is a 83 y.o. female being seen via video with dgt [employer].  Pt's  is also available via phone (had technical difficulties so visit was completed via phone call) .    Patient seen via video with daughter, Aida.  She is in chair, appears well.  She states she enjoys her new home and that everyone there is very nice.  Her daughter offers history as patient is poor historian secondary to dementia.  Per daughter, since she has moved she has increased confusions.  She does not know where she is and she wants to go home.  She was like this prior to the move, but has worsened.  Pt most calm with dgt.  Dgt is there all day, behaviors start around 2pm when she leaves and last til bed time.  Pt's  fells  that buspar is making things worse.  Mirtazapine was started at end of March- initially helped, but last 2 days did nothing for sleep.  Pt was seen in ED on 4/7 d/t behaviors - no changes made at that time.  No cause factors found.  Pt is agitated and can get combative toward .  He would like a better medication to help.  Dgt is agreeable  Pt currently taking lexapro, 1/2 tab aricept, namenda bid, mirtazapine, buspar bid (family tried tid but no help), melatonin.  Seroquel was stopped 2/23, but  doesn't remember why.         Past Medical History:   Diagnosis Date    Anxiety     Gastroesophageal reflux disease without esophagitis 08/28/2018    Hyperlipidemia     Hypertension     Hypothyroidism     Mild dementia with agitation (HCC) 08/24/2020    Osteoarthritis 10/05/2012    Other osteoporosis without current pathological fracture 02/09/2023       Past Surgical History:   Procedure Laterality Date    CATARACT EXTRACTION      CHOLECYSTECTOMY      COLONOSCOPY      HYSTERECTOMY         Current Outpatient Medications   Medication Sig Dispense Refill    amLODIPine (NORVASC) 2.5 mg tablet Take 1 tablet (2.5 mg total) by mouth daily 90 tablet 1    azilsartan medoxomil (Edarbi) 40 MG tablet Take 1 tablet (40 mg total) by mouth daily 90 tablet 3    Cholecalciferol (Vitamin D3) 20 MCG (800 UNIT) TABS Take 20 mcg by mouth 2 (two) times a day      divalproex sodium (Depakote) 125 mg DR tablet Take 1 tablet (125 mg total) by mouth 2 (two) times a day Give at 12pm and 6pm daily 60 tablet 0    escitalopram (LEXAPRO) 10 mg tablet Take 1 tablet (10 mg total) by mouth daily 90 tablet 1    memantine (NAMENDA) 10 mg tablet Take 1 tablet (10 mg total) by mouth 2 (two) times a day 180 tablet 3    mirtazapine (REMERON) 7.5 MG tablet Take 1 tablet (7.5 mg total) by mouth daily at bedtime 30 tablet 0    nitrofurantoin (MACROBID) 100 mg capsule Take 1 capsule (100 mg total) by mouth daily with lunch 90 capsule 5    pantoprazole  (PROTONIX) 40 mg tablet Take 1 tablet (40 mg total) by mouth daily 90 tablet 1    propranolol (INDERAL LA) 60 mg 24 hr capsule Take 1 capsule (60 mg total) by mouth daily Please stop Norvasc... 90 capsule 3    rosuvastatin (CRESTOR) 10 MG tablet TAKE ONE TABLET (10 MG TOTAL) BY MOUTH DAILY 90 tablet 3    Calcium Carbonate-Vit D-Min (Caltrate 600+D Plus Minerals) 600-800 MG-UNIT TABS Take 1 tablet by mouth 2 (two) times a day with meals (Patient not taking: Reported on 4/7/2024) 200 tablet 6    denosumab (PROLIA) 60 mg/mL Inject 1 mL (60 mg total) under the skin once for 1 dose (Patient not taking: Reported on 4/11/2024) 1 mL 1    ergocalciferol (VITAMIN D2) 50,000 units Take 1 capsule (50,000 Units total) by mouth once a week (Patient not taking: Reported on 4/7/2024) 12 capsule 1    L-Methylfolate-B73-E5-Z1 (Cerefolin) 6-1-50-5 MG TABS Take 1 tablet by mouth in the morning (Patient not taking: Reported on 3/22/2024) 90 tablet 3    pyridoxine (VITAMIN B6) 100 mg tablet Take 1 tablet (100 mg total) by mouth daily (Patient not taking: Reported on 4/7/2024) 90 tablet 0     No current facility-administered medications for this visit.        Allergies   Allergen Reactions    No Known Allergies        Review of Systems   Unable to perform ROS: Dementia       Video Exam    There were no vitals filed for this visit.    Physical Exam  Vitals and nursing note reviewed.   Constitutional:       General: She is not in acute distress.     Appearance: Normal appearance. She is not diaphoretic.   HENT:      Head: Normocephalic.   Pulmonary:      Effort: Pulmonary effort is normal. No respiratory distress.      Breath sounds: No wheezing.   Musculoskeletal:         General: Normal range of motion.   Skin:     General: Skin is dry.   Neurological:      General: No focal deficit present.      Mental Status: She is alert. Mental status is at baseline.   Psychiatric:         Mood and Affect: Mood normal.         Behavior: Behavior  normal.

## 2024-04-11 NOTE — PATIENT INSTRUCTIONS
Medication changes:  Stop donepezil  Stop melatonin  Stop buspirone, (but ok to restart at once or twice a day if it seems to have helped once you stop it).    Continue namenda (memantine) twice a day  Continue lexapro (escitalopram)  Continue mirtazapine at night (for now)  Continue tylenol twice a day    Will start depakote 125mg bid @ 12pm and 6pm (**changed from once a day at 2pm**)  Monitor for increased agitation, confusion, tiredness, balance issues.  May need to adjust times and dose of this medication if it seems to help.    Office will call around lunch time next week to see how things are going.  Please call earlier if concerns.

## 2024-04-12 ENCOUNTER — TELEPHONE (OUTPATIENT)
Dept: PSYCHIATRY | Facility: CLINIC | Age: 84
End: 2024-04-12

## 2024-04-12 RX ORDER — DIVALPROEX SODIUM 125 MG/1
125 TABLET, DELAYED RELEASE ORAL 2 TIMES DAILY
Qty: 60 TABLET | Refills: 0 | Status: SHIPPED | OUTPATIENT
Start: 2024-04-12

## 2024-04-12 NOTE — ASSESSMENT & PLAN NOTE
Pt having increased anxiety/agitation in evening.  Becoming combative towards  after dgt leaves.    This prompted ED visit on 4/7  [facility] planned to start tonight from 6-8pm to help pt's   Cont emotional support, relaxation techniques  BH referral placed for med management if needed.

## 2024-04-12 NOTE — ASSESSMENT & PLAN NOTE
Plan of care reviewed with patient's  and dgt during visit yesterday.  Discussed with Dr. López this am and then called pt's dgt back this am to review changes  Continue lexapro, mirtazapine, namenda  Stop buspar, aricept  (OK to restart buspar if it is felt there was a benefit after it was stopped)  Start depakote 125mg bid at 12pm and 6pm  If pt having agitations, try to reorient, redirect  Look for causative factors such as pain, hunger, cold  Office to call in at end of next week for eval, monitor for dizziness, worsening agitation, lethargy or any other concerning factors and call as needed.  If needed, will slowly titrate up depakote to total 250mg bid, then add gabapentin up to 100mg bid if agitations continue.  Will check LFT's in about a month if depakote is continued  Pt's dgt verbalized understanding.

## 2024-04-12 NOTE — TELEPHONE ENCOUNTER
Patient has been added to the Medication Management wait list with a referral.    Insurance: [employer]  Insurance Type:    Commercial []   Medicaid []   [facility] (if applicable)   Medicare [x]  Location Preference: [place]  Provider Preference: none  Virtual: Yes [] No [x]  Were outside resources sent: Yes [] No [x]

## 2024-04-18 ENCOUNTER — TELEPHONE (OUTPATIENT)
Age: 84
End: 2024-04-18

## 2024-04-18 DIAGNOSIS — G47.00 INSOMNIA, UNSPECIFIED TYPE: ICD-10-CM

## 2024-04-18 RX ORDER — MIRTAZAPINE 7.5 MG/1
7.5 TABLET, FILM COATED ORAL
Qty: 30 TABLET | Refills: 5 | Status: SHIPPED | OUTPATIENT
Start: 2024-04-18

## 2024-04-18 NOTE — TELEPHONE ENCOUNTER
"Called the patient's daughter Aida for medication check regarding the Depakote. The following questions were asked and they have been answered as \"NO\"      Any G.I. upset?(Nausea or diarrhea)  Any bad dreams or nightmares?  Any dizziness or unsteadiness when standing/walking?  Any headaches?      The only concern that they do have is that the past 2/3 nights, it has been a lot harder for them to get the patient to sleep. She normally falls asleep relatively quickly and it has been taking them about 2 hours to get her to sleep. She just \"sits there\".      They have also requested a refill of the Mirtazapine. They only have enough until the end of next week.   "

## 2024-04-24 ENCOUNTER — TELEPHONE (OUTPATIENT)
Age: 84
End: 2024-04-24

## 2024-04-24 NOTE — TELEPHONE ENCOUNTER
Received call from Simran at [facility] asking about paperwork that was faxed over for Dr. Meyers to complete.    They did receive the paperwork but Dr. Meyers did not sign the individual medication list. Per Health Department guidelines, medication list needs a MD signature on each page.  Please have Dr Meyers complete this and fax back to: [phone].

## 2024-05-17 ENCOUNTER — APPOINTMENT (EMERGENCY)
Dept: RADIOLOGY | Facility: HOSPITAL | Age: 84
DRG: 956 | End: 2024-05-17
Payer: COMMERCIAL

## 2024-05-17 ENCOUNTER — HOSPITAL ENCOUNTER (INPATIENT)
Facility: HOSPITAL | Age: 84
LOS: 10 days | Discharge: NURSING, REHAB OR LONG-TERM CARE | DRG: 956 | End: 2024-05-28
Attending: EMERGENCY MEDICINE | Admitting: INTERNAL MEDICINE
Payer: COMMERCIAL

## 2024-05-17 DIAGNOSIS — S72.002A CLOSED FRACTURE OF LEFT HIP, INITIAL ENCOUNTER (HCC): Primary | ICD-10-CM

## 2024-05-17 DIAGNOSIS — J18.9 MULTIFOCAL PNEUMONIA: ICD-10-CM

## 2024-05-17 LAB
ABO GROUP BLD: NORMAL
ALBUMIN SERPL BCP-MCNC: 3.5 G/DL (ref 3.5–5)
ALP SERPL-CCNC: 39 U/L (ref 34–104)
ALT SERPL W P-5'-P-CCNC: 103 U/L (ref 7–52)
ANION GAP SERPL CALCULATED.3IONS-SCNC: 9 MMOL/L (ref 4–13)
APTT PPP: 30 SECONDS (ref 23–37)
AST SERPL W P-5'-P-CCNC: 74 U/L (ref 13–39)
BASOPHILS # BLD AUTO: 0.05 THOUSANDS/ÂΜL (ref 0–0.1)
BASOPHILS NFR BLD AUTO: 1 % (ref 0–1)
BILIRUB SERPL-MCNC: 0.37 MG/DL (ref 0.2–1)
BLD GP AB SCN SERPL QL: NEGATIVE
BUN SERPL-MCNC: 29 MG/DL (ref 5–25)
CALCIUM SERPL-MCNC: 9.5 MG/DL (ref 8.4–10.2)
CHLORIDE SERPL-SCNC: 105 MMOL/L (ref 96–108)
CO2 SERPL-SCNC: 27 MMOL/L (ref 21–32)
CREAT SERPL-MCNC: 1.39 MG/DL (ref 0.6–1.3)
EOSINOPHIL # BLD AUTO: 0.22 THOUSAND/ÂΜL (ref 0–0.61)
EOSINOPHIL NFR BLD AUTO: 3 % (ref 0–6)
ERYTHROCYTE [DISTWIDTH] IN BLOOD BY AUTOMATED COUNT: 15.3 % (ref 11.6–15.1)
GFR SERPL CREATININE-BSD FRML MDRD: 34 ML/MIN/1.73SQ M
GLUCOSE SERPL-MCNC: 97 MG/DL (ref 65–140)
HCT VFR BLD AUTO: 33.7 % (ref 34.8–46.1)
HGB BLD-MCNC: 11 G/DL (ref 11.5–15.4)
IMM GRANULOCYTES # BLD AUTO: 0.04 THOUSAND/UL (ref 0–0.2)
IMM GRANULOCYTES NFR BLD AUTO: 1 % (ref 0–2)
INR PPP: 1.06 (ref 0.84–1.19)
LYMPHOCYTES # BLD AUTO: 1.51 THOUSANDS/ÂΜL (ref 0.6–4.47)
LYMPHOCYTES NFR BLD AUTO: 20 % (ref 14–44)
MCH RBC QN AUTO: 30.6 PG (ref 26.8–34.3)
MCHC RBC AUTO-ENTMCNC: 32.6 G/DL (ref 31.4–37.4)
MCV RBC AUTO: 94 FL (ref 82–98)
MONOCYTES # BLD AUTO: 0.72 THOUSAND/ÂΜL (ref 0.17–1.22)
MONOCYTES NFR BLD AUTO: 10 % (ref 4–12)
NEUTROPHILS # BLD AUTO: 4.9 THOUSANDS/ÂΜL (ref 1.85–7.62)
NEUTS SEG NFR BLD AUTO: 65 % (ref 43–75)
NRBC BLD AUTO-RTO: 0 /100 WBCS
PLATELET # BLD AUTO: 235 THOUSANDS/UL (ref 149–390)
PMV BLD AUTO: 11.5 FL (ref 8.9–12.7)
POTASSIUM SERPL-SCNC: 4 MMOL/L (ref 3.5–5.3)
PROT SERPL-MCNC: 6.5 G/DL (ref 6.4–8.4)
PROTHROMBIN TIME: 14 SECONDS (ref 11.6–14.5)
RBC # BLD AUTO: 3.59 MILLION/UL (ref 3.81–5.12)
RH BLD: POSITIVE
SODIUM SERPL-SCNC: 141 MMOL/L (ref 135–147)
SPECIMEN EXPIRATION DATE: NORMAL
WBC # BLD AUTO: 7.44 THOUSAND/UL (ref 4.31–10.16)

## 2024-05-17 PROCEDURE — 80053 COMPREHEN METABOLIC PANEL: CPT | Performed by: EMERGENCY MEDICINE

## 2024-05-17 PROCEDURE — 99285 EMERGENCY DEPT VISIT HI MDM: CPT

## 2024-05-17 PROCEDURE — 96375 TX/PRO/DX INJ NEW DRUG ADDON: CPT

## 2024-05-17 PROCEDURE — 73502 X-RAY EXAM HIP UNI 2-3 VIEWS: CPT

## 2024-05-17 PROCEDURE — 85730 THROMBOPLASTIN TIME PARTIAL: CPT | Performed by: EMERGENCY MEDICINE

## 2024-05-17 PROCEDURE — 93005 ELECTROCARDIOGRAM TRACING: CPT

## 2024-05-17 PROCEDURE — 86900 BLOOD TYPING SEROLOGIC ABO: CPT | Performed by: EMERGENCY MEDICINE

## 2024-05-17 PROCEDURE — 36415 COLL VENOUS BLD VENIPUNCTURE: CPT | Performed by: EMERGENCY MEDICINE

## 2024-05-17 PROCEDURE — 86901 BLOOD TYPING SEROLOGIC RH(D): CPT | Performed by: EMERGENCY MEDICINE

## 2024-05-17 PROCEDURE — 85025 COMPLETE CBC W/AUTO DIFF WBC: CPT | Performed by: EMERGENCY MEDICINE

## 2024-05-17 PROCEDURE — 99285 EMERGENCY DEPT VISIT HI MDM: CPT | Performed by: EMERGENCY MEDICINE

## 2024-05-17 PROCEDURE — 85610 PROTHROMBIN TIME: CPT | Performed by: EMERGENCY MEDICINE

## 2024-05-17 PROCEDURE — 84145 PROCALCITONIN (PCT): CPT

## 2024-05-17 PROCEDURE — 71045 X-RAY EXAM CHEST 1 VIEW: CPT

## 2024-05-17 PROCEDURE — 86850 RBC ANTIBODY SCREEN: CPT | Performed by: EMERGENCY MEDICINE

## 2024-05-17 RX ORDER — KETOROLAC TROMETHAMINE 30 MG/ML
0.5 INJECTION, SOLUTION INTRAMUSCULAR; INTRAVENOUS ONCE
Status: COMPLETED | OUTPATIENT
Start: 2024-05-17 | End: 2024-05-17

## 2024-05-17 RX ADMIN — MORPHINE SULFATE 2 MG: 2 INJECTION, SOLUTION INTRAMUSCULAR; INTRAVENOUS at 22:48

## 2024-05-18 ENCOUNTER — APPOINTMENT (EMERGENCY)
Dept: CT IMAGING | Facility: HOSPITAL | Age: 84
DRG: 956 | End: 2024-05-18
Payer: COMMERCIAL

## 2024-05-18 PROBLEM — J18.9 MULTIFOCAL PNEUMONIA: Status: ACTIVE | Noted: 2024-05-18

## 2024-05-18 PROBLEM — S72.002A FRACTURE OF FEMORAL NECK, LEFT (HCC): Status: ACTIVE | Noted: 2024-05-18

## 2024-05-18 PROBLEM — J96.01 ACUTE RESPIRATORY FAILURE WITH HYPOXIA (HCC): Status: ACTIVE | Noted: 2024-05-18

## 2024-05-18 LAB
ABO GROUP BLD: NORMAL
ALBUMIN SERPL BCP-MCNC: 3.8 G/DL (ref 3.5–5)
ALP SERPL-CCNC: 46 U/L (ref 34–104)
ALT SERPL W P-5'-P-CCNC: 116 U/L (ref 7–52)
ANION GAP SERPL CALCULATED.3IONS-SCNC: 10 MMOL/L (ref 4–13)
AST SERPL W P-5'-P-CCNC: 83 U/L (ref 13–39)
ATRIAL RATE: 77 BPM
BASOPHILS # BLD AUTO: 0.07 THOUSANDS/ÂΜL (ref 0–0.1)
BASOPHILS NFR BLD AUTO: 1 % (ref 0–1)
BILIRUB SERPL-MCNC: 0.44 MG/DL (ref 0.2–1)
BNP SERPL-MCNC: 69 PG/ML (ref 0–100)
BUN SERPL-MCNC: 25 MG/DL (ref 5–25)
CALCIUM SERPL-MCNC: 9.4 MG/DL (ref 8.4–10.2)
CHLORIDE SERPL-SCNC: 106 MMOL/L (ref 96–108)
CO2 SERPL-SCNC: 26 MMOL/L (ref 21–32)
CREAT SERPL-MCNC: 1.25 MG/DL (ref 0.6–1.3)
EOSINOPHIL # BLD AUTO: 0.15 THOUSAND/ÂΜL (ref 0–0.61)
EOSINOPHIL NFR BLD AUTO: 1 % (ref 0–6)
ERYTHROCYTE [DISTWIDTH] IN BLOOD BY AUTOMATED COUNT: 15.1 % (ref 11.6–15.1)
FLUAV RNA RESP QL NAA+PROBE: NEGATIVE
FLUBV RNA RESP QL NAA+PROBE: NEGATIVE
GFR SERPL CREATININE-BSD FRML MDRD: 39 ML/MIN/1.73SQ M
GLUCOSE SERPL-MCNC: 93 MG/DL (ref 65–140)
HCT VFR BLD AUTO: 34.3 % (ref 34.8–46.1)
HGB BLD-MCNC: 11.3 G/DL (ref 11.5–15.4)
IMM GRANULOCYTES # BLD AUTO: 0.07 THOUSAND/UL (ref 0–0.2)
IMM GRANULOCYTES NFR BLD AUTO: 1 % (ref 0–2)
L PNEUMO1 AG UR QL IA.RAPID: NEGATIVE
LYMPHOCYTES # BLD AUTO: 1.71 THOUSANDS/ÂΜL (ref 0.6–4.47)
LYMPHOCYTES NFR BLD AUTO: 16 % (ref 14–44)
MAGNESIUM SERPL-MCNC: 1.8 MG/DL (ref 1.9–2.7)
MCH RBC QN AUTO: 31.5 PG (ref 26.8–34.3)
MCHC RBC AUTO-ENTMCNC: 32.9 G/DL (ref 31.4–37.4)
MCV RBC AUTO: 96 FL (ref 82–98)
MONOCYTES # BLD AUTO: 0.91 THOUSAND/ÂΜL (ref 0.17–1.22)
MONOCYTES NFR BLD AUTO: 9 % (ref 4–12)
NEUTROPHILS # BLD AUTO: 7.52 THOUSANDS/ÂΜL (ref 1.85–7.62)
NEUTS SEG NFR BLD AUTO: 72 % (ref 43–75)
NRBC BLD AUTO-RTO: 0 /100 WBCS
P AXIS: 50 DEGREES
PLATELET # BLD AUTO: 240 THOUSANDS/UL (ref 149–390)
PMV BLD AUTO: 11.1 FL (ref 8.9–12.7)
POTASSIUM SERPL-SCNC: 3.6 MMOL/L (ref 3.5–5.3)
PR INTERVAL: 178 MS
PROCALCITONIN SERPL-MCNC: 0.12 NG/ML
PROT SERPL-MCNC: 6.9 G/DL (ref 6.4–8.4)
QRS AXIS: 21 DEGREES
QRSD INTERVAL: 88 MS
QT INTERVAL: 350 MS
QTC INTERVAL: 396 MS
RBC # BLD AUTO: 3.59 MILLION/UL (ref 3.81–5.12)
RH BLD: POSITIVE
RSV RNA RESP QL NAA+PROBE: NEGATIVE
S PNEUM AG UR QL: NEGATIVE
SARS-COV-2 RNA RESP QL NAA+PROBE: NEGATIVE
SODIUM SERPL-SCNC: 142 MMOL/L (ref 135–147)
T WAVE AXIS: 96 DEGREES
VENTRICULAR RATE: 77 BPM
WBC # BLD AUTO: 10.43 THOUSAND/UL (ref 4.31–10.16)

## 2024-05-18 PROCEDURE — 85025 COMPLETE CBC W/AUTO DIFF WBC: CPT

## 2024-05-18 PROCEDURE — 83735 ASSAY OF MAGNESIUM: CPT

## 2024-05-18 PROCEDURE — 99223 1ST HOSP IP/OBS HIGH 75: CPT | Performed by: HOSPITALIST

## 2024-05-18 PROCEDURE — 87449 NOS EACH ORGANISM AG IA: CPT

## 2024-05-18 PROCEDURE — 93010 ELECTROCARDIOGRAM REPORT: CPT | Performed by: INTERNAL MEDICINE

## 2024-05-18 PROCEDURE — 99223 1ST HOSP IP/OBS HIGH 75: CPT | Performed by: PHYSICIAN ASSISTANT

## 2024-05-18 PROCEDURE — 96375 TX/PRO/DX INJ NEW DRUG ADDON: CPT

## 2024-05-18 PROCEDURE — 0241U HB NFCT DS VIR RESP RNA 4 TRGT: CPT | Performed by: EMERGENCY MEDICINE

## 2024-05-18 PROCEDURE — 87040 BLOOD CULTURE FOR BACTERIA: CPT | Performed by: EMERGENCY MEDICINE

## 2024-05-18 PROCEDURE — 36415 COLL VENOUS BLD VENIPUNCTURE: CPT | Performed by: EMERGENCY MEDICINE

## 2024-05-18 PROCEDURE — 71250 CT THORAX DX C-: CPT

## 2024-05-18 PROCEDURE — 80053 COMPREHEN METABOLIC PANEL: CPT

## 2024-05-18 PROCEDURE — 83880 ASSAY OF NATRIURETIC PEPTIDE: CPT | Performed by: EMERGENCY MEDICINE

## 2024-05-18 PROCEDURE — 96365 THER/PROPH/DIAG IV INF INIT: CPT

## 2024-05-18 RX ORDER — VANCOMYCIN HYDROCHLORIDE 1 G/200ML
15 INJECTION, SOLUTION INTRAVENOUS ONCE
Status: COMPLETED | OUTPATIENT
Start: 2024-05-18 | End: 2024-05-18

## 2024-05-18 RX ORDER — QUETIAPINE FUMARATE 25 MG/1
25 TABLET, FILM COATED ORAL
COMMUNITY
Start: 2024-05-15

## 2024-05-18 RX ORDER — FUROSEMIDE 10 MG/ML
60 INJECTION INTRAMUSCULAR; INTRAVENOUS ONCE
Status: COMPLETED | OUTPATIENT
Start: 2024-05-18 | End: 2024-05-18

## 2024-05-18 RX ORDER — TRANEXAMIC ACID 10 MG/ML
1000 INJECTION, SOLUTION INTRAVENOUS ONCE
Status: COMPLETED | OUTPATIENT
Start: 2024-05-18 | End: 2024-05-18

## 2024-05-18 RX ORDER — SODIUM CHLORIDE, SODIUM GLUCONATE, SODIUM ACETATE, POTASSIUM CHLORIDE, MAGNESIUM CHLORIDE, SODIUM PHOSPHATE, DIBASIC, AND POTASSIUM PHOSPHATE .53; .5; .37; .037; .03; .012; .00082 G/100ML; G/100ML; G/100ML; G/100ML; G/100ML; G/100ML; G/100ML
50 INJECTION, SOLUTION INTRAVENOUS CONTINUOUS
Status: DISPENSED | OUTPATIENT
Start: 2024-05-18 | End: 2024-05-18

## 2024-05-18 RX ORDER — BENZONATATE 100 MG/1
100 CAPSULE ORAL 3 TIMES DAILY PRN
Status: DISCONTINUED | OUTPATIENT
Start: 2024-05-18 | End: 2024-05-28 | Stop reason: HOSPADM

## 2024-05-18 RX ORDER — PROPRANOLOL HCL 60 MG
60 CAPSULE, EXTENDED RELEASE 24HR ORAL DAILY
Status: DISCONTINUED | OUTPATIENT
Start: 2024-05-18 | End: 2024-05-28 | Stop reason: HOSPADM

## 2024-05-18 RX ORDER — PANTOPRAZOLE SODIUM 40 MG/1
40 TABLET, DELAYED RELEASE ORAL
Status: DISCONTINUED | OUTPATIENT
Start: 2024-05-18 | End: 2024-05-28 | Stop reason: HOSPADM

## 2024-05-18 RX ORDER — MIRTAZAPINE 7.5 MG/1
7.5 TABLET, FILM COATED ORAL
Status: DISCONTINUED | OUTPATIENT
Start: 2024-05-18 | End: 2024-05-28 | Stop reason: HOSPADM

## 2024-05-18 RX ORDER — PRAVASTATIN SODIUM 80 MG/1
80 TABLET ORAL
Status: DISCONTINUED | OUTPATIENT
Start: 2024-05-18 | End: 2024-05-28 | Stop reason: HOSPADM

## 2024-05-18 RX ORDER — MEMANTINE HYDROCHLORIDE 5 MG/1
10 TABLET ORAL 2 TIMES DAILY
Status: DISCONTINUED | OUTPATIENT
Start: 2024-05-18 | End: 2024-05-28 | Stop reason: HOSPADM

## 2024-05-18 RX ORDER — AMLODIPINE BESYLATE 2.5 MG/1
2.5 TABLET ORAL DAILY
Status: DISCONTINUED | OUTPATIENT
Start: 2024-05-18 | End: 2024-05-28 | Stop reason: HOSPADM

## 2024-05-18 RX ORDER — DIVALPROEX SODIUM 125 MG/1
125 TABLET, DELAYED RELEASE ORAL 2 TIMES DAILY
Status: DISCONTINUED | OUTPATIENT
Start: 2024-05-18 | End: 2024-05-28 | Stop reason: HOSPADM

## 2024-05-18 RX ORDER — LOSARTAN POTASSIUM 50 MG/1
50 TABLET ORAL DAILY
Status: DISCONTINUED | OUTPATIENT
Start: 2024-05-18 | End: 2024-05-28 | Stop reason: HOSPADM

## 2024-05-18 RX ORDER — OXYCODONE HYDROCHLORIDE 5 MG/1
5 TABLET ORAL EVERY 6 HOURS PRN
Status: DISCONTINUED | OUTPATIENT
Start: 2024-05-18 | End: 2024-05-28 | Stop reason: HOSPADM

## 2024-05-18 RX ORDER — ACETAMINOPHEN 325 MG/1
650 TABLET ORAL EVERY 6 HOURS PRN
Status: DISCONTINUED | OUTPATIENT
Start: 2024-05-18 | End: 2024-05-23

## 2024-05-18 RX ORDER — QUETIAPINE FUMARATE 25 MG/1
25 TABLET, FILM COATED ORAL
Status: DISCONTINUED | OUTPATIENT
Start: 2024-05-18 | End: 2024-05-28 | Stop reason: HOSPADM

## 2024-05-18 RX ORDER — ESCITALOPRAM OXALATE 10 MG/1
10 TABLET ORAL DAILY
Status: DISCONTINUED | OUTPATIENT
Start: 2024-05-18 | End: 2024-05-28 | Stop reason: HOSPADM

## 2024-05-18 RX ADMIN — MIRTAZAPINE 7.5 MG: 7.5 TABLET, FILM COATED ORAL at 20:42

## 2024-05-18 RX ADMIN — ACETAMINOPHEN 650 MG: 325 TABLET, FILM COATED ORAL at 15:13

## 2024-05-18 RX ADMIN — AZITHROMYCIN MONOHYDRATE 500 MG: 500 INJECTION, POWDER, LYOPHILIZED, FOR SOLUTION INTRAVENOUS at 02:09

## 2024-05-18 RX ADMIN — FUROSEMIDE 60 MG: 10 INJECTION, SOLUTION INTRAVENOUS at 00:51

## 2024-05-18 RX ADMIN — AMLODIPINE BESYLATE 2.5 MG: 2.5 TABLET ORAL at 08:21

## 2024-05-18 RX ADMIN — SODIUM CHLORIDE, SODIUM GLUCONATE, SODIUM ACETATE, POTASSIUM CHLORIDE, MAGNESIUM CHLORIDE, SODIUM PHOSPHATE, DIBASIC, AND POTASSIUM PHOSPHATE 50 ML/HR: .53; .5; .37; .037; .03; .012; .00082 INJECTION, SOLUTION INTRAVENOUS at 05:07

## 2024-05-18 RX ADMIN — DIVALPROEX SODIUM 125 MG: 125 TABLET, DELAYED RELEASE ORAL at 08:21

## 2024-05-18 RX ADMIN — ESCITALOPRAM OXALATE 10 MG: 10 TABLET ORAL at 08:21

## 2024-05-18 RX ADMIN — CEFEPIME 2000 MG: 2 INJECTION, POWDER, FOR SOLUTION INTRAVENOUS at 12:50

## 2024-05-18 RX ADMIN — DIVALPROEX SODIUM 125 MG: 125 TABLET, DELAYED RELEASE ORAL at 17:26

## 2024-05-18 RX ADMIN — PRAVASTATIN SODIUM 80 MG: 80 TABLET ORAL at 17:26

## 2024-05-18 RX ADMIN — MEMANTINE 10 MG: 5 TABLET ORAL at 08:22

## 2024-05-18 RX ADMIN — CEFEPIME 2000 MG: 2 INJECTION, POWDER, FOR SOLUTION INTRAVENOUS at 23:36

## 2024-05-18 RX ADMIN — VANCOMYCIN HYDROCHLORIDE 1000 MG: 1 INJECTION, SOLUTION INTRAVENOUS at 02:12

## 2024-05-18 RX ADMIN — PROPRANOLOL HYDROCHLORIDE 60 MG: 60 CAPSULE, EXTENDED RELEASE ORAL at 08:27

## 2024-05-18 RX ADMIN — TRANEXAMIC ACID 1000 MG: 10 INJECTION, SOLUTION INTRAVENOUS at 02:00

## 2024-05-18 RX ADMIN — LOSARTAN POTASSIUM 50 MG: 50 TABLET, FILM COATED ORAL at 08:22

## 2024-05-18 RX ADMIN — QUETIAPINE FUMARATE 25 MG: 25 TABLET ORAL at 20:42

## 2024-05-18 RX ADMIN — CEFEPIME HYDROCHLORIDE 2000 MG: 2 INJECTION, POWDER, FOR SOLUTION INTRAVENOUS at 00:59

## 2024-05-18 RX ADMIN — MEMANTINE 10 MG: 5 TABLET ORAL at 17:26

## 2024-05-18 NOTE — PHYSICAL THERAPY NOTE
Physical Therapy Cancellation Note           05/18/24 0729   PT Last Visit   PT Visit Date 05/18/24   Note Type   Note type Cancelled Session   Cancel Reasons Patient to operating room   Additional Comments per chart review admitted w femoral neck fx. pending OR todday for intervention. will cx and continue to follow post operatively as appropriate.       Alejandra Claudio, PT

## 2024-05-18 NOTE — ED PROVIDER NOTES
History  Chief Complaint   Patient presents with    Fall     Pt arrives via ems from [facility] dementia unit. Per staff pt had a witnessed mechanical fall and was assisted to the floor. -headstrike, -thinners, -loc. Pt c/o left hip pain.      Patient is an 83-year-old female.  She is resident of nursing home.  She has a history of anxiety, hypertension, GERD, osteoporosis, hyperlipidemia, hypothyroidism, osteoarthritis, and dementia.  She also has a history of polymyalgia rheumatica.  She has had temporal arteritis.  She has congestive heart failure.  Today she took a mechanical fall at the nursing home injuring her left hip.  There is no head strike.  No loss of consciousness.  No neck pain.  No oral anticoagulant use.  She denies any chest pain or difficulty breathing.  No abdominal pain.  No other extremity injuries.  No lateralizing motor or sensory deficits.  The injury was sudden and occurred today prior to arrival.  She presents by ambulance for evaluation.  She received Toradol prehospital.        Prior to Admission Medications   Prescriptions Last Dose Informant Patient Reported? Taking?   Calcium Carbonate-Vit D-Min (Caltrate 600+D Plus Minerals) 600-800 MG-UNIT TABS  Self, Child No No   Sig: Take 1 tablet by mouth 2 (two) times a day with meals   Patient not taking: Reported on 4/7/2024   Cholecalciferol (Vitamin D3) 20 MCG (800 UNIT) TABS  Self, Child Yes No   Sig: Take 20 mcg by mouth 2 (two) times a day   L-Methylfolate-A08-C4-Z2 (Cerefolin) 6-1-50-5 MG TABS  Self, Child No No   Sig: Take 1 tablet by mouth in the morning   Patient not taking: Reported on 3/22/2024   amLODIPine (NORVASC) 2.5 mg tablet   No No   Sig: Take 1 tablet (2.5 mg total) by mouth daily   azilsartan medoxomil (Edarbi) 40 MG tablet  Self, Child No No   Sig: Take 1 tablet (40 mg total) by mouth daily   denosumab (PROLIA) 60 mg/mL  Self, Child No No   Sig: Inject 1 mL (60 mg total) under the skin once for 1 dose    Patient not taking: Reported on 4/11/2024   divalproex sodium (Depakote) 125 mg DR tablet   No No   Sig: Take 1 tablet (125 mg total) by mouth 2 (two) times a day Give at 12pm and 6pm daily   ergocalciferol (VITAMIN D2) 50,000 units  Self, Child No No   Sig: Take 1 capsule (50,000 Units total) by mouth once a week   Patient not taking: Reported on 4/7/2024   escitalopram (LEXAPRO) 10 mg tablet   No No   Sig: Take 1 tablet (10 mg total) by mouth daily   memantine (NAMENDA) 10 mg tablet   No No   Sig: Take 1 tablet (10 mg total) by mouth 2 (two) times a day   mirtazapine (REMERON) 7.5 MG tablet   No No   Sig: Take 1 tablet (7.5 mg total) by mouth daily at bedtime   nitrofurantoin (MACROBID) 100 mg capsule  Self, Child No No   Sig: Take 1 capsule (100 mg total) by mouth daily with lunch   pantoprazole (PROTONIX) 40 mg tablet   No No   Sig: Take 1 tablet (40 mg total) by mouth daily   propranolol (INDERAL LA) 60 mg 24 hr capsule  Self, Child No No   Sig: Take 1 capsule (60 mg total) by mouth daily Please stop Norvasc...   pyridoxine (VITAMIN B6) 100 mg tablet   No No   Sig: Take 1 tablet (100 mg total) by mouth daily   Patient not taking: Reported on 4/7/2024   rosuvastatin (CRESTOR) 10 MG tablet  Self, Child No No   Sig: TAKE ONE TABLET (10 MG TOTAL) BY MOUTH DAILY      Facility-Administered Medications: None       Past Medical History:   Diagnosis Date    Anxiety     Gastroesophageal reflux disease without esophagitis 08/28/2018    Hyperlipidemia     Hypertension     Hypothyroidism     Mild dementia with agitation (HCC) 08/24/2020    Multifocal pneumonia 5/18/2024    Osteoarthritis 10/05/2012    Other osteoporosis without current pathological fracture 02/09/2023       Past Surgical History:   Procedure Laterality Date    CATARACT EXTRACTION      CHOLECYSTECTOMY      COLONOSCOPY      HYSTERECTOMY         Family History   Problem Relation Age of Onset    No Known Problems Mother     No Known Problems Father      I  have reviewed and agree with the history as documented.    E-Cigarette/Vaping    E-Cigarette Use Never User      E-Cigarette/Vaping Substances    Nicotine No     THC No     CBD No     Flavoring No      Social History     Tobacco Use    Smoking status: Never    Smokeless tobacco: Never   Vaping Use    Vaping status: Never Used   Substance Use Topics    Alcohol use: No    Drug use: No       Review of Systems   Unable to perform ROS: Dementia       Physical Exam  Physical Exam  Vitals reviewed.   Constitutional:       General: She is not in acute distress.  HENT:      Head: Normocephalic and atraumatic.      Nose: Nose normal.      Mouth/Throat:      Mouth: Mucous membranes are moist.   Eyes:      Extraocular Movements: Extraocular movements intact.      Pupils: Pupils are equal, round, and reactive to light.   Neck:      Comments: No midline cervical tenderness.  Cardiovascular:      Rate and Rhythm: Normal rate and regular rhythm.      Pulses: Normal pulses.      Heart sounds: Normal heart sounds. No murmur heard.     No friction rub. No gallop.   Pulmonary:      Effort: Pulmonary effort is normal. No respiratory distress.      Breath sounds: Normal breath sounds. No stridor. No wheezing, rhonchi or rales.   Chest:      Chest wall: No tenderness.   Abdominal:      General: Bowel sounds are normal. There is no distension.      Palpations: Abdomen is soft.      Tenderness: There is no abdominal tenderness.   Musculoskeletal:         General: Tenderness and deformity present.      Cervical back: Neck supple. No rigidity or tenderness.      Comments: There is tenderness to the left hip.  The leg is shortened and externally rotated.   Skin:     General: Skin is warm and dry.   Neurological:      General: No focal deficit present.      Mental Status: She is alert.      Comments: Awake and alert.   Psychiatric:         Mood and Affect: Mood normal.         Behavior: Behavior normal.         Vital Signs  ED Triage Vitals    Temperature Pulse Respirations Blood Pressure SpO2   05/17/24 2153 05/17/24 2153 05/17/24 2153 05/17/24 2153 05/17/24 2153   98.7 °F (37.1 °C) 74 18 143/68 (!) 89 %      Temp Source Heart Rate Source Patient Position - Orthostatic VS BP Location FiO2 (%)   05/17/24 2153 05/17/24 2153 05/17/24 2318 05/17/24 2318 --   Oral Monitor Lying Right arm       Pain Score       05/17/24 2248       9           Vitals:    05/17/24 2153 05/17/24 2300 05/17/24 2318 05/18/24 0050   BP: 143/68  144/68 131/60   Pulse: 74 78 73    Patient Position - Orthostatic VS:   Lying          Visual Acuity      ED Medications  Medications   azithromycin (ZITHROMAX) 500 mg in sodium chloride 0.9 % 250 mL IVPB (has no administration in time range)   vancomycin (VANCOCIN) IVPB (premix in dextrose) 1,000 mg 200 mL (has no administration in time range)   tranexamic acid (CYKLOKAPRON) 1000-0.7 MG/100ML-% injection 1,000 mg (has no administration in time range)   ketorolac (FOR EMS ONLY) (TORADOL) injection 15 mg (0 mg Does not apply Given to EMS 5/17/24 2156)   morphine injection 2 mg (2 mg Intravenous Given 5/17/24 2248)   furosemide (LASIX) injection 60 mg (60 mg Intravenous Given 5/18/24 0051)   cefepime (MAXIPIME) 2 g/50 mL dextrose IVPB (2,000 mg Intravenous New Bag 5/18/24 0059)       Diagnostic Studies  Results Reviewed       Procedure Component Value Units Date/Time    Procalcitonin [[id number]]     Lab Status: No result Specimen: Blood     FLU/RSV/COVID - if FLU/RSV clinically relevant [[id number]]  (Normal) Collected: 05/18/24 0026    Lab Status: Final result Specimen: Nares from Nose Updated: 05/18/24 0117     SARS-CoV-2 Negative     INFLUENZA A PCR Negative     INFLUENZA B PCR Negative     RSV PCR Negative    Narrative:      FOR PEDIATRIC PATIENTS - copy/paste COVID Guidelines URL to browser: [email or web address]    SARS-CoV-2 assay is a Nucleic Acid Amplification assay intended for  the  qualitative detection of nucleic acid from SARS-CoV-2 in nasopharyngeal  swabs. Results are for the presumptive identification of SARS-CoV-2 RNA.    Positive results are indicative of infection with SARS-CoV-2, the virus  causing COVID-19, but do not rule out bacterial infection or co-infection  with other viruses. Laboratories within the [place] and its  territories are required to report all positive results to the appropriate  public health authorities. Negative results do not preclude SARS-CoV-2  infection and should not be used as the sole basis for treatment or other  patient management decisions. Negative results must be combined with  clinical observations, patient history, and epidemiological information.  This test has not been FDA cleared or approved.    This test has been authorized by FDA under an Emergency Use Authorization  (EUA). This test is only authorized for the duration of time the  declaration that circumstances exist justifying the authorization of the  emergency use of an in vitro diagnostic tests for detection of SARS-CoV-2  virus and/or diagnosis of COVID-19 infection under section 564(b)(1) of  the Act, 21 U.S.C. 360bbb-3(b)(1), unless the authorization is terminated  or revoked sooner. The test has been validated but independent review by FDA  and CLIA is pending.    Test performed using [employer] GeneXpert: This RT-PCR assay targets N2,  a region unique to SARS-CoV-2. A conserved region in the E-gene was chosen  for pan-Sarbecovirus detection which includes SARS-CoV-2.    According to CMS-2020-01-R, this platform meets the definition of high-throughput technology.    B-Type Natriuretic Peptide(BNP) [[id number]]  (Normal) Collected: 05/18/24 0026    Lab Status: Final result Specimen: Blood from Arm, Right Updated: 05/18/24 0100     BNP 69 pg/mL     Blood culture #2 [[id number]] Collected: 05/18/24 0047    Lab Status: In process Specimen: Blood from Arm, Left Updated: 05/18/24 0052     Blood culture #2 [[id number]] Collected: 05/18/24 0047    Lab Status: No result Specimen: Blood from Arm, Left     Blood culture #1 [[id number]] Collected: 05/18/24 0026    Lab Status: In process Specimen: Blood from Arm, Right Updated: 05/18/24 0040    Blood culture #1 [[id number]]     Lab Status: No result Specimen: Blood     Comprehensive metabolic panel [[id number]]  (Abnormal) Collected: 05/17/24 2248    Lab Status: Final result Specimen: Blood from Arm, Left Updated: 05/17/24 2320     Sodium 141 mmol/L      Potassium 4.0 mmol/L      Chloride 105 mmol/L      CO2 27 mmol/L      ANION GAP 9 mmol/L      BUN 29 mg/dL      Creatinine 1.39 mg/dL      Glucose 97 mg/dL      Calcium 9.5 mg/dL      AST 74 U/L       U/L      Alkaline Phosphatase 39 U/L      Total Protein 6.5 g/dL      Albumin 3.5 g/dL      Total Bilirubin 0.37 mg/dL      eGFR 34 ml/min/1.73sq m     Narrative:      National Kidney Disease Foundation guidelines for Chronic Kidney Disease (CKD):     Stage 1 with normal or high GFR (GFR > 90 mL/min/1.73 square meters)    Stage 2 Mild CKD (GFR = 60-89 mL/min/1.73 square meters)    Stage 3A Moderate CKD (GFR = 45-59 mL/min/1.73 square meters)    Stage 3B Moderate CKD (GFR = 30-44 mL/min/1.73 square meters)    Stage 4 Severe CKD (GFR = 15-29 mL/min/1.73 square meters)    Stage 5 End Stage CKD (GFR <15 mL/min/1.73 square meters)  Note: GFR calculation is accurate only with a steady state creatinine    Protime-INR [[id number]]  (Normal) Collected: 05/17/24 2248    Lab Status: Final result Specimen: Blood from Arm, Left Updated: 05/17/24 2311     Protime 14.0 seconds      INR 1.06    APTT [[id number]]  (Normal) Collected: 05/17/24 2248    Lab Status: Final result Specimen: Blood from Arm, Left Updated: 05/17/24 2311     PTT 30 seconds     CBC and differential [[id number]]  (Abnormal) Collected: 05/17/24 2248    Lab Status: Final result Specimen: Blood from Arm, Left Updated: 05/17/24 0210     WBC 7.44  Thousand/uL      RBC 3.59 Million/uL      Hemoglobin 11.0 g/dL      Hematocrit 33.7 %      MCV 94 fL      MCH 30.6 pg      MCHC 32.6 g/dL      RDW 15.3 %      MPV 11.5 fL      Platelets 235 Thousands/uL      nRBC 0 /100 WBCs      Segmented % 65 %      Immature Grans % 1 %      Lymphocytes % 20 %      Monocytes % 10 %      Eosinophils Relative 3 %      Basophils Relative 1 %      Absolute Neutrophils 4.90 Thousands/µL      Absolute Immature Grans 0.04 Thousand/uL      Absolute Lymphocytes 1.51 Thousands/µL      Absolute Monocytes 0.72 Thousand/µL      Eosinophils Absolute 0.22 Thousand/µL      Basophils Absolute 0.05 Thousands/µL                    CT chest without contrast   Final Result by Levi Power MD (05/18 0119)      Extensive patchy bilateral upper and lower lobe infiltrates suspicious for multifocal bronchopneumonia. Correlation with the patient's symptoms and laboratory studies is recommended. A short-term follow-up CT chest in 6 to 8 weeks following treatment is    recommended to assess for improvement/resolution and exclude an underlying lesion.               Workstation performed: [id number]         XR chest 1 view portable   ED Interpretation by Alphonse Gomez MD (05/17 1488)   Multiple bilateral infiltrates.      XR hip/pelv 2-3 vws left   ED Interpretation by Alphonse Gomez MD (05/18 0023)   Left femoral neck fracture.  No pelvis fracture.                 Procedures  ECG 12 Lead Documentation Only    Date/Time: 5/17/2024 10:59 PM    Performed by: Alphonse Gomez MD  Authorized by: Alphonse Gomez MD    ECG reviewed by me, the ED Provider: yes    Patient location:  ED  Comments:      Normal sinus rhythm.  Nonspecific ST and T wave abnormality.  Abnormal EKG.  Similar to prior EKG.           ED Course                                             Medical Decision Making  Considered traumatic injury such as intracranial hemorrhage, skull fracture, cervical fracture, thoracic/lumbar fracture,  pelvis fracture, rib fracture, pneumothorax, hemothorax, solid organ injury, perforated viscus, and extremity fractures.  Patient was found to have a left femoral neck fracture.  No dislocation.  No other traumatic injuries were found.  The preop chest x-ray showed multifocal infiltrates.  Subsequent CAT scan of the chest confirmed multifocal pneumonia.  At first it was unclear if this was pneumonia or CHF.  Patient was cultured.  She received Lasix and empiric antibiotics.  Pulmonary embolism would be unlikely.  COVID was negative.  Consulted with orthopedics.  Consulted with hospitalist for admission.  TXA administered.    Amount and/or Complexity of Data Reviewed  Independent Historian:      Details: Family  Labs: ordered. Decision-making details documented in ED Course.  Radiology: ordered and independent interpretation performed. Decision-making details documented in ED Course.  ECG/medicine tests: ordered and independent interpretation performed. Decision-making details documented in ED Course.  Discussion of management or test interpretation with external provider(s): Orthopedics, hospitalist    Risk  Prescription drug management.  Decision regarding hospitalization.             Disposition  Final diagnoses:   Closed fracture of left hip, initial encounter (HCC)   Multifocal pneumonia     Time reflects when diagnosis was documented in both MDM as applicable and the Disposition within this note       Time User Action Codes Description Comment    5/18/2024  1:36 AM Alphonse Gomez Add [S72.002A] Closed fracture of left hip, initial encounter (HCC)     5/18/2024  1:36 AM Alphonse Gomez Add [J18.9] Multifocal pneumonia           ED Disposition       ED Disposition   Admit    Condition   Stable    Date/Time   Sat May 18, 2024  1:35 AM    Comment   --             Follow-up Information    None         Patient's Medications   Discharge Prescriptions    No medications on file       No discharge procedures on  file.    PDMP Review         Value Time User    PDMP Reviewed  Yes 7/23/2020  4:49 PM Parag Meyers MD            ED Provider  Electronically Signed by             Alphonse Gomez MD  05/21/24 7237

## 2024-05-18 NOTE — CONSULTS
Orthopedics   Dangelo Gunter 83 y.o. female MRN: [id number]  Unit/Bed#: E2 -01      Chief Complaint:   left hip pain    HPI:   83 y.o.female with a history of Dementia that uses a walker at baseline who sustained a witnessed fall at her nursing home [facility] yesterday.  The patient was being assisted by staff when she fell to her left side, landing on the left hip.  She was brought to the emergency room where she was found to have a left femoral neck fracture.  She was admitted to medicine services and orthopedics has been consulted.  Currently, the patient states she has some pain in the left hip but not much.  She denies pain elsewhere in the left side of her body.  She answers most questions appropriately, but is unsure of place and time.  She is unsure if she ever had history of surgery to the left hip.  She is not currently on blood thinning medications per chart review.  Medicine is currently treating her for pneumonia with IV antibiotics, and hypoxia with nasal cannula oxygen.    Review Of Systems:   Skin: Normal  Neuro: See HPI  Musculoskeletal: See HPI  14 point review of systems unobtainable due to pts condition    Past Medical History:   Past Medical History:   Diagnosis Date    Anxiety     Gastroesophageal reflux disease without esophagitis 08/28/2018    Hyperlipidemia     Hypertension     Hypothyroidism     Mild dementia with agitation (HCC) 08/24/2020    Multifocal pneumonia 5/18/2024    Osteoarthritis 10/05/2012    Other osteoporosis without current pathological fracture 02/09/2023       Past Surgical History:   Past Surgical History:   Procedure Laterality Date    CATARACT EXTRACTION      CHOLECYSTECTOMY      COLONOSCOPY      HYSTERECTOMY         Family History:  Family history reviewed and non-contributory  Family History   Problem Relation Age of Onset    No Known Problems Mother     No Known Problems Father        Social History:  Social History     Socioeconomic  History    Marital status: /Civil Union     Spouse name: None    Number of children: None    Years of education: None    Highest education level: None   Occupational History    Occupation: retired   Tobacco Use    Smoking status: Never    Smokeless tobacco: Never   Vaping Use    Vaping status: Never Used   Substance and Sexual Activity    Alcohol use: No    Drug use: No    Sexual activity: Not Currently   Other Topics Concern    None   Social History Narrative    None     Social Determinants of Health     Financial Resource Strain: Low Risk  (8/9/2023)    Overall Financial Resource Strain (CARDIA)     Difficulty of Paying Living Expenses: Not hard at all   Food Insecurity: No Food Insecurity (4/14/2021)    Hunger Vital Sign     Worried About Running Out of Food in the Last Year: Never true     Ran Out of Food in the Last Year: Never true   Transportation Needs: No Transportation Needs (8/9/2023)    PRAPARE - Transportation     Lack of Transportation (Medical): No     Lack of Transportation (Non-Medical): No   Physical Activity: Insufficiently Active (7/7/2020)    Exercise Vital Sign     Days of Exercise per Week: 2 days     Minutes of Exercise per Session: 10 min   Stress: No Stress Concern Present (7/7/2020)    [employer] [place] [employer] - Occupational Stress Questionnaire     Feeling of Stress : Not at all   Social Connections: Unknown (7/7/2020)    Social Connection and Isolation Panel [NHANES]     Frequency of Communication with Friends and Family: Patient declined     Frequency of Social Gatherings with Friends and Family: Patient declined     Attends Religion Services: Patient declined     Active Member of Clubs or Organizations: Patient declined     Attends Club or Organization Meetings: Patient declined     Marital Status: Patient declined   Intimate Partner Violence: Not At Risk (7/7/2020)    Humiliation, Afraid, Rape, and Kick questionnaire     Fear of Current or Ex-Partner: No      Emotionally Abused: No     Physically Abused: No     Sexually Abused: No   Housing Stability: Not on file       Allergies:   Allergies   Allergen Reactions    No Known Allergies            Labs:  0   Lab Value Date/Time    HCT 34.3 (L) 05/18/2024 0507    HCT 33.7 (L) 05/17/2024 2248    HCT 42.6 02/29/2024 1642    HGB 11.3 (L) 05/18/2024 0507    HGB 11.0 (L) 05/17/2024 2248    HGB 14.0 02/29/2024 1642    INR 1.06 05/17/2024 2248    WBC 10.43 (H) 05/18/2024 0507    WBC 7.44 05/17/2024 2248    WBC 6.82 02/29/2024 1642    ESR 41 (H) 02/29/2024 1642    CRP <3.0 11/05/2021 0748       Meds:    Current Facility-Administered Medications:     acetaminophen (TYLENOL) tablet 650 mg, 650 mg, Oral, Q6H PRN, Kevin Shah PA-C    amLODIPine (NORVASC) tablet 2.5 mg, 2.5 mg, Oral, Daily, Kevin Shah PA-C    [START ON 5/19/2024] azithromycin (ZITHROMAX) 500 mg in sodium chloride 0.9 % 250 mL IVPB, 500 mg, Intravenous, Q24H, Kevin Shah PA-C    benzonatate (TESSALON PERLES) capsule 100 mg, 100 mg, Oral, TID PRN, Kevin Shah PA-C    cefepime (MAXIPIME) 2 g/50 mL dextrose IVPB, 2,000 mg, Intravenous, Q12H, Kevin Shah PA-C    divalproex sodium (DEPAKOTE) DR tablet 125 mg, 125 mg, Oral, BID, Kevin Shah PA-C    escitalopram (LEXAPRO) tablet 10 mg, 10 mg, Oral, Daily, Kevin Shah PA-C    losartan (COZAAR) tablet 50 mg, 50 mg, Oral, Daily, Kevin Shah PA-C    memantine (NAMENDA) tablet 10 mg, 10 mg, Oral, BID, Kevin Shah PA-C    mirtazapine (REMERON) tablet 7.5 mg, 7.5 mg, Oral, HS, Kevin Shah PA-C    multi-electrolyte (PLASMALYTE-A/ISOLYTE-S PH 7.4) IV solution, 50 mL/hr, Intravenous, Continuous, Kevin Shah PA-C, Last Rate: 50 mL/hr at 05/18/24 0507, 50 mL/hr at 05/18/24 0507    oxyCODONE (ROXICODONE) IR tablet 5 mg, 5 mg, Oral, Q6H PRN, Kevin Shah PA-C    oxyCODONE (ROXICODONE) split tablet 2.5 mg, 2.5 mg, Oral, Q6H PRN, Kevin Shah PA-C    pantoprazole  "(PROTONIX) EC tablet 40 mg, 40 mg, Oral, Daily Before Breakfast, Kevin Shah PA-C    pravastatin (PRAVACHOL) tablet 80 mg, 80 mg, Oral, Daily With Dinner, Kevin Shah PA-C    propranolol (INDERAL LA) 24 hr capsule 60 mg, 60 mg, Oral, Daily, Kevin Shah PA-C    QUEtiapine (SEROquel) tablet 25 mg, 25 mg, Oral, HS, Kevin Shah PA-C    Blood Culture:   No results found for: \"BLOODCX\"    Wound Culture:   No results found for: \"WOUNDCULT\"    Ins and Outs:  I/O last 24 hours:  In: 150 [IV Piggyback:150]  Out: -           Physical Exam:   /71 (BP Location: Left arm)   Pulse 71   Temp 97.6 °F (36.4 °C) (Temporal)   Resp 16   Ht 5' 6\" (1.676 m)   Wt 70.3 kg (154 lb 15.7 oz)   SpO2 95%   BMI 25.01 kg/m²   Gen: No acute distress, resting comfortably in bed  Breathing unlabored  Respiratory: No audible wheezing or stridor  Cardiovascular: Well Perfused peripherally, 2+ distal pulse  Abdomen: nondistended, no peritoneal signs  Musculoskeletal: left lower extremity  Skin intact, no open wounds or erythema.  No visible ecchymosis.  Shortening and external rotation of the LLE  Mildly Tender to palpation over hip  Non-tender L distal femur region, and L knee  Sensation intact L3-S1  Positive ankle dorsi/plantar flexion, EHL/FHL  Musculature is soft and compressible, no pain with passive stretch    Radiology:   I personally reviewed the films.  X-rays AP/Lateral and views left hip shows femoral neck fracture    _*_*_*_*_*_*_*_*_*_*_*_*_*_*_*_*_*_*_*_*_*_*_*_*_*_*_*_*_*_*_*_*_*_*_*_*_*_*_*_*_*    Assessment:    83 y.o.female status post fall with left femoral neck fracture    Currently being treated for Pneumonia with IV abx and oxygen    Plan:   Non weight bearing left lower extremity.  To OR for L hip hemiarthroplasty when medically stable and cleared.  Not cleared for OR today.  Will make NPO at midnight and follow-up in the AM.  Pre-op labs completed.  Analgesics for pain.  Informed consent " obtained over the phone (POA ) and placed in OR desk.  Medicine primary service for all medical management and preoperative risk stratification  Dispo: Ortho will follow  Patient's  and daughter updated over the phone.          Ike Downing PA-C

## 2024-05-18 NOTE — ASSESSMENT & PLAN NOTE
Initially requiring 2L while in the ED requiring titration up to 6L. Patient currently requiring 4L NC.  Typically maintained on RA  Suspect secondary to multifocal pneumonia demonstrated on CT  Respiratory protocol, ween oxygen as tolerated  Plan as above

## 2024-05-18 NOTE — OCCUPATIONAL THERAPY NOTE
Occupational Therapy Cancel Note         Patient Name: Dangelo Gunter  Today's Date: 5/18/2024 05/18/24 0748   Note Type   Note type Cancelled Session   Cancel Reasons Patient to operating room   Additional Comments per chart review admitted w femoral neck fx. pending OR todday for intervention. will cx OT evaluation and continue to follow post operatively as appropriate.     Lily Harrison, MS OTR/L CLT

## 2024-05-18 NOTE — ASSESSMENT & PLAN NOTE
Pt only oriented to person and place  Delirium precautions  Continue home regimen of lexapro, namenda, remeron, depakote, and seroquel

## 2024-05-18 NOTE — ASSESSMENT & PLAN NOTE
Patient with PMHx of dementia, HTN, and HLD presented to the ED from her facility, [facility], after sustaining a mechanical fall  Pt reportedly was assisted by staff when she fell. Staff was able to help guide patient to the floor. No reported head strike and patient not anticoagulated. Pt did land on her left hip.  XR left hip demonstrating fracture of left femoral neck, official read pending   Nonweight bearing   Pain control, supportive care  Ortho consult pending

## 2024-05-18 NOTE — ASSESSMENT & PLAN NOTE
"Pt noted to become hypoxic while in the ED  CT chest demonstrating \"Extensive patchy bilateral upper and lower lobe infiltrates suspicious for multifocal bronchopneumonia\"  WBC 7.44, afebrile, not fulfilling SIRS criteria  Procalc 0.12, continue to trend  Blood cultures obtained in the ED  Resp protocol, gentle IV fluids  Urine studies and sputum cultures pending  Cefepime, azithromycin, and vancomycin given in the ED, continue with cefepime and azithromycin for now  "

## 2024-05-18 NOTE — PLAN OF CARE
Problem: Prexisting or High Potential for Compromised Skin Integrity  Goal: Skin integrity is maintained or improved  Description: INTERVENTIONS:  - Identify patients at risk for skin breakdown  - Assess and monitor skin integrity  - Assess and monitor nutrition and hydration status  - Monitor labs   - Assess for incontinence   - Turn and reposition patient  - Assist with mobility/ambulation  - Relieve pressure over bony prominences  - Avoid friction and shearing  - Provide appropriate hygiene as needed including keeping skin clean and dry  - Evaluate need for skin moisturizer/barrier cream  - Collaborate with interdisciplinary team   - Patient/family teaching  - Consider wound care consult   Outcome: Progressing     Problem: PAIN - ADULT  Goal: Verbalizes/displays adequate comfort level or baseline comfort level  Description: Interventions:  - Encourage patient to monitor pain and request assistance  - Assess pain using appropriate pain scale  - Administer analgesics based on type and severity of pain and evaluate response  - Implement non-pharmacological measures as appropriate and evaluate response  - Consider cultural and social influences on pain and pain management  - Notify physician/advanced practitioner if interventions unsuccessful or patient reports new pain  Outcome: Progressing     Problem: SAFETY ADULT  Goal: Patient will remain free of falls  Description: INTERVENTIONS:  - Educate patient/family on patient safety including physical limitations  - Instruct patient to call for assistance with activity   - Consult OT/PT to assist with strengthening/mobility   - Keep Call bell within reach  - Keep bed low and locked with side rails adjusted as appropriate  - Keep care items and personal belongings within reach  - Initiate and maintain comfort rounds  - Make Fall Risk Sign visible to staff  - Offer Toileting every 2 Hours, in advance of need  - Initiate/Maintain bed alarm  - Obtain necessary fall risk  management equipment: non slip socks, call bell   - Apply yellow socks and bracelet for high fall risk patients  - Consider moving patient to room near nurses station  Outcome: Progressing

## 2024-05-18 NOTE — H&P
"  [facility]  H&P  Name: Dangelo Gunter 83 y.o. female I MRN: [id number]  Unit/Bed#: E2 -01 I Date of Admission: 5/17/2024   Date of Service: 5/18/2024 I Hospital Day: 0      Assessment & Plan   * Fracture of femoral neck, left (HCC)  Assessment & Plan  Patient with PMHx of dementia, HTN, and HLD presented to the ED from her facility, [facility], after sustaining a mechanical fall  Pt reportedly was assisted by staff when she fell. Staff was able to help guide patient to the floor. No reported head strike and patient not anticoagulated. Pt did land on her left hip.  XR left hip demonstrating fracture of left femoral neck, official read pending   Nonweight bearing   Pain control, supportive care  Ortho consult pending     Multifocal pneumonia  Assessment & Plan  Pt noted to become hypoxic while in the ED  CT chest demonstrating \"Extensive patchy bilateral upper and lower lobe infiltrates suspicious for multifocal bronchopneumonia\"  WBC 7.44, afebrile, not fulfilling SIRS criteria  Procalc 0.12, continue to trend  Blood cultures obtained in the ED  Resp protocol, gentle IV fluids  Urine studies and sputum cultures pending  Cefepime, azithromycin, and vancomycin given in the ED, continue with cefepime and azithromycin for now    Acute respiratory failure with hypoxia (HCC)  Assessment & Plan  Initially requiring 2L while in the ED requiring titration up to 6L. Patient currently requiring 4L NC.  Typically maintained on RA  Suspect secondary to multifocal pneumonia demonstrated on CT  Respiratory protocol, ween oxygen as tolerated  Plan as above    Dementia with agitation (HCC)  Assessment & Plan  Pt only oriented to person and place  Delirium precautions  Continue home regimen of lexapro, namenda, remeron, depakote, and seroquel     Hyperlipidemia  Assessment & Plan  Continue statin    Essential hypertension  Assessment & Plan  Continue amlodipine, losartan, and " propanolol           VTE Pharmacologic Prophylaxis: VTE Score: 7 High Risk (Score >/= 5) - Pharmacological DVT Prophylaxis Contraindicated. Sequential Compression Devices Ordered.  Code Status: Level 1 - Full Code   Discussion with family:  Update in AM.     Anticipated Length of Stay: Patient will be admitted on an inpatient basis with an anticipated length of stay of greater than 2 midnights secondary to left femoral neck fracture, multifocal pneumonia.    Total Time Spent on Date of Encounter in care of patient: 75 mins. This time was spent on one or more of the following: performing physical exam; counseling and coordination of care; obtaining or reviewing history; documenting in the medical record; reviewing/ordering tests, medications or procedures; communicating with other healthcare professionals and discussing with patient's family/caregivers.    Chief Complaint: Pt sustained mechanical fall at facility    History of Present Illness:  Dangelo Gunter is a 83 y.o. female who presents with mechanical fall leading to left femoral neck fracture and multifocal pneumonia.  History limited from patient as patient only alert and oriented to person and place.  Per facility, patient sustained a mechanical fall while being assisted by staff.  Staff was able to assist patient to the ground however patient did land on her left side.  No reported head strike and patient is not anticoagulated.  While in the ED it was incidentally found that patient was hypoxic.  Chest x-ray with multiple bilateral infiltrates confirmed by CT scan to be multifocal pneumonia.    Review of Systems:  Review of Systems   Unable to perform ROS: Dementia       Past Medical and Surgical History:   Past Medical History:   Diagnosis Date    Anxiety     Gastroesophageal reflux disease without esophagitis 08/28/2018    Hyperlipidemia     Hypertension     Hypothyroidism     Mild dementia with agitation (HCC) 08/24/2020    Multifocal pneumonia  5/18/2024    Osteoarthritis 10/05/2012    Other osteoporosis without current pathological fracture 02/09/2023       Past Surgical History:   Procedure Laterality Date    CATARACT EXTRACTION      CHOLECYSTECTOMY      COLONOSCOPY      HYSTERECTOMY         Meds/Allergies:  Prior to Admission medications    Medication Sig Start Date End Date Taking? Authorizing Provider   QUEtiapine (SEROquel) 25 mg tablet Take 25 mg by mouth daily at bedtime 5/15/24  Yes Historical Provider, MD   amLODIPine (NORVASC) 2.5 mg tablet Take 1 tablet (2.5 mg total) by mouth daily 1/18/24   Parag Meyers MD   azilsartan medoxomil (Edarbi) 40 MG tablet Take 1 tablet (40 mg total) by mouth daily 8/30/23   Parag Meyers MD   Calcium Carbonate-Vit D-Min (Caltrate 600+D Plus Minerals) 600-800 MG-UNIT TABS Take 1 tablet by mouth 2 (two) times a day with meals  Patient not taking: Reported on 4/7/2024 11/16/23   Parag Meyers MD   Cholecalciferol (Vitamin D3) 20 MCG (800 UNIT) TABS Take 20 mcg by mouth 2 (two) times a day    Historical Provider, MD   denosumab (PROLIA) 60 mg/mL Inject 1 mL (60 mg total) under the skin once for 1 dose  Patient not taking: Reported on 4/11/2024 11/16/23 12/21/23  Parag Meyers MD   divalproex sodium (Depakote) 125 mg DR tablet Take 1 tablet (125 mg total) by mouth 2 (two) times a day Give at 12pm and 6pm daily 4/12/24   SVETA Gilbert   ergocalciferol (VITAMIN D2) 50,000 units Take 1 capsule (50,000 Units total) by mouth once a week  Patient not taking: Reported on 4/7/2024 9/12/23   Parag Meyers MD   escitalopram (LEXAPRO) 10 mg tablet Take 1 tablet (10 mg total) by mouth daily 4/9/24   Parag Meyers MD   L-Methylfolate-Z16-L7-S4 (Cerefolin) 6-1-50-5 MG TABS Take 1 tablet by mouth in the morning  Patient not taking: Reported on 3/22/2024 5/1/23   Parag Meyers MD   memantine (NAMENDA) 10 mg tablet Take 1 tablet (10 mg total) by mouth 2 (two) times a day 3/25/24   Parag Meyers MD   mirtazapine (REMERON) 7.5 MG tablet  "Take 1 tablet (7.5 mg total) by mouth daily at bedtime 4/18/24   SVETA Gilbert   nitrofurantoin (MACROBID) 100 mg capsule Take 1 capsule (100 mg total) by mouth daily with lunch 11/21/23   Parag Meyers MD   pantoprazole (PROTONIX) 40 mg tablet Take 1 tablet (40 mg total) by mouth daily 2/6/24   Parag Meyers MD   propranolol (INDERAL LA) 60 mg 24 hr capsule Take 1 capsule (60 mg total) by mouth daily Please stop Norvasc... 11/8/23   Parag Meyers MD   pyridoxine (VITAMIN B6) 100 mg tablet Take 1 tablet (100 mg total) by mouth daily  Patient not taking: Reported on 4/7/2024 4/3/24   Parag Meyers MD   rosuvastatin (CRESTOR) 10 MG tablet TAKE ONE TABLET (10 MG TOTAL) BY MOUTH DAILY 10/20/23   Parag Meyers MD     I have reveiwed home medications using records provided by [facility].    Allergies:   Allergies   Allergen Reactions    No Known Allergies        Social History:  Marital Status: /Civil Union   Patient Pre-hospital Living Situation: Skilled Nursing Facility: [facility]  Patient Pre-hospital Level of Mobility: walks with walker  Patient Pre-hospital Diet Restrictions: none  Substance Use History:   Social History     Substance and Sexual Activity   Alcohol Use No     Social History     Tobacco Use   Smoking Status Never   Smokeless Tobacco Never     Social History     Substance and Sexual Activity   Drug Use No       Family History:  Family History   Problem Relation Age of Onset    No Known Problems Mother     No Known Problems Father        Physical Exam:     Vitals:   Blood Pressure: 138/71 (05/18/24 0248)  Pulse: 71 (05/18/24 0248)  Temperature: 97.6 °F (36.4 °C) (05/18/24 0248)  Temp Source: Temporal (05/18/24 0248)  Respirations: 16 (05/18/24 0248)  Height: 5' 6\" (167.6 cm) (05/17/24 2153)  Weight - Scale: 74 kg (163 lb 2.3 oz) (05/18/24 0248)  SpO2: 95 % (05/18/24 0248)    Physical Exam  Vitals and nursing note reviewed.   Constitutional:       General: She is not in acute distress.   "   Appearance: She is well-developed.   HENT:      Head: Normocephalic and atraumatic.      Nose: Nose normal. No congestion.      Mouth/Throat:      Mouth: Mucous membranes are dry.      Pharynx: Oropharynx is clear.   Eyes:      Conjunctiva/sclera: Conjunctivae normal.   Cardiovascular:      Rate and Rhythm: Normal rate and regular rhythm.      Heart sounds: Normal heart sounds. No murmur heard.     No friction rub. No gallop.   Pulmonary:      Effort: Pulmonary effort is normal. No respiratory distress.      Breath sounds: Rales present. No wheezing or rhonchi.   Abdominal:      General: Bowel sounds are normal. There is no distension.      Palpations: Abdomen is soft.      Tenderness: There is no abdominal tenderness.   Musculoskeletal:         General: Tenderness (left hip) and deformity (LLE externally rotated) present.      Cervical back: Neck supple.      Right lower leg: No edema.      Left lower leg: No edema.   Skin:     General: Skin is warm and dry.      Capillary Refill: Capillary refill takes less than 2 seconds.   Neurological:      General: No focal deficit present.      Mental Status: She is alert. Mental status is at baseline.      Comments: AAOx2, person and place   Psychiatric:         Mood and Affect: Mood normal.         Behavior: Behavior normal.          Additional Data:     Lab Results:  Results from last 7 days   Lab Units 05/17/24  2248   WBC Thousand/uL 7.44   HEMOGLOBIN g/dL 11.0*   HEMATOCRIT % 33.7*   PLATELETS Thousands/uL 235   SEGS PCT % 65   LYMPHO PCT % 20   MONO PCT % 10   EOS PCT % 3     Results from last 7 days   Lab Units 05/17/24  2248   SODIUM mmol/L 141   POTASSIUM mmol/L 4.0   CHLORIDE mmol/L 105   CO2 mmol/L 27   BUN mg/dL 29*   CREATININE mg/dL 1.39*   ANION GAP mmol/L 9   CALCIUM mg/dL 9.5   ALBUMIN g/dL 3.5   TOTAL BILIRUBIN mg/dL 0.37   ALK PHOS U/L 39   ALT U/L 103*   AST U/L 74*   GLUCOSE RANDOM mg/dL 97     Results from last 7 days   Lab Units 05/17/24  2248   INR   1.06             Results from last 7 days   Lab Units 05/17/24  2248   PROCALCITONIN ng/ml 0.12       Lines/Drains:  Invasive Devices       Peripheral Intravenous Line  Duration             Peripheral IV 05/17/24 Left Antecubital <1 day    Peripheral IV 05/18/24 Dorsal (posterior);Right Forearm <1 day              Drain  Duration             Urethral Catheter Latex 16 Fr. <1 day                  Urinary Catheter:  Goal for removal: Voiding trial when ambulation improves             Imaging: Reviewed radiology reports from this admission including: chest CT scan and Personally reviewed the following imaging: chest xray and xray(s)  CT chest without contrast   Final Result by Levi Power MD (05/18 0119)      Extensive patchy bilateral upper and lower lobe infiltrates suspicious for multifocal bronchopneumonia. Correlation with the patient's symptoms and laboratory studies is recommended. A short-term follow-up CT chest in 6 to 8 weeks following treatment is    recommended to assess for improvement/resolution and exclude an underlying lesion.               Workstation performed: [id number]         XR chest 1 view portable   ED Interpretation by Alphonse Gomez MD (05/17 2440)   Multiple bilateral infiltrates.      XR hip/pelv 2-3 vws left   ED Interpretation by Alphonse Gomez MD (05/18 0023)   Left femoral neck fracture.  No pelvis fracture.          EKG and Other Studies Reviewed on Admission:   EKG: NSR. HR 77.    ** Please Note: This note has been constructed using a voice recognition system. **

## 2024-05-19 LAB
ANION GAP SERPL CALCULATED.3IONS-SCNC: 10 MMOL/L (ref 4–13)
BASOPHILS # BLD AUTO: 0.07 THOUSANDS/ÂΜL (ref 0–0.1)
BASOPHILS NFR BLD AUTO: 1 % (ref 0–1)
BUN SERPL-MCNC: 24 MG/DL (ref 5–25)
CALCIUM SERPL-MCNC: 8.5 MG/DL (ref 8.4–10.2)
CHLORIDE SERPL-SCNC: 106 MMOL/L (ref 96–108)
CO2 SERPL-SCNC: 23 MMOL/L (ref 21–32)
CREAT SERPL-MCNC: 1.08 MG/DL (ref 0.6–1.3)
EOSINOPHIL # BLD AUTO: 0.38 THOUSAND/ÂΜL (ref 0–0.61)
EOSINOPHIL NFR BLD AUTO: 4 % (ref 0–6)
ERYTHROCYTE [DISTWIDTH] IN BLOOD BY AUTOMATED COUNT: 15.4 % (ref 11.6–15.1)
GFR SERPL CREATININE-BSD FRML MDRD: 47 ML/MIN/1.73SQ M
GLUCOSE SERPL-MCNC: 91 MG/DL (ref 65–140)
HCT VFR BLD AUTO: 31.8 % (ref 34.8–46.1)
HGB BLD-MCNC: 10.5 G/DL (ref 11.5–15.4)
IMM GRANULOCYTES # BLD AUTO: 0.04 THOUSAND/UL (ref 0–0.2)
IMM GRANULOCYTES NFR BLD AUTO: 0 % (ref 0–2)
LYMPHOCYTES # BLD AUTO: 1.21 THOUSANDS/ÂΜL (ref 0.6–4.47)
LYMPHOCYTES NFR BLD AUTO: 13 % (ref 14–44)
MAGNESIUM SERPL-MCNC: 1.9 MG/DL (ref 1.9–2.7)
MCH RBC QN AUTO: 31.3 PG (ref 26.8–34.3)
MCHC RBC AUTO-ENTMCNC: 33 G/DL (ref 31.4–37.4)
MCV RBC AUTO: 95 FL (ref 82–98)
MONOCYTES # BLD AUTO: 0.94 THOUSAND/ÂΜL (ref 0.17–1.22)
MONOCYTES NFR BLD AUTO: 10 % (ref 4–12)
NEUTROPHILS # BLD AUTO: 6.4 THOUSANDS/ÂΜL (ref 1.85–7.62)
NEUTS SEG NFR BLD AUTO: 72 % (ref 43–75)
NRBC BLD AUTO-RTO: 0 /100 WBCS
PLATELET # BLD AUTO: 198 THOUSANDS/UL (ref 149–390)
PMV BLD AUTO: 11.3 FL (ref 8.9–12.7)
POTASSIUM SERPL-SCNC: 3.7 MMOL/L (ref 3.5–5.3)
PROCALCITONIN SERPL-MCNC: 0.14 NG/ML
RBC # BLD AUTO: 3.36 MILLION/UL (ref 3.81–5.12)
SODIUM SERPL-SCNC: 139 MMOL/L (ref 135–147)
WBC # BLD AUTO: 9.04 THOUSAND/UL (ref 4.31–10.16)

## 2024-05-19 PROCEDURE — 85025 COMPLETE CBC W/AUTO DIFF WBC: CPT | Performed by: HOSPITALIST

## 2024-05-19 PROCEDURE — NC001 PR NO CHARGE: Performed by: PHYSICIAN ASSISTANT

## 2024-05-19 PROCEDURE — 99232 SBSQ HOSP IP/OBS MODERATE 35: CPT | Performed by: HOSPITALIST

## 2024-05-19 PROCEDURE — 83735 ASSAY OF MAGNESIUM: CPT | Performed by: HOSPITALIST

## 2024-05-19 PROCEDURE — 80048 BASIC METABOLIC PNL TOTAL CA: CPT | Performed by: HOSPITALIST

## 2024-05-19 PROCEDURE — 87081 CULTURE SCREEN ONLY: CPT | Performed by: HOSPITALIST

## 2024-05-19 PROCEDURE — 84145 PROCALCITONIN (PCT): CPT

## 2024-05-19 RX ORDER — ENOXAPARIN SODIUM 100 MG/ML
30 INJECTION SUBCUTANEOUS
Status: DISCONTINUED | OUTPATIENT
Start: 2024-05-19 | End: 2024-05-21

## 2024-05-19 RX ADMIN — AZITHROMYCIN MONOHYDRATE 500 MG: 500 INJECTION, POWDER, LYOPHILIZED, FOR SOLUTION INTRAVENOUS at 01:34

## 2024-05-19 RX ADMIN — LOSARTAN POTASSIUM 50 MG: 50 TABLET, FILM COATED ORAL at 10:13

## 2024-05-19 RX ADMIN — DIVALPROEX SODIUM 125 MG: 125 TABLET, DELAYED RELEASE ORAL at 10:13

## 2024-05-19 RX ADMIN — AMLODIPINE BESYLATE 2.5 MG: 2.5 TABLET ORAL at 10:13

## 2024-05-19 RX ADMIN — PANTOPRAZOLE SODIUM 40 MG: 40 TABLET, DELAYED RELEASE ORAL at 06:26

## 2024-05-19 RX ADMIN — Medication 2.5 MG: at 11:58

## 2024-05-19 RX ADMIN — DIVALPROEX SODIUM 125 MG: 125 TABLET, DELAYED RELEASE ORAL at 17:39

## 2024-05-19 RX ADMIN — CEFEPIME 2000 MG: 2 INJECTION, POWDER, FOR SOLUTION INTRAVENOUS at 11:59

## 2024-05-19 RX ADMIN — PROPRANOLOL HYDROCHLORIDE 60 MG: 60 CAPSULE, EXTENDED RELEASE ORAL at 10:14

## 2024-05-19 RX ADMIN — MEMANTINE 10 MG: 5 TABLET ORAL at 10:13

## 2024-05-19 RX ADMIN — ENOXAPARIN SODIUM 30 MG: 30 INJECTION SUBCUTANEOUS at 11:59

## 2024-05-19 RX ADMIN — ESCITALOPRAM OXALATE 10 MG: 10 TABLET ORAL at 10:13

## 2024-05-19 RX ADMIN — PRAVASTATIN SODIUM 80 MG: 80 TABLET ORAL at 17:39

## 2024-05-19 RX ADMIN — OXYCODONE 5 MG: 5 TABLET ORAL at 17:39

## 2024-05-19 RX ADMIN — MEMANTINE 10 MG: 5 TABLET ORAL at 17:39

## 2024-05-19 NOTE — PLAN OF CARE
Problem: Prexisting or High Potential for Compromised Skin Integrity  Goal: Skin integrity is maintained or improved  Description: INTERVENTIONS:  - Identify patients at risk for skin breakdown  - Assess and monitor skin integrity  - Assess and monitor nutrition and hydration status  - Monitor labs   - Assess for incontinence   - Turn and reposition patient  - Assist with mobility/ambulation  - Relieve pressure over bony prominences  - Avoid friction and shearing  - Provide appropriate hygiene as needed including keeping skin clean and dry  - Evaluate need for skin moisturizer/barrier cream  - Collaborate with interdisciplinary team   - Patient/family teaching  - Consider wound care consult   Outcome: Progressing     Problem: PAIN - ADULT  Goal: Verbalizes/displays adequate comfort level or baseline comfort level  Description: Interventions:  - Encourage patient to monitor pain and request assistance  - Assess pain using appropriate pain scale  - Administer analgesics based on type and severity of pain and evaluate response  - Implement non-pharmacological measures as appropriate and evaluate response  - Consider cultural and social influences on pain and pain management  - Notify physician/advanced practitioner if interventions unsuccessful or patient reports new pain  Outcome: Progressing     Problem: SAFETY ADULT  Goal: Patient will remain free of falls  Description: INTERVENTIONS:  - Educate patient/family on patient safety including physical limitations  - Instruct patient to call for assistance with activity   - Consult OT/PT to assist with strengthening/mobility   - Keep Call bell within reach  - Keep bed low and locked with side rails adjusted as appropriate  - Keep care items and personal belongings within reach  - Initiate and maintain comfort rounds  - Make Fall Risk Sign visible to staff  - Offer Toileting every 2 Hours, in advance of need  - Initiate/Maintain bed alarm  - Obtain necessary fall risk  management equipment: walker  - Apply yellow socks and bracelet for high fall risk patients  - Consider moving patient to room near nurses station  Outcome: Progressing     Problem: CONFUSION/THOUGHT DISTURBANCE  Goal: Thought disturbances (confusion, delirium, depression, dementia or psychosis) are managed to maintain or return to baseline mental status and functional level  Description: INTERVENTIONS:  - Assess for possible contributors to  thought disturbance, including but not limited to medications, infection, impaired vision or hearing, underlying metabolic abnormalities, dehydration, respiratory compromise,  psychiatric diagnoses and notify attending PHYSICAN/AP  - Monitor and intervene to maintain adequate nutrition, hydration, elimination, sleep and activity  - Decrease environmental stimuli, including noise as appropriate.  - Provide frequent contacts to provide refocusing, direction and reassurance as needed. Approach patient calmly with eye contact and at their level.  - [place] high risk fall precautions, aspiration precautions and other safety measures, as indicated  - If delirium suspected, notify physician/AP of change in condition and request immediate in-person evaluation  - Pursue consults as appropriate including Geriatric (campus dependent), OT for cognitive evaluation/activity planning, psychiatric, pastoral care, etc.  Outcome: Progressing     Problem: GENITOURINARY - ADULT  Goal: Urinary catheter remains patent  Description: INTERVENTIONS:  - Assess patency of urinary catheter  - If patient has a chronic carpenter, consider changing catheter if non-functioning  - Follow guidelines for intermittent irrigation of non-functioning urinary catheter  Outcome: Progressing

## 2024-05-19 NOTE — PROGRESS NOTES
Orthopedics  Miguelandi Gunter 83 y.o. female MRN: [id number]  Unit/Bed#: E2 -01        Subjective:    83 y.o.female seen and examined at the bedside.  At rest in bed she has minimal pain L hip.  No numbness/tingling in the LLE.  She is SOB whenever head of bed is raised.            Objective:    Labs:  0   Lab Value Date/Time    HCT 31.8 (L) 05/19/2024 0450    HCT 34.3 (L) 05/18/2024 0507    HCT 33.7 (L) 05/17/2024 2248    HGB 10.5 (L) 05/19/2024 0450    HGB 11.3 (L) 05/18/2024 0507    HGB 11.0 (L) 05/17/2024 2248    INR 1.06 05/17/2024 2248    WBC 9.04 05/19/2024 0450    WBC 10.43 (H) 05/18/2024 0507    WBC 7.44 05/17/2024 2248    ESR 41 (H) 02/29/2024 1642    CRP <3.0 11/05/2021 0748       Meds:    Current Facility-Administered Medications:     acetaminophen (TYLENOL) tablet 650 mg, 650 mg, Oral, Q6H PRN, Kevin Shah PA-C, 650 mg at 05/18/24 1513    amLODIPine (NORVASC) tablet 2.5 mg, 2.5 mg, Oral, Daily, Kevin Shah PA-C, 2.5 mg at 05/19/24 1013    azithromycin (ZITHROMAX) 500 mg in sodium chloride 0.9 % 250 mL IVPB, 500 mg, Intravenous, Q24H, Kevin Shah PA-C, Last Rate: 250 mL/hr at 05/19/24 0134, 500 mg at 05/19/24 0134    benzonatate (TESSALON PERLES) capsule 100 mg, 100 mg, Oral, TID PRN, Kevin Shah PA-C    cefepime (MAXIPIME) 2 g/50 mL dextrose IVPB, 2,000 mg, Intravenous, Q12H, Kevin Shah PA-C, Last Rate: 100 mL/hr at 05/19/24 1159, 2,000 mg at 05/19/24 1159    divalproex sodium (DEPAKOTE) DR tablet 125 mg, 125 mg, Oral, BID, Kevin Shah PA-C, 125 mg at 05/19/24 1013    enoxaparin (LOVENOX) subcutaneous injection 30 mg, 30 mg, Subcutaneous, Q24H [facility], [facility], DO, 30 mg at 05/19/24 1159    escitalopram (LEXAPRO) tablet 10 mg, 10 mg, Oral, Daily, Kevin Shah PA-C, 10 mg at 05/19/24 1013    losartan (COZAAR) tablet 50 mg, 50 mg, Oral, Daily, Kevin Shah PA-C, 50 mg at 05/19/24 1013    memantine (NAMENDA) tablet 10 mg, 10 mg, Oral, BID, Kevin  "Berta Shah PA-C, 10 mg at 05/19/24 1013    mirtazapine (REMERON) tablet 7.5 mg, 7.5 mg, Oral, HS, Kevin Shah PA-C, 7.5 mg at 05/18/24 2042    oxyCODONE (ROXICODONE) IR tablet 5 mg, 5 mg, Oral, Q6H PRN, Kevin Shah PA-C    oxyCODONE (ROXICODONE) split tablet 2.5 mg, 2.5 mg, Oral, Q6H PRN, Kevin Shah PA-C, 2.5 mg at 05/19/24 1158    pantoprazole (PROTONIX) EC tablet 40 mg, 40 mg, Oral, Daily Before Breakfast, Kevin Shah PA-C, 40 mg at 05/19/24 0626    pravastatin (PRAVACHOL) tablet 80 mg, 80 mg, Oral, Daily With Dinner, Kevin Shah PA-C, 80 mg at 05/18/24 1726    propranolol (INDERAL LA) 24 hr capsule 60 mg, 60 mg, Oral, Daily, Kevin Shah PA-C, 60 mg at 05/19/24 1014    QUEtiapine (SEROquel) tablet 25 mg, 25 mg, Oral, HS, Kevin Shah PA-C, 25 mg at 05/18/24 2042    Blood Culture:   Lab Results   Component Value Date    BLOODCX No Growth at 24 hrs. 05/18/2024       Wound Culture:   No results found for: \"WOUNDCULT\"    Ins and Outs:  I/O last 24 hours:  In: -   Out: 1100 [Urine:1100]      Orthopedic Physical Exam:    Vitals:    05/19/24 0748   BP: 141/85   Pulse: 76   Resp: 18   Temp: 98.7 °F (37.1 °C)   SpO2: 94%   Lying flat in bed, NAD.  NC oxygen in placed.  No active labored breathing.  left Lower Extremity extremity:  L hip no open wounds  Visible skin no erythema or ecchymosis  + TTP L hip  5/5 strength with ankle DF/PF, EHL/FHL  SILT   No calf tenderness or pitting edema  Toes warm, sensate, mobile    _*_*_*_*_*_*_*_*_*_*_*_*_*_*_*_*_*_*_*_*_*_*_*_*_*_*_*_*_*_*_*_*_*_*_*_*_*_*_*_*_*    Assessment:     83 y.o.female s/p fall with left femoral neck fracture requiring surgical intervention.      Plan:    Non weight bearing left lower extremity  To OR for L hip hemiarthroplasty when medically stable and cleared.  Not cleared for OR today per medicine and anesthesia given bilateral active multi-lobar pneumonia treated with IV abx and hypoxia requiring supplemental " oxygen.  Informed consent obtained over the phone by POA and was placed in OR holding area.  DVT prophylaxis - Lovenox.  Will need to be held prior to surgery once a date is set.  Analgesics  Medicine primary service   (POA) updated at bedside today.  Dispo: Ortho will follow and check in daily with medicine for clearance updates.  NPO midnight order in place.          Ike Downing PA-C

## 2024-05-19 NOTE — PLAN OF CARE
Problem: PAIN - ADULT  Goal: Verbalizes/displays adequate comfort level or baseline comfort level  Description: Interventions:  - Encourage patient to monitor pain and request assistance  - Assess pain using appropriate pain scale  - Administer analgesics based on type and severity of pain and evaluate response  - Implement non-pharmacological measures as appropriate and evaluate response  - Consider cultural and social influences on pain and pain management  - Notify physician/advanced practitioner if interventions unsuccessful or patient reports new pain  5/18/2024 2003 by Janice Fabian RN  Outcome: Progressing  5/18/2024 2002 by Janice Fabian RN  Outcome: Progressing  5/18/2024 0608 by Janice Fabian RN  Outcome: Progressing     Problem: SAFETY ADULT  Goal: Patient will remain free of falls  Description: INTERVENTIONS:  - Educate patient/family on patient safety including physical limitations  - Instruct patient to call for assistance with activity   - Consult OT/PT to assist with strengthening/mobility   - Keep Call bell within reach  - Keep bed low and locked with side rails adjusted as appropriate  - Keep care items and personal belongings within reach  - Initiate and maintain comfort rounds  - Make Fall Risk Sign visible to staff  - Offer Toileting every 2 Hours, in advance of need  - Initiate/Maintain bed alarm  - Apply yellow socks and bracelet for high fall risk patients  - Consider moving patient to room near nurses station  5/18/2024 2003 by Janice Fabian RN  Outcome: Progressing  5/18/2024 2002 by Janice Fabian RN  Outcome: Progressing  5/18/2024 0608 by Janice Fabian RN  Outcome: Progressing     Problem: CONFUSION/THOUGHT DISTURBANCE  Goal: Thought disturbances (confusion, delirium, depression, dementia or psychosis) are managed to maintain or return to baseline mental status and functional level  Description: INTERVENTIONS:  - Assess for possible contributors to  thought  disturbance, including but not limited to medications, infection, impaired vision or hearing, underlying metabolic abnormalities, dehydration, respiratory compromise,  psychiatric diagnoses and notify attending PHYSICAN/AP  - Monitor and intervene to maintain adequate nutrition, hydration, elimination, sleep and activity  - Decrease environmental stimuli, including noise as appropriate.  - Provide frequent contacts to provide refocusing, direction and reassurance as needed. Approach patient calmly with eye contact and at their level.  - [place] high risk fall precautions, aspiration precautions and other safety measures, as indicated  - If delirium suspected, notify physician/AP of change in condition and request immediate in-person evaluation  - Pursue consults as appropriate including Geriatric (campus dependent), OT for cognitive evaluation/activity planning, psychiatric, pastoral care, etc.  Outcome: Progressing     Problem: GENITOURINARY - ADULT  Goal: Urinary catheter remains patent  Description: INTERVENTIONS:  - Assess patency of urinary catheter  - If patient has a chronic carpenter, consider changing catheter if non-functioning  - Follow guidelines for intermittent irrigation of non-functioning urinary catheter  Outcome: Progressing

## 2024-05-19 NOTE — NURSING NOTE
Ortho to floor today, spoke with  and pt, will likely do sx middle of this week d/t recent dx of PNA. Pt put back on regular diet and encouraged to drink fluids. NWB at this time, Oxy 2.5mg and ice applied to L hip area. Spoke with ortho and SLIM pt not to be NPO at this time. ABT via IV continues.

## 2024-05-19 NOTE — OCCUPATIONAL THERAPY NOTE
Occupational Therapy Cancel Note:    Patient Name: Dangelo Gunter  Today's Date: 5/19/2024    OT consult received. Chart reviewed. Pt admitted w/ L femoral neck fracture. Spoke to RN. Pt pending medical clearance for OR as pt currently being treated for PNA. Will cx OT orders at this time and request new orders as medically appropriate post-op.    Grisel Cheek, OTR/L

## 2024-05-19 NOTE — ASSESSMENT & PLAN NOTE
Came in for the hip fracture but was found to be hypoxic in the ER and found to have multifocal pneumonia    Not really having symptoms though.  No productive cough.  But she still requiring 4 L oxygen.    She is on cefepime and Zithromax    She is on 4 L oxygen, would like to try and wean that off in the next day or 2 prior to attempting left hip fracture repair

## 2024-05-19 NOTE — UTILIZATION REVIEW
Initial Clinical Review    Admission: Date/Time/Statement:   Admission Orders (From admission, onward)       Ordered        05/18/24 0136  INPATIENT ADMISSION  Once                          Orders Placed This Encounter   Procedures    INPATIENT ADMISSION     Standing Status:   Standing     Number of Occurrences:   1     Order Specific Question:   Level of Care     Answer:   Med Surg [16]     Order Specific Question:   Estimated length of stay     Answer:   More than 2 Midnights     Order Specific Question:   Certification     Answer:   I certify that inpatient services are medically necessary for this patient for a duration of greater than two midnights. See H&P and MD Progress Notes for additional information about the patient's course of treatment.     ED Arrival Information       Expected   -    Arrival   5/17/2024 21:45    Acuity   Urgent              Means of arrival   Ambulance    Escorted by   [facility]    Service   Hospitalist    Admission type   Emergency              Arrival complaint   Fall             Chief Complaint   Patient presents with    Fall     Pt arrives via ems from [facility] dementia unit. Per staff pt had a witnessed mechanical fall and was assisted to the floor. -headstrike, -thinners, -loc. Pt c/o left hip pain.        Initial Presentation: 83 y.o. female pmh Dementia  uses a walker at baseline, HTN, HLD to ED by EMS from Nursing facility s/p mechanical fall witnessed by Staff. Able to be guided to floor wo head strike; no AC @ baseline. Landed on L hip   EXAM  AA x2-baseline, Hypoxic requiring 4 L NC, multifocal pneumonia seen on CT, CXR w multiple ban infiltrates.Rales. XR hip/pelvis Left femoral neck fracture. LLE externally rotated; Labs pro yanira elevated. Given triple IV antibx, IV Morphine Toradol, IV Lasix, TXE.  PLAN Inpatient admission due to L FX femoral neck, multifocal PNA, acute respiratory failure w hypoxia. Cont IV cefepime and azithromycin,  supplemental O2, gentle IVF, obtain urine studies & sputum CX. Consult ORTHO, pain management, NWB; cont PTA meds. Patient ought be off O2 prior to attempting surgery  ORTHO  L hip pain w FX left femoral neck   Non weight bearing left lower extremity.  To OR for L hip hemiarthroplasty when medically stable and cleared.  Not cleared for OR today.    Will make NPO at midnight and follow-up in the AM.  Analgesics for pain.  Anticipated Length of Stay/Certification Statement:  admitted on an inpatient basis with an anticipated length of stay of greater than 2 midnights secondary to left femoral neck fracture, multifocal pneumonia.   Date: 5/19/2024   Day 2:   Attempt  to wean her to 2 L but oxygen saturation dropped to 89% On 4 L NC, anesthesia prefers respiratory status much better prior to trying to repair her hip fracture   very comfortable as long as she lays still. Minimal pain in her hip. Cont supplemental O2, Cefepime & azithromycin  Date: 5/20/2024  Day 3: Has surpassed a 2nd midnight with active treatments and services.  Patient has crossed 3 midnights and requires ongoing care  Presents with  s/p mechanical fall witnessed by Staff, + hip pain  On exam on supplemental O2  Abnormal labs or imaging  Diagnosis/Plan      Multifocal pneumonia/pneumonitis Continue supplemental oxygen with nasal cannula and wean as able to keep O2 sat greater than 90%. atypical for this to be considered an infection and therefore an alternative diagnosis must be evaluated   Differential diagnosis would include aspiration pneumonitis versus atypical or viral pneumonia.  Additionally given the fracture, this could also be suggested of fat embolism syndrome  Check an RP 2 panel, strep and Legionella urine antigens  Continue antibiotics for now  Check a urine analysis and specifically request that microscopy be analyzed for lipid droplets  In terms of her ORTHO surgery, these consolidations are not likely to resolve within days and will  likely require weeks.  I doubt that surgery can be delayed that long.  Options would include empirically treating the pneumonia for 5 days and then proceeding with surgery if there is no progression in her oxygen requirements or if this can be performed with a nerve block.  Regardless she will be at at least moderate risk for perioperative pulmonary complications  ED Triage Vitals   Temperature Pulse Respirations Blood Pressure SpO2   05/17/24 2153 05/17/24 2153 05/17/24 2153 05/17/24 2153 05/17/24 2153   98.7 °F (37.1 °C) 74 18 143/68 (!) 89 %      Temp Source Heart Rate Source Patient Position - Orthostatic VS BP Location FiO2 (%)   05/17/24 2153 05/17/24 2153 05/17/24 2318 05/17/24 2318 --   Oral Monitor Lying Right arm       Pain Score       05/17/24 2248       9          Wt Readings from Last 1 Encounters:   05/18/24 70.6 kg (155 lb 10.3 oz)     Additional Vital Signs:   Date/Time Temp Pulse Resp BP MAP (mmHg) SpO2 Calculated FIO2 (%) - Nasal Cannula Nasal Cannula O2 Flow Rate (L/min) O2 Device Patient Position - Orthostatic VS   05/19/24 0748 98.7 °F (37.1 °C) 76 18 141/85 109 94 % -- -- -- Lying   05/18/24 2159 97.6 °F (36.4 °C) 74 16 158/75 108 94 % 36 4 L/min Nasal cannula Lying   05/18/24 1534 98.2 °F (36.8 °C) 76 18 130/65 90 90 % 36 4 L/min Nasal cannula Lying   05/18/24 0722 99.2 °F (37.3 °C) 82 17 142/66 95 96 % 36 4 L/min Nasal cannula Lying   05/18/24 0248 97.6 °F (36.4 °C) 71 16 138/71 -- 95 % 36 4 L/min Nasal cannula Lying   05/18/24 0148 -- 76 22 119/80 84 96 % 44 6 L/min Nasal cannula Lying   05/18/24 0050 -- -- 18 131/60 -- 93 % 44 6 L/min Nasal cannula --   05/17/24 2318 -- 73 17 144/68 98 96 % 36 4 L/min Nasal cannula Lying   05/17/24 2300 -- 78 18 -- -- 88 % Abnormal  28 2 L/min Nasal cannula --   05/17/24 2156 -- -- -- -- -- 95 % 28 2 L/min Nasal cannula --   05/17/24 2153 98.7 °F (37.1 °C) 74 18 143/68 -- 89 % Abnormal  -- -- None (Room air) --     Weights (last 14 days)    Date/Time Weight  "Weight Method Height   05/18/24 0600 70.6 kg (155 lb 10.3 oz) Bed scale --   05/18/24 0355 70.3 kg (154 lb 15.7 oz) Bed scale --   05/18/24 0248 70.3 kg (154 lb 15.7 oz) Bed scale --   05/17/24 2153 -- -- 5' 6\" (1.676 m)     Pertinent Labs/Diagnostic Test Results:   5/17 EKG NSR  CT chest without contrast   Final Result by Levi Power MD (05/18 0119)      Extensive patchy bilateral upper and lower lobe infiltrates suspicious for multifocal bronchopneumonia. Correlation with the patient's symptoms and laboratory studies is recommended. A short-term follow-up CT chest in 6 to 8 weeks following treatment is    recommended to assess for improvement/resolution and exclude an underlying lesion.               Workstation performed: [id number]         XR chest 1 view portable   ED Interpretation by Alphonse Gomez MD (05/17 2348)   Multiple bilateral infiltrates.      Final Result by Antonia Watts MD (05/18 1037)      Multifocal bilateral lung opacity, new from February 2024, likely infectious or inflammatory. See subsequent chest CT.            Workstation performed: [id number]         XR hip/pelv 2-3 vws left   ED Interpretation by Alphonse Gomez MD (05/18 0023)   Left femoral neck fracture.  No pelvis fracture.      Final Result by Aris Sanchez MD (05/18 1502)   Left femoral neck impacted fracture.      Findings concur with ED results as provided in PACS viewer/EPIC at the time of interpretation.            Workstation performed: [id number]           Results from last 7 days   Lab Units 05/18/24  0026   SARS-COV-2  Negative     Results from last 7 days   Lab Units 05/20/24  0441 05/19/24  0450 05/18/24  0507 05/17/24  2248   WBC Thousand/uL 9.18 9.04 10.43* 7.44   HEMOGLOBIN g/dL 9.8* 10.5* 11.3* 11.0*   HEMATOCRIT % 30.3* 31.8* 34.3* 33.7*   PLATELETS Thousands/uL 194 198 240 235   TOTAL NEUT ABS Thousands/µL 6.42 6.40 7.52 4.90         Results from last 7 days   Lab Units 05/20/24  0441 05/19/24  0450 " "05/18/24  0507 05/17/24  2248   SODIUM mmol/L 140 139 142 141   POTASSIUM mmol/L 3.7 3.7 3.6 4.0   CHLORIDE mmol/L 107 106 106 105   CO2 mmol/L 27 23 26 27   ANION GAP mmol/L 6 10 10 9   BUN mg/dL 21 24 25 29*   CREATININE mg/dL 1.00 1.08 1.25 1.39*   EGFR ml/min/1.73sq m 51 47 39 34   CALCIUM mg/dL 8.2* 8.5 9.4 9.5   MAGNESIUM mg/dL 1.9 1.9 1.8*  --      Results from last 7 days   Lab Units 05/18/24  0507 05/17/24  2248   AST U/L 83* 74*   ALT U/L 116* 103*   ALK PHOS U/L 46 39   TOTAL PROTEIN g/dL 6.9 6.5   ALBUMIN g/dL 3.8 3.5   TOTAL BILIRUBIN mg/dL 0.44 0.37         Results from last 7 days   Lab Units 05/20/24  0441 05/19/24  0450 05/18/24  0507 05/17/24  2248   GLUCOSE RANDOM mg/dL 95 91 93 97             No results found for: \"BETA-HYDROXYBUTYRATE\"                           Results from last 7 days   Lab Units 05/17/24  2248   PROTIME seconds 14.0   INR  1.06   PTT seconds 30         Results from last 7 days   Lab Units 05/19/24  0450 05/17/24  2248   PROCALCITONIN ng/ml 0.14 0.12                 Results from last 7 days   Lab Units 05/18/24  0026   BNP pg/mL 69                                     Results from last 7 days   Lab Units 05/20/24  1222   CLARITY UA  Clear   COLOR UA  Yellow   SPEC GRAV UA  1.033*   PH UA  6.0   GLUCOSE UA mg/dl Negative   KETONES UA mg/dl 10 (1+)*   BLOOD UA  Negative   PROTEIN UA mg/dl 50 (1+)*   NITRITE UA  Negative   BILIRUBIN UA  Negative   UROBILINOGEN UA (BE) mg/dl <2.0   LEUKOCYTES UA  Negative   WBC UA /hpf 2-4*   RBC UA /hpf 2-4*   BACTERIA UA /hpf None Seen   EPITHELIAL CELLS WET PREP /hpf Occasional   MUCUS THREADS  Innumerable*     Results from last 7 days   Lab Units 05/18/24  0547 05/18/24  0026   STREP PNEUMONIAE ANTIGEN, URINE  Negative  --    LEGIONELLA URINARY ANTIGEN  Negative  --    INFLUENZA A PCR   --  Negative   INFLUENZA B PCR   --  Negative   RSV PCR   --  Negative                             Results from last 7 days   Lab Units 05/18/24  0047 " 05/18/24  0026   BLOOD CULTURE  No Growth at 48 hrs. No Growth at 48 hrs.                   ED Treatment:   Medication Administration from 05/17/2024 2145 to 05/18/2024 0259         Date/Time Order Dose Route Action     05/17/2024 2156 EDT ketorolac (FOR EMS ONLY) (TORADOL) injection 15 mg 0 mg Does not apply Given to EMS     05/17/2024 2248 EDT morphine injection 2 mg 2 mg Intravenous Given     05/18/2024 0051 EDT furosemide (LASIX) injection 60 mg 60 mg Intravenous Given     05/18/2024 0129 EDT cefepime (MAXIPIME) 2 g/50 mL dextrose IVPB 0 mg Intravenous Stopped     05/18/2024 0059 EDT cefepime (MAXIPIME) 2 g/50 mL dextrose IVPB 2,000 mg Intravenous New Bag     05/18/2024 0209 EDT azithromycin (ZITHROMAX) 500 mg in sodium chloride 0.9 % 250 mL IVPB 500 mg Intravenous New Bag     05/18/2024 0212 EDT vancomycin (VANCOCIN) IVPB (premix in dextrose) 1,000 mg 200 mL 1,000 mg Intravenous New Bag     05/18/2024 0210 EDT tranexamic acid (CYKLOKAPRON) 1000-0.7 MG/100ML-% injection 1,000 mg 0 mg Intravenous Stopped     05/18/2024 0200 EDT tranexamic acid (CYKLOKAPRON) 1000-0.7 MG/100ML-% injection 1,000 mg 1,000 mg Intravenous New Bag          Past Medical History:   Diagnosis Date    Anxiety     Gastroesophageal reflux disease without esophagitis 08/28/2018    Hyperlipidemia     Hypertension     Hypothyroidism     Mild dementia with agitation ([facility]) 08/24/2020    Multifocal pneumonia 5/18/2024    Osteoarthritis 10/05/2012    Other osteoporosis without current pathological fracture 02/09/2023     Present on Admission:   (Resolved) Hyperlipidemia   Dementia with agitation (HCC)   Essential hypertension      Admitting Diagnosis: Left hip pain [M25.552]  Closed fracture of left hip, initial encounter ([facility]) [S72.002A]  Multifocal pneumonia [J18.9]  Age/Sex: 83 y.o. female  Admission Orders:  NC  Non weight bearing left lower extremity.    Scheduled Medications:  amLODIPine, 2.5 mg, Oral, Daily  azithromycin, 500 mg, Intravenous,  Q24H  cefepime, 2,000 mg, Intravenous, Q12H  divalproex sodium, 125 mg, Oral, BID  enoxaparin, 30 mg, Subcutaneous, Q24H JANETTE  escitalopram, 10 mg, Oral, Daily  losartan, 50 mg, Oral, Daily  memantine, 10 mg, Oral, BID  mirtazapine, 7.5 mg, Oral, HS  pantoprazole, 40 mg, Oral, Daily Before Breakfast  pravastatin, 80 mg, Oral, Daily With Dinner  propranolol, 60 mg, Oral, Daily  QUEtiapine, 25 mg, Oral, HS      Continuous IV Infusions:     PRN Meds:  acetaminophen, 650 mg, Oral, Q6H PRN  benzonatate, 100 mg, Oral, TID PRN  oxyCODONE, 5 mg, Oral, Q6H PRN  oxyCODONE, 2.5 mg, Oral, Q6H PRN        IP CONSULT TO ORTHOPEDIC SURGERY  IP CONSULT TO PULMONOLOGY    Network Utilization Review Department  ATTENTION: Please call with any questions or concerns to [phone] and carefully listen to the prompts so that you are directed to the right person. All voicemails are confidential.   For Discharge needs, contact Care Management DC Support Team at [phone] opt. 2  Send all requests for admission clinical reviews, approved or denied determinations and any other requests to dedicated fax number below belonging to the campus where the patient is receiving treatment. List of dedicated fax numbers for the Facilities:  FACILITY NAME UR FAX NUMBER   ADMISSION DENIALS (Administrative/Medical Necessity) [phone]   DISCHARGE SUPPORT TEAM (NETWORK) [phone]   PARENT CHILD HEALTH (Maternity/NICU/Pediatrics) [phone]   [facility] [phone]   [facility] [phone]   [facility] [phone]   [facility] [phone]   [facility] [phone]   [facility] [phone]   [facility] [phone]   [facility] [phone]   [facility] [phone]   Psychiatric hospital  [facility] [phone]   [facility] [phone]   [facility] [phone]

## 2024-05-19 NOTE — PHYSICAL THERAPY NOTE
Physical Therapy Cancellation Note           05/19/24 1020   PT Last Visit   PT Visit Date 05/19/24   Note Type   Note type Cancelled Session   Cancel Reasons Medical status   Additional Comments pt surgery pending medical clearance as pt currently being treated for pna. will dc orders and be available for re-consult post operatively as appropriate.       Alejandra Claudio, PT

## 2024-05-19 NOTE — ASSESSMENT & PLAN NOTE
Pt only oriented to person and place  Delirium precautions  Continue home regimen of lexapro, namenda, remeron, depakote, and seroquel     She is actually very calm.  The family has been taking turns staying with her.  We just have to keep her from trying to get out of bed with her dementia.  But she is doing very well in the hospital so far

## 2024-05-19 NOTE — ASSESSMENT & PLAN NOTE
Due to pneumonia.  Currently requiring 4 L oxygen  She has no history of any type of lung disease.    She does not chronically wear oxygen

## 2024-05-19 NOTE — PROGRESS NOTES
On license of [facility]  Progress Note  Name: Dangelo Gunter I  MRN: [id number]  Unit/Bed#: E2 -01 I Date of Admission: 5/17/2024   Date of Service: 5/19/2024 I Hospital Day: 1    Assessment & Plan   * Fracture of femoral neck, left (HCC)  Assessment & Plan  Patient with PMHx of dementia, HTN, and HLD presented to the ED from her facility, [facility], after sustaining a mechanical fall  Pt reportedly was assisted by staff when she fell. Staff was able to help guide patient to the floor. No reported head strike and patient not anticoagulated. Pt did land on her left hip.  XR left hip demonstrating fracture of left femoral neck, official read pending       We are having to hold off on hip fracture repair as she was noted to be hypoxic in the ER and found to have multifocal pneumonia    I spoke with anesthesia.  They would like her respiratory status much better prior to trying to repair her hip fracture    She is currently on 4 L oxygen    We would like her on much less oxygen if not on room air prior to doing surgery    She is very comfortable as long as she lays still.  Minimal pain in her hip    Multifocal pneumonia  Assessment & Plan  Came in for the hip fracture but was found to be hypoxic in the ER and found to have multifocal pneumonia    Not really having symptoms though.  No productive cough.  But she still requiring 4 L oxygen.    She is on cefepime and Zithromax    She is on 4 L oxygen, would like to try and wean that off in the next day or 2 prior to attempting left hip fracture repair    Acute respiratory failure with hypoxia (HCC)  Assessment & Plan  Due to pneumonia.  Currently requiring 4 L oxygen  She has no history of any type of lung disease.    She does not chronically wear oxygen    Dementia with agitation (HCC)  Assessment & Plan  Pt only oriented to person and place  Delirium precautions  Continue home regimen of lexapro, namenda, remeron, depakote, and  seroquel     She is actually very calm.  The family has been taking turns staying with her.  We just have to keep her from trying to get out of bed with her dementia.  But she is doing very well in the hospital so far               Subjective:   She is doing well.  Very calm with her daughter at the bedside.  She is not in any pain right now from the left hip as she is keeping it still.    No productive cough    She is still on 4 L oxygen.  They tried to wean her to 2 L but oxygen saturation dropped to 89%      Objective:     Vitals:   Temp (24hrs), Av.2 °F (36.8 °C), Min:97.6 °F (36.4 °C), Max:98.7 °F (37.1 °C)    Temp:  [97.6 °F (36.4 °C)-98.7 °F (37.1 °C)] 98.7 °F (37.1 °C)  HR:  [74-76] 76  Resp:  [16-18] 18  BP: (130-158)/(65-85) 141/85  SpO2:  [90 %-94 %] 94 %  Body mass index is 25.12 kg/m².     Input and Output Summary (last 24 hours):       Intake/Output Summary (Last 24 hours) at 2024 1133  Last data filed at 2024 1359  Gross per 24 hour   Intake --   Output 1100 ml   Net -1100 ml       Physical Exam:     Physical Exam  Vitals and nursing note reviewed.   HENT:      Head: Normocephalic and atraumatic.   Eyes:      Pupils: Pupils are equal, round, and reactive to light.   Cardiovascular:      Rate and Rhythm: Normal rate and regular rhythm.      Heart sounds: No murmur heard.     No friction rub. No gallop.   Pulmonary:      Effort: Pulmonary effort is normal.      Breath sounds: Normal breath sounds. No wheezing or rales.   Abdominal:      General: Bowel sounds are normal.      Palpations: Abdomen is soft.      Tenderness: There is no abdominal tenderness.   Musculoskeletal:      Right lower leg: No edema.      Left lower leg: No edema.       .       Additional Data:     Labs:    Results from last 7 days   Lab Units 24  0450   WBC Thousand/uL 9.04   HEMOGLOBIN g/dL 10.5*   HEMATOCRIT % 31.8*   PLATELETS Thousands/uL 198   SEGS PCT % 72   LYMPHO PCT % 13*   MONO PCT % 10   EOS PCT % 4      Results from last 7 days   Lab Units 05/19/24  0450 05/18/24  0507   POTASSIUM mmol/L 3.7 3.6   CHLORIDE mmol/L 106 106   CO2 mmol/L 23 26   BUN mg/dL 24 25   CREATININE mg/dL 1.08 1.25   CALCIUM mg/dL 8.5 9.4   ALK PHOS U/L  --  46   ALT U/L  --  116*   AST U/L  --  83*     Results from last 7 days   Lab Units 05/17/24  2248   INR  1.06                   * I Have Reviewed All Lab Data     Recent Cultures (last 7 days):     Results from last 7 days   Lab Units 05/18/24  0547 05/18/24  0047 05/18/24  0026   BLOOD CULTURE   --  No Growth at 24 hrs. No Growth at 24 hrs.   LEGIONELLA URINARY ANTIGEN  Negative  --   --          Last 24 Hours Medication List:   Current Facility-Administered Medications   Medication Dose Route Frequency Provider Last Rate    acetaminophen  650 mg Oral Q6H PRN Kevin Shah PA-C      amLODIPine  2.5 mg Oral Daily Kevin Shah, ERIN      azithromycin  500 mg Intravenous Q24H BESSIE Cardenas-C 500 mg (05/19/24 0134)    benzonatate  100 mg Oral TID PRN Kevin Shah PA-C      cefepime  2,000 mg Intravenous Q12H BESSIE Cardenas-C 2,000 mg (05/18/24 2336)    divalproex sodium  125 mg Oral BID Kevin Shah PA-C      escitalopram  10 mg Oral Daily Kevin Shah PA-C      losartan  50 mg Oral Daily Kevin Shah, ERIN      memantine  10 mg Oral BID Kevin Shah, PA-LEVY      mirtazapine  7.5 mg Oral HS Kevin Shah PA-C      oxyCODONE  5 mg Oral Q6H PRN Kevin Shah PA-C      oxyCODONE  2.5 mg Oral Q6H PRN Kevin Shah, ERIN      pantoprazole  40 mg Oral Daily Before Breakfast Kevin Shah, ERIN      pravastatin  80 mg Oral Daily With Dinner Kevin Shah, ERIN      propranolol  60 mg Oral Daily Kevin Shah, ERIN      QUEtiapine  25 mg Oral HS Kevin Shah, ERIN           VTE Pharmacologic Prophylaxis:   Pharmacologic: Enoxaparin (Lovenox)      Current Length of Stay: 1 day(s)    Current Patient Status: Inpatient       Discharge  Plan: greater than 48 hours.   Needs hip fracture repair likely mid week    Code Status: Level 1 - Full Code           Today, Patient Was Seen By: Christopher Sosa DO    ** Please Note: Dictation voice to text software may have been used in the creation of this document. **

## 2024-05-19 NOTE — ASSESSMENT & PLAN NOTE
Patient with PMHx of dementia, HTN, and HLD presented to the ED from her facility, [facility], after sustaining a mechanical fall  Pt reportedly was assisted by staff when she fell. Staff was able to help guide patient to the floor. No reported head strike and patient not anticoagulated. Pt did land on her left hip.  XR left hip demonstrating fracture of left femoral neck, official read pending       We are having to hold off on hip fracture repair as she was noted to be hypoxic in the ER and found to have multifocal pneumonia    I spoke with anesthesia.  They would like her respiratory status much better prior to trying to repair her hip fracture    She is currently on 4 L oxygen    We would like her on much less oxygen if not on room air prior to doing surgery    She is very comfortable as long as she lays still.  Minimal pain in her hip

## 2024-05-20 LAB
ANION GAP SERPL CALCULATED.3IONS-SCNC: 6 MMOL/L (ref 4–13)
B PARAP IS1001 DNA NPH QL NAA+NON-PROBE: NOT DETECTED
B PERT.PT PRMT NPH QL NAA+NON-PROBE: NOT DETECTED
BACTERIA UR QL AUTO: ABNORMAL /HPF
BASOPHILS # BLD AUTO: 0.06 THOUSANDS/ÂΜL (ref 0–0.1)
BASOPHILS NFR BLD AUTO: 1 % (ref 0–1)
BILIRUB UR QL STRIP: NEGATIVE
BUN SERPL-MCNC: 21 MG/DL (ref 5–25)
C PNEUM DNA NPH QL NAA+NON-PROBE: NOT DETECTED
CALCIUM SERPL-MCNC: 8.2 MG/DL (ref 8.4–10.2)
CHLORIDE SERPL-SCNC: 107 MMOL/L (ref 96–108)
CLARITY UR: CLEAR
CO2 SERPL-SCNC: 27 MMOL/L (ref 21–32)
COLOR UR: YELLOW
CREAT SERPL-MCNC: 1 MG/DL (ref 0.6–1.3)
EOSINOPHIL # BLD AUTO: 0.23 THOUSAND/ÂΜL (ref 0–0.61)
EOSINOPHIL NFR BLD AUTO: 3 % (ref 0–6)
ERYTHROCYTE [DISTWIDTH] IN BLOOD BY AUTOMATED COUNT: 15.2 % (ref 11.6–15.1)
FLUAV RNA NPH QL NAA+NON-PROBE: NOT DETECTED
FLUBV RNA NPH QL NAA+NON-PROBE: NOT DETECTED
GFR SERPL CREATININE-BSD FRML MDRD: 51 ML/MIN/1.73SQ M
GLUCOSE SERPL-MCNC: 95 MG/DL (ref 65–140)
GLUCOSE UR STRIP-MCNC: NEGATIVE MG/DL
HADV DNA NPH QL NAA+NON-PROBE: NOT DETECTED
HCOV 229E RNA NPH QL NAA+NON-PROBE: NOT DETECTED
HCOV HKU1 RNA NPH QL NAA+NON-PROBE: NOT DETECTED
HCOV NL63 RNA NPH QL NAA+NON-PROBE: NOT DETECTED
HCOV OC43 RNA NPH QL NAA+NON-PROBE: NOT DETECTED
HCT VFR BLD AUTO: 30.3 % (ref 34.8–46.1)
HGB BLD-MCNC: 9.8 G/DL (ref 11.5–15.4)
HGB UR QL STRIP.AUTO: NEGATIVE
HMPV RNA NPH QL NAA+NON-PROBE: NOT DETECTED
HPIV1 RNA NPH QL NAA+NON-PROBE: NOT DETECTED
HPIV2 RNA NPH QL NAA+NON-PROBE: NOT DETECTED
HPIV3 RNA NPH QL NAA+NON-PROBE: NOT DETECTED
HPIV4 RNA NPH QL NAA+NON-PROBE: NOT DETECTED
IMM GRANULOCYTES # BLD AUTO: 0.05 THOUSAND/UL (ref 0–0.2)
IMM GRANULOCYTES NFR BLD AUTO: 1 % (ref 0–2)
KETONES UR STRIP-MCNC: ABNORMAL MG/DL
L PNEUMO1 AG UR QL IA.RAPID: NEGATIVE
LEUKOCYTE ESTERASE UR QL STRIP: NEGATIVE
LYMPHOCYTES # BLD AUTO: 1.26 THOUSANDS/ÂΜL (ref 0.6–4.47)
LYMPHOCYTES NFR BLD AUTO: 14 % (ref 14–44)
M PNEUMO DNA NPH QL NAA+NON-PROBE: NOT DETECTED
MAGNESIUM SERPL-MCNC: 1.9 MG/DL (ref 1.9–2.7)
MCH RBC QN AUTO: 30.4 PG (ref 26.8–34.3)
MCHC RBC AUTO-ENTMCNC: 32.3 G/DL (ref 31.4–37.4)
MCV RBC AUTO: 94 FL (ref 82–98)
MONOCYTES # BLD AUTO: 1.16 THOUSAND/ÂΜL (ref 0.17–1.22)
MONOCYTES NFR BLD AUTO: 13 % (ref 4–12)
MRSA NOSE QL CULT: NORMAL
MUCOUS THREADS UR QL AUTO: ABNORMAL
NEUTROPHILS # BLD AUTO: 6.42 THOUSANDS/ÂΜL (ref 1.85–7.62)
NEUTS SEG NFR BLD AUTO: 68 % (ref 43–75)
NITRITE UR QL STRIP: NEGATIVE
NON-SQ EPI CELLS URNS QL MICRO: ABNORMAL /HPF
NRBC BLD AUTO-RTO: 0 /100 WBCS
PH UR STRIP.AUTO: 6 [PH]
PLATELET # BLD AUTO: 194 THOUSANDS/UL (ref 149–390)
PMV BLD AUTO: 10.9 FL (ref 8.9–12.7)
POTASSIUM SERPL-SCNC: 3.7 MMOL/L (ref 3.5–5.3)
PROT UR STRIP-MCNC: ABNORMAL MG/DL
RBC # BLD AUTO: 3.22 MILLION/UL (ref 3.81–5.12)
RBC #/AREA URNS AUTO: ABNORMAL /HPF
RSV RNA NPH QL NAA+NON-PROBE: NOT DETECTED
RV+EV RNA NPH QL NAA+NON-PROBE: NOT DETECTED
S PNEUM AG UR QL: NEGATIVE
SARS-COV-2 RNA NPH QL NAA+NON-PROBE: NOT DETECTED
SODIUM SERPL-SCNC: 140 MMOL/L (ref 135–147)
SP GR UR STRIP.AUTO: 1.03 (ref 1–1.03)
UROBILINOGEN UR STRIP-ACNC: <2 MG/DL
WBC # BLD AUTO: 9.18 THOUSAND/UL (ref 4.31–10.16)
WBC #/AREA URNS AUTO: ABNORMAL /HPF

## 2024-05-20 PROCEDURE — 87449 NOS EACH ORGANISM AG IA: CPT | Performed by: INTERNAL MEDICINE

## 2024-05-20 PROCEDURE — 85025 COMPLETE CBC W/AUTO DIFF WBC: CPT | Performed by: HOSPITALIST

## 2024-05-20 PROCEDURE — 81001 URINALYSIS AUTO W/SCOPE: CPT | Performed by: INTERNAL MEDICINE

## 2024-05-20 PROCEDURE — 92610 EVALUATE SWALLOWING FUNCTION: CPT

## 2024-05-20 PROCEDURE — 80048 BASIC METABOLIC PNL TOTAL CA: CPT | Performed by: HOSPITALIST

## 2024-05-20 PROCEDURE — 0202U NFCT DS 22 TRGT SARS-COV-2: CPT | Performed by: INTERNAL MEDICINE

## 2024-05-20 PROCEDURE — 83735 ASSAY OF MAGNESIUM: CPT | Performed by: HOSPITALIST

## 2024-05-20 PROCEDURE — 99223 1ST HOSP IP/OBS HIGH 75: CPT | Performed by: INTERNAL MEDICINE

## 2024-05-20 PROCEDURE — NC001 PR NO CHARGE

## 2024-05-20 PROCEDURE — 99233 SBSQ HOSP IP/OBS HIGH 50: CPT | Performed by: INTERNAL MEDICINE

## 2024-05-20 RX ORDER — AZITHROMYCIN 250 MG/1
500 TABLET, FILM COATED ORAL EVERY 24 HOURS
Status: DISCONTINUED | OUTPATIENT
Start: 2024-05-20 | End: 2024-05-22

## 2024-05-20 RX ADMIN — ACETAMINOPHEN 650 MG: 325 TABLET, FILM COATED ORAL at 17:19

## 2024-05-20 RX ADMIN — ESCITALOPRAM OXALATE 10 MG: 10 TABLET ORAL at 09:38

## 2024-05-20 RX ADMIN — AZITHROMYCIN DIHYDRATE 500 MG: 250 TABLET ORAL at 21:28

## 2024-05-20 RX ADMIN — OXYCODONE 5 MG: 5 TABLET ORAL at 01:41

## 2024-05-20 RX ADMIN — PRAVASTATIN SODIUM 80 MG: 80 TABLET ORAL at 15:08

## 2024-05-20 RX ADMIN — AZITHROMYCIN MONOHYDRATE 500 MG: 500 INJECTION, POWDER, LYOPHILIZED, FOR SOLUTION INTRAVENOUS at 01:41

## 2024-05-20 RX ADMIN — MEMANTINE 10 MG: 5 TABLET ORAL at 09:38

## 2024-05-20 RX ADMIN — MEMANTINE 10 MG: 5 TABLET ORAL at 17:19

## 2024-05-20 RX ADMIN — CEFEPIME 2000 MG: 2 INJECTION, POWDER, FOR SOLUTION INTRAVENOUS at 00:29

## 2024-05-20 RX ADMIN — PROPRANOLOL HYDROCHLORIDE 60 MG: 60 CAPSULE, EXTENDED RELEASE ORAL at 09:53

## 2024-05-20 RX ADMIN — DIVALPROEX SODIUM 125 MG: 125 TABLET, DELAYED RELEASE ORAL at 17:20

## 2024-05-20 RX ADMIN — OXYCODONE 5 MG: 5 TABLET ORAL at 20:02

## 2024-05-20 RX ADMIN — ENOXAPARIN SODIUM 30 MG: 30 INJECTION SUBCUTANEOUS at 09:38

## 2024-05-20 RX ADMIN — CEFTRIAXONE 1000 MG: 10 INJECTION, POWDER, FOR SOLUTION INTRAVENOUS at 13:00

## 2024-05-20 RX ADMIN — LOSARTAN POTASSIUM 50 MG: 50 TABLET, FILM COATED ORAL at 09:38

## 2024-05-20 RX ADMIN — PANTOPRAZOLE SODIUM 40 MG: 40 TABLET, DELAYED RELEASE ORAL at 06:28

## 2024-05-20 RX ADMIN — AMLODIPINE BESYLATE 2.5 MG: 2.5 TABLET ORAL at 09:38

## 2024-05-20 RX ADMIN — ACETAMINOPHEN 650 MG: 325 TABLET, FILM COATED ORAL at 09:43

## 2024-05-20 RX ADMIN — DIVALPROEX SODIUM 125 MG: 125 TABLET, DELAYED RELEASE ORAL at 09:38

## 2024-05-20 NOTE — ASSESSMENT & PLAN NOTE
Possibly related to aspiration pneumonia versus viral pneumonia  Pulmonary consultation reviewed, question fat emboli from recent fracture  Wean oxygen as tolerated, currently on 5 L  Continue ceftriaxone day #3 of 5  Start azithromycin  Check RP 2 panel  Moderate risk for pulmonary and cardiac complications possible operative management on hip fracture on 5/22/2024

## 2024-05-20 NOTE — SPEECH THERAPY NOTE
Speech Language/Pathology  Speech/Language Pathology  Assessment    Patient Name: Dangelo Gunter  Today's Date: 5/20/2024     Problem List  Principal Problem:    Fracture of femoral neck, left (HCC)  Active Problems:    Essential hypertension    Hyperlipidemia    Dementia with agitation (HCC)    Acute respiratory failure with hypoxia (HCC)    Multifocal pneumonia    Past Medical History  Past Medical History:   Diagnosis Date    Anxiety     Gastroesophageal reflux disease without esophagitis 08/28/2018    Hyperlipidemia     Hypertension     Hypothyroidism     Mild dementia with agitation (HCC) 08/24/2020    Multifocal pneumonia 5/18/2024    Osteoarthritis 10/05/2012    Other osteoporosis without current pathological fracture 02/09/2023     Past Surgical History  Past Surgical History:   Procedure Laterality Date    CATARACT EXTRACTION      CHOLECYSTECTOMY      COLONOSCOPY      HYSTERECTOMY        Bedside Swallow Evaluation:    Summary:  Pt presented w/ confusion.  Mild-mod oral stage, no pharyngeal S/S.  Daughter reported that her mentation was much better last evening and patient was able to tolerate p.o. better.  Though the skin on the green beans was too hard.  Today reportedly had difficulty with meat.  Stated patient likes sweets.  Presented patient with ice cream and cookies.  Patient kept asking me what it was and asked what she should do with it.  Stating the cookie was too hard before she tried it.  Took a bite. Chewed excessively.  Refused anymore.  Bolus control, formation, and transfer were WNL with chew cookie bolus, ice cream, and thin liquids.  Patient complains of pain when seated upright per daughter.  An additional pillow was propped behind her head to keep her head forward.  No coughing observed today with any p.o.  Daughter has not noted any either.  Nurse reported patient was chewing her meds daughter reported that prior patient would take them whole with thin.  Daughter also stated patient  has been in 3 different facilities recently which may be contributing to confusion although she also stated patient has had UTIs in the past which created confusion.Pt is also stating the food doesn't taste jonathan anything.     Recommendations:  Diet: dysphagia 2 mech soft for now.  Liquid:thin  Meds:as tolerated  Supervision: close  Positioning:Upright  Strategies: set up tray, encourage intake  Pt to take PO/Meds only when fully alert and upright.   Oral care  Aspiration precautions  Reflux precautions  Therapy Prognosis:fair as/if mental status improves  Frequency: 2-5x/wk as able and indicated    Consider consult w/:  Rehab  Nutrition    Goal(s):  Dysphagia LTG  -Patient will demonstrate safe and effective oral intake (without overt s/s significant oral/pharyngeal dysphagia including s/s penetration or aspiration) for the highest appropriate diet level.     1.Pt will tolerate least restrictive diet w/out s/s aspiration or oral/pharyngeal difficulties.   2.Pt will will effectively manipulate/masticate and transfer purees/solids w/out s/s dysphagia/aspiration.   3.Pt will tolerate thin liquids w/out s/s aspiration.   -If indicated, patient will comply with a Video/Modified Barium Swallow study for more complete assessment of swallowing anatomy/physiology/aspiration risk and to assess efficacy of treatment techniques so as to best guide treatment plan     H&P/Admit info/ pertinent provider notes: (PMH noted above)  Chief Complaint: Pt sustained mechanical fall at facility  History of Present Illness:  Dangelo Gunter is a 83 y.o. female who presents with mechanical fall leading to left femoral neck fracture and multifocal pneumonia.  History limited from patient as patient only alert and oriented to person and place.  Per facility, patient sustained a mechanical fall while being assisted by staff.  Staff was able to assist patient to the ground however patient did land on her left side.  No reported head strike and  patient is not anticoagulated.  While in the ED it was incidentally found that patient was hypoxic.  Chest x-ray with multiple bilateral infiltrates confirmed by CT scan to be multifocal pneumonia.    Per pulm 5/20/24:  Assessment:  Multifocal pneumonia/pneumonitis  Acute hypoxic respiratory failure-saturating 95% on 5 L nasal cannula  Abnormal CT of the chest  Dementia  Plan:  Continue supplemental oxygen with nasal cannula and wean as able to keep O2 sat greater than 90%  The lack of fever, leukocytosis, bandemia, normal procalcitonin, or symptoms of shortness of breath or productive cough all argue against this being an infectious etiology.  The picture is very atypical for this to be considered an infection and therefore an alternative diagnosis must be evaluated  She had a completely normal chest x-ray back in February so this is an acute issue  Differential diagnosis would include aspiration pneumonitis versus atypical or viral pneumonia.  Additionally given the fracture, this could also be suggested of fat embolism syndrome  I will check an RP 2 panel, strep and Legionella urine antigens  Can continue antibiotics for now  Will check a urine analysis and specifically request that microscopy be analyzed for lipid droplets  In terms of her surgery, these consolidations are not likely to resolve within days and will likely require weeks.  I doubt that surgery can be delayed that long.  Options would include empirically treating the pneumonia for 5 days and then proceeding with surgery if there is no progression in her oxygen requirements or if this can be performed with a nerve block.  Regardless she will be at at least moderate risk for perioperative pulmonary complications    5/19 nursing note:  Ortho to floor today, spoke with  and pt, will likely do sx middle of this week d/t recent dx of PNA. Pt put back on regular diet and encouraged to drink fluids. NWB at this time, Oxy 2.5mg and ice applied to L  hip area. Spoke with ortho and SLIM pt not to be NPO at this time. ABT via IV continues.      Special Studies:  Ct chest: 5/18:  Extensive patchy bilateral upper and lower lobe infiltrates suspicious for multifocal bronchopneumonia. Correlation with the patient's symptoms and laboratory studies is recommended. A short-term follow-up CT chest in 6 to 8 weeks following treatment is   recommended to assess for improvement/resolution and exclude an underlying lesion.    Procalcitonin:   0.14 5/19/24   WBC:   9.18  5/20     Previous MBS:  none    Patient's goal: none stated    Did the pt report pain? no  If yes, was nursing notified/was it addressed? N/a    Reason for consult:  R/o aspiration  Determine safest and least restrictive diet  Change in mental status  H/o neurological disease  respiratory compromise  current pna??  Precautions:  Current testing for covid    Food Allergies:  wnl   Current Diet:  regular   Premorbid diet:  regular   O2 requirement:  5L nc   Social/Prior living  Memory care   Voice/Speech:  Minimal speech was clear   Follows commands:  poorly   Cognitive status:  Alert but confused     [facility]  Dentition:natural  Lips (VII):wnl  Tongue (XII):wnl  Secretion management:wnl    Esophageal stage:  No s/s reported    Results d/w:  Pt, nursing, physician

## 2024-05-20 NOTE — ASSESSMENT & PLAN NOTE
Pt only oriented to person and place  Delirium precautions  Continue home regimen of lexapro, namenda, remeron, depakote, and seroquel  Mood is stable

## 2024-05-20 NOTE — PLAN OF CARE
Problem: PAIN - ADULT  Goal: Verbalizes/displays adequate comfort level or baseline comfort level  Description: Interventions:  - Encourage patient to monitor pain and request assistance  - Assess pain using appropriate pain scale  - Administer analgesics based on type and severity of pain and evaluate response  - Implement non-pharmacological measures as appropriate and evaluate response  - Consider cultural and social influences on pain and pain management  - Notify physician/advanced practitioner if interventions unsuccessful or patient reports new pain  Outcome: Progressing     Problem: SAFETY ADULT  Goal: Patient will remain free of falls  Description: INTERVENTIONS:  - Educate patient/family on patient safety including physical limitations  - Instruct patient to call for assistance with activity   - Consult OT/PT to assist with strengthening/mobility   - Keep Call bell within reach  - Keep bed low and locked with side rails adjusted as appropriate  - Keep care items and personal belongings within reach  - Initiate and maintain comfort rounds  - Make Fall Risk Sign visible to staff  - Offer Toileting every 2 Hours, in advance of need  - Initiate/Maintain bed alarm  - Apply yellow socks and bracelet for high fall risk patients  - Consider moving patient to room near nurses station  Outcome: Progressing     Problem: CONFUSION/THOUGHT DISTURBANCE  Goal: Thought disturbances (confusion, delirium, depression, dementia or psychosis) are managed to maintain or return to baseline mental status and functional level  Description: INTERVENTIONS:  - Assess for possible contributors to  thought disturbance, including but not limited to medications, infection, impaired vision or hearing, underlying metabolic abnormalities, dehydration, respiratory compromise,  psychiatric diagnoses and notify attending PHYSICAN/AP  - Monitor and intervene to maintain adequate nutrition, hydration, elimination, sleep and activity  -  Decrease environmental stimuli, including noise as appropriate.  - Provide frequent contacts to provide refocusing, direction and reassurance as needed. Approach patient calmly with eye contact and at their level.  - Wallace high risk fall precautions, aspiration precautions and other safety measures, as indicated  - If delirium suspected, notify physician/AP of change in condition and request immediate in-person evaluation  - Pursue consults as appropriate including Geriatric (campus dependent), OT for cognitive evaluation/activity planning, psychiatric, pastoral care, etc.  Outcome: Progressing     Problem: GENITOURINARY - ADULT  Goal: Urinary catheter remains patent  Description: INTERVENTIONS:  - Assess patency of urinary catheter  - If patient has a chronic carpenter, consider changing catheter if non-functioning  - Follow guidelines for intermittent irrigation of non-functioning urinary catheter  Outcome: Progressing     Problem: RESPIRATORY - ADULT  Goal: Achieves optimal ventilation and oxygenation  Description: INTERVENTIONS:  - Assess for changes in respiratory status  - Assess for changes in mentation and behavior  - Position to facilitate oxygenation and minimize respiratory effort  - Oxygen administered by appropriate delivery if ordered  - Initiate smoking cessation education as indicated  - Encourage broncho-pulmonary hygiene including cough, deep breathe, Incentive Spirometry  - Assess the need for suctioning and aspirate as needed  - Assess and instruct to report SOB or any respiratory difficulty  - Respiratory Therapy support as indicated  Outcome: Progressing

## 2024-05-20 NOTE — UTILIZATION REVIEW
ED TREATMENT  TIME   5/17  @   3664    SEE INITIAL REVIEW AT BOTTOM     Orthopedic surgery planned when medically  stable    Remains in house  5/20    Continue  JENNIFER.   O2  sats  currently  95  % on   5 L  NC

## 2024-05-20 NOTE — CONSULTS
Consult Note - Pulmonary   Dangelo Gunter 83 y.o. female MRN: [id number]  Unit/Bed#: E2 -01 Encounter: [id number]      Reason for consultation: multifocal pneumonia    Requesting physician: Edwin Jeong DO     Assessment:    Multifocal pneumonia/pneumonitis  Acute hypoxic respiratory failure-saturating 95% on 5 L nasal cannula  Abnormal CT of the chest  Dementia    Plan:    Continue supplemental oxygen with nasal cannula and wean as able to keep O2 sat greater than 90%  The lack of fever, leukocytosis, bandemia, normal procalcitonin, or symptoms of shortness of breath or productive cough all argue against this being an infectious etiology.  The picture is very atypical for this to be considered an infection and therefore an alternative diagnosis must be evaluated  She had a completely normal chest x-ray back in February so this is an acute issue  Differential diagnosis would include aspiration pneumonitis versus atypical or viral pneumonia.  Additionally given the fracture, this could also be suggested of fat embolism syndrome  I will check an RP 2 panel, strep and Legionella urine antigens  Can continue antibiotics for now  Will check a urine analysis and specifically request that microscopy be analyzed for lipid droplets  In terms of her surgery, these consolidations are not likely to resolve within days and will likely require weeks.  I doubt that surgery can be delayed that long.  Options would include empirically treating the pneumonia for 5 days and then proceeding with surgery if there is no progression in her oxygen requirements or if this can be performed with a nerve block.  Regardless she will be at at least moderate risk for perioperative pulmonary complications        History of Present Illness   HPI:  Dangelo Gunter is a 83 y.o. female who who presents with a fall and was found to have a left femoral fracture.  Incidentally she was also found to have bilateral dense consolidations  concerning for pneumonia.     The patient has dementia so is unable to provide a history however she is able to verbalize that she is not having any shortness of breath or no coughing.  She denies any sick contacts.     Review of Systems   Constitutional:  Negative for chills and fever.   HENT:  Negative for congestion, postnasal drip and rhinorrhea.    Eyes:  Negative for itching.   Respiratory:  Negative for cough, shortness of breath, wheezing and stridor.    Cardiovascular:  Negative for chest pain, palpitations and leg swelling.   Gastrointestinal:  Negative for abdominal distention, abdominal pain, nausea and vomiting.   Genitourinary:  Negative for dysuria and flank pain.   Musculoskeletal:  Negative for arthralgias and myalgias.   Skin:  Negative for color change.   Neurological:  Negative for dizziness, light-headedness and headaches.   Psychiatric/Behavioral: Negative.           Historical Information   Past Medical History:   Diagnosis Date    Anxiety     Gastroesophageal reflux disease without esophagitis 08/28/2018    Hyperlipidemia     Hypertension     Hypothyroidism     Mild dementia with agitation (HCC) 08/24/2020    Multifocal pneumonia 5/18/2024    Osteoarthritis 10/05/2012    Other osteoporosis without current pathological fracture 02/09/2023     Past Surgical History:   Procedure Laterality Date    CATARACT EXTRACTION      CHOLECYSTECTOMY      COLONOSCOPY      HYSTERECTOMY       Family History   Problem Relation Age of Onset    No Known Problems Mother     No Known Problems Father        Occupational History: Noncontributory    Social History: No issues with drugs or alcohol lives in a nursing home    Meds/Allergies   Current Facility-Administered Medications   Medication Dose Route Frequency    acetaminophen (TYLENOL) tablet 650 mg  650 mg Oral Q6H PRN    amLODIPine (NORVASC) tablet 2.5 mg  2.5 mg Oral Daily    azithromycin (ZITHROMAX) tablet 500 mg  500 mg Oral Q24H    benzonatate (TESSALON  PERLES) capsule 100 mg  100 mg Oral TID PRN    ceftriaxone (ROCEPHIN) 1 g/50 mL in dextrose IVPB  1,000 mg Intravenous Q24H    divalproex sodium (DEPAKOTE) DR tablet 125 mg  125 mg Oral BID    enoxaparin (LOVENOX) subcutaneous injection 30 mg  30 mg Subcutaneous Q24H JANETTE    escitalopram (LEXAPRO) tablet 10 mg  10 mg Oral Daily    losartan (COZAAR) tablet 50 mg  50 mg Oral Daily    memantine (NAMENDA) tablet 10 mg  10 mg Oral BID    mirtazapine (REMERON) tablet 7.5 mg  7.5 mg Oral HS    oxyCODONE (ROXICODONE) IR tablet 5 mg  5 mg Oral Q6H PRN    oxyCODONE (ROXICODONE) split tablet 2.5 mg  2.5 mg Oral Q6H PRN    pantoprazole (PROTONIX) EC tablet 40 mg  40 mg Oral Daily Before Breakfast    pravastatin (PRAVACHOL) tablet 80 mg  80 mg Oral Daily With Dinner    propranolol (INDERAL LA) 24 hr capsule 60 mg  60 mg Oral Daily    QUEtiapine (SEROquel) tablet 25 mg  25 mg Oral HS       Medications Prior to Admission:     QUEtiapine (SEROquel) 25 mg tablet    amLODIPine (NORVASC) 2.5 mg tablet    azilsartan medoxomil (Edarbi) 40 MG tablet    Calcium Carbonate-Vit D-Min (Caltrate 600+D Plus Minerals) 600-800 MG-UNIT TABS    Cholecalciferol (Vitamin D3) 20 MCG (800 UNIT) TABS    denosumab (PROLIA) 60 mg/mL    divalproex sodium (Depakote) 125 mg DR tablet    ergocalciferol (VITAMIN D2) 50,000 units    escitalopram (LEXAPRO) 10 mg tablet    L-Methylfolate-M45-X4-A5 (Cerefolin) 6-1-50-5 MG TABS    memantine (NAMENDA) 10 mg tablet    mirtazapine (REMERON) 7.5 MG tablet    nitrofurantoin (MACROBID) 100 mg capsule    pantoprazole (PROTONIX) 40 mg tablet    propranolol (INDERAL LA) 60 mg 24 hr capsule    pyridoxine (VITAMIN B6) 100 mg tablet    rosuvastatin (CRESTOR) 10 MG tablet  Allergies   Allergen Reactions    No Known Allergies        Vitals:    05/19/24 1505 05/19/24 2100 05/20/24 0744 05/20/24 1102   BP: 135/66 128/75 124/68 109/53   BP Location: Left arm Left arm Left arm Left arm   Pulse: 72 70 72 70   Resp: 18 16 16 16   Temp:  98 °F (36.7 °C) 97.8 °F (36.6 °C) 98 °F (36.7 °C) 98.2 °F (36.8 °C)   TempSrc: Temporal Temporal Temporal Temporal   SpO2: 90% 91% 90% 92%   Weight:       Height:           Physical Exam  Constitutional:       General: She is not in acute distress.     Appearance: She is not diaphoretic.   HENT:      Head: Normocephalic and atraumatic.      Nose: Nose normal.      Mouth/Throat:      Mouth: Oropharynx is clear and moist.      Pharynx: No oropharyngeal exudate.   Eyes:      General: No scleral icterus.     Extraocular Movements: EOM normal.      Conjunctiva/sclera: Conjunctivae normal.      Pupils: Pupils are equal, round, and reactive to light.   Neck:      Thyroid: No thyromegaly.      Vascular: No JVD.      Trachea: No tracheal deviation.   Cardiovascular:      Rate and Rhythm: Normal rate and regular rhythm.      Heart sounds: Normal heart sounds. No murmur heard.     No friction rub. No gallop.   Pulmonary:      Effort: Pulmonary effort is normal. No respiratory distress.      Breath sounds: Normal breath sounds. No stridor. No wheezing or rales.   Abdominal:      General: Bowel sounds are normal. There is no distension.      Palpations: Abdomen is soft.      Tenderness: There is no abdominal tenderness. There is no guarding or rebound.   Musculoskeletal:         General: No deformity or edema.      Cervical back: Normal range of motion and neck supple.   Lymphadenopathy:      Cervical: No cervical adenopathy.   Skin:     General: Skin is warm.      Findings: No erythema or rash.   Neurological:      Mental Status: She is alert and oriented to person, place, and time.      Cranial Nerves: No cranial nerve deficit.      Sensory: No sensory deficit.   Psychiatric:         Mood and Affect: Mood and affect normal.         Labs: I have personally reviewed pertinent lab results.  Results from last 7 days   Lab Units 05/20/24  0441 05/19/24  0450 05/18/24  0507   WBC Thousand/uL 9.18 9.04 10.43*   HEMOGLOBIN g/dL 9.8*  10.5* 11.3*   HEMATOCRIT % 30.3* 31.8* 34.3*   PLATELETS Thousands/uL 194 198 240   SEGS PCT % 68 72 72   MONO PCT % 13* 10 9   EOS PCT % 3 4 1      Results from last 7 days   Lab Units 05/20/24  0441 05/19/24  0450 05/18/24  0507 05/17/24  2248   POTASSIUM mmol/L 3.7 3.7 3.6 4.0   CHLORIDE mmol/L 107 106 106 105   CO2 mmol/L 27 23 26 27   BUN mg/dL 21 24 25 29*   CREATININE mg/dL 1.00 1.08 1.25 1.39*   CALCIUM mg/dL 8.2* 8.5 9.4 9.5   ALK PHOS U/L  --   --  46 39   ALT U/L  --   --  116* 103*   AST U/L  --   --  83* 74*     Results from last 7 days   Lab Units 05/20/24 0441 05/19/24  0450 05/18/24  0507   MAGNESIUM mg/dL 1.9 1.9 1.8*          Results from last 7 days   Lab Units 05/17/24  2248   INR  1.06   PTT seconds 30         0   Lab Value Date/Time    TROPONINI <0.02 07/15/2020 0054    TROPONINI <0.02 07/14/2020 2210    TROPONINI <0.02 07/14/2020 1831    TROPONINI <0.02 07/14/2020 1524    TROPONINI <0.02 06/24/2020 1117       Imaging and other studies: I have personally reviewed pertinent reports.   and I have personally reviewed pertinent films in PACS  CT chest-scattered bilateral multifocal consolidations    Pulmonary function testing: None on file    EKG, Pathology, and Other Studies: I have personally reviewed pertinent reports.   and I have personally reviewed pertinent films in PACS    Code Status: Level 1 - Full Code      Freddy Lewis MD  Pulmonary & Critical Care   [facility]

## 2024-05-20 NOTE — ASSESSMENT & PLAN NOTE
Multifocal pneumonia viral versus atypical organism  Continue ceftriaxone day #3  Legionella antigen pending  Wean oxygen as tolerated  Appreciate pulmonary consultation

## 2024-05-20 NOTE — PROGRESS NOTES
Formerly [facility], 1928–1983  Progress Note  Name: Dangelo Gunter I  MRN: [id number]  Unit/Bed#: E2 -01 I Date of Admission: 5/17/2024   Date of Service: 5/20/2024 I Hospital Day: 2    Assessment & Plan   * Fracture of femoral neck, left (HCC)  Assessment & Plan  Patient with PMHx of dementia, HTN, and HLD presented to the ED from her facility, [facility], after sustaining a mechanical fall  Pt reportedly was assisted by staff when she fell. Staff was able to help guide patient to the floor. No reported head strike and patient not anticoagulated. Pt did land on her left hip.  XR left hip demonstrating fracture of left femoral neck  Considered moderate risk for cardiac and pulmonary complication  Possible surgery once completed antibiotic treatment course      Multifocal pneumonia  Assessment & Plan  Multifocal pneumonia viral versus atypical organism  Continue ceftriaxone day #3  Legionella antigen pending  Wean oxygen as tolerated  Appreciate pulmonary consultation    Acute respiratory failure with hypoxia (HCC)  Assessment & Plan  Possibly related to aspiration pneumonia versus viral pneumonia  Pulmonary consultation reviewed, question fat emboli from recent fracture  Wean oxygen as tolerated, currently on 5 L  Continue ceftriaxone day #3 of 5  Start azithromycin  Check RP 2 panel  Moderate risk for pulmonary and cardiac complications possible operative management on hip fracture on 5/22/2024    Dementia with agitation (HCC)  Assessment & Plan  Pt only oriented to person and place  Delirium precautions  Continue home regimen of lexapro, namenda, remeron, depakote, and seroquel  Mood is stable    Essential hypertension  Assessment & Plan  Continue amlodipine, losartan, and propanolol                 Hospital Course:     83-year-old female patient is presenting with femoral fracture currently, found to have acute respiratory failure secondary to pneumonia.  History of dementia,  hypertension    Assessment:      Principal Problem:    Fracture of femoral neck, left (HCC)  Active Problems:    Essential hypertension    Dementia with agitation (HCC)    Acute respiratory failure with hypoxia (HCC)    Multifocal pneumonia      Plan:    Continue antibiotic therapy         VTE Pharmacologic Prophylaxis:   Pharmacologic: Enoxaparin (Lovenox)  Mechanical VTE Prophylaxis in Place: Yes    AM-PAC Basic Mobility:  Basic Mobility Inpatient Raw Score: 9    JH-HLM Achieved: 1: Laying in bed  JH-HLM Goal: 3: Sit at edge of bed    HLM Goal listed above. Continue with multidisciplinary rounding and encourage appropriate mobility to improve upon HLM goals.         Patient Centered Rounds: Case discussed and reviewed with nursing    Discussions with Specialists or Other Care Team Provider: Case management    Education and Discussions with Family / Patient: Discussed with patient family    Time Spent for Care: 80 minutes.  More than 50% of total time spent on counseling and coordination of care as described above.    Current Length of Stay: 2 day(s)    Current Patient Status: Inpatient   Certification Statement: The patient will continue to require additional inpatient hospital stay due to pneumonia, productive    Discharge Plan / Estimated Discharge Date: 72 hours    Code Status: Level 1 - Full Code      Subjective:   Seen and examined, no acute complaints, patient appears comfortable on 5 L of nasal cannula oxygen.    A complete and comprehensive 14 point organ system review has been performed and all other systems are negative other than stated above.    Objective:     Vitals:   Temp (24hrs), Av.8 °F (36.6 °C), Min:97.3 °F (36.3 °C), Max:98.2 °F (36.8 °C)    Temp:  [97.3 °F (36.3 °C)-98.2 °F (36.8 °C)] 97.3 °F (36.3 °C)  HR:  [70-72] 72  Resp:  [16-18] 18  BP: (109-128)/(53-75) 124/60  SpO2:  [90 %-92 %] 90 %  Body mass index is 25.12 kg/m².     Input and Output Summary (last 24 hours):       Intake/Output  Summary (Last 24 hours) at 5/20/2024 1614  Last data filed at 5/19/2024 1815  Gross per 24 hour   Intake 120 ml   Output 350 ml   Net -230 ml       Physical Exam:     General: well appearing, no acute distress  HEENT: atraumatic, PERRLA, moist mucosa, normal pharynx, normal tonsils and adenoids, normal tongue, no fluid in sinuses  Neck: Trachea midline, no carotid bruit, no masses  Respiratory: normal chest wall expansion, CTA B, no r/r/w, no rubs  Cardiovascular: RRR, no m/r/g, Normal S1 and S2  Abdomen: Soft, non-tender, non-distended, normal bowel sounds in all quadrants, no hepatosplenomegaly, no tympany  Rectal: deferred  Musculoskeletal: normal ROM in upper and lower extremities  Integumentary: warm, dry, and pink, with no rash, purpura, or petechia  Heme/Lymph: no lymphadenopathy, no bruises  Neurological: Cranial Nerves II-XII grossly intact  Psychiatric: Apparent dementia    Additional Data:     Labs:    Results from last 7 days   Lab Units 05/20/24  0441   WBC Thousand/uL 9.18   HEMOGLOBIN g/dL 9.8*   HEMATOCRIT % 30.3*   PLATELETS Thousands/uL 194   SEGS PCT % 68   LYMPHO PCT % 14   MONO PCT % 13*   EOS PCT % 3     Results from last 7 days   Lab Units 05/20/24  0441 05/19/24  0450 05/18/24  0507   POTASSIUM mmol/L 3.7   < > 3.6   CHLORIDE mmol/L 107   < > 106   CO2 mmol/L 27   < > 26   BUN mg/dL 21   < > 25   CREATININE mg/dL 1.00   < > 1.25   CALCIUM mg/dL 8.2*   < > 9.4   ALK PHOS U/L  --   --  46   ALT U/L  --   --  116*   AST U/L  --   --  83*    < > = values in this interval not displayed.     Results from last 7 days   Lab Units 05/17/24  2248   INR  1.06       * I Have Reviewed All Lab Data Listed Above.  * Additional Pertinent Lab Tests Reviewed: All Labs For Current Hospital Admission Reviewed    Imaging:    Imaging Reports Reviewed Today Include: chest ct  Imaging Personally Reviewed by Myself Includes:  chest ct    Recent Cultures (last 7 days):     Results from last 7 days   Lab Units  05/18/24  0547 05/18/24  0047 05/18/24  0026   BLOOD CULTURE   --  No Growth at 48 hrs. No Growth at 48 hrs.   LEGIONELLA URINARY ANTIGEN  Negative  --   --        Last 24 Hours Medication List:   Current Facility-Administered Medications   Medication Dose Route Frequency Provider Last Rate    acetaminophen  650 mg Oral Q6H PRN Kevin Shah, PA-LEVY      amLODIPine  2.5 mg Oral Daily Kevin Shah, PA-C      azithromycin  500 mg Oral Q24H Christopher S Prechtel, DO      benzonatate  100 mg Oral TID PRN Kevin Shah, PA-C      cefTRIAXone  1,000 mg Intravenous Q24H Edwin Jeong, DO 1,000 mg (05/20/24 1300)    divalproex sodium  125 mg Oral BID Kevin Shah, PA-C      enoxaparin  30 mg Subcutaneous Q24H [facility] Christopher S Prechtel, DO      escitalopram  10 mg Oral Daily Kevin Shah, PA-C      losartan  50 mg Oral Daily Kevin Shah, PA-C      memantine  10 mg Oral BID Kevin Shah, PA-C      mirtazapine  7.5 mg Oral HS Kevin Shah, PA-C      oxyCODONE  5 mg Oral Q6H PRN Kevin Shah, PA-C      oxyCODONE  2.5 mg Oral Q6H PRN Kevin Shah, PA-C      pantoprazole  40 mg Oral Daily Before Breakfast Kevin Shah, PA-C      pravastatin  80 mg Oral Daily With Dinner Kevin Shah, PA-C      propranolol  60 mg Oral Daily Kevin Shah, PA-C      QUEtiapine  25 mg Oral HS Kevin Shah, PA-C         AM-PAC Basic Mobility:  Basic Mobility Inpatient Raw Score: 9    -HLM Achieved: 1: Laying in bed  -HLM Goal: 3: Sit at edge of bed    HLM Goal listed above. Continue with multidisciplinary rounding and encourage appropriate mobility to improve upon HLM goals.     Today, Patient Was Seen By: Edwin Jeong DO    ** Please Note: This note was completed in part utilizing [employer] One [employer] software dictation.  Grammatical errors, random word insertions, spelling mistakes, and incomplete sentences may be an occasional consequence of this system secondary to software  limitations, ambient noise, and hardware issues.  If you have any questions or concerns about the content, text, or information contained within the body of this dictation, please contact the provider for clarification. **

## 2024-05-20 NOTE — PLAN OF CARE
Problem: CONFUSION/THOUGHT DISTURBANCE  Goal: Thought disturbances (confusion, delirium, depression, dementia or psychosis) are managed to maintain or return to baseline mental status and functional level  Description: INTERVENTIONS:  - Assess for possible contributors to  thought disturbance, including but not limited to medications, infection, impaired vision or hearing, underlying metabolic abnormalities, dehydration, respiratory compromise,  psychiatric diagnoses and notify attending PHYSICAN/AP  - Monitor and intervene to maintain adequate nutrition, hydration, elimination, sleep and activity  - Decrease environmental stimuli, including noise as appropriate.  - Provide frequent contacts to provide refocusing, direction and reassurance as needed. Approach patient calmly with eye contact and at their level.  - [place] high risk fall precautions, aspiration precautions and other safety measures, as indicated  - If delirium suspected, notify physician/AP of change in condition and request immediate in-person evaluation  - Pursue consults as appropriate including Geriatric (campus dependent), OT for cognitive evaluation/activity planning, psychiatric, pastoral care, etc.  Outcome: Progressing     Problem: SAFETY ADULT  Goal: Patient will remain free of falls  Description: INTERVENTIONS:  - Educate patient/family on patient safety including physical limitations  - Instruct patient to call for assistance with activity   - Consult OT/PT to assist with strengthening/mobility   - Keep Call bell within reach  - Keep bed low and locked with side rails adjusted as appropriate  - Keep care items and personal belongings within reach  - Initiate and maintain comfort rounds  - Apply yellow socks and bracelet for high fall risk patients  - Consider moving patient to room near nurses station  Outcome: Progressing

## 2024-05-20 NOTE — PROGRESS NOTES
The azithromycin has / have been converted to Oral per [facility] IV-to-PO Auto-Conversion Protocol for Adults as approved by the Pharmacy and Therapeutics Committee. The patient met all eligible criteria:  1) Age = 18 years old   2) Received at least one dose of the IV form   3) Receiving at least one other scheduled oral/enteral medication   4) Tolerating an oral/enteral diet   and did not have any exclusions:   1) Critical care patient   2) Active GI bleed (IF assessing H2RAs or PPIs)   3) Continuous tube feeding (IF assessing cipro, doxycycline, levofloxacin, minocycline, rifampin, or voriconazole)   4) Receiving PO vancomycin (IF assessing metronidazole)   5) Persistent nausea and/or vomiting   6) Ileus or gastrointestinal obstruction   7) Tari/nasogastric tube set for continuous suction   8) Specific order not to automatically convert to PO (in the order's comments or if discussed in the most recent Infectious Disease or primary team's progress notes).

## 2024-05-20 NOTE — PROGRESS NOTES
Orthopedics  Miguelandi Gunter 83 y.o. female MRN: [id number]  Unit/Bed#: E2 -01    Assessment:   83 y.o.female s/p fall with left femoral neck fracture requiring surgical intervention; admitted with multi-lobar pneumonia on IV abx and hypoxia requiring supplemental oxygen- appreciate input on surgical clearance    Plan:    Non weight bearing left lower extremity  To OR for L hip hemiarthroplasty when medically stable and cleared.    Informed consent obtained over the phone by POA and was placed in OR holding area.  DVT prophylaxis - Lovenox.  Will need to be held prior to surgery once a date is set.  Analgesics  Medicine primary service  Weaning O2 as tolerated  Appreciate Pulmonology's input:   Options would include empirically treating the pneumonia for 5 days and then proceeding with surgery if there is no progression in her oxygen requirements or if this can be performed with a nerve block. Regardless she will be at at least moderate risk for perioperative pulmonary complications   Dispo: Ortho will follow and check in daily with medicine for clearance updates.  NPO midnight order in place.    Subjective:    83 y.o.female seen and examined at the bedside.  At rest in bed she has minimal pain L hip.  No noted numbness/tingling in the LLE.  She is on o2 by NC and conversational today. Daughter at bedside, supporting that SLIM has been weaning oxygen      Objective:    Labs:  0   Lab Value Date/Time    HCT 30.3 (L) 05/20/2024 0441    HCT 31.8 (L) 05/19/2024 0450    HCT 34.3 (L) 05/18/2024 0507    HGB 9.8 (L) 05/20/2024 0441    HGB 10.5 (L) 05/19/2024 0450    HGB 11.3 (L) 05/18/2024 0507    INR 1.06 05/17/2024 2248    WBC 9.18 05/20/2024 0441    WBC 9.04 05/19/2024 0450    WBC 10.43 (H) 05/18/2024 0507    ESR 41 (H) 02/29/2024 1642    CRP <3.0 11/05/2021 0748       Meds:    Current Facility-Administered Medications:     acetaminophen (TYLENOL) tablet 650 mg, 650 mg, Oral, Q6H PRN, Kevin Shah PA-C,  650 mg at 05/20/24 1719    amLODIPine (NORVASC) tablet 2.5 mg, 2.5 mg, Oral, Daily, Kevin Shah PA-C, 2.5 mg at 05/20/24 0938    azithromycin (ZITHROMAX) tablet 500 mg, 500 mg, Oral, Q24H, Christopher Sosa DO    benzonatate (TESSALON PERLES) capsule 100 mg, 100 mg, Oral, TID PRN, Kevin Shah PA-C    ceftriaxone (ROCEPHIN) 1 g/50 mL in dextrose IVPB, 1,000 mg, Intravenous, Q24H, Edwin Jeong DO, Last Rate: 100 mL/hr at 05/20/24 1300, 1,000 mg at 05/20/24 1300    divalproex sodium (DEPAKOTE) DR tablet 125 mg, 125 mg, Oral, BID, Kevin Shah PA-C, 125 mg at 05/20/24 1720    enoxaparin (LOVENOX) subcutaneous injection 30 mg, 30 mg, Subcutaneous, Q24H JANETTE, Christopher Sosa DO, 30 mg at 05/20/24 0938    escitalopram (LEXAPRO) tablet 10 mg, 10 mg, Oral, Daily, Kevin Shah PA-C, 10 mg at 05/20/24 0938    losartan (COZAAR) tablet 50 mg, 50 mg, Oral, Daily, Kevin Shah PA-C, 50 mg at 05/20/24 0938    memantine (NAMENDA) tablet 10 mg, 10 mg, Oral, BID, Kevin Shah PA-C, 10 mg at 05/20/24 1719    mirtazapine (REMERON) tablet 7.5 mg, 7.5 mg, Oral, HS, Kevin Shah PA-C, 7.5 mg at 05/18/24 2042    oxyCODONE (ROXICODONE) IR tablet 5 mg, 5 mg, Oral, Q6H PRN, Kevin Shah PA-C, 5 mg at 05/20/24 0141    oxyCODONE (ROXICODONE) split tablet 2.5 mg, 2.5 mg, Oral, Q6H PRN, Kevin Shah PA-C, 2.5 mg at 05/19/24 1158    pantoprazole (PROTONIX) EC tablet 40 mg, 40 mg, Oral, Daily Before Breakfast, Kevin Shah PA-C, 40 mg at 05/20/24 0628    pravastatin (PRAVACHOL) tablet 80 mg, 80 mg, Oral, Daily With Dinner, Kevin Shah PA-C, 80 mg at 05/20/24 1508    propranolol (INDERAL LA) 24 hr capsule 60 mg, 60 mg, Oral, Daily, Kevin Shah PA-C, 60 mg at 05/20/24 0953    QUEtiapine (SEROquel) tablet 25 mg, 25 mg, Oral, HS, Kevin Shah PA-C, 25 mg at 05/18/24 2042    Blood Culture:   Lab Results   Component Value Date    BLOODCX No Growth at 48 hrs. 05/18/2024  "      Wound Culture:   No results found for: \"WOUNDCULT\"    Ins and Outs:  I/O last 24 hours:  In: 360 [P.O.:360]  Out: 350 [Urine:350]      Orthopedic Physical Exam:    Vitals:    05/20/24 1514   BP: 124/60   Pulse: 72   Resp: 18   Temp: (!) 97.3 °F (36.3 °C)   SpO2: 90%   Lying flat in bed, NAD.  NC oxygen in placed.  No active labored breathing.  left Lower Extremity extremity:  L hip no open wounds  Visible skin no erythema or ecchymosis  + TTP L hip  Intact to ankle DF/PF, EHL/FHL  SILT   No calf tenderness or pitting edema  Toes warm, sensate, mobile    _*_*_*_*_*_*_*_*_*_*_*_*_*_*_*_*_*_*_*_*_*_*_*_*_*_*_*_*_*_*_*_*_*_*_*_*_*_*_*_*_*      Jayy Salinas PA-C            "

## 2024-05-21 ENCOUNTER — ANESTHESIA EVENT (INPATIENT)
Dept: PERIOP | Facility: HOSPITAL | Age: 84
DRG: 956 | End: 2024-05-21
Payer: COMMERCIAL

## 2024-05-21 PROBLEM — Z99.81 SUPPLEMENTAL OXYGEN DEPENDENT: Status: ACTIVE | Noted: 2024-05-21

## 2024-05-21 LAB
ANION GAP SERPL CALCULATED.3IONS-SCNC: 6 MMOL/L (ref 4–13)
BACTERIA UR QL AUTO: ABNORMAL /HPF
BILIRUB UR QL STRIP: NEGATIVE
BUN SERPL-MCNC: 20 MG/DL (ref 5–25)
CALCIUM SERPL-MCNC: 8.6 MG/DL (ref 8.4–10.2)
CHLORIDE SERPL-SCNC: 104 MMOL/L (ref 96–108)
CLARITY UR: CLEAR
CO2 SERPL-SCNC: 28 MMOL/L (ref 21–32)
COLOR UR: YELLOW
CREAT SERPL-MCNC: 0.98 MG/DL (ref 0.6–1.3)
ERYTHROCYTE [DISTWIDTH] IN BLOOD BY AUTOMATED COUNT: 14.5 % (ref 11.6–15.1)
GFR SERPL CREATININE-BSD FRML MDRD: 53 ML/MIN/1.73SQ M
GLUCOSE SERPL-MCNC: 108 MG/DL (ref 65–140)
GLUCOSE UR STRIP-MCNC: NEGATIVE MG/DL
HCT VFR BLD AUTO: 31.6 % (ref 34.8–46.1)
HGB BLD-MCNC: 10.5 G/DL (ref 11.5–15.4)
HGB UR QL STRIP.AUTO: NEGATIVE
KETONES UR STRIP-MCNC: ABNORMAL MG/DL
LEUKOCYTE ESTERASE UR QL STRIP: NEGATIVE
MCH RBC QN AUTO: 30.9 PG (ref 26.8–34.3)
MCHC RBC AUTO-ENTMCNC: 33.2 G/DL (ref 31.4–37.4)
MCV RBC AUTO: 93 FL (ref 82–98)
MUCOUS THREADS UR QL AUTO: ABNORMAL
NITRITE UR QL STRIP: NEGATIVE
NON-SQ EPI CELLS URNS QL MICRO: ABNORMAL /HPF
OTHER STN SPEC: ABNORMAL
PH UR STRIP.AUTO: 6 [PH]
PLATELET # BLD AUTO: 204 THOUSANDS/UL (ref 149–390)
PMV BLD AUTO: 10.9 FL (ref 8.9–12.7)
POTASSIUM SERPL-SCNC: 4.1 MMOL/L (ref 3.5–5.3)
PROT UR STRIP-MCNC: ABNORMAL MG/DL
RBC # BLD AUTO: 3.4 MILLION/UL (ref 3.81–5.12)
RBC #/AREA URNS AUTO: ABNORMAL /HPF
RENAL EPI CELLS #/AREA URNS HPF: PRESENT /[HPF]
SODIUM SERPL-SCNC: 138 MMOL/L (ref 135–147)
SP GR UR STRIP.AUTO: 1.03 (ref 1–1.03)
UROBILINOGEN UR STRIP-ACNC: 2 MG/DL
WBC # BLD AUTO: 8.41 THOUSAND/UL (ref 4.31–10.16)
WBC #/AREA URNS AUTO: ABNORMAL /HPF

## 2024-05-21 PROCEDURE — 81001 URINALYSIS AUTO W/SCOPE: CPT | Performed by: INTERNAL MEDICINE

## 2024-05-21 PROCEDURE — 80048 BASIC METABOLIC PNL TOTAL CA: CPT | Performed by: INTERNAL MEDICINE

## 2024-05-21 PROCEDURE — 99232 SBSQ HOSP IP/OBS MODERATE 35: CPT | Performed by: INTERNAL MEDICINE

## 2024-05-21 PROCEDURE — 99233 SBSQ HOSP IP/OBS HIGH 50: CPT | Performed by: INTERNAL MEDICINE

## 2024-05-21 PROCEDURE — NC001 PR NO CHARGE

## 2024-05-21 PROCEDURE — 85027 COMPLETE CBC AUTOMATED: CPT | Performed by: INTERNAL MEDICINE

## 2024-05-21 RX ORDER — ENOXAPARIN SODIUM 100 MG/ML
30 INJECTION SUBCUTANEOUS
Status: DISCONTINUED | OUTPATIENT
Start: 2024-05-23 | End: 2024-05-28 | Stop reason: HOSPADM

## 2024-05-21 RX ORDER — DOCUSATE SODIUM 100 MG/1
100 CAPSULE, LIQUID FILLED ORAL 2 TIMES DAILY
Status: DISCONTINUED | OUTPATIENT
Start: 2024-05-21 | End: 2024-05-28 | Stop reason: HOSPADM

## 2024-05-21 RX ORDER — SENNOSIDES 8.6 MG
2 TABLET ORAL
Status: DISCONTINUED | OUTPATIENT
Start: 2024-05-21 | End: 2024-05-28 | Stop reason: HOSPADM

## 2024-05-21 RX ORDER — POLYETHYLENE GLYCOL 3350 17 G/17G
17 POWDER, FOR SOLUTION ORAL DAILY
Status: DISCONTINUED | OUTPATIENT
Start: 2024-05-21 | End: 2024-05-28 | Stop reason: HOSPADM

## 2024-05-21 RX ADMIN — PRAVASTATIN SODIUM 80 MG: 80 TABLET ORAL at 17:39

## 2024-05-21 RX ADMIN — DIVALPROEX SODIUM 125 MG: 125 TABLET, DELAYED RELEASE ORAL at 17:41

## 2024-05-21 RX ADMIN — DIVALPROEX SODIUM 125 MG: 125 TABLET, DELAYED RELEASE ORAL at 11:45

## 2024-05-21 RX ADMIN — MIRTAZAPINE 7.5 MG: 7.5 TABLET, FILM COATED ORAL at 21:31

## 2024-05-21 RX ADMIN — DOCUSATE SODIUM 100 MG: 100 CAPSULE, LIQUID FILLED ORAL at 17:43

## 2024-05-21 RX ADMIN — Medication 2.5 MG: at 09:11

## 2024-05-21 RX ADMIN — SENNOSIDES 17.2 MG: 8.6 TABLET, FILM COATED ORAL at 21:31

## 2024-05-21 RX ADMIN — POLYETHYLENE GLYCOL 3350 17 G: 17 POWDER, FOR SOLUTION ORAL at 11:44

## 2024-05-21 RX ADMIN — DOCUSATE SODIUM 100 MG: 100 CAPSULE, LIQUID FILLED ORAL at 11:44

## 2024-05-21 RX ADMIN — AZITHROMYCIN DIHYDRATE 500 MG: 250 TABLET ORAL at 21:30

## 2024-05-21 RX ADMIN — PANTOPRAZOLE SODIUM 40 MG: 40 TABLET, DELAYED RELEASE ORAL at 05:13

## 2024-05-21 RX ADMIN — AMLODIPINE BESYLATE 2.5 MG: 2.5 TABLET ORAL at 11:45

## 2024-05-21 RX ADMIN — Medication 2.5 MG: at 20:18

## 2024-05-21 RX ADMIN — MEMANTINE 10 MG: 5 TABLET ORAL at 17:39

## 2024-05-21 RX ADMIN — ESCITALOPRAM OXALATE 10 MG: 10 TABLET ORAL at 11:45

## 2024-05-21 RX ADMIN — ENOXAPARIN SODIUM 30 MG: 30 INJECTION SUBCUTANEOUS at 11:44

## 2024-05-21 RX ADMIN — LOSARTAN POTASSIUM 50 MG: 50 TABLET, FILM COATED ORAL at 11:46

## 2024-05-21 RX ADMIN — MEMANTINE 10 MG: 5 TABLET ORAL at 11:46

## 2024-05-21 RX ADMIN — PROPRANOLOL HYDROCHLORIDE 60 MG: 60 CAPSULE, EXTENDED RELEASE ORAL at 12:49

## 2024-05-21 RX ADMIN — CEFTRIAXONE 1000 MG: 10 INJECTION, POWDER, FOR SOLUTION INTRAVENOUS at 11:45

## 2024-05-21 RX ADMIN — QUETIAPINE FUMARATE 25 MG: 25 TABLET ORAL at 21:31

## 2024-05-21 NOTE — PLAN OF CARE
Problem: Prexisting or High Potential for Compromised Skin Integrity  Goal: Skin integrity is maintained or improved  Description: INTERVENTIONS:  - Identify patients at risk for skin breakdown  - Assess and monitor skin integrity  - Assess and monitor nutrition and hydration status  - Monitor labs   - Assess for incontinence   - Turn and reposition patient  - Assist with mobility/ambulation  - Relieve pressure over bony prominences  - Avoid friction and shearing  - Provide appropriate hygiene as needed including keeping skin clean and dry  - Evaluate need for skin moisturizer/barrier cream  - Collaborate with interdisciplinary team   - Patient/family teaching  - Consider wound care consult   Outcome: Progressing      Problem: PAIN - ADULT  Goal: Verbalizes/displays adequate comfort level or baseline comfort level  Description: Interventions:  - Encourage patient to monitor pain and request assistance  - Assess pain using appropriate pain scale  - Administer analgesics based on type and severity of pain and evaluate response  - Implement non-pharmacological measures as appropriate and evaluate response  - Consider cultural and social influences on pain and pain management  - Notify physician/advanced practitioner if interventions unsuccessful or patient reports new pain  Outcome: Progressing     Problem: SAFETY ADULT  Goal: Patient will remain free of falls  Description: INTERVENTIONS:  - Educate patient/family on patient safety including physical limitations  - Instruct patient to call for assistance with activity   - Consult OT/PT to assist with strengthening/mobility   - Keep Call bell within reach  - Keep bed low and locked with side rails adjusted as appropriate  - Keep care items and personal belongings within reach  - Initiate and maintain comfort rounds  - Make Fall Risk Sign visible to staff  - Initiate/Maintain bed alarm    Problem: CONFUSION/THOUGHT DISTURBANCE  Goal: Thought disturbances (confusion,  delirium, depression, dementia or psychosis) are managed to maintain or return to baseline mental status and functional level  Description: INTERVENTIONS:  - Assess for possible contributors to  thought disturbance, including but not limited to medications, infection, impaired vision or hearing, underlying metabolic abnormalities, dehydration, respiratory compromise,  psychiatric diagnoses and notify attending PHYSICAN/AP  - Monitor and intervene to maintain adequate nutrition, hydration, elimination, sleep and activity  - Decrease environmental stimuli, including noise as appropriate.  - Provide frequent contacts to provide refocusing, direction and reassurance as needed. Approach patient calmly with eye contact and at their level.  - [place] high risk fall precautions, aspiration precautions and other safety measures, as indicated  - If delirium suspected, notify physician/AP of change in condition and request immediate in-person evaluation  - Pursue consults as appropriate including Geriatric (campus dependent), OT for cognitive evaluation/activity planning, psychiatric, pastoral care, etc.  Outcome: Progressing    Problem: RESPIRATORY - ADULT  Goal: Achieves optimal ventilation and oxygenation  Description: INTERVENTIONS:  - Assess for changes in respiratory status  - Assess for changes in mentation and behavior  - Position to facilitate oxygenation and minimize respiratory effort  - Oxygen administered by appropriate delivery if ordered  - Initiate smoking cessation education as indicated  - Encourage broncho-pulmonary hygiene including cough, deep breathe, Incentive Spirometry  - Assess the need for suctioning and aspirate as needed  - Assess and instruct to report SOB or any respiratory difficulty  - Respiratory Therapy support as indicated  Outcome: Progressing        - Apply yellow socks and bracelet for high fall risk patients  - Consider moving patient to room near nurses station  Outcome: Progressing

## 2024-05-21 NOTE — PROGRESS NOTES
Pt is on OR schedule for 5/22/24.  Please do not order/give lovenox for 5/22/24, so that we may attempt spinal anesthesia.  Lovenox must be held for 12 hrs prior to spinal.  Thank you

## 2024-05-21 NOTE — CASE MANAGEMENT
Case Management Assessment & Discharge Planning Note    Patient name Dangelo Gunter  Location [facility] 2 /E2 -* MRN [id number]  : 1940 Date 2024       Current Admission Date: 2024  Current Admission Diagnosis:Fracture of femoral neck, left (HCC)   Patient Active Problem List    Diagnosis Date Noted Date Diagnosed    Supplemental oxygen dependent 2024     Acute respiratory failure with hypoxia (HCC) 2024     Multifocal pneumonia 2024     Fracture of femoral neck, left (HCC) 2024     Venous insufficiency of left lower extremity 2024     Dementia with agitation ([facility]) 2024     Balance problems 2023     Diastolic dysfunction without heart failure 2023     Nonrheumatic tricuspid valve regurgitation 2023     Other osteoporosis without current pathological fracture 2023     Other insomnia 2022     Encounter for medication review 2021     Bradycardia 2021     Other chest pain 2020     Anxiety disorder 2020     Chest pressure 2020     Hyponatremia 2020     Polymyalgia rheumatica ([facility]) 2020     Temporal arteritis ([facility]) 2020     Gastroesophageal reflux disease without esophagitis 2018     Essential hypertension 2018     Abnormal CT scan, head 2018     Goiter 10/05/2012     Osteoarthritis 10/05/2012       LOS (days): 3  Geometric Mean LOS (GMLOS) (days): 3.9  Days to GMLOS:0.3     OBJECTIVE:    Risk of Unplanned Readmission Score: 18.92         Current admission status: Inpatient       Preferred Pharmacy:   [facility] - [place], [place] - [address]  [address] [address]  Phone: [phone] Fax: [phone]    [facility] - [place], [place] - [address] [place] [address]  [address] [place] [address]  [address] [place] [address]  Phone: [phone] Fax: [phone]    Primary Care Provider: Parag Meyers MD    Primary Insurance:  [facility]  Secondary Insurance:     ASSESSMENT:  Active Health Care Proxies    There are no active Health Care Proxies on file.       Advance Directives  Does patient have a Health Care POA?: Yes  Does patient have Advance Directives?: Yes  Advance Directives: Power of  for health care  Primary Contact: Reece Gunter [phone]         Readmission Root Cause  30 Day Readmission: No    Patient Information  Admitted from:: Facility  Mental Status: Confused  During Assessment patient was accompanied by: Daughter  Assessment information provided by:: Daughter  County of Residence: [place]  What city do you live in?: [place]  Home entry access options. Select all that apply.: No steps to enter home  Type of Current Residence: Facility  Upon entering residence, is there a bedroom on the main floor (no further steps)?: Yes  Living Arrangements: Other (Comment) ([facility])  Is patient a ?: No    Activities of Daily Living Prior to Admission  Functional Status: Assistance  Completes ADLs independently?: No  Level of ADL dependence: Assistance  Ambulates independently?: Yes  Does patient use assisted devices?: Yes  Assisted Devices (DME) used: Walker, Wheelchair  Does patient currently own DME?: Yes  What DME does the patient currently own?: Walker, Wheelchair  Does patient have a history of Outpatient Therapy (PT/OT)?: No  Does the patient have a history of Short-Term Rehab?: No  Does patient have a history of HHC?: Yes  Does patient currently have HHC?: Yes    Current Home Health Care  Type of Current Home Care Services: Home PT, Home ST, Home OT  Current Home Health Agency:: Other (please enter name in comment) ([facility])  Current Home Health Follow-Up Provider:: PCP    Patient Information Continued  Income Source: Pension/nursing home  Does patient have prescription coverage?: Yes  Does patient receive dialysis treatments?: No  Does patient have a history of substance  abuse?: No  Does patient have a history of Mental Health Diagnosis?: No         Means of Transportation  Means of Transport to Appts:: Family transport (Family if needed but most drs are at facility)      Social Determinants of Health (SDOH)      Flowsheet Row Most Recent Value   Housing Stability    In the last 12 months, was there a time when you were not able to pay the mortgage or rent on time? N   In the past 12 months, how many times have you moved where you were living? 0   At any time in the past 12 months, were you homeless or living in a shelter (including now)? N   Transportation Needs    In the past 12 months, has lack of transportation kept you from medical appointments or from getting medications? no   In the past 12 months, has lack of transportation kept you from meetings, work, or from getting things needed for daily living? No   Food Insecurity    Within the past 12 months, you worried that your food would run out before you got the money to buy more. Never true   Within the past 12 months, the food you bought just didn't last and you didn't have money to get more. Never true   Utilities    In the past 12 months has the electric, gas, oil, or water company threatened to shut off services in your home? No            DISCHARGE DETAILS:    Discharge planning discussed with:: Pt's daughter                      Contacts  Patient Contacts: Aida Alex (Daughter)  [phone]  Relationship to Patient:: Family  Contact Method: In Person  Reason/Outcome: Continuity of Care, Emergency Contact, Discharge Planning    Requested Home Health Care         Is the patient interested in HHC at discharge?: No    DME Referral Provided  Referral made for DME?: No

## 2024-05-21 NOTE — ASSESSMENT & PLAN NOTE
Patient with PMHx of dementia, HTN, and HLD presented to the ED from her facility, [facility], after sustaining a mechanical fall  Pt reportedly was assisted by staff when she fell. Staff was able to help guide patient to the floor. No reported head strike and patient not anticoagulated. Pt did land on her left hip.  XR left hip demonstrating fracture of left femoral neck  Considered moderate risk for cardiac and pulmonary complication  Possible surgery once completed antibiotic treatment course  N.p.o. after midnight  Patient is considered moderate risk for cardiac and pulmonary complications, will plan for operative management based upon surgeon availability  DVT prophylaxis for tomorrow to be held

## 2024-05-21 NOTE — ASSESSMENT & PLAN NOTE
Possibly related to aspiration pneumonia versus viral pneumonia  Pulmonary consultation reviewed, question fat emboli from recent fracture  Wean oxygen as tolerated, currently on 5 L  Continue ceftriaxone day #4 of 5-last dose tomorrow  Completed 3 days of azithromycin  RP 2 panel negative  Moderate risk for pulmonary and cardiac complications possible operative management on hip fracture on 5/22/2024

## 2024-05-21 NOTE — PROGRESS NOTES
[facility]  Progress Note  Name: Dangelo Gunter I  MRN: [id number]  Unit/Bed#: E2 -01 I Date of Admission: 5/17/2024   Date of Service: 5/21/2024 I Hospital Day: 3    Assessment & Plan   * Fracture of femoral neck, left (HCC)  Assessment & Plan  Patient with PMHx of dementia, HTN, and HLD presented to the ED from her facility, [facility], after sustaining a mechanical fall  Pt reportedly was assisted by staff when she fell. Staff was able to help guide patient to the floor. No reported head strike and patient not anticoagulated. Pt did land on her left hip.  XR left hip demonstrating fracture of left femoral neck  Considered moderate risk for cardiac and pulmonary complication  Possible surgery once completed antibiotic treatment course  N.p.o. after midnight  Patient is considered moderate risk for cardiac and pulmonary complications, will plan for operative management based upon surgeon availability  DVT prophylaxis for tomorrow to be held    Multifocal pneumonia  Assessment & Plan  Multifocal pneumonia viral versus atypical organism  Continue ceftriaxone day #4  Wean oxygen as tolerated  Appreciate pulmonary consultation    Acute respiratory failure with hypoxia (HCC)  Assessment & Plan  Possibly related to aspiration pneumonia versus viral pneumonia  Pulmonary consultation reviewed, question fat emboli from recent fracture  Wean oxygen as tolerated, currently on 5 L  Continue ceftriaxone day #4 of 5-last dose tomorrow  Completed 3 days of azithromycin  RP 2 panel negative  Moderate risk for pulmonary and cardiac complications possible operative management on hip fracture on 5/22/2024    Dementia with agitation (HCC)  Assessment & Plan  Pt only oriented to person and place  Delirium precautions  Continue home regimen of lexapro, namenda, remeron, depakote, and seroquel  Mood is stable    Essential hypertension  Assessment & Plan  Continue amlodipine,  losartan, and propanolol                 Hospital Course:     83-year-old female patient presenting with femoral neck fracture.  History of dementia, on admission found to have multifocal pneumonia possibly aspiration.  She is on day 4 of antibiotic therapy with plans for operative management tomorrow    Assessment:      Principal Problem:    Fracture of femoral neck, left (HCC)  Active Problems:    Essential hypertension    Dementia with agitation (HCC)    Acute respiratory failure with hypoxia (HCC)    Multifocal pneumonia      Plan:    N.p.o. after midnight for possible operative management  Continue antibiotic therapy through tomorrow  Situation remains of high risk       VTE Pharmacologic Prophylaxis:   Pharmacologic: Enoxaparin (Lovenox)  Mechanical VTE Prophylaxis in Place: Yes    AM-PAC Basic Mobility:  Basic Mobility Inpatient Raw Score: 9    -HLM Achieved: 2: Bed activities/Dependent transfer  -HLM Goal: 3: Sit at edge of bed    HLM Goal listed above. Continue with multidisciplinary rounding and encourage appropriate mobility to improve upon HLM goals.         Patient Centered Rounds: Case discussed and reviewed with nursing    Discussions with Specialists or Other Care Team Provider: Case management    Education and Discussions with Family / Patient: Discussed with patient family at bedside    Time Spent for Care: 80 minutes.  More than 50% of total time spent on counseling and coordination of care as described above.    Current Length of Stay: 3 day(s)    Current Patient Status: Inpatient   Certification Statement: The patient will continue to require additional inpatient hospital stay due to operative management    Discharge Plan / Estimated Discharge Date: To be determined but likely greater than 72 hours    Code Status: Level 1 - Full Code      Subjective:   Seen and examined, history limited by dementia.  Per nursing patient has pain that is worse with movement of her fractured leg.    A complete  and comprehensive 14 point organ system review has been performed and all other systems are negative other than stated above.    Objective:     Vitals:   Temp (24hrs), Av.7 °F (36.5 °C), Min:97.3 °F (36.3 °C), Max:98.2 °F (36.8 °C)    Temp:  [97.3 °F (36.3 °C)-98.2 °F (36.8 °C)] 98.2 °F (36.8 °C)  HR:  [72-86] 81  Resp:  [18] 18  BP: (124-158)/(60-84) 152/74  SpO2:  [90 %-94 %] 93 %  Body mass index is 25.12 kg/m².     Input and Output Summary (last 24 hours):       Intake/Output Summary (Last 24 hours) at 2024 1328  Last data filed at 2024 1253  Gross per 24 hour   Intake --   Output 550 ml   Net -550 ml       Physical Exam:     General: well appearing, no acute distress  HEENT: atraumatic, PERRLA, moist mucosa, normal pharynx, normal tonsils and adenoids, normal tongue, no fluid in sinuses  Neck: Trachea midline, no carotid bruit, no masses  Respiratory: normal chest wall expansion, CTA B, no r/r/w, no rubs  Cardiovascular: RRR, no m/r/g, Normal S1 and S2  Abdomen: Soft, non-tender, non-distended, normal bowel sounds in all quadrants, no hepatosplenomegaly, no tympany  Rectal: deferred  Musculoskeletal: normal ROM in upper and lower extremities  Integumentary: warm, dry, and pink, with no rash, purpura, or petechia  Heme/Lymph: no lymphadenopathy, no bruises  Neurological: Cranial Nerves II-XII grossly intact  Psychiatric: cooperative with normal mood, affect, and cognition      Additional Data:     Labs:    Results from last 7 days   Lab Units 24  0513 24  0441   WBC Thousand/uL 8.41 9.18   HEMOGLOBIN g/dL 10.5* 9.8*   HEMATOCRIT % 31.6* 30.3*   PLATELETS Thousands/uL 204 194   SEGS PCT %  --  68   LYMPHO PCT %  --  14   MONO PCT %  --  13*   EOS PCT %  --  3     Results from last 7 days   Lab Units 24  0513 24  0450 24  0507   POTASSIUM mmol/L 4.1   < > 3.6   CHLORIDE mmol/L 104   < > 106   CO2 mmol/L 28   < > 26   BUN mg/dL 20   < > 25   CREATININE mg/dL 0.98   < >  1.25   CALCIUM mg/dL 8.6   < > 9.4   ALK PHOS U/L  --   --  46   ALT U/L  --   --  116*   AST U/L  --   --  83*    < > = values in this interval not displayed.     Results from last 7 days   Lab Units 05/17/24  2248   INR  1.06       * I Have Reviewed All Lab Data Listed Above.  * Additional Pertinent Lab Tests Reviewed: All Labs For Current Hospital Admission Reviewed    Imaging:    Imaging Reports Reviewed Today Include: No new imaging  Imaging Personally Reviewed by Myself Includes: No new imaging    Recent Cultures (last 7 days):     Results from last 7 days   Lab Units 05/20/24  1222 05/18/24  0547 05/18/24  0047 05/18/24  0026   BLOOD CULTURE   --   --  No Growth at 72 hrs. No Growth at 72 hrs.   LEGIONELLA URINARY ANTIGEN  Negative Negative  --   --        Last 24 Hours Medication List:   Current Facility-Administered Medications   Medication Dose Route Frequency Provider Last Rate    acetaminophen  650 mg Oral Q6H PRN Kevin Shah PA-C      amLODIPine  2.5 mg Oral Daily Kevin Shah PA-C      azithromycin  500 mg Oral Q24H [facility], [facility]      benzonatate  100 mg Oral TID PRN Kevin Shah PA-C      cefTRIAXone  1,000 mg Intravenous Q24H Edwin Jeong, DO 1,000 mg (05/21/24 1145)    divalproex sodium  125 mg Oral BID Kevin Shah PA-C      docusate sodium  100 mg Oral BID Edwin Jeong, DO      enoxaparin  30 mg Subcutaneous Q24H [facility], DO      escitalopram  10 mg Oral Daily Kevin Shah PA-C      losartan  50 mg Oral Daily Kevin Shah, ERIN      memantine  10 mg Oral BID Kevin Shah, ERIN      mirtazapine  7.5 mg Oral HS Kevin Shah, ERIN      oxyCODONE  5 mg Oral Q6H PRN Kevin Shah, ERNI      oxyCODONE  2.5 mg Oral Q6H PRN Kevin Shah, PA-LEVY      pantoprazole  40 mg Oral Daily Before Breakfast Kevin Shah PA-C      polyethylene glycol  17 g Oral Daily Edwin Jeong, DO      pravastatin  80 mg Oral Daily With Dinner Kevin  Berta Shah PA-C      propranolol  60 mg Oral Daily Kevin Shah PA-C      QUEtiapine  25 mg Oral HS Kevin Shah PA-C      senna  2 tablet Oral HS Edwin Jeong DO         AM-PAC Basic Mobility:  Basic Mobility Inpatient Raw Score: 9    JH-HLM Achieved: 2: Bed activities/Dependent transfer  JH-HLM Goal: 3: Sit at edge of bed    HLM Goal listed above. Continue with multidisciplinary rounding and encourage appropriate mobility to improve upon HLM goals.     Today, Patient Was Seen By: Edwin Jeong DO    ** Please Note: This note was completed in part utilizing [employer] Dragon One [employer] software dictation.  Grammatical errors, random word insertions, spelling mistakes, and incomplete sentences may be an occasional consequence of this system secondary to software limitations, ambient noise, and hardware issues.  If you have any questions or concerns about the content, text, or information contained within the body of this dictation, please contact the provider for clarification. **

## 2024-05-21 NOTE — PROGRESS NOTES
Progress Note - Orthopedics   Dangelo Gunter 83 y.o. female MRN: [id number]  Unit/Bed#: E2 -01      Subjective:    83 y.o.female s/p fall with left femoral neck fracture requiring surgical intervention pending medical clearance.  No new acute overnight events, no new complaints.  Pain is reasonably well-controlled at rest.  She denies present subjective CP, SOB, N/V, or numbness or tingling.     Labs:  0   Lab Value Date/Time    HCT 31.6 (L) 05/21/2024 0513    HCT 30.3 (L) 05/20/2024 0441    HCT 31.8 (L) 05/19/2024 0450    HGB 10.5 (L) 05/21/2024 0513    HGB 9.8 (L) 05/20/2024 0441    HGB 10.5 (L) 05/19/2024 0450    INR 1.06 05/17/2024 2248    WBC 8.41 05/21/2024 0513    WBC 9.18 05/20/2024 0441    WBC 9.04 05/19/2024 0450    ESR 41 (H) 02/29/2024 1642    CRP <3.0 11/05/2021 0748       Meds:    Current Facility-Administered Medications:     acetaminophen (TYLENOL) tablet 650 mg, 650 mg, Oral, Q6H PRN, Kevin Shah PA-C, 650 mg at 05/20/24 1719    amLODIPine (NORVASC) tablet 2.5 mg, 2.5 mg, Oral, Daily, Kevin Shah PA-C, 2.5 mg at 05/21/24 1145    azithromycin (ZITHROMAX) tablet 500 mg, 500 mg, Oral, Q24H, Christopher Sosa DO, 500 mg at 05/20/24 2128    benzonatate (TESSALON PERLES) capsule 100 mg, 100 mg, Oral, TID PRN, Kevin Shah PA-C    ceftriaxone (ROCEPHIN) 1 g/50 mL in dextrose IVPB, 1,000 mg, Intravenous, Q24H, Edwin Jeong DO, Last Rate: 100 mL/hr at 05/21/24 1145, 1,000 mg at 05/21/24 1145    divalproex sodium (DEPAKOTE) DR tablet 125 mg, 125 mg, Oral, BID, Kevin Shah PA-C, 125 mg at 05/21/24 1145    docusate sodium (COLACE) capsule 100 mg, 100 mg, Oral, BID, Edwin Jeong DO, 100 mg at 05/21/24 1144    [START ON 5/23/2024] enoxaparin (LOVENOX) subcutaneous injection 30 mg, 30 mg, Subcutaneous, Q24H [facility], Edwin Jeong DO    escitalopram (LEXAPRO) tablet 10 mg, 10 mg, Oral, Daily, Kevin Shah PA-C, 10 mg at 05/21/24 1145    losartan (COZAAR) tablet 50  "mg, 50 mg, Oral, Daily, Kevin Shah PA-C, 50 mg at 05/21/24 1146    memantine (NAMENDA) tablet 10 mg, 10 mg, Oral, BID, Kevin Shah PA-C, 10 mg at 05/21/24 1146    mirtazapine (REMERON) tablet 7.5 mg, 7.5 mg, Oral, HS, Kevin Shah PA-C, 7.5 mg at 05/18/24 2042    oxyCODONE (ROXICODONE) IR tablet 5 mg, 5 mg, Oral, Q6H PRN, Kevin Shah PA-C, 5 mg at 05/20/24 2002    oxyCODONE (ROXICODONE) split tablet 2.5 mg, 2.5 mg, Oral, Q6H PRN, Kevin Shah PA-C, 2.5 mg at 05/21/24 0911    pantoprazole (PROTONIX) EC tablet 40 mg, 40 mg, Oral, Daily Before Breakfast, Kevin Shah PA-C, 40 mg at 05/21/24 0513    polyethylene glycol (MIRALAX) packet 17 g, 17 g, Oral, Daily, Edwin Jeong DO, 17 g at 05/21/24 1144    pravastatin (PRAVACHOL) tablet 80 mg, 80 mg, Oral, Daily With Dinner, Kevin Shah PA-C, 80 mg at 05/20/24 1508    propranolol (INDERAL LA) 24 hr capsule 60 mg, 60 mg, Oral, Daily, Kevin Shah PA-C, 60 mg at 05/21/24 1249    QUEtiapine (SEROquel) tablet 25 mg, 25 mg, Oral, HS, Kevin Shah PA-C, 25 mg at 05/18/24 2042    senna (SENOKOT) tablet 17.2 mg, 2 tablet, Oral, HS, Edwin Jeong DO    Blood Culture:   Lab Results   Component Value Date    BLOODCX No Growth at 72 hrs. 05/18/2024       Wound Culture:   No results found for: \"WOUNDCULT\"    Ins and Outs:  I/O last 24 hours:  In: -   Out: 550 [Urine:550]          Physical:  Vitals:    05/21/24 0929   BP: 152/74   Pulse: 81   Resp: 18   Temp: 98.2 °F (36.8 °C)   SpO2: 93%     Musculoskeletal: left Lower Extremity  Visible skin intact.  TTP over the hip  Sensation intact over the toes  Motor intact to +FHL/EHL, +ankle dorsi/plantar flexion  Digits warm and well perfused  Capillary refill < 2 seconds    Assessment:    83 y.o.female s/p fall with left femoral neck fracture requiring surgical intervention; admitted with multi-lobar pneumonia on IV abx and hypoxia requiring supplemental oxygen.  Per SLIM, the patient " is likely to be cleared for surgery tomorrow.     Plan:  Non weight bearing left lower extremity  To OR for L hip hemiarthroplasty when medically stable and cleared.   The patient will likely go to the OR tomorrow, 5/22   NPO at midnight  Informed consent previously obtained over the phone by POA and was placed in OR holding area.  DVT prophylaxis - Lovenox.  Will need to be held tomorrow morning prior to surgery.  Analgesics  Medicine primary service  Weaning O2 as tolerated  Appreciate Pulmonology's input:   Options would include empirically treating the pneumonia for 5 days and then proceeding with surgery if there is no progression in her oxygen requirements or if this can be performed with a nerve block. Regardless she will be at at least moderate risk for perioperative pulmonary complications   Dispo: Ortho will follow     Alejandra Briggs PA-C

## 2024-05-21 NOTE — ASSESSMENT & PLAN NOTE
Multifocal pneumonia viral versus atypical organism  Continue ceftriaxone day #4  Wean oxygen as tolerated  Appreciate pulmonary consultation

## 2024-05-21 NOTE — ANESTHESIA PREPROCEDURE EVALUATION
Procedure:  HEMIARTHROPLASTY HIP (BIPOLAR) (Left: Shoulder)    Relevant Problems   CARDIO   (+) Essential hypertension   (+) Hypertension (Resolved)   (+) Nonrheumatic tricuspid valve regurgitation   (+) Other chest pain   (+) Pulmonary hypertension associated with systemic disorder (HCC) (Resolved)      ENDO   (+) Hypothyroidism (Resolved)      GI/HEPATIC   (+) Gastroesophageal reflux disease without esophagitis      MUSCULOSKELETAL   (+) Osteoarthritis      NEURO/PSYCH   (+) Anxiety disorder   (+) Dementia with agitation (HCC)      PULMONARY   (+) Acute respiratory failure with hypoxia (HCC)   (+) Multifocal pneumonia   (+) Supplemental oxygen dependent      Rheumatology   (+) Polymyalgia rheumatica (HCC)        Physical Exam    Airway    Mallampati score: II  TM Distance: >3 FB  Neck ROM: limited     Dental   No notable dental hx     Cardiovascular  Rhythm: regular, Rate: normal, Cardiovascular exam normal    Pulmonary   Decreased breath sounds    Other Findings  post-pubertal.      Anesthesia Plan  ASA Score- 4     Anesthesia Type- spinal with ASA Monitors.         Additional Monitors:     Airway Plan:     Comment: Spoke with Daughter Aida Alex signed consent..  She understands that Dangelo is high risk from anesthesia perspective given recent pulmonary pathology.  We will attempt to surgery under spinal anesthesia but may have to do GETA is spinal is not successful.  Daughter is aware that Rosa's dementia will likely worsen after receiving anesthesia.    ·  Left Ventricle: Left ventricular cavity size is normal. Wall thickness is mildly increased. There is mild concentric hypertrophy. The left ventricular ejection fraction is 60%. Systolic function is normal. Wall motion is normal. Diastolic function is mildly abnormal, consistent with grade I (abnormal) relaxation.  ·  Left Atrium: The atrium is mildly dilated.  ·  Right Atrium: The atrium is dilated.  ·  Aortic Valve: The aortic valve is trileaflet.  The leaflets are moderately calcified. The leaflets exhibit normal mobility. There is mild regurgitation. There is aortic valve sclerosis.  ·  Mitral Valve: There is mild annular calcification. There is mild regurgitation.  ·  Tricuspid Valve: There is moderate regurgitation. The right ventricular systolic pressure is moderately elevated. The estimated right ventricular systolic pressure is 56.00 mmHg.  ·  Pulmonic Valve: There is mild regurgitation.  ·  Pericardium: There is a trivial pericardial effusion along the right atrial free wall. The fluid exhibits no internal echoes. There is no echocardiographic evidence of tamponade.     .       Plan Factors-    Induction-     Postoperative Plan-     Perioperative Resuscitation Plan - Level 1 - Full Code.       Informed Consent-

## 2024-05-21 NOTE — PROGRESS NOTES
"Progress Note - Pulmonary   Dangelo Gunter 83 y.o. female MRN: [id number]  Unit/Bed#: E2 -01 Encounter: [id number]    Assessment/Plan:    Acute hypoxic respiratory failure  -93% on 4 L NC, patient does not wear any supplemental O2 at home  -Continue to maintain saturation greater than 89%  -Pulmonary hygiene encouraged: Deep breathing with cough, OOB as tolerated, incentive spirometry and flutter valve hourly    Multifocal pneumonia/pneumonitis  -CT chest completed 05/18/2024 shows extensive patchy bilateral upper and lower lobe infiltrates suspicious for multifocal bronchopneumonia.  -WBC 9.04-9.18-8.41  -Procalcitonin negative x 2  -RP 2 panel negative, Legionella antigen and strep pneumonia negative  -Azithromycin day #3  -Cefepime day #4  -See Dr. Lewis note from 05/20/2024 for pulmonary recommendations regarding surgery    Abnormal CT of chest  -CT chest completed 05/18/2024 shows extensive patchy bilateral upper and lower lobe infiltrates suspicious for multifocal bronchopneumonia.    -Will need outpatient follow-up imaging in 6 to 8 weeks    Fracture of femoral neck  -Fell at [facility] care unit  -X-ray of left hip shows fracture of left femoral neck  -Will consider surgery after 5 days antibiotics    Chief Complaint:    I feel ok    Subjective:    Patient seen and examined at bedside.  Found laying in bed, daughter at bedside.  Has complaints of right leg pain.  Overall oxygen requirements improved down to 4 L NC.  No overnight events reported.  Patient denies any fever chills night sweats chest pain or hemoptysis    Objective:    Vitals: Blood pressure 152/74, pulse 81, temperature 98.2 °F (36.8 °C), temperature source Temporal, resp. rate 18, height 5' 6\" (1.676 m), weight 70.6 kg (155 lb 10.3 oz), SpO2 93%.4 L,Body mass index is 25.12 kg/m².      Intake/Output Summary (Last 24 hours) at 5/21/2024 1011  Last data filed at 5/20/2024 1915  Gross per 24 hour   Intake --   Output 200 ml   Net -200 ml " "      Invasive Devices       Peripheral Intravenous Line  Duration             Peripheral IV 05/18/24 Dorsal (posterior);Right Forearm 3 days    Peripheral IV 05/21/24 Proximal;Right;Ventral (anterior) Forearm <1 day              Drain  Duration             Urethral Catheter Latex 16 Fr. 3 days                    Physical Exam:     Physical Exam  Vitals reviewed.   Constitutional:       General: She is not in acute distress.     Appearance: She is not ill-appearing.   HENT:      Head: Normocephalic and atraumatic.      Right Ear: External ear normal.      Left Ear: External ear normal.      Nose: Nose normal.      Mouth/Throat:      Mouth: Mucous membranes are moist.      Pharynx: Oropharynx is clear.   Eyes:      Extraocular Movements: Extraocular movements intact.      Pupils: Pupils are equal, round, and reactive to light.   Cardiovascular:      Rate and Rhythm: Normal rate and regular rhythm.      Pulses: Normal pulses.      Heart sounds: Normal heart sounds.   Pulmonary:      Effort: Pulmonary effort is normal.      Comments: Decreased breath sounds bilaterally likely secondary to poor inspiratory effort  Abdominal:      General: Bowel sounds are normal.      Tenderness: There is no abdominal tenderness.   Musculoskeletal:      Cervical back: Normal range of motion and neck supple.      Right lower leg: No edema.      Left lower leg: No edema.      Comments: Right leg fracture   Skin:     General: Skin is warm and dry.   Neurological:      Mental Status: She is alert. Mental status is at baseline.   Psychiatric:         Mood and Affect: Mood normal.         Behavior: Behavior normal.         Labs: I have personally reviewed pertinent lab results., ABG: No results found for: \"PHART\", \"QJN5RUA\", \"PO2ART\", \"LEE9IOZ\", \"T2UBMOKH\", \"BEART\", \"SOURCE\", BNP: No results found for: \"BNP\", CBC:   Lab Results   Component Value Date    WBC 8.41 05/21/2024    HGB 10.5 (L) 05/21/2024    HCT 31.6 (L) 05/21/2024    MCV 93 " 05/21/2024     05/21/2024    RBC 3.40 (L) 05/21/2024    MCH 30.9 05/21/2024    MCHC 33.2 05/21/2024    RDW 14.5 05/21/2024    MPV 10.9 05/21/2024   , CMP:   Lab Results   Component Value Date    SODIUM 138 05/21/2024    K 4.1 05/21/2024     05/21/2024    CO2 28 05/21/2024    BUN 20 05/21/2024    CREATININE 0.98 05/21/2024    CALCIUM 8.6 05/21/2024    EGFR 53 05/21/2024         Imaging and other studies: I have personally reviewed pertinent reports.    CT chest completed 05/18/2024 shows extensive patchy bilateral upper and lower lobe infiltrates suspicious for multifocal bronchopneumonia.

## 2024-05-21 NOTE — PRE-PROCEDURE INSTRUCTIONS
Pt is on the OR schedule for 5/22.  Please do not order lovenox for 5/22, so that we may attempt spinal anesthesia,

## 2024-05-22 ENCOUNTER — TELEPHONE (OUTPATIENT)
Age: 84
End: 2024-05-22

## 2024-05-22 ENCOUNTER — APPOINTMENT (INPATIENT)
Dept: RADIOLOGY | Facility: HOSPITAL | Age: 84
DRG: 956 | End: 2024-05-22
Payer: COMMERCIAL

## 2024-05-22 ENCOUNTER — ANESTHESIA (INPATIENT)
Dept: PERIOP | Facility: HOSPITAL | Age: 84
DRG: 956 | End: 2024-05-22
Payer: COMMERCIAL

## 2024-05-22 PROBLEM — Z99.81 SUPPLEMENTAL OXYGEN DEPENDENT: Status: RESOLVED | Noted: 2024-05-21 | Resolved: 2024-05-22

## 2024-05-22 LAB
ABO GROUP BLD: NORMAL
ANION GAP SERPL CALCULATED.3IONS-SCNC: 5 MMOL/L (ref 4–13)
BLD GP AB SCN SERPL QL: NEGATIVE
BUN SERPL-MCNC: 20 MG/DL (ref 5–25)
CALCIUM SERPL-MCNC: 8.6 MG/DL (ref 8.4–10.2)
CHLORIDE SERPL-SCNC: 102 MMOL/L (ref 96–108)
CO2 SERPL-SCNC: 28 MMOL/L (ref 21–32)
CREAT SERPL-MCNC: 0.89 MG/DL (ref 0.6–1.3)
ERYTHROCYTE [DISTWIDTH] IN BLOOD BY AUTOMATED COUNT: 14.4 % (ref 11.6–15.1)
GFR SERPL CREATININE-BSD FRML MDRD: 59 ML/MIN/1.73SQ M
GLUCOSE SERPL-MCNC: 108 MG/DL (ref 65–140)
HCT VFR BLD AUTO: 31.9 % (ref 34.8–46.1)
HGB BLD-MCNC: 10.6 G/DL (ref 11.5–15.4)
MCH RBC QN AUTO: 31.5 PG (ref 26.8–34.3)
MCHC RBC AUTO-ENTMCNC: 33.2 G/DL (ref 31.4–37.4)
MCV RBC AUTO: 95 FL (ref 82–98)
PLATELET # BLD AUTO: 212 THOUSANDS/UL (ref 149–390)
PMV BLD AUTO: 11.2 FL (ref 8.9–12.7)
POTASSIUM SERPL-SCNC: 3.8 MMOL/L (ref 3.5–5.3)
RBC # BLD AUTO: 3.37 MILLION/UL (ref 3.81–5.12)
RH BLD: POSITIVE
SODIUM SERPL-SCNC: 135 MMOL/L (ref 135–147)
SPECIMEN EXPIRATION DATE: NORMAL
WBC # BLD AUTO: 8.59 THOUSAND/UL (ref 4.31–10.16)

## 2024-05-22 PROCEDURE — 0SRS0J9 REPLACEMENT OF LEFT HIP JOINT, FEMORAL SURFACE WITH SYNTHETIC SUBSTITUTE, CEMENTED, OPEN APPROACH: ICD-10-PCS | Performed by: STUDENT IN AN ORGANIZED HEALTH CARE EDUCATION/TRAINING PROGRAM

## 2024-05-22 PROCEDURE — NC001 PR NO CHARGE: Performed by: STUDENT IN AN ORGANIZED HEALTH CARE EDUCATION/TRAINING PROGRAM

## 2024-05-22 PROCEDURE — 72170 X-RAY EXAM OF PELVIS: CPT

## 2024-05-22 PROCEDURE — C1776 JOINT DEVICE (IMPLANTABLE): HCPCS | Performed by: STUDENT IN AN ORGANIZED HEALTH CARE EDUCATION/TRAINING PROGRAM

## 2024-05-22 PROCEDURE — 85027 COMPLETE CBC AUTOMATED: CPT | Performed by: INTERNAL MEDICINE

## 2024-05-22 PROCEDURE — 80048 BASIC METABOLIC PNL TOTAL CA: CPT | Performed by: INTERNAL MEDICINE

## 2024-05-22 PROCEDURE — 86850 RBC ANTIBODY SCREEN: CPT | Performed by: ANESTHESIOLOGY

## 2024-05-22 PROCEDURE — 27236 TREAT THIGH FRACTURE: CPT | Performed by: STUDENT IN AN ORGANIZED HEALTH CARE EDUCATION/TRAINING PROGRAM

## 2024-05-22 PROCEDURE — 27236 TREAT THIGH FRACTURE: CPT

## 2024-05-22 PROCEDURE — 99232 SBSQ HOSP IP/OBS MODERATE 35: CPT | Performed by: INTERNAL MEDICINE

## 2024-05-22 PROCEDURE — 99233 SBSQ HOSP IP/OBS HIGH 50: CPT | Performed by: INTERNAL MEDICINE

## 2024-05-22 PROCEDURE — 86900 BLOOD TYPING SEROLOGIC ABO: CPT | Performed by: ANESTHESIOLOGY

## 2024-05-22 PROCEDURE — C1713 ANCHOR/SCREW BN/BN,TIS/BN: HCPCS | Performed by: STUDENT IN AN ORGANIZED HEALTH CARE EDUCATION/TRAINING PROGRAM

## 2024-05-22 PROCEDURE — 86901 BLOOD TYPING SEROLOGIC RH(D): CPT | Performed by: ANESTHESIOLOGY

## 2024-05-22 DEVICE — SUMMIT FEMORAL STEM 12/14 TAPER CEMENTED SIZE 5 STD 120MM
Type: IMPLANTABLE DEVICE | Site: HIP | Status: FUNCTIONAL
Brand: SUMMIT

## 2024-05-22 DEVICE — SELF CENTERING BI-POLAR HEAD 28MM ID 47MM OD
Type: IMPLANTABLE DEVICE | Site: HIP | Status: FUNCTIONAL
Brand: SELF CENTERING

## 2024-05-22 DEVICE — BONE PREPARATION KIT
Type: IMPLANTABLE DEVICE | Site: HIP | Status: FUNCTIONAL
Brand: BIOPREP

## 2024-05-22 DEVICE — SMARTSET HIGH PERFORMANCE MV MEDIUM VISCOSITY BONE CEMENT 40G
Type: IMPLANTABLE DEVICE | Site: HIP | Status: FUNCTIONAL
Brand: SMARTSET

## 2024-05-22 DEVICE — CEMENTRALIZER STEM CENTRALIZER 10.5MM CEMENTED
Type: IMPLANTABLE DEVICE | Site: HIP | Status: FUNCTIONAL
Brand: CEMENTRALIZER

## 2024-05-22 DEVICE — ARTICUL/EZE FEMORAL HEAD DIAMETER 28MM +5 12/14 TAPER
Type: IMPLANTABLE DEVICE | Site: HIP | Status: FUNCTIONAL
Brand: ARTICUL/EZE

## 2024-05-22 RX ORDER — ACETAMINOPHEN 10 MG/ML
INJECTION, SOLUTION INTRAVENOUS AS NEEDED
Status: DISCONTINUED | OUTPATIENT
Start: 2024-05-22 | End: 2024-05-22

## 2024-05-22 RX ORDER — DOCUSATE SODIUM 100 MG/1
100 CAPSULE, LIQUID FILLED ORAL 2 TIMES DAILY
Status: CANCELLED | OUTPATIENT
Start: 2024-05-22

## 2024-05-22 RX ORDER — FENTANYL CITRATE 50 UG/ML
INJECTION, SOLUTION INTRAMUSCULAR; INTRAVENOUS AS NEEDED
Status: DISCONTINUED | OUTPATIENT
Start: 2024-05-22 | End: 2024-05-22

## 2024-05-22 RX ORDER — PROPOFOL 10 MG/ML
INJECTION, EMULSION INTRAVENOUS CONTINUOUS PRN
Status: DISCONTINUED | OUTPATIENT
Start: 2024-05-22 | End: 2024-05-22

## 2024-05-22 RX ORDER — CEFAZOLIN SODIUM 1 G/50ML
1000 SOLUTION INTRAVENOUS EVERY 8 HOURS
Status: COMPLETED | OUTPATIENT
Start: 2024-05-22 | End: 2024-05-23

## 2024-05-22 RX ORDER — MAGNESIUM HYDROXIDE 1200 MG/15ML
LIQUID ORAL AS NEEDED
Status: DISCONTINUED | OUTPATIENT
Start: 2024-05-22 | End: 2024-05-22 | Stop reason: HOSPADM

## 2024-05-22 RX ORDER — CEFAZOLIN SODIUM 2 G/50ML
2000 SOLUTION INTRAVENOUS
Status: COMPLETED | OUTPATIENT
Start: 2024-05-23 | End: 2024-05-22

## 2024-05-22 RX ORDER — ONDANSETRON 2 MG/ML
INJECTION INTRAMUSCULAR; INTRAVENOUS AS NEEDED
Status: DISCONTINUED | OUTPATIENT
Start: 2024-05-22 | End: 2024-05-22

## 2024-05-22 RX ORDER — ONDANSETRON 2 MG/ML
4 INJECTION INTRAMUSCULAR; INTRAVENOUS ONCE AS NEEDED
Status: DISCONTINUED | OUTPATIENT
Start: 2024-05-22 | End: 2024-05-22 | Stop reason: HOSPADM

## 2024-05-22 RX ORDER — BUPIVACAINE HYDROCHLORIDE 7.5 MG/ML
INJECTION, SOLUTION INTRASPINAL AS NEEDED
Status: DISCONTINUED | OUTPATIENT
Start: 2024-05-22 | End: 2024-05-22

## 2024-05-22 RX ORDER — ALBUMIN, HUMAN INJ 5% 5 %
SOLUTION INTRAVENOUS CONTINUOUS PRN
Status: DISCONTINUED | OUTPATIENT
Start: 2024-05-22 | End: 2024-05-22

## 2024-05-22 RX ORDER — SODIUM CHLORIDE 9 MG/ML
100 INJECTION, SOLUTION INTRAVENOUS CONTINUOUS
Status: DISCONTINUED | OUTPATIENT
Start: 2024-05-22 | End: 2024-05-23

## 2024-05-22 RX ORDER — BUPIVACAINE HYDROCHLORIDE 2.5 MG/ML
INJECTION, SOLUTION EPIDURAL; INFILTRATION; INTRACAUDAL AS NEEDED
Status: DISCONTINUED | OUTPATIENT
Start: 2024-05-22 | End: 2024-05-22 | Stop reason: HOSPADM

## 2024-05-22 RX ORDER — ACETAMINOPHEN 10 MG/ML
1000 INJECTION, SOLUTION INTRAVENOUS ONCE
Status: COMPLETED | OUTPATIENT
Start: 2024-05-22 | End: 2024-05-22

## 2024-05-22 RX ORDER — SODIUM CHLORIDE 9 MG/ML
INJECTION, SOLUTION INTRAVENOUS CONTINUOUS PRN
Status: DISCONTINUED | OUTPATIENT
Start: 2024-05-22 | End: 2024-05-22

## 2024-05-22 RX ORDER — SENNOSIDES 8.6 MG
1 TABLET ORAL DAILY
Status: CANCELLED | OUTPATIENT
Start: 2024-05-23

## 2024-05-22 RX ORDER — ENOXAPARIN SODIUM 100 MG/ML
40 INJECTION SUBCUTANEOUS DAILY
Status: CANCELLED | OUTPATIENT
Start: 2024-05-23

## 2024-05-22 RX ORDER — TRANEXAMIC ACID 10 MG/ML
1000 INJECTION, SOLUTION INTRAVENOUS
Status: COMPLETED | OUTPATIENT
Start: 2024-05-23 | End: 2024-05-22

## 2024-05-22 RX ORDER — CALCIUM CARBONATE 500 MG/1
1000 TABLET, CHEWABLE ORAL DAILY PRN
Status: CANCELLED | OUTPATIENT
Start: 2024-05-22

## 2024-05-22 RX ORDER — PROPOFOL 10 MG/ML
INJECTION, EMULSION INTRAVENOUS AS NEEDED
Status: DISCONTINUED | OUTPATIENT
Start: 2024-05-22 | End: 2024-05-22

## 2024-05-22 RX ORDER — CEFAZOLIN SODIUM 1 G/50ML
1000 SOLUTION INTRAVENOUS EVERY 8 HOURS
Status: CANCELLED | OUTPATIENT
Start: 2024-05-23 | End: 2024-05-23

## 2024-05-22 RX ORDER — FENTANYL CITRATE/PF 50 MCG/ML
25 SYRINGE (ML) INJECTION
Status: DISCONTINUED | OUTPATIENT
Start: 2024-05-22 | End: 2024-05-22 | Stop reason: HOSPADM

## 2024-05-22 RX ADMIN — SODIUM CHLORIDE: 0.9 INJECTION, SOLUTION INTRAVENOUS at 18:09

## 2024-05-22 RX ADMIN — PROPOFOL 10 MG: 10 INJECTION, EMULSION INTRAVENOUS at 16:31

## 2024-05-22 RX ADMIN — CEFAZOLIN SODIUM 1000 MG: 1 SOLUTION INTRAVENOUS at 22:20

## 2024-05-22 RX ADMIN — ONDANSETRON 4 MG: 2 INJECTION INTRAMUSCULAR; INTRAVENOUS at 18:09

## 2024-05-22 RX ADMIN — FENTANYL CITRATE 25 MCG: 50 INJECTION INTRAMUSCULAR; INTRAVENOUS at 16:28

## 2024-05-22 RX ADMIN — PANTOPRAZOLE SODIUM 40 MG: 40 TABLET, DELAYED RELEASE ORAL at 06:02

## 2024-05-22 RX ADMIN — PHENYLEPHRINE HYDROCHLORIDE 20 MCG/MIN: 10 INJECTION INTRAVENOUS at 17:12

## 2024-05-22 RX ADMIN — SENNOSIDES 17.2 MG: 8.6 TABLET, FILM COATED ORAL at 22:20

## 2024-05-22 RX ADMIN — CEFAZOLIN SODIUM 2000 MG: 2 SOLUTION INTRAVENOUS at 16:39

## 2024-05-22 RX ADMIN — PROPOFOL 10 MG: 10 INJECTION, EMULSION INTRAVENOUS at 16:28

## 2024-05-22 RX ADMIN — SODIUM CHLORIDE: 9 INJECTION, SOLUTION INTRAVENOUS at 16:54

## 2024-05-22 RX ADMIN — PROPOFOL 20 MCG/KG/MIN: 10 INJECTION, EMULSION INTRAVENOUS at 16:47

## 2024-05-22 RX ADMIN — MIRTAZAPINE 7.5 MG: 7.5 TABLET, FILM COATED ORAL at 22:21

## 2024-05-22 RX ADMIN — PROPRANOLOL HYDROCHLORIDE 60 MG: 60 CAPSULE, EXTENDED RELEASE ORAL at 09:45

## 2024-05-22 RX ADMIN — ACETAMINOPHEN 1000 MG: 10 INJECTION INTRAVENOUS at 16:48

## 2024-05-22 RX ADMIN — TRANEXAMIC ACID 1000 MG: 10 INJECTION, SOLUTION INTRAVENOUS at 16:56

## 2024-05-22 RX ADMIN — QUETIAPINE FUMARATE 25 MG: 25 TABLET ORAL at 22:21

## 2024-05-22 RX ADMIN — ALBUMIN (HUMAN): 12.5 INJECTION, SOLUTION INTRAVENOUS at 17:15

## 2024-05-22 RX ADMIN — PROPOFOL 10 MG: 10 INJECTION, EMULSION INTRAVENOUS at 16:36

## 2024-05-22 RX ADMIN — SODIUM CHLORIDE 100 ML/HR: 0.9 INJECTION, SOLUTION INTRAVENOUS at 15:47

## 2024-05-22 RX ADMIN — CEFTRIAXONE 1000 MG: 10 INJECTION, POWDER, FOR SOLUTION INTRAVENOUS at 12:21

## 2024-05-22 RX ADMIN — PROPOFOL 20 MG: 10 INJECTION, EMULSION INTRAVENOUS at 16:34

## 2024-05-22 RX ADMIN — BUPIVACAINE HYDROCHLORIDE IN DEXTROSE 1.6 ML: 7.5 INJECTION, SOLUTION SUBARACHNOID at 16:39

## 2024-05-22 RX ADMIN — ACETAMINOPHEN 1000 MG: 10 INJECTION INTRAVENOUS at 16:18

## 2024-05-22 NOTE — ASSESSMENT & PLAN NOTE
Multifocal pneumonia viral versus atypical organism  Continue ceftriaxone day #5- Completed course  Wean oxygen as tolerated  Appreciate pulmonary consultation

## 2024-05-22 NOTE — ASSESSMENT & PLAN NOTE
Possibly related to aspiration pneumonia versus viral pneumonia  Pulmonary consultation reviewed, question fat emboli from recent fracture  Wean oxygen as tolerated, currently on 4 L  Pulmonary consultation reviewed, patient with fat emboli from fractures because of acute hypoxemic respiratory failure  Discontinue antibiotics

## 2024-05-22 NOTE — ANESTHESIA POSTPROCEDURE EVALUATION
Post-Op Assessment Note    CV Status:  Stable  Pain Score: 0    Pain management: adequate    Multimodal analgesia used between 6 hours prior to anesthesia start to PACU discharge    Mental Status:  Sleepy   Hydration Status:  Stable   PONV Controlled:  None   Airway Patency:  Patent     Post Op Vitals Reviewed: Yes    No anethesia notable event occurred.    Staff: CRNA               /60 (05/22/24 1836)    Temp (!) 97.3 °F (36.3 °C) (05/22/24 1836)    Pulse 70 (05/22/24 1836)   Resp 22 (05/22/24 1836)    SpO2 91 % (05/22/24 1836)

## 2024-05-22 NOTE — PROGRESS NOTES
[facility]  Progress Note  Name: Dangelo Gunter I  MRN: [id number]  Unit/Bed#: OR POOL I Date of Admission: 5/17/2024   Date of Service: 5/22/2024 I Hospital Day: 4    Assessment & Plan   * Fracture of femoral neck, left (HCC)  Assessment & Plan  Patient with PMHx of dementia, HTN, and HLD presented to the ED from her facility, [facility], after sustaining a mechanical fall  Pt reportedly was assisted by staff when she fell. Staff was able to help guide patient to the floor. No reported head strike and patient not anticoagulated. Pt did land on her left hip.  XR left hip demonstrating fracture of left femoral neck  Considered moderate risk for cardiac and pulmonary complication  Possible surgery once completed antibiotic treatment course  N.p.o. after midnight  Patient is considered moderate risk for cardiac and pulmonary complications, will plan for operative management based upon surgeon availability  DVT prophylaxis for tomorrow to be held    Multifocal pneumonia  Assessment & Plan  Multifocal pneumonia viral versus atypical organism  Continue ceftriaxone day #5- Completed course  Wean oxygen as tolerated  Appreciate pulmonary consultation    Acute respiratory failure with hypoxia (HCC)  Assessment & Plan  Possibly related to aspiration pneumonia versus viral pneumonia  Pulmonary consultation reviewed, question fat emboli from recent fracture  Wean oxygen as tolerated, currently on 4 L  Pulmonary consultation reviewed, patient with fat emboli from fractures because of acute hypoxemic respiratory failure  Discontinue antibiotics    Dementia with agitation (HCC)  Assessment & Plan  Pt only oriented to person and place  Delirium precautions  Continue home regimen of lexapro, namenda, remeron, depakote, and seroquel  Mood is stable    Essential hypertension  Assessment & Plan  Continue amlodipine, losartan, and propanolol                 Hospital Course:      83-year-old female patient presenting with femoral neck fracture, history of dementia.  Hospitalization complicated by acute respiratory failure secondary to fat emboli from recent fracture.  Patient is now plan for operative management    Assessment:      Principal Problem:    Fracture of femoral neck, left (HCC)  Active Problems:    Essential hypertension    Dementia with agitation (HCC)    Acute respiratory failure with hypoxia (HCC)    Multifocal pneumonia      Plan:    Operative management today       VTE Pharmacologic Prophylaxis:   Pharmacologic: Enoxaparin (Lovenox)  Mechanical VTE Prophylaxis in Place: Yes    AM-PAC Basic Mobility:  Basic Mobility Inpatient Raw Score: 9    -HL Achieved: 2: Bed activities/Dependent transfer  -HLM Goal: 3: Sit at edge of bed    HLM Goal listed above. Continue with multidisciplinary rounding and encourage appropriate mobility to improve upon HLM goals.         Patient Centered Rounds: Case discussed and reviewed with nursing    Discussions with Specialists or Other Care Team Provider: Case management    Education and Discussions with Family / Patient: Patient daughter at bedside    Time Spent for Care: 80 minutes.  More than 50% of total time spent on counseling and coordination of care as described above.    Current Length of Stay: 4 day(s)    Current Patient Status: Inpatient   Certification Statement: The patient will continue to require additional inpatient hospital stay due to need for operative management    Discharge Plan / Estimated Discharge Date: To be determined    Code Status: Level 1 - Full Code      Subjective:   Seen and examined, pain is controlled.  Breathing improved    A complete and comprehensive 14 point organ system review has been performed and all other systems are negative other than stated above.    Objective:     Vitals:   Temp (24hrs), Av.2 °F (36.8 °C), Min:97.8 °F (36.6 °C), Max:98.9 °F (37.2 °C)    Temp:  [97.8 °F (36.6 °C)-98.9 °F  (37.2 °C)] 98 °F (36.7 °C)  HR:  [77-82] 77  Resp:  [18-19] 18  BP: (146-169)/(71-90) 169/90  SpO2:  [90 %-95 %] 93 %  Body mass index is 25.12 kg/m².     Input and Output Summary (last 24 hours):       Intake/Output Summary (Last 24 hours) at 5/22/2024 1536  Last data filed at 5/22/2024 0527  Gross per 24 hour   Intake 480 ml   Output 150 ml   Net 330 ml       Physical Exam:     General: well appearing, no acute distress  HEENT: atraumatic, PERRLA, moist mucosa, normal pharynx, normal tonsils and adenoids, normal tongue, no fluid in sinuses  Neck: Trachea midline, no carotid bruit, no masses  Respiratory: normal chest wall expansion, coarse breath sounds, no r/r/w, no rubs  Cardiovascular: RRR, no m/r/g, Normal S1 and S2  Abdomen: Soft, non-tender, non-distended, normal bowel sounds in all quadrants, no hepatosplenomegaly, no tympany  Rectal: deferred  Musculoskeletal: Limited by recent fracture integumentary: warm, dry, and pink, with no rash, purpura, or petechia  Heme/Lymph: no lymphadenopathy, no bruises  Neurological: Cranial Nerves II-XII grossly intact  Psychiatric: Apparent dementia    Additional Data:     Labs:    Results from last 7 days   Lab Units 05/22/24  0456 05/21/24  0513 05/20/24  0441   WBC Thousand/uL 8.59   < > 9.18   HEMOGLOBIN g/dL 10.6*   < > 9.8*   HEMATOCRIT % 31.9*   < > 30.3*   PLATELETS Thousands/uL 212   < > 194   SEGS PCT %  --   --  68   LYMPHO PCT %  --   --  14   MONO PCT %  --   --  13*   EOS PCT %  --   --  3    < > = values in this interval not displayed.     Results from last 7 days   Lab Units 05/22/24  0456 05/19/24  0450 05/18/24  0507   POTASSIUM mmol/L 3.8   < > 3.6   CHLORIDE mmol/L 102   < > 106   CO2 mmol/L 28   < > 26   BUN mg/dL 20   < > 25   CREATININE mg/dL 0.89   < > 1.25   CALCIUM mg/dL 8.6   < > 9.4   ALK PHOS U/L  --   --  46   ALT U/L  --   --  116*   AST U/L  --   --  83*    < > = values in this interval not displayed.     Results from last 7 days   Lab Units  05/17/24  2248   INR  1.06       * I Have Reviewed All Lab Data Listed Above.  * Additional Pertinent Lab Tests Reviewed: All Labs For Current Hospital Admission Reviewed    Imaging:    Imaging Reports Reviewed Today Include: No new imaging  Imaging Personally Reviewed by Myself Includes: No new imaging    Recent Cultures (last 7 days):     Results from last 7 days   Lab Units 05/20/24  1222 05/18/24  0547 05/18/24  0047 05/18/24  0026   BLOOD CULTURE   --   --  No Growth After 4 Days. No Growth After 4 Days.   LEGIONELLA URINARY ANTIGEN  Negative Negative  --   --        Last 24 Hours Medication List:   Current Facility-Administered Medications   Medication Dose Route Frequency Provider Last Rate    acetaminophen  1,000 mg Intravenous Once Ayala Hyde CRNA      [Transfer Hold] acetaminophen  650 mg Oral Q6H PRN Kevin Shah PA-C      [Transfer Hold] amLODIPine  2.5 mg Oral Daily Kevin Shah PA-C      [Transfer Hold] benzonatate  100 mg Oral TID PRN Kevin Shah PA-C      [START ON 5/23/2024] cefazolin  2,000 mg Intravenous On Call To OR Alejandra Briggs PA-C      [Transfer Hold] divalproex sodium  125 mg Oral BID Kevin Shah PA-C      [Transfer Hold] docusate sodium  100 mg Oral BID Edwin Jeong DO      [Transfer Hold] enoxaparin  30 mg Subcutaneous Q24H [facility] Edwin Jeong DO      [Transfer Hold] escitalopram  10 mg Oral Daily Kevin Shah PA-C      [Transfer Hold] losartan  50 mg Oral Daily Kevin Shah PA-C      [Transfer Hold] memantine  10 mg Oral BID Kevin Shah PA-C      [Transfer Hold] mirtazapine  7.5 mg Oral HS Kevin Shah PA-C      [Transfer Hold] oxyCODONE  5 mg Oral Q6H PRN Kevin Shah PA-C      [Transfer Hold] oxyCODONE  2.5 mg Oral Q6H PRN Kevin Shah PA-C      [Transfer Hold] pantoprazole  40 mg Oral Daily Before Breakfast Kevin Shah PA-C      [Transfer Hold] polyethylene glycol  17 g Oral Daily Edwin Jeong DO       [Transfer Hold] pravastatin  80 mg Oral Daily With Dinner Kevin Shah PA-C      [Transfer Hold] propranolol  60 mg Oral Daily Kevin Shah PA-C      [Transfer Hold] QUEtiapine  25 mg Oral HS Kevin Shah PA-C      [Transfer Hold] senna  2 tablet Oral HS Edwin Jeong DO      [Transfer Hold] tranexamic acid  1,000 mg Intravenous On Call To OR Alejandra Briggs PA-C         AM-PAC Basic Mobility:  Basic Mobility Inpatient Raw Score: 9    -HLM Achieved: 2: Bed activities/Dependent transfer  -HLM Goal: 3: Sit at edge of bed    HLM Goal listed above. Continue with multidisciplinary rounding and encourage appropriate mobility to improve upon HLM goals.     Today, Patient Was Seen By: Edwin Jeong DO    ** Please Note: This note was completed in part utilizing [employer] software dictation.  Grammatical errors, random word insertions, spelling mistakes, and incomplete sentences may be an occasional consequence of this system secondary to software limitations, ambient noise, and hardware issues.  If you have any questions or concerns about the content, text, or information contained within the body of this dictation, please contact the provider for clarification. **

## 2024-05-22 NOTE — PLAN OF CARE
Problem: Prexisting or High Potential for Compromised Skin Integrity  Goal: Skin integrity is maintained or improved  Description: INTERVENTIONS:  - Identify patients at risk for skin breakdown  - Assess and monitor skin integrity  - Assess and monitor nutrition and hydration status  - Monitor labs   - Assess for incontinence   - Turn and reposition patient  - Assist with mobility/ambulation  - Relieve pressure over bony prominences  - Avoid friction and shearing  - Provide appropriate hygiene as needed including keeping skin clean and dry  - Evaluate need for skin moisturizer/barrier cream  - Collaborate with interdisciplinary team   - Patient/family teaching  - Consider wound care consult   Outcome: Progressing     Problem: PAIN - ADULT  Goal: Verbalizes/displays adequate comfort level or baseline comfort level  Description: Interventions:  - Encourage patient to monitor pain and request assistance  - Assess pain using appropriate pain scale  - Administer analgesics based on type and severity of pain and evaluate response  - Implement non-pharmacological measures as appropriate and evaluate response  - Consider cultural and social influences on pain and pain management  - Notify physician/advanced practitioner if interventions unsuccessful or patient reports new pain  Outcome: Progressing     Problem: SAFETY ADULT  Goal: Patient will remain free of falls  Description: INTERVENTIONS:  - Educate patient/family on patient safety including physical limitations  - Instruct patient to call for assistance with activity   - Consult OT/PT to assist with strengthening/mobility   - Keep Call bell within reach  - Keep bed low and locked with side rails adjusted as appropriate  - Keep care items and personal belongings within reach  - Initiate and maintain comfort rounds  - Make Fall Risk Sign visible to staff  - Offer Toileting every  Hours, in advance of need  - Initiate/Maintain alarm  - Obtain necessary fall risk  [employer] Approved    Prior Authorization Number: [id number]  Dates of approval: 09/20/2018-[employer] of plan  Filling Pharmacy: [facility]  Additional Information: Pharmacy notified of approval, will contact patient when medication is ready for .          management equipment:   - Apply yellow socks and bracelet for high fall risk patients  - Consider moving patient to room near nurses station  Outcome: Progressing     Problem: CONFUSION/THOUGHT DISTURBANCE  Goal: Thought disturbances (confusion, delirium, depression, dementia or psychosis) are managed to maintain or return to baseline mental status and functional level  Description: INTERVENTIONS:  - Assess for possible contributors to  thought disturbance, including but not limited to medications, infection, impaired vision or hearing, underlying metabolic abnormalities, dehydration, respiratory compromise,  psychiatric diagnoses and notify attending PHYSICAN/AP  - Monitor and intervene to maintain adequate nutrition, hydration, elimination, sleep and activity  - Decrease environmental stimuli, including noise as appropriate.  - Provide frequent contacts to provide refocusing, direction and reassurance as needed. Approach patient calmly with eye contact and at their level.  - [place] high risk fall precautions, aspiration precautions and other safety measures, as indicated  - If delirium suspected, notify physician/AP of change in condition and request immediate in-person evaluation  - Pursue consults as appropriate including Geriatric (campus dependent), OT for cognitive evaluation/activity planning, psychiatric, pastoral care, etc.  Outcome: Progressing     Problem: GENITOURINARY - ADULT  Goal: Urinary catheter remains patent  Description: INTERVENTIONS:  - Assess patency of urinary catheter  - If patient has a chronic carpenter, consider changing catheter if non-functioning  - Follow guidelines for intermittent irrigation of non-functioning urinary catheter  Outcome: Progressing     Problem: RESPIRATORY - ADULT  Goal: Achieves optimal ventilation and oxygenation  Description: INTERVENTIONS:  - Assess for changes in respiratory status  - Assess for changes in mentation and behavior  - Position to facilitate  oxygenation and minimize respiratory effort  - Oxygen administered by appropriate delivery if ordered  - Initiate smoking cessation education as indicated  - Encourage broncho-pulmonary hygiene including cough, deep breathe, Incentive Spirometry  - Assess the need for suctioning and aspirate as needed  - Assess and instruct to report SOB or any respiratory difficulty  - Respiratory Therapy support as indicated  Outcome: Progressing     Problem: Nutrition/Hydration-ADULT  Goal: Nutrient/Hydration intake appropriate for improving, restoring or maintaining nutritional needs  Description: Monitor and assess patient's nutrition/hydration status for malnutrition. Collaborate with interdisciplinary team and initiate plan and interventions as ordered.  Monitor patient's weight and dietary intake as ordered or per policy. Utilize nutrition screening tool and intervene as necessary. Determine patient's food preferences and provide high-protein, high-caloric foods as appropriate.     INTERVENTIONS:  - Monitor oral intake, urinary output, labs, and treatment plans  - Assess nutrition and hydration status and recommend course of action  - Evaluate amount of meals eaten  - Assist patient with eating if necessary   - Allow adequate time for meals  - Recommend/ encourage appropriate diets, oral nutritional supplements, and vitamin/mineral supplements  - Order, calculate, and assess calorie counts as needed  - Recommend, monitor, and adjust tube feedings and TPN/PPN based on assessed needs  - Assess need for intravenous fluids  - Provide specific nutrition/hydration education as appropriate  - Include patient/family/caregiver in decisions related to nutrition  Outcome: Progressing     Problem: INFECTION - ADULT  Goal: Absence or prevention of progression during hospitalization  Description: INTERVENTIONS:  - Assess and monitor for signs and symptoms of infection  - Monitor lab/diagnostic results  - Monitor all insertion sites, i.e.  indwelling lines, tubes, and drains  - Monitor endotracheal if appropriate and nasal secretions for changes in amount and color  - [place] appropriate cooling/warming therapies per order  - Administer medications as ordered  - Instruct and encourage patient and family to use good hand hygiene technique  - Identify and instruct in appropriate isolation precautions for identified infection/condition  Outcome: Progressing     Problem: DISCHARGE PLANNING  Goal: Discharge to home or other facility with appropriate resources  Description: INTERVENTIONS:  - Identify barriers to discharge w/patient and caregiver  - Arrange for needed discharge resources and transportation as appropriate  - Identify discharge learning needs (meds, wound care, etc.)  - Arrange for interpretive services to assist at discharge as needed  - Refer to Case Management Department for coordinating discharge planning if the patient needs post-hospital services based on physician/advanced practitioner order or complex needs related to functional status, cognitive ability, or social support system  Outcome: Progressing     Problem: SKIN/TISSUE INTEGRITY - ADULT  Goal: Skin Integrity remains intact(Skin Breakdown Prevention)  Description: Assess:  -Perform Vitor assessment every   -Clean and moisturize skin every   -Inspect skin when repositioning, toileting, and assisting with ADLS  -Assess under medical devices such as  every   -Assess extremities for adequate circulation and sensation     Bed Management:  -Have minimal linens on bed & keep smooth, unwrinkled  -Change linens as needed when moist or perspiring  -Avoid sitting or lying in one position for more than  hours while in bed  -Keep HOB at degrees     Toileting:  -Offer bedside commode  -Assess for incontinence every   -Use incontinent care products after each incontinent episode such as     Activity:  -Mobilize patient  times a day  -Encourage activity and walks on unit  -Encourage or  provide ROM exercises   -Turn and reposition patient every  Hours  -Use appropriate equipment to lift or move patient in bed  -Instruct/ Assist with weight shifting every  when out of bed in chair  -Consider limitation of chair time  hour intervals    Skin Care:  -Avoid use of baby powder, tape, friction and shearing, hot water or constrictive clothing  -Relieve pressure over bony prominences using   -Do not massage red bony areas    Next Steps:  -Teach patient strategies to minimize risks such as    -Consider consults to  interdisciplinary teams such as   Outcome: Progressing     Problem: HEMATOLOGIC - ADULT  Goal: Maintains hematologic stability  Description: INTERVENTIONS  - Assess for signs and symptoms of bleeding or hemorrhage  - Monitor labs  - Administer supportive blood products/factors as ordered and appropriate  Outcome: Progressing     Problem: MUSCULOSKELETAL - ADULT  Goal: Maintain or return mobility to safest level of function  Description: INTERVENTIONS:  - Assess patient's ability to carry out ADLs; assess patient's baseline for ADL function and identify physical deficits which impact ability to perform ADLs (bathing, care of mouth/teeth, toileting, grooming, dressing, etc.)  - Assess/evaluate cause of self-care deficits   - Assess range of motion  - Assess patient's mobility  - Assess patient's need for assistive devices and provide as appropriate  - Encourage maximum independence but intervene and supervise when necessary  - Involve family in performance of ADLs  - Assess for home care needs following discharge   - Consider OT consult to assist with ADL evaluation and planning for discharge  - Provide patient education as appropriate  Outcome: Progressing  Goal: Maintain proper alignment of affected body part  Description: INTERVENTIONS:  - Support, maintain and protect limb and body alignment  - Provide patient/ family with appropriate education  Outcome: Progressing

## 2024-05-22 NOTE — PROGRESS NOTES
Progress Note - Orthopedics   Dangelo Gunter 83 y.o. female MRN: [id number]  Unit/Bed#: E2 -01      Subjective:    83 y.o.female s/p fall with left femoral neck fracture requiring surgical intervention.  No new acute overnight events.  Patient is getting washed today during our visit.  She is not expressing that she is in pain.  She does not attempt to communicate with me.    Labs:  0   Lab Value Date/Time    HCT 31.9 (L) 05/22/2024 0456    HCT 31.6 (L) 05/21/2024 0513    HCT 30.3 (L) 05/20/2024 0441    HGB 10.6 (L) 05/22/2024 0456    HGB 10.5 (L) 05/21/2024 0513    HGB 9.8 (L) 05/20/2024 0441    INR 1.06 05/17/2024 2248    WBC 8.59 05/22/2024 0456    WBC 8.41 05/21/2024 0513    WBC 9.18 05/20/2024 0441    ESR 41 (H) 02/29/2024 1642    CRP <3.0 11/05/2021 0748       Meds:    Current Facility-Administered Medications:     acetaminophen (TYLENOL) tablet 650 mg, 650 mg, Oral, Q6H PRN, Kevin Shah PA-C, 650 mg at 05/20/24 1719    amLODIPine (NORVASC) tablet 2.5 mg, 2.5 mg, Oral, Daily, Kevin Shah PA-C, 2.5 mg at 05/21/24 1145    azithromycin (ZITHROMAX) tablet 500 mg, 500 mg, Oral, Q24H, Christopher Sosa DO, 500 mg at 05/21/24 2130    benzonatate (TESSALON PERLES) capsule 100 mg, 100 mg, Oral, TID PRN, Kevin Shah PA-C    ceftriaxone (ROCEPHIN) 1 g/50 mL in dextrose IVPB, 1,000 mg, Intravenous, Q24H, Edwin Jeong DO, Last Rate: 100 mL/hr at 05/21/24 1145, 1,000 mg at 05/21/24 1145    divalproex sodium (DEPAKOTE) DR tablet 125 mg, 125 mg, Oral, BID, Kevin Shah PA-C, 125 mg at 05/21/24 1741    docusate sodium (COLACE) capsule 100 mg, 100 mg, Oral, BID, Edwin Jeong DO, 100 mg at 05/21/24 1743    [START ON 5/23/2024] enoxaparin (LOVENOX) subcutaneous injection 30 mg, 30 mg, Subcutaneous, Q24H JANETTE, Edwin Jeong DO    escitalopram (LEXAPRO) tablet 10 mg, 10 mg, Oral, Daily, Kevin Shah PA-C, 10 mg at 05/21/24 1145    losartan (COZAAR) tablet 50 mg, 50 mg, Oral,  "Daily, Kevin Shah PA-C, 50 mg at 05/21/24 1146    memantine (NAMENDA) tablet 10 mg, 10 mg, Oral, BID, Kevin Shah PA-C, 10 mg at 05/21/24 1739    mirtazapine (REMERON) tablet 7.5 mg, 7.5 mg, Oral, HS, Kevin Shah PA-C, 7.5 mg at 05/21/24 2131    oxyCODONE (ROXICODONE) IR tablet 5 mg, 5 mg, Oral, Q6H PRN, Kevin Shah PA-C, 5 mg at 05/20/24 2002    oxyCODONE (ROXICODONE) split tablet 2.5 mg, 2.5 mg, Oral, Q6H PRN, Kevin Shah PA-C, 2.5 mg at 05/21/24 2018    pantoprazole (PROTONIX) EC tablet 40 mg, 40 mg, Oral, Daily Before Breakfast, Kevin Shah PA-C, 40 mg at 05/22/24 0602    polyethylene glycol (MIRALAX) packet 17 g, 17 g, Oral, Daily, Edwin Jeong DO, 17 g at 05/21/24 1144    pravastatin (PRAVACHOL) tablet 80 mg, 80 mg, Oral, Daily With Dinner, Kevin Shah PA-C, 80 mg at 05/21/24 1739    propranolol (INDERAL LA) 24 hr capsule 60 mg, 60 mg, Oral, Daily, Kevin Shah PA-C, 60 mg at 05/22/24 0945    QUEtiapine (SEROquel) tablet 25 mg, 25 mg, Oral, HS, Kevin Shah PA-C, 25 mg at 05/21/24 2131    senna (SENOKOT) tablet 17.2 mg, 2 tablet, Oral, HS, Edwin Jeong DO, 17.2 mg at 05/21/24 2131    Blood Culture:   Lab Results   Component Value Date    BLOODCX No Growth After 4 Days. 05/18/2024       Wound Culture:   No results found for: \"WOUNDCULT\"    Ins and Outs:  I/O last 24 hours:  In: 480 [P.O.:480]  Out: 500 [Urine:500]          Physical:  Vitals:    05/22/24 0800   BP: 169/90   Pulse: 77   Resp: 18   Temp: 98 °F (36.7 °C)   SpO2: 93%     Musculoskeletal: left Lower Extremity  Visible skin intact  TTP over the hip  2+ DP pulse  Digits warm and well perfused  Capillary refill < 2 seconds    Assessment:    83 y.o.female s/p fall with left femoral neck fracture requiring surgical intervention; admitted with multi-lobar pneumonia on IV abx and hypoxia requiring supplemental oxygen.  Per SLIM, the patient is cleared for surgery today.      Plan:  Non weight " bearing left lower extremity  To OR for L hip hemiarthroplasty today as patient is medically stable and cleared.   The patient was marked today in preparation for surgery.  Informed consent previously obtained over the phone by POA and was placed in OR holding area.  NPO now  DVT prophylaxis - Lovenox.  Will need to be held this morning prior to surgery.  Analgesics  Medicine primary service  Weaning O2 as tolerated  Continue to appreciate Pulmonology's input:   Options would include empirically treating the pneumonia for 5 days and then proceeding with surgery if there is no progression in her oxygen requirements or if this can be performed with a nerve block. Regardless she will be at at least moderate risk for perioperative pulmonary complications   Dispo: Ortho will follow     Alejandra Briggs PA-C

## 2024-05-22 NOTE — ANESTHESIA POSTPROCEDURE EVALUATION
Post-Op Assessment Note    CV Status:  Stable    Pain management: adequate       Mental Status:  Alert and awake   Hydration Status:  Euvolemic   PONV Controlled:  Controlled   Airway Patency:  Patent     Post Op Vitals Reviewed: Yes    No anethesia notable event occurred.    Staff: Anesthesiologist               /68 (05/22/24 1906)    Temp 97.7 °F (36.5 °C) (05/22/24 1906)    Pulse 68 (05/22/24 1906)   Resp 16 (05/22/24 1906)    SpO2 90 % (05/22/24 1906)

## 2024-05-22 NOTE — DISCHARGE INSTR - AVS FIRST PAGE
Hemiarthroplasty- Half Hip Replacement    What to Expect/Activity  You are weight bearing as tolerated to your operative leg unless otherwise instructed. Use your walker. It is important to get up and walk for 10 minutes every hour, if possible.  Initial recovery therapy is focused on walking. If in your follow-up a referral to formal physical therapy is required, this will be provided based on your recovery.  You may sleep however is comfortable for you  Swelling and discomfort causes pain. Elevate your surgical leg on 1-2 pillows placed behind your ankle/calf throughout the day, especially when you are laying down.  Please use ice packs for 20-30 minutes every 1-2 hours for 48-72 hours on the operative hip. Please make sure there is a barrier directly between your skin and your ice/ice pack.  Please use incentive spirometer 10 times per hour while awake (see diagram below).  NO HIP PRECAUTIONS NEEDED; anterolateral approach      Dressing/Wound Care/Bathing  You may start showering 24 hours after surgery, the surgical dressing (silver sticky dressing) will remain in place.   Use a bag or wrap to keep the silver dressing dry  You will be wrapped in soft fluffy dressing and ACE wrap over the sticky silver dressing; the ACE wrap can be readjusted as needed, and the fluffy dressing removed if uncomfortable  Do not place any creams, ointments or gels on or around the incision.  No baths, swimming or submerging until cleared     Pain Management/Medications  You may resume your usual medications.  Please take the following medications:  Anti-coagulation (blood clot prevention) - Lovenox for 4 weeks  Pain medication:  Narcotic Medication: Take as directed  NSAID (Anti-inflammatory): Take as directed  Tylenol 1000mg every 8 hours  Zofran (ondasetron) - 4mg every 8 hours as needed for nausea  Stool Softeners (senna/colace)- take daily to help prevent constipation as narcotic pain medication causes  constipation  Antibiotic - take as directed if prescribed  If you have questions or pain concerns, please contact the office. Pain medication cannot remove all post-operative pain.    Follow up/Call if:  The findings of your surgery will be explained to you and your family immediately after surgery. However, in the post-operative period, during recovery from anesthesia you may not fully remember or fully understand what was said. We will go over this again when you return for your post-op appointment.  Please contact the office if you experience the following:  Excessive bleeding (bleeding through your dressing)  Fever greater than 101 degrees F after the first 2 days (a slight fever is normal the first few days after surgery)  Persistent nausea or vomiting  Decreased sensation or discoloration of the operative limb  Pain or swelling that is getting worse and not better with medication    Office Contact: [phone]

## 2024-05-22 NOTE — PLAN OF CARE
Problem: PAIN - ADULT  Goal: Verbalizes/displays adequate comfort level or baseline comfort level  Description: Interventions:  - Encourage patient to monitor pain and request assistance  - Assess pain using appropriate pain scale  - Administer analgesics based on type and severity of pain and evaluate response  - Implement non-pharmacological measures as appropriate and evaluate response  - Consider cultural and social influences on pain and pain management  - Notify physician/advanced practitioner if interventions unsuccessful or patient reports new pain  Outcome: Progressing     Problem: SAFETY ADULT  Goal: Patient will remain free of falls  Description: INTERVENTIONS:  - Educate patient/family on patient safety including physical limitations  - Instruct patient to call for assistance with activity   - Consult OT/PT to assist with strengthening/mobility   - Keep Call bell within reach  - Keep bed low and locked with side rails adjusted as appropriate  - Keep care items and personal belongings within reach  - Initiate and maintain comfort rounds  - Make Fall Risk Sign visible to staff  - Offer Toileting every 2 Hours, in advance of need  - Initiate/Maintain bed alarm  - Apply yellow socks and bracelet for high fall risk patients  - Consider moving patient to room near nurses station  Outcome: Progressing      Problem: CONFUSION/THOUGHT DISTURBANCE  Goal: Thought disturbances (confusion, delirium, depression, dementia or psychosis) are managed to maintain or return to baseline mental status and functional level  Description: INTERVENTIONS:  - Assess for possible contributors to  thought disturbance, including but not limited to medications, infection, impaired vision or hearing, underlying metabolic abnormalities, dehydration, respiratory compromise,  psychiatric diagnoses and notify attending PHYSICAN/AP  - Monitor and intervene to maintain adequate nutrition, hydration, elimination, sleep and activity  -  Decrease environmental stimuli, including noise as appropriate.  - Provide frequent contacts to provide refocusing, direction and reassurance as needed. Approach patient calmly with eye contact and at their level.  - [place] high risk fall precautions, aspiration precautions and other safety measures, as indicated  - If delirium suspected, notify physician/AP of change in condition and request immediate in-person evaluation  - Pursue consults as appropriate including Geriatric (campus dependent), OT for cognitive evaluation/activity planning, psychiatric, pastoral care, etc.  Outcome: Progressing     Problem: RESPIRATORY - ADULT  Goal: Achieves optimal ventilation and oxygenation  Description: INTERVENTIONS:  - Assess for changes in respiratory status  - Assess for changes in mentation and behavior  - Position to facilitate oxygenation and minimize respiratory effort  - Oxygen administered by appropriate delivery if ordered  - Initiate smoking cessation education as indicated  - Encourage broncho-pulmonary hygiene including cough, deep breathe, Incentive Spirometry  - Assess the need for suctioning and aspirate as needed  - Assess and instruct to report SOB or any respiratory difficulty  - Respiratory Therapy support as indicated  Outcome: Progressing     Problem: Nutrition/Hydration-ADULT  Goal: Nutrient/Hydration intake appropriate for improving, restoring or maintaining nutritional needs  Description: Monitor and assess patient's nutrition/hydration status for malnutrition. Collaborate with interdisciplinary team and initiate plan and interventions as ordered.  Monitor patient's weight and dietary intake as ordered or per policy. Utilize nutrition screening tool and intervene as necessary. Determine patient's food preferences and provide high-protein, high-caloric foods as appropriate.     INTERVENTIONS:  - Monitor oral intake, urinary output, labs, and treatment plans  - Assess nutrition and hydration status  and recommend course of action  - Evaluate amount of meals eaten  - Assist patient with eating if necessary   - Allow adequate time for meals  - Recommend/ encourage appropriate diets, oral nutritional supplements, and vitamin/mineral supplements  - Order, calculate, and assess calorie counts as needed  - Recommend, monitor, and adjust tube feedings and TPN/PPN based on assessed needs  - Assess need for intravenous fluids  - Provide specific nutrition/hydration education as appropriate  - Include patient/family/caregiver in decisions related to nutrition  Outcome: Progressing     Problem: SKIN/TISSUE INTEGRITY - ADULT  Goal: Skin Integrity remains intact(Skin Breakdown Prevention)  Description: Assess:  -Perform Vitor assessment every day  -Clean and moisturize skin every day  -Inspect skin when repositioning, toileting, and assisting with ADLS  -Assess under medical devices such as oxygen tubing every shift  -Assess extremities for adequate circulation and sensation     Bed Management:  -Have minimal linens on bed & keep smooth, unwrinkled  -Change linens as needed when moist or perspiring  -Avoid sitting or lying in one position for more than 2 hours while in bed  -Keep HOB at 30 degrees     Toileting:  -Offer bedside commode  -Assess for incontinence every shift  -Use incontinent care products after each incontinent episode such as calazime cream    Activity:  -Encourage activity and walks on unit  -Encourage or provide ROM exercises   -Turn and reposition patient every 2 Hours  -Use appropriate equipment to lift or move patient in bed  -Instruct/ Assist with weight shifting every 2 hours when out of bed in chair    Skin Care:  -Avoid use of baby powder, tape, friction and shearing, hot water or constrictive clothing  -Relieve pressure over bony prominences using alleviates  -Do not massage red bony areas    Next Steps:  -Teach patient strategies to minimize risks such as skin breakdown   Outcome: Progressing

## 2024-05-22 NOTE — OP NOTE
OPERATIVE REPORT    PATIENT NAME: Dangelo Gunter   :  1940  MRN: [id number]  Pt Location: [facility] OR ROOM 03    SURGERY DATE: 2024    SURGEON(S) and ROLE:  Primary: Delfin Pereira DO  Assisting: Jayy Salinas PA-C    NOTE:  The presence of a physician assistant was necessary to help with patient positioning, surgical exposure, wound retraction, wound closure, and other key portions of the procedure.  No qualified resident was available for this case.    PREOPERATIVE DIAGNOSES:  Left Femoral Neck Fracture    POSTOPERATIVE DIAGNOSES:  Same as Preoperative Diagnosis    PROCEDURES:  Left Hip Hemiarthroplasty    ANESTHESIA TYPE:  Spinal    ANESTHESIA STAFF:   Anesthesiologist: Tip Doyle DO  CRNA: Ayala Hyde CRNA    ESTIMATED BLOOD LOSS:  200 mL    PERIOPERATIVE ANTIBIOTICS:  cefazolin, 2 grams    IMPLANTS: Femoral Stem: Size 5 DEPUY SUMMIT Cemented    Femoral Neck:  +5 mm    Femoral Head: 47 mm outer metal head      Implant Name Type Inv. Item Serial No.  Lot No. LRB No. Used Action   PREPARATION KIT TOTAL HIP BIOPREP - [id number]  PREPARATION KIT TOTAL HIP BIOPREP  PRISCILLA JOSE ANGEL [id number] Left 1 Implanted   CEMENT BONE SMART SET GRAY MED VISC - [id number]  CEMENT BONE SMART SET JEFFERSON MED VISC  DEPUY [id number] Left 1 Implanted   COMPONENT ACETABULAR 47 X 28MM SLFCNTR - [id number]  COMPONENT ACETABULAR 47 X 28MM SLFCNTR  DEPUY [id number] Left 1 Implanted   STEM CENTRALIZER 10.5MM DSTL - [id number]  STEM CENTRALIZER 10.5MM DSTL  DEPUY [id number] Left 1 Implanted   STEM FEM CMNT  TPR SZ 5 STD OFFSET SUMMIT - [id number]  STEM FEM CMNT  TPR SZ 5 STD OFFSET SUMMIT  DEPUY [id number] Left 1 Implanted   HEAD FEMORAL  TPR 28MM STD +5MM ARTICULEZE - [id number]  HEAD FEMORAL  TPR 28MM STD +5MM ARTICULEZE  DEPUY [id number] Left 1 Implanted       SPECIMENS:  * No specimens in log *    DRAINS:  None    OPERATIVE INDICATIONS:  The patient is a 83 y.o. female with displaced left  femoral neck fracture.  Surgical treatment was indicated due to persistent symptoms despite non-surgical treatment, displacement, elevated risk of developing nonunion, and liklihood of prolonged or permanent functional impairment with non-surgical treatment.  After a thorough discussion of the potential risks, benefits, and alternative treatments, the patient and power of  agreed to proceed with surgery.  The patient and power of  understands that the risks of surgery include, but are not limited to: failure of repair, nonunion, malunion, loss of fixation, infection, neurovascular injury, wound healing complications, venous thromboembolism, persistent pain, stiffness, instability, recurrence of symptoms, potential need for additional surgeries, and loss of limb or life, dislocation, leg length discrepancy.  Oral and written consent for surgery was obtained from the patient and power of  preoperatively.    PROCEDURE AND TECHNIQUE:  On the day of surgery, the patient was identified in the preoperative holding area.  The operative site was marked by the surgeon.  The patient was taken into the operating room. The patient was anesthetized, and perioperative antibiotics, cefazolin, 2 grams were given. The patient was placed on the  right lateral decubitus position with the  left  hip up, an axillary roll was placed, hip positioner was used, all bony prominences were well-padded and  pelvis was well-padded as well. The operative site was prepped and draped using standard sterile technique.  Timeout was performed identifying all team members in the room and to confirm the patient's identity, the left hip as the operative site, and the proposed procedure.     A lateral Hardinge approach to the hip was performed. An approximately 10 cm incision was made over the lateral greater trochanter, and the incision was carried down to the fascia and hemostasis was obtained with electrocautery.  The iliotibial  band fascia was incised in line with the incision, extending slightly anteriorly following the tensor fascia audrey planes.  This was divided bluntly and crossing vessels were cauterized.  The trochanteric bursa was excised, and Charnley retractor was placed.  Anterior third of the gluteus medius insertion was identified, with the fat stripe.  Anterior third of the gluteus medius was resected with the Bovie off of the greater trochanter extending then distally along with the sleeve of some of the vastus lateralis.  The hip was externally rotated in order to identify the anterior femoral neck, this was continued down onto bone resecting tissue off the femoral neck down to the hip joint capsule.  Hip joint capsule was resected anteriorly, retractors were placed around the femoral neck.  Corkscrew was placed within the femoral head femoral head was removed entirely, measured on the back table to be approximately 47 mm femoral head.  Retractors were placed to identify the acetabulum, any deep tissue was resected that was able to block the acetabulum including the ligamentum teres.  Trial 47 mm head was then placed and had a good \circumferential fit within the acetabulum.  Hip was then dislocated appropriately, leg was hung off the table within the sterile drape with external rotation.    Attention was then drawn to the proximal femur, and the leg was flexed and externally rotated. Femoral elevator was placed, standard approach to the proximal femur was then done using first the box osteotome, then the lateralizing reamer/canal finder.  A canal finder and box osteotome were used to access the femoral canal.  Femoral broaches were passed incrementally in about 10 degrees of anteversion. Broaching was performed up to approximately size 5.  It was decided to cement the stem, proximal reamers were then used up to a size 5-6 as well.  The final size 5 broach had a good axial and rotational stability, and it was left in place.   Calcar planar was then used.  A trial head and neck were placed.    Trial reductions were performed.  Neck length was adjusted as needed to provde appropriate stability and soft tissue tension.  With the final trial components in place,There was no bony impingement, and soft tissue tension was appropriate.  Leg lengths were equal based on palpation of the knees and heels.  +5 mm neck was used with the trial. This was reduced into placed and was extremely stable all ranges of motion, Including 45 degrees of internal rotation 45 degrees of flexion with 90 degrees of flexion, position of sleep, 60 degrees of internal rotation.    Extreme external rotation was also extremely stable without anterior dislocation    The hip was dislocated and the trial components were removed.  The acetabulum and femoral canal were once more copiously irrigated with sterile saline.  Cement was mixed on the back table allowed to dry into a putty like state, injected within the femoral canal after the placement of the cement restrictor, the stem was inserted  into the cement allowed to dry maintaining the appropriate anteversion until completely dry.  Trial components were then once more attempted on the femoral cemented stem, the +5 mm 47 mm bipolar head was reduced and found to be equally stable as previously discussed.  Trial head was then removed.    The final femoral bipolar head was engaged in the Hernandez taper and the hip and reduced found to have excellent fit and stability.     The hip was irrigated again with sterile saline. Patient was injected with total joint cocktail into the deep capsule and soft tissues.gluteus medius and minimus tissue were closed with a #2 Ethibond. Iliotibial band was closed with #1 stratafix to achieve a watertight closure.  Deep tissue closed with #1 vicryl. Sub Q closed with 2-0 monocryl. Skin closed with running 3-0 monocryl and skin glue. Skin cleansed and dried, mepilex dressing placed. Toes wrapped to  "groin with ACE and soft roll.     COMPLICATIONS:  None    PATIENT DISPOSITION:  PACU , stable      SIGNATURE:  Delfin Pereira, DO  DATE:  May 22, 2024  TIME:  6:32 PM    Plan\"  Weightbearing as tolerated left lower extremity  Physical therapy occupational therapy  No hip precautions  Antibiotics: Ancef x 2  Anticoagulation: Lovenox 40 mg SQ daily, per medicine  Hanley: DC per medicine  Diet: Per medicine  Pain control per medicine.       "

## 2024-05-22 NOTE — ASSESSMENT & PLAN NOTE
Patient with PMHx of dementia, HTN, and HLD presented to the ED from her facility, [facility], after sustaining a mechanical fall  Pt reportedly was assisted by staff when she fell. Staff was able to help guide patient to the floor. No reported head strike and patient not anticoagulated. Pt did land on her left hip.  XR left hip demonstrating fracture of left femoral neck  Considered moderate risk for cardiac and pulmonary complication  Possible surgery once completed antibiotic treatment course  N.p.o. after midnight  Patient is considered moderate risk for cardiac and pulmonary complications, will plan for operative management based upon surgeon availability  DVT prophylaxis for tomorrow to be held

## 2024-05-22 NOTE — PROGRESS NOTES
"Progress Note - Pulmonary   Dangelo Gunter 83 y.o. female MRN: [id number]  Unit/Bed#: E2 -01 Encounter: [id number]    Assessment/Plan:    Acute hypoxic respiratory failure  -93% on 4 L NC, patient does not wear any supplemental O2 at home  -Continue to maintain saturation greater than 89%  -Pulmonary hygiene encouraged: Deep breathing with cough, OOB as tolerated, incentive spirometry and flutter valve hourly  -Home O2 assessment prior to discharge     Multifocal pneumonia/pneumonitis  -CT chest completed 05/18/2024 shows extensive patchy bilateral upper and lower lobe infiltrates suspicious for multifocal bronchopneumonia.  -WBC 9.04-9.18-8.41-8.59  -Procalcitonin negative x 2  -RP 2 panel negative, Legionella antigen and strep pneumonia negative  -Azithromycin day #6  -Cefepime 3 days completed  -Ceftriaxone day #3  -See Dr. Lewis note from 05/20/2024 for pulmonary recommendations regarding surgery     Abnormal CT of chest  -CT chest completed 05/18/2024 shows extensive patchy bilateral upper and lower lobe infiltrates suspicious for multifocal bronchopneumonia.    -Will need outpatient follow-up imaging in 6 to 8 weeks     Fracture of femoral neck  -Fell at [facility] care unit  -X-ray of left hip shows fracture of left femoral neck  -5 days of antibiotics complete, surgery scheduled for 5 pm tonight    Chief Complaint:    Good morning    Subjective:    Patient seen and examined at bedside.  Found resting comfortably in bed.  Pleasantly confused at baseline.  No overnight events reported.  Daughter at bedside.  No fever chills night sweats.  5 days antibiotics completed today.  Patient scheduled for surgery at 5 PM tonight    Objective:    Vitals: Blood pressure 169/90, pulse 77, temperature 98 °F (36.7 °C), temperature source Temporal, resp. rate 18, height 5' 6\" (1.676 m), weight 70.6 kg (155 lb 10.3 oz), SpO2 93%.4 L,Body mass index is 25.12 kg/m².      Intake/Output Summary (Last 24 hours) at 5/22/2024 " "1039  Last data filed at 5/22/2024 0527  Gross per 24 hour   Intake 480 ml   Output 500 ml   Net -20 ml       Invasive Devices       Peripheral Intravenous Line  Duration             Peripheral IV 05/18/24 Dorsal (posterior);Right Forearm 4 days    Peripheral IV 05/21/24 Proximal;Right;Ventral (anterior) Forearm 1 day              Drain  Duration             Urethral Catheter Latex 16 Fr. 4 days                    Physical Exam:     Physical Exam  Vitals reviewed.   Constitutional:       General: She is not in acute distress.     Appearance: She is ill-appearing.   HENT:      Head: Normocephalic and atraumatic.      Right Ear: External ear normal.      Left Ear: External ear normal.      Nose: Nose normal.      Mouth/Throat:      Mouth: Mucous membranes are dry.      Pharynx: Oropharynx is clear.   Eyes:      Extraocular Movements: Extraocular movements intact.      Pupils: Pupils are equal, round, and reactive to light.   Cardiovascular:      Rate and Rhythm: Normal rate and regular rhythm.      Pulses: Normal pulses.      Heart sounds: Normal heart sounds.   Pulmonary:      Effort: Pulmonary effort is normal.      Comments: Decrease breath sounds likely due to poor inspiratory effort  Abdominal:      General: Bowel sounds are normal.      Tenderness: There is no abdominal tenderness.   Musculoskeletal:      Cervical back: Normal range of motion and neck supple.      Right lower leg: Edema present.      Left lower leg: Edema present.      Comments: Right leg brace due to femoral head FX   Skin:     General: Skin is warm and dry.   Neurological:      Mental Status: She is alert. Mental status is at baseline.   Psychiatric:         Mood and Affect: Mood normal.         Behavior: Behavior normal.         Thought Content: Thought content normal.         Labs: I have personally reviewed pertinent lab results., ABG: No results found for: \"PHART\", \"FYW6QQI\", \"PO2ART\", \"NQY3NLM\", \"A9NTWVXE\", \"BEART\", \"SOURCE\", BNP: No " "results found for: \"BNP\", CBC:   Lab Results   Component Value Date    WBC 8.59 05/22/2024    HGB 10.6 (L) 05/22/2024    HCT 31.9 (L) 05/22/2024    MCV 95 05/22/2024     05/22/2024    RBC 3.37 (L) 05/22/2024    MCH 31.5 05/22/2024    MCHC 33.2 05/22/2024    RDW 14.4 05/22/2024    MPV 11.2 05/22/2024   , CMP:   Lab Results   Component Value Date    SODIUM 135 05/22/2024    K 3.8 05/22/2024     05/22/2024    CO2 28 05/22/2024    BUN 20 05/22/2024    CREATININE 0.89 05/22/2024    CALCIUM 8.6 05/22/2024    EGFR 59 05/22/2024         Imaging and other studies: I have personally reviewed pertinent reports.    CT chest completed 05/18/2024 shows extensive patchy bilateral upper and lower lobe infiltrates suspicious for multifocal bronchopneumonia  "

## 2024-05-22 NOTE — TELEPHONE ENCOUNTER
Daughter calling in on behalf of patient, states that Patient fell and broke her hip and will be having Surgery, wanted PCP to know.

## 2024-05-22 NOTE — ANESTHESIA PROCEDURE NOTES
Spinal Block    Patient location during procedure: OR  Start time: 5/22/2024 4:39 PM  Reason for block: at surgeon's request, post-op pain management and primary anesthetic  Staffing  Performed by: Tip Doyle DO  Authorized by: Tip Doyle DO    Preanesthetic Checklist  Completed: patient identified, IV checked, site marked, risks and benefits discussed, surgical consent, monitors and equipment checked, pre-op evaluation and timeout performed  Spinal Block  Patient position: sitting  Prep: Betadine  Patient monitoring: heart rate, continuous pulse ox and frequent blood pressure checks  Approach: midline  Location: L3-4  Needle  Needle type: Pencan   Needle gauge: 24 G  Needle length: 4 in  Assessment  Sensory level: T4  Injection Assessment:  positive aspiration for clear CSF, no paresthesia on injection and negative aspiration for heme.  Post-procedure:  site cleaned

## 2024-05-23 ENCOUNTER — APPOINTMENT (INPATIENT)
Dept: RADIOLOGY | Facility: HOSPITAL | Age: 84
DRG: 956 | End: 2024-05-23
Payer: COMMERCIAL

## 2024-05-23 LAB
ANION GAP SERPL CALCULATED.3IONS-SCNC: 7 MMOL/L (ref 4–13)
BACTERIA BLD CULT: NORMAL
BACTERIA BLD CULT: NORMAL
BUN SERPL-MCNC: 18 MG/DL (ref 5–25)
CALCIUM SERPL-MCNC: 7.7 MG/DL (ref 8.4–10.2)
CHLORIDE SERPL-SCNC: 107 MMOL/L (ref 96–108)
CO2 SERPL-SCNC: 24 MMOL/L (ref 21–32)
CREAT SERPL-MCNC: 0.68 MG/DL (ref 0.6–1.3)
ERYTHROCYTE [DISTWIDTH] IN BLOOD BY AUTOMATED COUNT: 14.1 % (ref 11.6–15.1)
GFR SERPL CREATININE-BSD FRML MDRD: 80 ML/MIN/1.73SQ M
GLUCOSE SERPL-MCNC: 136 MG/DL (ref 65–140)
HCT VFR BLD AUTO: 29 % (ref 34.8–46.1)
HGB BLD-MCNC: 9.5 G/DL (ref 11.5–15.4)
MCH RBC QN AUTO: 31.1 PG (ref 26.8–34.3)
MCHC RBC AUTO-ENTMCNC: 32.8 G/DL (ref 31.4–37.4)
MCV RBC AUTO: 95 FL (ref 82–98)
PLATELET # BLD AUTO: 211 THOUSANDS/UL (ref 149–390)
PMV BLD AUTO: 11.4 FL (ref 8.9–12.7)
POTASSIUM SERPL-SCNC: 4.3 MMOL/L (ref 3.5–5.3)
RBC # BLD AUTO: 3.05 MILLION/UL (ref 3.81–5.12)
SODIUM SERPL-SCNC: 138 MMOL/L (ref 135–147)
WBC # BLD AUTO: 8.67 THOUSAND/UL (ref 4.31–10.16)

## 2024-05-23 PROCEDURE — 80048 BASIC METABOLIC PNL TOTAL CA: CPT | Performed by: PHYSICIAN ASSISTANT

## 2024-05-23 PROCEDURE — 71045 X-RAY EXAM CHEST 1 VIEW: CPT

## 2024-05-23 PROCEDURE — 92526 ORAL FUNCTION THERAPY: CPT

## 2024-05-23 PROCEDURE — 99232 SBSQ HOSP IP/OBS MODERATE 35: CPT | Performed by: INTERNAL MEDICINE

## 2024-05-23 PROCEDURE — 97167 OT EVAL HIGH COMPLEX 60 MIN: CPT

## 2024-05-23 PROCEDURE — 99233 SBSQ HOSP IP/OBS HIGH 50: CPT | Performed by: INTERNAL MEDICINE

## 2024-05-23 PROCEDURE — 85027 COMPLETE CBC AUTOMATED: CPT | Performed by: PHYSICIAN ASSISTANT

## 2024-05-23 PROCEDURE — 99024 POSTOP FOLLOW-UP VISIT: CPT | Performed by: STUDENT IN AN ORGANIZED HEALTH CARE EDUCATION/TRAINING PROGRAM

## 2024-05-23 PROCEDURE — 97163 PT EVAL HIGH COMPLEX 45 MIN: CPT

## 2024-05-23 RX ORDER — ACETAMINOPHEN 325 MG/1
650 TABLET ORAL EVERY 8 HOURS SCHEDULED
Status: DISCONTINUED | OUTPATIENT
Start: 2024-05-23 | End: 2024-05-28 | Stop reason: HOSPADM

## 2024-05-23 RX ORDER — OLANZAPINE 10 MG/2ML
2.5 INJECTION, POWDER, FOR SOLUTION INTRAMUSCULAR EVERY 6 HOURS PRN
Status: DISCONTINUED | OUTPATIENT
Start: 2024-05-23 | End: 2024-05-24

## 2024-05-23 RX ADMIN — LOSARTAN POTASSIUM 50 MG: 50 TABLET, FILM COATED ORAL at 09:39

## 2024-05-23 RX ADMIN — SENNOSIDES 17.2 MG: 8.6 TABLET, FILM COATED ORAL at 21:05

## 2024-05-23 RX ADMIN — AMLODIPINE BESYLATE 2.5 MG: 2.5 TABLET ORAL at 09:39

## 2024-05-23 RX ADMIN — DOCUSATE SODIUM 100 MG: 100 CAPSULE, LIQUID FILLED ORAL at 17:21

## 2024-05-23 RX ADMIN — Medication 2.5 MG: at 12:20

## 2024-05-23 RX ADMIN — MEMANTINE 10 MG: 5 TABLET ORAL at 17:21

## 2024-05-23 RX ADMIN — QUETIAPINE FUMARATE 25 MG: 25 TABLET ORAL at 21:05

## 2024-05-23 RX ADMIN — MIRTAZAPINE 7.5 MG: 7.5 TABLET, FILM COATED ORAL at 21:05

## 2024-05-23 RX ADMIN — ACETAMINOPHEN 325MG 650 MG: 325 TABLET ORAL at 21:06

## 2024-05-23 RX ADMIN — POLYETHYLENE GLYCOL 3350 17 G: 17 POWDER, FOR SOLUTION ORAL at 09:42

## 2024-05-23 RX ADMIN — ACETAMINOPHEN 650 MG: 325 TABLET, FILM COATED ORAL at 12:19

## 2024-05-23 RX ADMIN — DIVALPROEX SODIUM 125 MG: 125 TABLET, DELAYED RELEASE ORAL at 17:21

## 2024-05-23 RX ADMIN — PANTOPRAZOLE SODIUM 40 MG: 40 TABLET, DELAYED RELEASE ORAL at 05:20

## 2024-05-23 RX ADMIN — DOCUSATE SODIUM 100 MG: 100 CAPSULE, LIQUID FILLED ORAL at 09:39

## 2024-05-23 RX ADMIN — DIVALPROEX SODIUM 125 MG: 125 TABLET, DELAYED RELEASE ORAL at 09:39

## 2024-05-23 RX ADMIN — ENOXAPARIN SODIUM 30 MG: 30 INJECTION SUBCUTANEOUS at 09:39

## 2024-05-23 RX ADMIN — ESCITALOPRAM OXALATE 10 MG: 10 TABLET ORAL at 09:39

## 2024-05-23 RX ADMIN — CEFAZOLIN SODIUM 1000 MG: 1 SOLUTION INTRAVENOUS at 05:20

## 2024-05-23 RX ADMIN — PROPRANOLOL HYDROCHLORIDE 60 MG: 60 CAPSULE, EXTENDED RELEASE ORAL at 09:39

## 2024-05-23 RX ADMIN — PRAVASTATIN SODIUM 80 MG: 80 TABLET ORAL at 17:21

## 2024-05-23 RX ADMIN — MEMANTINE 10 MG: 5 TABLET ORAL at 09:39

## 2024-05-23 NOTE — OCCUPATIONAL THERAPY NOTE
Occupational Therapy Evaluation     Patient Name: Dangelo Gunter  Today's Date: 5/23/2024  Problem List  Principal Problem:    Fracture of femoral neck, left (HCC)  Active Problems:    Essential hypertension    Dementia with agitation (HCC)    Acute respiratory failure with hypoxia (HCC)    Multifocal pneumonia    Past Medical History  Past Medical History:   Diagnosis Date    Anxiety     Gastroesophageal reflux disease without esophagitis 08/28/2018    Hyperlipidemia     Hypertension     Hypothyroidism     Mild dementia with agitation (HCC) 08/24/2020    Multifocal pneumonia 5/18/2024    Osteoarthritis 10/05/2012    Other osteoporosis without current pathological fracture 02/09/2023     Past Surgical History  Past Surgical History:   Procedure Laterality Date    CATARACT EXTRACTION      CHOLECYSTECTOMY      COLONOSCOPY      HYSTERECTOMY             05/23/24 1123   Note Type   Note type Evaluation   Pain Assessment   Pain Assessment Tool 0-10   Pain Score No Pain   Pain Rating: FLACC (Rest) - Face 0   Pain Rating: FLACC (Rest) - Legs 0   Pain Rating: FLACC (Rest) - Activity 0   Pain Rating: FLACC (Rest) - Cry 1   Pain Rating: FLACC (Rest) - Consolability 0   Score: FLACC (Rest) 1   Restrictions/Precautions   Weight Bearing Precautions Per Order Yes   LLE Weight Bearing Per Order WBAT   Other Precautions Cognitive;Chair Alarm;Bed Alarm;Fall Risk;Pain  (no hip precautions per ortho(Sofia Farrell)--verbal)   Home Living   Type of Home SNF  ([facility])   Home Layout One level   Home Equipment Walker  (rollator)   Prior Function   Level of [place] Independent with functional mobility;Needs assistance with ADLs   Lives With Facility staff   Falls in the last 6 months 1 to 4  (1)   Comments Per , pt had assistance with her ADLs, but states independence with her transfers/ambulation--with rollator, +falls=1   Lifestyle   Autonomy Per , pt had assistance with her ADLs, but states  "independence with her transfers/ambulation--with rollator, +falls=1   Reciprocal Relationships supportive , 2 children   Service to Others worked as an    Intrinsic Gratification spending time with family   Subjective   Subjective \"I don't know where I am.\"   ADL   Where Assessed Edge of bed   Eating Assistance 5  Supervision/Setup   Grooming Assistance 5  Supervision/Setup   UB Bathing Assistance 4  Minimal Assistance   LB Bathing Assistance 2  Maximal Assistance   UB Dressing Assistance 4  Minimal Assistance   LB Dressing Assistance 2  Maximal Assistance   Toileting Assistance  2  Maximal Assistance   Bed Mobility   Rolling R 1  Dependent   Rolling L 1  Dependent   Supine to Sit 1  Dependent   Transfers   Sit to Stand 3  Moderate assistance   Additional items Assist x 2;Increased time required;Verbal cues   Stand to Sit 3  Moderate assistance   Additional items Assist x 2;Increased time required;Verbal cues   Additional Comments bp's=146/80(supine), 151/76(sitting in chair after ambulation); SPO2=92% on 4liters; RA=83%--nsg aware   Functional Mobility   Functional Mobility 3  Moderate assistance   Additional Comments x2   Additional items Rolling walker   Balance   Static Sitting Fair   Dynamic Sitting Fair -   Static Standing Poor +   Dynamic Standing Poor   Activity Tolerance   Activity Tolerance Patient limited by fatigue;Patient limited by pain;Other (Comment)  (cognition)   RUE Assessment   RUE Assessment WFL   RUE Strength   RUE Overall Strength Within Functional Limits - able to perform ADL tasks with strength  (4/5 throughout)   LUE Assessment   LUE Assessment WFL   LUE Strength   LUE Overall Strength Within Functional Limits - able to perform ADL tasks with strength  (4/5 throughout)   Hand Function   Gross Motor Coordination Functional   Fine Motor Coordination Functional   Sensation   Light Touch No apparent deficits   Proprioception   Proprioception No apparent deficits "   Vision-Basic Assessment   Current Vision   (glasses)   Vision - Complex Assessment   Acuity   (impaired)   Psychosocial   Psychosocial (WDL) WDL   Perception   Inattention/Neglect Appears intact   Cognition   Overall Cognitive Status Impaired   Arousal/Participation Alert   Attention Attends with cues to redirect   Orientation Level Oriented to person;Disoriented to place;Disoriented to time;Disoriented to situation   Memory Decreased long term memory;Decreased recall of biographical information;Decreased recall of precautions;Decreased recall of recent events;Decreased short term memory   Following Commands Follows one step commands with increased time or repetition   Comments hx dementia   Assessment   Limitation Decreased ADL status;Decreased UE strength;Decreased Safe judgement during ADL;Decreased cognition;Decreased endurance;Decreased high-level ADLs   Prognosis Fair   Assessment Pt is a 82y/o female admitted to the hospital after falling at her place of residency, resulting in a L hip fx; pt also noted with PNA.  Pt required a L hip hemiarthroplasty(5/22). Pt is currently allowed WBAT L hip, w/o hip restrictions. Pt with PMH dementia, anxiety, HTN, PNA, OA. Per , pt had assistance with her ADLs, but states independence with her transfers/ambulation--with rollator, +falls=1. During initial eval, pt demonstrated deficits with her functional balance, functional mobility, ADL status, transfer safety, b/l UE strength, activity tolerance(currently fair=15-20mins), and cognition. Pt would benefit from continued OT tx for the above deficits. 2-5xwk/1-2wks. The patient's raw score on the AM-PAC Daily Activity Inpatient Short Form is 15. A raw score of less than 19 suggests the patient may benefit from discharge to post-acute rehabilitation services. Please refer to the recommendation of the Occupational Therapist for safe discharge planning.   Goals   Patient Goals unable to fully assess 2* dementia   STG  Time Frame   (1-7 days)   Short Term Goal #1 Pt will demonstrate improved activity tolerance to good(20-30mins) and standing tolerance to 3-5mins to assist with ADLs.   Short Term Goal #2 Pt will demonstrate proper walker/transfer safety 100% of the time.   Short Term Goal  Pt will demonstrate Kylah with their bed mobility to facilitate EOB ADLs.   Long Term Goal #1 Pt will demonstrate supervision with their sit-stand transfers to assist with completion of their LE dressing.   Long Term Goal #2 Pt will demonstrate supervision with her UE and Kylah with her LE bathing/dressing.   Long Term Goal Pt will demonstrate improved functional balance by 1 grade to assist with ADLs/transfers.   Plan   Treatment Interventions ADL retraining;UE strengthening/ROM;Functional transfer training;Endurance training;Cognitive reorientation;Patient/family training;Equipment evaluation/education;Compensatory technique education;Continued evaluation   Goal Expiration Date 06/06/24   OT Treatment Day 0   OT Frequency   (2-5xwk)   Discharge Recommendation   Rehab Resource Intensity Level, OT II (Moderate Resource Intensity)   AM-PAC Daily Activity Inpatient   Lower Body Dressing 2   Bathing 2   Toileting 2   Upper Body Dressing 3   Grooming 3   Eating 3   Daily Activity Raw Score 15   Daily Activity Standardized Score (Calc for Raw Score >=11) 34.69   AM-PAC Applied Cognition Inpatient   Following a Speech/Presentation 3   Understanding Ordinary Conversation 3   Taking Medications 2   Remembering Where Things Are Placed or Put Away 2   Remembering List of 4-5 Errands 1   Taking Care of Complicated Tasks 1   Applied Cognition Raw Score 12   Applied Cognition Standardized Score 28.82   Bro Wesley

## 2024-05-23 NOTE — PROGRESS NOTES
Progress Note - Pulmonary   Dangelo Gunter 83 y.o. female MRN: [id number]  Unit/Bed#: E2 -01 Encounter: [id number]    Assessment/Plan:    Acute hypoxic respiratory failure  -93% on 4 L NC, patient does not wear any supplemental O2 at home  -Continue to maintain saturation greater than 89%  -Pulmonary hygiene encouraged: Deep breathing with cough, OOB as tolerated, incentive spirometry and flutter valve hourly  -Home O2 assessment prior to discharge     Multifocal pneumonitis versus fat embolism syndrome of lungs  -CT chest completed 05/18/2024 shows extensive patchy bilateral upper and lower lobe infiltrates suspicious for multifocal bronchopneumonia.  -WBC 9.04-9.18-8.41-8.59  -Procalcitonin negative x 2  -RP 2 panel negative, Legionella antigen and strep pneumonia negative  -Azithromycin day #6  -Cefepime 3 days completed  -Ceftriaxone day #5  -Urine microscopically highly suggestive of fat embolism  -Does not need to continue antibiotics from a pulmonary standpoint  -Conservative treatment with oxygen and pulmonary hygiene  -Follow-up outpatient imaging in 6 to 8 weeks    Abnormal CT of chest  -CT chest completed 05/18/2024 shows extensive patchy bilateral upper and lower lobe infiltrates suspicious for multifocal bronchopneumonia.    -Will need outpatient follow-up imaging in 6 to 8 weeks     Fracture of femoral neck  -Fell at memory care unit  -X-ray of left hip shows fracture of left femoral neck  -5 days of antibiotics complete for suspected pneumonia  -S/P cemented left hip hemiarthroplasty completed 05/22/2024 with Dr. Pereira    Chief Complaint:    I do not know how I am    Subjective:    Patient seen and examined at bedside.  Found resting comfortably in bed daughter at bedside.  She is pleasantly confused at baseline.  No overnight events were reported.  No fever chills or night sweats.  Cemented left hip hemiarthroplasty completed yesterday 05/22 with Dr. Pereira.  Patient endorses some mild  "discomfort but does not appear in any distress at this time.  Currently on 4 L nasal cannula, oxygen saturation stable    Objective:    Vitals: Blood pressure 158/82, pulse 66, temperature (!) 97.2 °F (36.2 °C), temperature source Temporal, resp. rate 16, height 5' 6\" (1.676 m), weight 70.6 kg (155 lb 10.3 oz), SpO2 95%.4 L,Body mass index is 25.12 kg/m².      Intake/Output Summary (Last 24 hours) at 5/23/2024 1105  Last data filed at 5/22/2024 1914  Gross per 24 hour   Intake 2000 ml   Output 625 ml   Net 1375 ml       Invasive Devices       Peripheral Intravenous Line  Duration             Peripheral IV 05/21/24 Proximal;Right;Ventral (anterior) Forearm 2 days    Peripheral IV 05/22/24 Left Hand <1 day              Drain  Duration             Urethral Catheter Latex 16 Fr. 5 days                    Physical Exam:     Physical Exam  Constitutional:       Appearance: She is ill-appearing.   HENT:      Head: Normocephalic and atraumatic.      Right Ear: External ear normal.      Left Ear: External ear normal.      Nose: Nose normal.      Mouth/Throat:      Mouth: Mucous membranes are moist.      Pharynx: Oropharynx is clear.   Eyes:      Extraocular Movements: Extraocular movements intact.      Pupils: Pupils are equal, round, and reactive to light.   Cardiovascular:      Rate and Rhythm: Normal rate and regular rhythm.      Pulses: Normal pulses.      Heart sounds: Normal heart sounds.   Pulmonary:      Effort: Pulmonary effort is normal.      Comments: Decreased breath sounds bilaterally likely secondary to poor inspiratory effort  Fine bibasilar crackles noted  Abdominal:      General: Bowel sounds are normal.      Tenderness: There is no abdominal tenderness.   Musculoskeletal:      Cervical back: Normal range of motion and neck supple.      Right lower leg: Edema present.      Left lower leg: Edema present.      Comments: Right leg brace due to femoral head fracture   Skin:     General: Skin is warm and dry. " "  Neurological:      Mental Status: She is alert. Mental status is at baseline.      Motor: No weakness.   Psychiatric:         Mood and Affect: Mood normal.         Behavior: Behavior normal.         Labs: I have personally reviewed pertinent lab results., ABG: No results found for: \"PHART\", \"FXT0LVO\", \"PO2ART\", \"UGR3QXL\", \"I1UGPBTO\", \"BEART\", \"SOURCE\", BNP: No results found for: \"BNP\", CBC:   Lab Results   Component Value Date    WBC 8.67 05/23/2024    HGB 9.5 (L) 05/23/2024    HCT 29.0 (L) 05/23/2024    MCV 95 05/23/2024     05/23/2024    RBC 3.05 (L) 05/23/2024    MCH 31.1 05/23/2024    MCHC 32.8 05/23/2024    RDW 14.1 05/23/2024    MPV 11.4 05/23/2024   , CMP:   Lab Results   Component Value Date    SODIUM 138 05/23/2024    K 4.3 05/23/2024     05/23/2024    CO2 24 05/23/2024    BUN 18 05/23/2024    CREATININE 0.68 05/23/2024    CALCIUM 7.7 (L) 05/23/2024    EGFR 80 05/23/2024         Imaging and other studies: I have personally reviewed pertinent reports.    CT chest completed 05/18/2024 shows extensive patchy bilateral upper and lower lobe infiltrates suspicious for multifocal bronchopneumonia   "

## 2024-05-23 NOTE — ASSESSMENT & PLAN NOTE
Multifocal pneumonia viral versus fat emboli  Received  ceftriaxone day #5- Completed course  Wean oxygen as tolerated

## 2024-05-23 NOTE — PROGRESS NOTES
"Progress Note - Orthopedics   Dangelo Gunter 83 y.o. female MRN: [id number]  Unit/Bed#: E2 -01 Encounter: [id number]    Assessment:  83 y.o. female POD 1 s/p cemented left hip hemiarthroplasty on 5/22 with Dr. Pereira     Plan:  WBAT to LLE  Maintain mepilex dressing  Pain control prn  PT/OT  Lovenox/SCDs for DVT prophylaxis  Abx x 24h  Medical management per SLIM.   Patient is stable from ortho standpoint. Will require follow up with Dr Pereira after dc.     Subjective: Pt S&E.  Resting comfortably. Family at bedside. Patient states she is doing well this morning. She denies any pain in the left hip or leg at this time. Denies any complaints. She denies fevers, chills, distal numbness or tingling.       Vitals: Blood pressure 158/82, pulse 66, temperature (!) 97.2 °F (36.2 °C), temperature source Temporal, resp. rate 16, height 5' 6\" (1.676 m), weight 70.6 kg (155 lb 10.3 oz), SpO2 95%.,Body mass index is 25.12 kg/m².      Intake/Output Summary (Last 24 hours) at 5/23/2024 0825  Last data filed at 5/22/2024 1914  Gross per 24 hour   Intake 2000 ml   Output 625 ml   Net 1375 ml       Invasive Devices       Peripheral Intravenous Line  Duration             Peripheral IV 05/21/24 Proximal;Right;Ventral (anterior) Forearm 2 days    Peripheral IV 05/22/24 Left Hand <1 day              Drain  Duration             Urethral Catheter Latex 16 Fr. 5 days                    Ortho Exam: leftLE:  Drsg: mepilex C/D/I, ACE wrap on leg, sensation grossly intact L4, L5, S1, palpable pedal pulse, EHL/AT/GS intact    Lab, Imaging and other studies: CBC:   Lab Results   Component Value Date    WBC 8.67 05/23/2024    HGB 9.5 (L) 05/23/2024    HCT 29.0 (L) 05/23/2024    MCV 95 05/23/2024     05/23/2024    RBC 3.05 (L) 05/23/2024    MCH 31.1 05/23/2024    MCHC 32.8 05/23/2024    RDW 14.1 05/23/2024    MPV 11.4 05/23/2024     CMP:   Lab Results   Component Value Date    SODIUM 138 05/23/2024     05/23/2024    CO2 24 " 05/23/2024    BUN 18 05/23/2024    CREATININE 0.68 05/23/2024    CALCIUM 7.7 (L) 05/23/2024    EGFR 80 05/23/2024

## 2024-05-23 NOTE — ASSESSMENT & PLAN NOTE
Patient with PMHx of dementia, HTN, and HLD presented to the ED from her facility, [facility], after sustaining a mechanical fall  83 y.o. female POD 1 s/p cemented left hip hemiarthroplasty on 5/22 with Dr. Pereira   WBAT to LLE  Maintain mepilex dressing  Rehab evals

## 2024-05-23 NOTE — PHYSICAL THERAPY NOTE
PHYSICAL THERAPY EVALUATION     NAME:  Dangelo Gunter  DATE: 05/23/24    AGE:   83 y.o.  Mrn:   [id number]  ADMIT DX:  Left hip pain [M25.552]  Closed fracture of left hip, initial encounter ([facility]) [S72.002A]  Multifocal pneumonia [J18.9]    Patient Active Problem List   Diagnosis    Gastroesophageal reflux disease without esophagitis    Essential hypertension    Temporal arteritis ([facility])    Abnormal CT scan, head    Goiter    Osteoarthritis    Chest pressure    Hyponatremia    Polymyalgia rheumatica ([facility])    Other chest pain    Anxiety disorder    Encounter for medication review    Bradycardia    Other insomnia    Other osteoporosis without current pathological fracture    Diastolic dysfunction without heart failure    Nonrheumatic tricuspid valve regurgitation    Balance problems    Dementia with agitation ([facility])    Venous insufficiency of left lower extremity    Acute respiratory failure with hypoxia ([facility])    Multifocal pneumonia    Fracture of femoral neck, left ([facility])       Past Medical History:   Diagnosis Date    Anxiety     Gastroesophageal reflux disease without esophagitis 08/28/2018    Hyperlipidemia     Hypertension     Hypothyroidism     Mild dementia with agitation ([facility]) 08/24/2020    Multifocal pneumonia 5/18/2024    Osteoarthritis 10/05/2012    Other osteoporosis without current pathological fracture 02/09/2023       Past Surgical History:   Procedure Laterality Date    CATARACT EXTRACTION      CHOLECYSTECTOMY      COLONOSCOPY      HYSTERECTOMY         Imaging Studies:  XR pelvis ap only 1 or 2 vw   Final Result by Gasper Henry MD (05/23 6488)      Uncomplicated left hip hemiarthroplasty.      Workstation performed: [id number]         CT chest without contrast   Final Result by Levi Power MD (05/18 0116)      Extensive patchy bilateral upper and lower lobe infiltrates suspicious for multifocal bronchopneumonia. Correlation with the patient's symptoms and laboratory studies is  recommended. A short-term follow-up CT chest in 6 to 8 weeks following treatment is    recommended to assess for improvement/resolution and exclude an underlying lesion.               Workstation performed: [id number]         XR chest 1 view portable   ED Interpretation by Alphonse Gomez MD (05/17 2348)   Multiple bilateral infiltrates.      Final Result by Antonia Watts MD (05/18 1037)      Multifocal bilateral lung opacity, new from February 2024, likely infectious or inflammatory. See subsequent chest CT.            Workstation performed: [id number]         XR hip/pelv 2-3 vws left   ED Interpretation by Alphonse Gomez MD (05/18 0023)   Left femoral neck fracture.  No pelvis fracture.      Final Result by Aris Sanchez MD (05/18 1502)   Left femoral neck impacted fracture.      Findings concur with ED results as provided in PACS viewer/EPIC at the time of interpretation.            Workstation performed: [id number]         XR chest portable    (Results Pending)       Past Medical History:   Diagnosis Date    Anxiety     Gastroesophageal reflux disease without esophagitis 08/28/2018    Hyperlipidemia     Hypertension     Hypothyroidism     Mild dementia with agitation ([facility]) 08/24/2020    Multifocal pneumonia 5/18/2024    Osteoarthritis 10/05/2012    Other osteoporosis without current pathological fracture 02/09/2023     Length Of Stay: 5  Performed at least 2 patient identifiers during session: Name and Birthday  PHYSICAL THERAPY EVALUATION :        05/23/24 1156   PT Last Visit   PT Visit Date 05/23/24   Note Type   Note type Evaluation   Pain Assessment   Pain Assessment Tool 0-10   Pain Score No Pain   Pain Location/Orientation Orientation: Left;Location: Hip   Restrictions/Precautions   Weight Bearing Precautions Per Order Yes   LLE Weight Bearing Per Order WBAT   Other Precautions Cognitive;Chair Alarm;Bed Alarm;Fall Risk;Pain;O2  (no hip precautions per ortho (Sofia), IV, catheter 4LNC upon arrival   "> RA > 2lnc > 4lnc)   Home Living   Type of Home Other (Comment)  ([facility])   Home Layout One level   Bathroom Shower/Tub Walk-in shower   Home Equipment Other (Comment)  (rollator)   Additional Comments Lives at [facility] confirmed w/    Prior Function   Level of Dierks Independent with functional mobility;Needs assistance with ADLs;Needs assistance with IADLS   Lives With Facility staff   Receives Help From Personal care attendant;Other (Comment);Family  (Facility staff)   Falls in the last 6 months 1 to 4  (1 per )   Comments Pt questionable historian, information gathered from    General   Family/Caregiver Present Yes  ()   Cognition   Overall Cognitive Status Impaired   Arousal/Participation Alert   Orientation Level Oriented to person   Memory Decreased long term memory;Decreased short term memory;Decreased recall of recent events;Decreased recall of precautions   Following Commands Follows one step commands inconsistently   Comments Dementia w/ agitation,  reporting pt at 75-80% cognitive baseline   Subjective   Subjective \"Doesn't seem too bad,\"   RLE Assessment   RLE Assessment WFL  (3+/5 hip flx, 4--/5 knee ext, ankle 4-/5)   LLE Assessment   LLE Assessment X  (knee ext 2+/5, ankle at least 3+/5)   Bed Mobility   Rolling R 1  Dependent   Rolling L 1  Dependent   Supine to Sit 1  Dependent   Additional items Assist x 2;Increased time required;Verbal cues   Additional Comments Greeted supine in bed on 4LNC > transitioned to RA then 2L at EOB. Refer to activity tolerance for values. Dec engagement by pt likely 2/2 cognition   Transfers   Sit to Stand 3  Moderate assistance   Additional items Assist x 2;Increased time required;Verbal cues;Armrests   Stand to Sit 3  Moderate assistance   Additional items Assist x 2;Increased time required;Verbal cues;Armrests   Stand pivot 3  Moderate assistance   Additional items Assist x 2;Increased " time required;Verbal cues   Additional Comments RW for stability. cued for safe technique, LE positioning, sequencing/hand placement. (+) retropulsive initially w/ dec LLE weight acceptance and b/l knee flx   Ambulation/Elevation   Gait pattern Poor UE support;Improper Weight shift;Antalgic;Narrow EM;Decreased foot clearance;Inconsistent reyes;Foward flexed;Short stride;Excessively slow;Step through pattern  (short step through despite cues for L step to)   Gait Assistance 3  Moderate assist   Additional items Assist x 2   Assistive Device Rolling walker   Distance 4'x1 OOB to chair   Ambulation/Elevation Additional Comments unsteady step through patterning with dec tolerance 2/2 fatigue, weakness.   Balance   Static Sitting Fair   Dynamic Sitting Fair -   Static Standing Poor +   Dynamic Standing Poor   Activity Tolerance   Activity Tolerance Patient limited by fatigue;Patient limited by pain;Other (Comment)  (cognition, bp's=146/80(supine), 151/76(sitting in chair after ambulation); SPO2=92-96% on 4liters; RA= 96% initially down to 83% w/ mobility, RN aware.)   Medical Staff Made Aware OT Bro   Nurse Made Aware RN cleared/updated   Assessment   Prognosis Good   Problem List Decreased strength;Decreased range of motion;Decreased endurance;Impaired balance;Decreased mobility;Decreased cognition;Decreased safety awareness;Decreased skin integrity;Pain   Assessment Pt is a 83 y.o. female y/o presenting [facility] with fall at [facility]. Primary dx: Fracture of femoral neck, left (HCC). Now POD1 s/p cemented L lateral hip hemiarthroplasty on 5/22 with Dr. Pereira. WBS: WBAT and LLE. No hip precautions. Significant pmhx per chart: HTN ,anxiety dementia w/ agitation, acute respiratory failure w/ hypoxia, mutlifocal PNA, GERD, OA, polymalgia rheumatica, bradycardia, balance problems (12/2023). PT consulted to assess strength/functional mobility, activity tolerance and d/c  needs. Active PT orders and activity orders for Activity as tolerated. PTA, pt  reports pt PLOF at mod I w/ functional mobility w/ use of Rollator, A w/ ADLs and IADLs, (-) O2 use. During PT IE, pt presenting with above (see flowsheet) outlined functional impairments including dec strength, balance, gait, and deficits that limit functional mobility and activity tolerance relative to baseline, w/ chronic dec cognition. Pt currently requires dep x2 for bed mobility, mod Ax2 for transfers and ambulation with RW to achieve OOB to chair. Limited ability to follow simple commands impacting safety and engagement, however pleasant. Pt currently demonstrating inc fall risk. Fall risk education provided with verbal understanding. Limited standing tolerance of 2-3 minutes, limited by fatigue and weakness. Denied additional sxs throughout session. SPO2=92-96% on 4liters; RA= 96% initially down to 83% w/ mobility, RN aware.BP stable. Recommend continued mobilization of pt with nsg staff as tolerated to prevent further decline in function. Pt will benefit from continued PT services to progress mobility independence necessary for return to PLOF. Based on pt presentation and impairments, pt would most appropriately benefit from level II (mod) rehab intensity resources  pending progress w/ inc assistance and use of RW.   Barriers to Discharge None;Other (Comment)  (Pending facility ability to provide necessary assistance)   Goals   Patient Goals none stated 2/2 cognition   STG Expiration Date 06/06/24   Short Term Goal #1 1).  Pt will perform bed mobility with min Ax1 demonstrating appropriate technique 100% of the time in order to improve function and dec caregiver burden  2)  Perform all transfers with min Ax1 demonstrating safe and appropriate technique 100% of the time in order to improve ability to negotiate safely in home environment and dec caregiver burden 3) Amb with least restrictive AD > 50'x1 with supervision in  order to demonstrate ability to negotiate in home environment and dec caregiver burden 4)  Improve overall strength and balance 1/2 grade in order to optimize ability to perform functional tasks and reduce fall risk. 5) Increase activity tolerance to 45 minutes in order to improve endurance to functional tasks. 6) PT for ongoing patient and family/caregiver education, DME needs and d/c planning in order to promote highest level of function in least restrictive environment.   PT Treatment Day 0   Plan   Treatment/Interventions Functional transfer training;LE strengthening/ROM;Therapeutic exercise;Endurance training;Cognitive reorientation;Equipment eval/education;Patient/family training;Bed mobility;Gait training;Compensatory technique education;Spoke to nursing;OT;Family   PT Frequency 3-5x/wk   Discharge Recommendation   Rehab Resource Intensity Level, PT II (Moderate Resource Intensity)   Equipment Recommended Walker   Additional Comments The patient's AM-PAC Basic Mobility Inpatient Short Form Raw Score is 9. A Raw score of less than or equal to 16 suggests the patient may benefit from discharge to post-acute rehabilitation services. Please also refer to the recommendation of the Physical Therapist for safe discharge planning.   AM-PAC Basic Mobility Inpatient   Turning in Flat Bed Without Bedrails 1   Lying on Back to Sitting on Edge of Flat Bed Without Bedrails 1   Moving Bed to Chair 2   Standing Up From Chair Using Arms 2   Walk in Room 2   Climb 3-5 Stairs With Railing 1   Basic Mobility Inpatient Raw Score 9   Turning Head Towards Sound 4   Follow Simple Instructions 3   Low Function Basic Mobility Raw Score  16   Low Function Basic Mobility Standardized Score  25.72   [facility] Highest Level Of Mobility   -HL Goal 3: Sit at edge of bed   -HLM Achieved 5: Stand (1 or more minutes)   End of Consult   Patient Position at End of Consult Bedside chair;Bed/Chair alarm activated;All needs within  reach;Other (comment)  (Pt stable on 4LNC.  in room. RN aware,)     Pt requires PT/OT co-eval for pt's best interest due to medical complexity, safety concerns, fall risk, dec activity tolerance which is decline from PLOF, significant assistance with mobility and/or cognitive-behavioral impairments.    (Please find full objective findings from PT assessment regarding body systems outlined above).     Hx/personal factors: advanced age, limited insight into impairments, recent fall(s)/fall history, new/inc supplemental O2 demands, and use of more restrictive AD, co-morbidities, mutliple lines, use of AD, dec cognition, pain, WB restrictions, recent orthopedic procedure , fall risk, and h/o of falls, post-operative precautions/WBS, cognition  Examination: assessed/impairments of systems including multiple body structures involved; dec mobility, dec balance, dec endurance, dec amb, risk for falls, pain, dec cognition, Orthopedic restrictions, impairements in locomotion, musculoskeletal, balance, endurance, posture, coordination, assessed cognition, gait deviations , dec activity tolerance/endurance vs baseline ,   Clinical: unpredictable (ongoing medical status, risk for falls, new/variable O2 needs from baseline, need for input for mobility technique/safety, WBS, cognition, and bed/chair alarm, )  Complexity: high       Kg Hines, PT,DPT   05/23/24

## 2024-05-23 NOTE — PROGRESS NOTES
[facility]  Progress Note  Name: Dangelo Gunter I  MRN: [id number]  Unit/Bed#: E2 -01 I Date of Admission: 5/17/2024   Date of Service: 5/23/2024  Hospital Day: 5    Assessment & Plan   * Fracture of femoral neck, left (HCC)  Assessment & Plan  Patient with PMHx of dementia, HTN, and HLD presented to the ED from her facility, [facility], after sustaining a mechanical fall  83 y.o. female POD 1 s/p cemented left hip hemiarthroplasty on 5/22 with Dr. Pereira   WBAT to LLE  Maintain mepilex dressing  Rehab evals    Multifocal pneumonia  Assessment & Plan  Multifocal pneumonia viral versus fat emboli  Received  ceftriaxone day #5- Completed course  Wean oxygen as tolerated    Acute respiratory failure with hypoxia (HCC)  Assessment & Plan  Possibly related to aspiration pneumonia versus viral pneumonia  Pulmonary consultation reviewed, question fat emboli from recent fracture  Wean oxygen as tolerated, currently on 4 L  Pulmonary consultation reviewed, patient with fat emboli from fractures because of acute hypoxemic respiratory failure  Discontinue antibiotics    Dementia with agitation (HCC)  Assessment & Plan  Pt only oriented to person and place  Delirium precautions  Continue home regimen of lexapro, namenda, remeron, depakote, and seroquel  Mood is stable      Essential hypertension  Assessment & Plan  Continue amlodipine, losartan, and propanolol                 Hospital Course:     83-year-old female patient presenting with femoral neck fracture, hospitalization complicated by acute respiratory failure secondary to fat emboli    Assessment:      Principal Problem:    Fracture of femoral neck, left (HCC)  Active Problems:    Essential hypertension    Dementia with agitation (HCC)    Acute respiratory failure with hypoxia (HCC)    Multifocal pneumonia      Plan:    Continue postoperative care  Weightbearing as tolerated  Rehab placement       VTE Pharmacologic  Prophylaxis:   Pharmacologic: Enoxaparin (Lovenox)  Mechanical VTE Prophylaxis in Place: Yes    AM-PAC Basic Mobility:  Basic Mobility Inpatient Raw Score: 9    JH-HLM Achieved: 5: Stand (1 or more minutes)  -HLM Goal: 3: Sit at edge of bed    HLM Goal listed above. Continue with multidisciplinary rounding and encourage appropriate mobility to improve upon HLM goals.         Patient Centered Rounds: Case discussed and reviewed with nursing    Discussions with Specialists or Other Care Team Provider: Case management    Education and Discussions with Family / Patient: Discussed with patient  at bedside    Time Spent for Care: 80 minutes.  More than 50% of total time spent on counseling and coordination of care as described above.    Current Length of Stay: 5 day(s)    Current Patient Status: Inpatient   Certification Statement: The patient will continue to require additional inpatient hospital stay due to rehab placement postoperative care    Discharge Plan / Estimated Discharge Date: 24 to 48 hours    Code Status: Level 1 - Full Code      Subjective:   Seen and examined, patient pleasantly demented, no acute complaints.  No nausea no vomiting    A complete and comprehensive 14 point organ system review has been performed and all other systems are negative other than stated above.    Objective:     Vitals:   Temp (24hrs), Av.5 °F (36.4 °C), Min:97.1 °F (36.2 °C), Max:98.1 °F (36.7 °C)    Temp:  [97.1 °F (36.2 °C)-98.1 °F (36.7 °C)] 97.5 °F (36.4 °C)  HR:  [65-70] 66  Resp:  [16-22] 18  BP: (113-158)/(60-91) 113/68  SpO2:  [90 %-96 %] 96 %  Body mass index is 25.12 kg/m².     Input and Output Summary (last 24 hours):       Intake/Output Summary (Last 24 hours) at 2024 1644  Last data filed at 2024 1209  Gross per 24 hour   Intake 1950 ml   Output 600 ml   Net 1350 ml       Physical Exam:     General: well appearing, no acute distress  HEENT: atraumatic, PERRLA, moist mucosa, normal pharynx,  normal tonsils and adenoids, normal tongue, no fluid in sinuses  Neck: Trachea midline, no carotid bruit, no masses  Respiratory: normal chest wall expansion, CTA B, no r/r/w, no rubs  Cardiovascular: RRR, no m/r/g, Normal S1 and S2  Abdomen: Soft, non-tender, non-distended, normal bowel sounds in all quadrants, no hepatosplenomegaly, no tympany  Rectal: deferred  Musculoskeletal: Moves all   integumentary: warm, dry, and pink, with no rash, purpura, or petechia  Heme/Lymph: no lymphadenopathy, no bruises  Neurological: Cranial Nerves II-XII grossly intact  Psychiatric: cooperative with normal mood, affect, and cognition      Additional Data:     Labs:    Results from last 7 days   Lab Units 05/23/24  0450 05/21/24  0513 05/20/24  0441   WBC Thousand/uL 8.67   < > 9.18   HEMOGLOBIN g/dL 9.5*   < > 9.8*   HEMATOCRIT % 29.0*   < > 30.3*   PLATELETS Thousands/uL 211   < > 194   SEGS PCT %  --   --  68   LYMPHO PCT %  --   --  14   MONO PCT %  --   --  13*   EOS PCT %  --   --  3    < > = values in this interval not displayed.     Results from last 7 days   Lab Units 05/23/24  0450 05/19/24  0450 05/18/24  0507   POTASSIUM mmol/L 4.3   < > 3.6   CHLORIDE mmol/L 107   < > 106   CO2 mmol/L 24   < > 26   BUN mg/dL 18   < > 25   CREATININE mg/dL 0.68   < > 1.25   CALCIUM mg/dL 7.7*   < > 9.4   ALK PHOS U/L  --   --  46   ALT U/L  --   --  116*   AST U/L  --   --  83*    < > = values in this interval not displayed.     Results from last 7 days   Lab Units 05/17/24  2248   INR  1.06       * I Have Reviewed All Lab Data Listed Above.  * Additional Pertinent Lab Tests Reviewed: All Labs For Current Hospital Admission Reviewed    Imaging:    Imaging Reports Reviewed Today Include:   Imaging Personally Reviewed by Myself Includes: Chest radiograph    Recent Cultures (last 7 days):     Results from last 7 days   Lab Units 05/20/24  1222 05/18/24  0547 05/18/24  0047 05/18/24  0026   BLOOD CULTURE   --   --  No Growth After 5 Days.  No Growth After 5 Days.   LEGIONELLA URINARY ANTIGEN  Negative Negative  --   --        Last 24 Hours Medication List:   Current Facility-Administered Medications   Medication Dose Route Frequency Provider Last Rate    acetaminophen  650 mg Oral Q8H [facility] Edwin Jeong,       amLODIPine  2.5 mg Oral Daily Clement Singh, PA-C      benzonatate  100 mg Oral TID PRN Clement Singh, PA-C      divalproex sodium  125 mg Oral BID Clement Singh, PA-C      docusate sodium  100 mg Oral BID Clement Singh, PA-C      enoxaparin  30 mg Subcutaneous Q24H [facility] Clement Singh, PA-C      escitalopram  10 mg Oral Daily Clement Singh, PA-C      losartan  50 mg Oral Daily Clement Singh, PA-C      memantine  10 mg Oral BID Clement Singh, PA-C      mirtazapine  7.5 mg Oral HS Clement Singh, PA-C      OLANZapine  2.5 mg Intramuscular Q6H PRN Edwin Jeong DO      oxyCODONE  5 mg Oral Q6H PRN Clement Singh, PA-C      oxyCODONE  2.5 mg Oral Q6H PRN Clement Singh, PA-C      pantoprazole  40 mg Oral Daily Before Breakfast Clement Singh, PA-C      polyethylene glycol  17 g Oral Daily Clement Singh, PA-C      pravastatin  80 mg Oral Daily With Dinner Clement Singh, PA-C      propranolol  60 mg Oral Daily Clement Ojedaks, PA-C      QUEtiapine  25 mg Oral HS Clement Singh, PA-C      senna  2 tablet Oral HS Clement Singh, PA-C         AM-PAC Basic Mobility:  Basic Mobility Inpatient Raw Score: 9    -HLM Achieved: 5: Stand (1 or more minutes)  -HLM Goal: 3: Sit at edge of bed    HLM Goal listed above. Continue with multidisciplinary rounding and encourage appropriate mobility to improve upon HLM goals.     Today, Patient Was Seen By: Edwin Jeong DO    ** Please Note: This note was completed in part utilizing [employer] Dragon One [employer] software dictation.  Grammatical errors, random word insertions, spelling mistakes, and incomplete  sentences may be an occasional consequence of this system secondary to software limitations, ambient noise, and hardware issues.  If you have any questions or concerns about the content, text, or information contained within the body of this dictation, please contact the provider for clarification. **

## 2024-05-23 NOTE — SPEECH THERAPY NOTE
Speech Language/Pathology    Speech/Language Pathology Progress Note    Patient Name: Dangelo Gunter  Today's Date: 5/23/2024     Problem List  Principal Problem:    Fracture of femoral neck, left (HCC)  Active Problems:    Essential hypertension    Dementia with agitation (HCC)    Acute respiratory failure with hypoxia (HCC)    Multifocal pneumonia       Past Medical History  Past Medical History:   Diagnosis Date    Anxiety     Gastroesophageal reflux disease without esophagitis 08/28/2018    Hyperlipidemia     Hypertension     Hypothyroidism     Mild dementia with agitation (HCC) 08/24/2020    Multifocal pneumonia 5/18/2024    Osteoarthritis 10/05/2012    Other osteoporosis without current pathological fracture 02/09/2023        Past Surgical History  Past Surgical History:   Procedure Laterality Date    CATARACT EXTRACTION      CHOLECYSTECTOMY      COLONOSCOPY      HYSTERECTOMY           Subjective:  Patient much more alert than when seen on 5/20. Now post sx.    at bedside assisting/feeding.   Objective:  Patient seen for follow-up x1.  Tomorrow previous.  Patient states she is is still requiring assistance to feed self.  Patient is eating chicken Caesar salad.  Mastication was prolonged but functional.  Bolus control and transfer WNL.  No cough or wet vocal quality w/ food or liquids.Nurse then came in to provide pain meds. (Tylenol and oxy) Pt had difficulty describing the level of pain. Much cuing needed.   Assessment:  Much improved since last seen. Tolerating diet w/ assist.   Plan/Recommendations:  Continue regular diet w/ thin liquids. Assist w/ meals. Monitor for decrease in alertness w/ pain meds. ST will d/c..

## 2024-05-23 NOTE — PLAN OF CARE
Problem: PHYSICAL THERAPY ADULT  Goal: Performs mobility at highest level of function for planned discharge setting.  See evaluation for individualized goals.  Description: Treatment/Interventions: Functional transfer training, LE strengthening/ROM, Therapeutic exercise, Endurance training, Cognitive reorientation, Equipment eval/education, Patient/family training, Bed mobility, Gait training, Compensatory technique education, Spoke to nursing, OT, Family  Equipment Recommended: Walker       See flowsheet documentation for full assessment, interventions and recommendations.  Note: Prognosis: Good  Problem List: Decreased strength, Decreased range of motion, Decreased endurance, Impaired balance, Decreased mobility, Decreased cognition, Decreased safety awareness, Decreased skin integrity, Pain  Assessment: Pt is a 83 y.o. female y/o presenting to [facility] on [facility]/17/2024 with fall at [facility]. Primary dx: Fracture of femoral neck, left (HCC). Now POD1 s/p cemented L lateral hip hemiarthroplasty on 5/22 with Dr. Pereira. WBS: WBAT and LLE. No hip precautions. Significant pmhx per chart: HTN ,dementia w/ agitation, acute respiratory failure w/ hypoxia, mutlifocal PNA, GERD, OA, polymalgia rheumatica, bradycardia, balance problems (12/2023). PT consulted to assess strength/functional mobility, activity tolerance and d/c needs. Active PT orders and activity orders for Activity as tolerated. PTA, pt  reports pt PLOF at mod I w/ functional mobility w/ use of Rollator, A w/ ADLs and IADLs, (-) O2 use. During PT IE, pt presenting with above (see flowsheet) outlined functional impairments including dec strength, balance, gait, and deficits that limit functional mobility and activity tolerance relative to baseline, w/ chronic dec cognition. Pt currently requires dep x2 for bed mobility, mod Ax2 for transfers and ambulation with RW to achieve OOB to chair. Limited ability to follow simple  commands impacting safety and engagement, however pleasant. Pt currently demonstrating inc fall risk. Fall risk education provided with verbal understanding. Limited standing tolerance of 2-3 minutes, limited by fatigue and weakness. Denied additional sxs throughout session. SPO2=92-96% on 4liters; RA= 96% initially down to 83% w/ mobility, RN aware.BP stable. Recommend continued mobilization of pt with nsg staff as tolerated to prevent further decline in function. Pt will benefit from continued PT services to progress mobility independence necessary for return to PLOF. Based on pt presentation and impairments, pt would most appropriately benefit from level II (mod) rehab intensity resources  pending progress w/ inc assistance and use of RW.  Barriers to Discharge: None, Other (Comment) (Pending facility ability to provide necessary assistance)     Rehab Resource Intensity Level, PT: II (Moderate Resource Intensity)    See flowsheet documentation for full assessment.

## 2024-05-23 NOTE — PLAN OF CARE
Problem: OCCUPATIONAL THERAPY ADULT  Goal: Performs self-care activities at highest level of function for planned discharge setting.  See evaluation for individualized goals.  Description: Treatment Interventions: ADL retraining, UE strengthening/ROM, Functional transfer training, Endurance training, Cognitive reorientation, Patient/family training, Equipment evaluation/education, Compensatory technique education, Continued evaluation          See flowsheet documentation for full assessment, interventions and recommendations.   Note: Limitation: Decreased ADL status, Decreased UE strength, Decreased Safe judgement during ADL, Decreased cognition, Decreased endurance, Decreased high-level ADLs  Prognosis: Fair  Assessment: Pt is a 84y/o female admitted to the hospital after falling at her place of residency, resulting in a L hip fx; pt also noted with PNA.  Pt required a L hip hemiarthroplasty(5/22). Pt is currently allowed WBAT L hip, w/o hip restrictions. Pt with PMH dementia, anxiety, HTN, PNA, OA. Per , pt had assistance with her ADLs, but states independence with her transfers/ambulation--with rollator, +falls=1. During initial eval, pt demonstrated deficits with her functional balance, functional mobility, ADL status, transfer safety, b/l UE strength, activity tolerance(currently fair=15-20mins), and cognition. Pt would benefit from continued OT tx for the above deficits. 2-5xwk/1-2wks. The patient's raw score on the AM-PAC Daily Activity Inpatient Short Form is 15. A raw score of less than 19 suggests the patient may benefit from discharge to post-acute rehabilitation services. Please refer to the recommendation of the Occupational Therapist for safe discharge planning.     Rehab Resource Intensity Level, OT: II (Moderate Resource Intensity)

## 2024-05-24 LAB — GLUCOSE SERPL-MCNC: 154 MG/DL (ref 65–140)

## 2024-05-24 PROCEDURE — 82948 REAGENT STRIP/BLOOD GLUCOSE: CPT

## 2024-05-24 PROCEDURE — 99232 SBSQ HOSP IP/OBS MODERATE 35: CPT | Performed by: INTERNAL MEDICINE

## 2024-05-24 PROCEDURE — 97530 THERAPEUTIC ACTIVITIES: CPT

## 2024-05-24 PROCEDURE — 99024 POSTOP FOLLOW-UP VISIT: CPT | Performed by: STUDENT IN AN ORGANIZED HEALTH CARE EDUCATION/TRAINING PROGRAM

## 2024-05-24 RX ADMIN — SENNOSIDES 17.2 MG: 8.6 TABLET, FILM COATED ORAL at 22:08

## 2024-05-24 RX ADMIN — PRAVASTATIN SODIUM 80 MG: 80 TABLET ORAL at 17:44

## 2024-05-24 RX ADMIN — ACETAMINOPHEN 325MG 650 MG: 325 TABLET ORAL at 22:08

## 2024-05-24 RX ADMIN — Medication 2.5 MG: at 11:32

## 2024-05-24 RX ADMIN — DIVALPROEX SODIUM 125 MG: 125 TABLET, DELAYED RELEASE ORAL at 08:17

## 2024-05-24 RX ADMIN — ESCITALOPRAM OXALATE 10 MG: 10 TABLET ORAL at 08:17

## 2024-05-24 RX ADMIN — ACETAMINOPHEN 325MG 650 MG: 325 TABLET ORAL at 06:17

## 2024-05-24 RX ADMIN — MEMANTINE 10 MG: 5 TABLET ORAL at 08:17

## 2024-05-24 RX ADMIN — POLYETHYLENE GLYCOL 3350 17 G: 17 POWDER, FOR SOLUTION ORAL at 08:16

## 2024-05-24 RX ADMIN — MEMANTINE 10 MG: 5 TABLET ORAL at 17:44

## 2024-05-24 RX ADMIN — DIVALPROEX SODIUM 125 MG: 125 TABLET, DELAYED RELEASE ORAL at 17:44

## 2024-05-24 RX ADMIN — Medication 2.5 MG: at 22:08

## 2024-05-24 RX ADMIN — QUETIAPINE FUMARATE 25 MG: 25 TABLET ORAL at 22:08

## 2024-05-24 RX ADMIN — LOSARTAN POTASSIUM 50 MG: 50 TABLET, FILM COATED ORAL at 08:17

## 2024-05-24 RX ADMIN — DOCUSATE SODIUM 100 MG: 100 CAPSULE, LIQUID FILLED ORAL at 08:17

## 2024-05-24 RX ADMIN — ENOXAPARIN SODIUM 30 MG: 30 INJECTION SUBCUTANEOUS at 08:17

## 2024-05-24 RX ADMIN — AMLODIPINE BESYLATE 2.5 MG: 2.5 TABLET ORAL at 08:17

## 2024-05-24 RX ADMIN — MIRTAZAPINE 7.5 MG: 7.5 TABLET, FILM COATED ORAL at 22:08

## 2024-05-24 RX ADMIN — PROPRANOLOL HYDROCHLORIDE 60 MG: 60 CAPSULE, EXTENDED RELEASE ORAL at 09:01

## 2024-05-24 RX ADMIN — OLANZAPINE 2.5 MG: 10 INJECTION, POWDER, FOR SOLUTION INTRAMUSCULAR at 01:43

## 2024-05-24 RX ADMIN — ACETAMINOPHEN 325MG 650 MG: 325 TABLET ORAL at 15:30

## 2024-05-24 RX ADMIN — DOCUSATE SODIUM 100 MG: 100 CAPSULE, LIQUID FILLED ORAL at 17:45

## 2024-05-24 RX ADMIN — PANTOPRAZOLE SODIUM 40 MG: 40 TABLET, DELAYED RELEASE ORAL at 06:17

## 2024-05-24 NOTE — CASE MANAGEMENT
[facility] received request for authorization from Care Manager.  Authorization request submitted for: [facility]  Facility Name:  [facility] NPI- [id number]  Facility MD: Dr Sylvester Wilson NPI- [id number]     Authorization initiated by contacting insurance:  [facility]  Via: [facility]   Clinicals submitted via fax # [phone]  Pending Reference #: [id number]    Care Manager notified: Janki SCHMIDT     Updates to authorization status will be noted in chart. Please reach out to CM for updates on any clinical information.

## 2024-05-24 NOTE — PLAN OF CARE
Problem: Prexisting or High Potential for Compromised Skin Integrity  Goal: Skin integrity is maintained or improved  Description: INTERVENTIONS:  - Identify patients at risk for skin breakdown  - Assess and monitor skin integrity  - Assess and monitor nutrition and hydration status  - Monitor labs   - Assess for incontinence   - Turn and reposition patient  - Assist with mobility/ambulation  - Relieve pressure over bony prominences  - Avoid friction and shearing  - Provide appropriate hygiene as needed including keeping skin clean and dry  - Evaluate need for skin moisturizer/barrier cream  - Collaborate with interdisciplinary team   - Patient/family teaching  - Consider wound care consult   Outcome: Progressing     Problem: PAIN - ADULT  Goal: Verbalizes/displays adequate comfort level or baseline comfort level  Description: Interventions:  - Encourage patient to monitor pain and request assistance  - Assess pain using appropriate pain scale  - Administer analgesics based on type and severity of pain and evaluate response  - Implement non-pharmacological measures as appropriate and evaluate response  - Consider cultural and social influences on pain and pain management  - Notify physician/advanced practitioner if interventions unsuccessful or patient reports new pain  Outcome: Progressing     Problem: SAFETY ADULT  Goal: Patient will remain free of falls  Description: INTERVENTIONS:  - Educate patient/family on patient safety including physical limitations  - Instruct patient to call for assistance with activity   - Consult OT/PT to assist with strengthening/mobility   - Keep Call bell within reach  - Keep bed low and locked with side rails adjusted as appropriate  - Keep care items and personal belongings within reach  - Initiate and maintain comfort rounds  - Make Fall Risk Sign visible to staff  - Offer Toileting every 2 Hours, in advance of need  - Initiate/Maintain bed and chair alarm  - Obtain necessary  fall risk management equipment: walker  - Apply yellow socks and bracelet for high fall risk patients  - Consider moving patient to room near nurses station  Outcome: Progressing     Problem: CONFUSION/THOUGHT DISTURBANCE  Goal: Thought disturbances (confusion, delirium, depression, dementia or psychosis) are managed to maintain or return to baseline mental status and functional level  Description: INTERVENTIONS:  - Assess for possible contributors to  thought disturbance, including but not limited to medications, infection, impaired vision or hearing, underlying metabolic abnormalities, dehydration, respiratory compromise,  psychiatric diagnoses and notify attending PHYSICAN/AP  - Monitor and intervene to maintain adequate nutrition, hydration, elimination, sleep and activity  - Decrease environmental stimuli, including noise as appropriate.  - Provide frequent contacts to provide refocusing, direction and reassurance as needed. Approach patient calmly with eye contact and at their level.  - [place] high risk fall precautions, aspiration precautions and other safety measures, as indicated  - If delirium suspected, notify physician/AP of change in condition and request immediate in-person evaluation  - Pursue consults as appropriate including Geriatric (campus dependent), OT for cognitive evaluation/activity planning, psychiatric, pastoral care, etc.  Outcome: Progressing     Problem: RESPIRATORY - ADULT  Goal: Achieves optimal ventilation and oxygenation  Description: INTERVENTIONS:  - Assess for changes in respiratory status  - Assess for changes in mentation and behavior  - Position to facilitate oxygenation and minimize respiratory effort  - Oxygen administered by appropriate delivery if ordered  - Initiate smoking cessation education as indicated  - Encourage broncho-pulmonary hygiene including cough, deep breathe, Incentive Spirometry  - Assess the need for suctioning and aspirate as needed  - Assess and  instruct to report SOB or any respiratory difficulty  - Respiratory Therapy support as indicated  Outcome: Progressing     Problem: Nutrition/Hydration-ADULT  Goal: Nutrient/Hydration intake appropriate for improving, restoring or maintaining nutritional needs  Description: Monitor and assess patient's nutrition/hydration status for malnutrition. Collaborate with interdisciplinary team and initiate plan and interventions as ordered.  Monitor patient's weight and dietary intake as ordered or per policy. Utilize nutrition screening tool and intervene as necessary. Determine patient's food preferences and provide high-protein, high-caloric foods as appropriate.     INTERVENTIONS:  - Monitor oral intake, urinary output, labs, and treatment plans  - Assess nutrition and hydration status and recommend course of action  - Evaluate amount of meals eaten  - Assist patient with eating if necessary   - Allow adequate time for meals  - Recommend/ encourage appropriate diets, oral nutritional supplements, and vitamin/mineral supplements  - Order, calculate, and assess calorie counts as needed  - Recommend, monitor, and adjust tube feedings and TPN/PPN based on assessed needs  - Assess need for intravenous fluids  - Provide specific nutrition/hydration education as appropriate  - Include patient/family/caregiver in decisions related to nutrition  Outcome: Progressing     Problem: SKIN/TISSUE INTEGRITY - ADULT  Goal: Skin Integrity remains intact(Skin Breakdown Prevention)  Description: Assess:  -Perform Vitor assessment every day  -Clean and moisturize skin every day  -Inspect skin when repositioning, toileting, and assisting with ADLS  -Assess under medical devices such as oxygen tubing every shift  -Assess extremities for adequate circulation and sensation     Bed Management:  -Have minimal linens on bed & keep smooth, unwrinkled  -Change linens as needed when moist or perspiring  -Avoid sitting or lying in one position for  more than 2 hours while in bed  -Keep HOB at 30 degrees     Toileting:  -Offer bedside commode  -Assess for incontinence every shift    Activity:  -Mobilize patient 3 times a day  -Encourage activity and walks on unit  -Encourage or provide ROM exercises   -Turn and reposition patient every 2 Hours  -Use appropriate equipment to lift or move patient in bed  -Instruct/ Assist with weight shifting every 2 hours when out of bed in chair  -Consider limitation of chair time 2 hour intervals    Skin Care:  -Avoid use of baby powder, tape, friction and shearing, hot water or constrictive clothing  -Relieve pressure over bony prominences using alleviates  -Do not massage red bony areas    Next Steps:  -Teach patient strategies to minimize risks such as skin breakdown   Outcome: Progressing

## 2024-05-24 NOTE — PHYSICAL THERAPY NOTE
PT PROGRESS NOTE    Name: Dangelo Gunter  AGE: 83 y.o.  MRN: [id number]  LENGTH OF STAY: 6    Admitting Dx:  Left hip pain [M25.552]  Closed fracture of left hip, initial encounter (Formerly [facility]) [S72.002A]  Multifocal pneumonia [J18.9]    Patient Active Problem List   Diagnosis    Gastroesophageal reflux disease without esophagitis    Essential hypertension    Temporal arteritis (Formerly [facility])    Abnormal CT scan, head    Goiter    Osteoarthritis    Chest pressure    Hyponatremia    Polymyalgia rheumatica (Formerly [facility])    Other chest pain    Anxiety disorder    Encounter for medication review    Bradycardia    Other insomnia    Other osteoporosis without current pathological fracture    Diastolic dysfunction without heart failure    Nonrheumatic tricuspid valve regurgitation    Balance problems    Dementia with agitation (Formerly [facility])    Venous insufficiency of left lower extremity    Acute respiratory failure with hypoxia (Formerly [facility])    Multifocal pneumonia    Fracture of femoral neck, left (Formerly [facility])       Performed at least 2 patient identifiers during session: Name and Birthday       05/24/24 1116   PT Last Visit   PT Visit Date 05/24/24   Note Type   Note Type Treatment   Pain Assessment   Pain Assessment Tool 0-10   Pain Score 10 - Worst Possible Pain   Pain Location/Orientation Orientation: Left;Location: Hip   Hospital Pain Intervention(s) Repositioned;Ambulation/increased activity;Rest   Pain Rating: FLACC (Rest) - Face 0   Pain Rating: FLACC (Rest) - Legs 0   Pain Rating: FLACC (Rest) - Activity 0   Pain Rating: FLACC (Rest) - Cry 1   Pain Rating: FLACC (Rest) - Consolability 0   Score: FLACC (Rest) 1   Pain Rating: FLACC (Activity) - Face 0   Pain Rating: FLACC (Activity) - Legs 0   Pain Rating: FLACC (Activity) - Activity 0   Pain Rating: FLACC (Activity) - Cry 1   Pain Rating: FLACC (Activity) - Consolability 0   Score: FLACC (Activity) 1   Restrictions/Precautions   Weight Bearing Precautions Per Order Yes   LLE Weight Bearing Per Order  "WBAT   Other Precautions Cognitive;Chair Alarm;Bed Alarm;Fall Risk;Pain  (no hip precautions per ortho (Sofia))   General   Chart Reviewed Yes   Family/Caregiver Present Yes  (Dtr at beginning and end of session)   Cognition   Overall Cognitive Status Impaired   Arousal/Participation Alert   Orientation Level Oriented to person   Memory Decreased long term memory;Decreased short term memory;Decreased recall of recent events;Decreased recall of precautions   Subjective   Subjective \"It's hard to do. You won't be there so how do you think I'm going to be able to do it.\"   Transfers   Sit to Stand 4  Minimal assistance   Additional items Assist x 1;Increased time required;Verbal cues;Armrests   Stand to Sit 4  Minimal assistance   Additional items Assist x 1;Increased time required;Verbal cues   Stand pivot 3  Moderate assistance   Additional items Assist x 1;Increased time required;Verbal cues   Toilet transfer 5  Supervision   Additional items Commode;Increased time required;Verbal cues   Additional Comments Greeted seated in bedside chair upon arrival. 3 transfers during session w/ RW for stability. Multimodal cues for safe techniqu, especially hand placement. min Ax1 w/ RW w/ second person to complete toileting hygiene in standin g   Ambulation/Elevation   Gait pattern Poor UE support;Improper Weight shift;Antalgic;Inconsistent reyes;Foward flexed;Short stride;Excessively slow;Step to;Wide EM;Decreased foot clearance   Gait Assistance 3  Moderate assist   Additional items Assist x 1;Other (Comment)  (2nd person SBA for safety)   Assistive Device Rolling walker   Distance 3'x2   Ambulation/Elevation Additional Comments unsteady step to patterning limited 2/2 inc pain, fatigue. Weightshifting in standing w/ RW support. L knee flx thorughout stance. No gross LOB. Multimodal cues for safe technique and gait mechanics   Balance   Static Sitting Fair   Dynamic Sitting Fair   Static Standing Fair -   Dynamic Standing " "Poor +   Ambulatory Poor   Endurance Deficit   Endurance Deficit Yes   Endurance Deficit Description fatigue, weakness   Activity Tolerance   Activity Tolerance Patient limited by fatigue;Patient limited by pain;Other (Comment)  (cognition)   Medical Staff Made Aware OT, RN Colby cleared/updated   Nurse Made Aware yes   Assessment   Prognosis Good   Problem List Decreased strength;Decreased range of motion;Decreased endurance;Impaired balance;Decreased mobility;Decreased cognition;Decreased safety awareness;Decreased skin integrity;Pain   Assessment Pt seen for follow up tx session per PT POC. Demonstrating progress towards functional goals w/ dec assistance to complete transfers. Poor tolerance to session 2/2 pt reports of 10/10 L hip pain, fatigue, and weakness. Frequent simple instructions required throughout for safety. Increased pt frustration w/ cues noted. Dec safety awareness throughout session 2/2 cognition placing pt at inc risk for falls in addition to current post-operative deficits. Denied additional sxs throughout session. Pt performing below baseline and will continue to benefit from skilled PT per POC. Recommend d/c w/ level II (mod) rehab intensity resources.   Barriers to Discharge None;Other (Comment)  (Pending facility ability to provide necessary assistance)   Goals   Patient Goals \"Go to the bathroom.\"   PT Treatment Day 1   Plan   Treatment/Interventions Functional transfer training;LE strengthening/ROM;Therapeutic exercise;Endurance training;Cognitive reorientation;Equipment eval/education;Patient/family training;Bed mobility;Gait training;Compensatory technique education;Spoke to nursing;OT;Family   Progress Slow progress, cognitive deficits   PT Frequency 3-5x/wk   Discharge Recommendation   Rehab Resource Intensity Level, PT II (Moderate Resource Intensity)   Equipment Recommended Walker   Additional Comments The patient's AM-PAC Basic Mobility Inpatient Short Form Raw Score is 12. A Raw " score of less than or equal to 16 suggests the patient may benefit from discharge to post-acute rehabilitation services. Please also refer to the recommendation of the Physical Therapist for safe discharge planning.   AM-PAC Basic Mobility Inpatient   Turning in Flat Bed Without Bedrails 2   Lying on Back to Sitting on Edge of Flat Bed Without Bedrails 2   Moving Bed to Chair 2   Standing Up From Chair Using Arms 3   Walk in Room 2   Climb 3-5 Stairs With Railing 1   Basic Mobility Inpatient Raw Score 12   Basic Mobility Standardized Score 32.23   [facility] Highest Level Of Mobility   -[facility] Goal 4: Move to chair/commode   -HL Achieved 5: Stand (1 or more minutes)   Education   Education Provided Mobility training;Assistive device;Other   Patient Reinforcement needed   End of Consult   Patient Position at End of Consult Bedside chair;Bed/Chair alarm activated;All needs within reach;Other (comment)  (RN in room. Pt stable)         Kg Hines DPT   05/24/24

## 2024-05-24 NOTE — PROGRESS NOTES
[facility]  Progress Note  Name: Dangelo Gunter I  MRN: [id number]  Unit/Bed#: E2 -01 I Date of Admission: 5/17/2024   Date of Service: 5/24/2024 I Hospital Day: 6    Assessment & Plan   * Fracture of femoral neck, left (HCC)  Assessment & Plan  Patient with PMHx of dementia, HTN, and HLD presented to the ED from her facility, [facility], after sustaining a mechanical fall  83 y.o. female POD 1 s/p cemented left hip hemiarthroplasty on 5/22 with Dr. Pereira   WBAT to LLE  Maintain mepilex dressing  Rehab evals    Multifocal pneumonia  Assessment & Plan  Multifocal pneumonia viral versus fat emboli  Received  ceftriaxone day #5- Completed course  Wean oxygen as tolerated    Acute respiratory failure with hypoxia (HCC)  Assessment & Plan  Possibly related to aspiration pneumonia versus viral pneumonia  Pulmonary consultation reviewed, question fat emboli from recent fracture  Wean oxygen as tolerated, currently on room air   Pulmonary consultation reviewed, patient with fat emboli from fractures because of acute hypoxemic respiratory failure  Discontinue antibiotics    Dementia with agitation (HCC)  Assessment & Plan  Pt only oriented to person and place  Delirium precautions  Continue home regimen of lexapro, namenda, remeron, depakote, and seroquel  Mood is stable      Essential hypertension  Assessment & Plan  Continue amlodipine, losartan, and propanolol               Hospital Course:     83-year-old female patient presenting with fracture of the left femoral neck after fall at her [facility].  History of dementia, hospitalization complicated by acute respiratory failure secondary to fat emboli from fracture.    Assessment:      Principal Problem:    Fracture of femoral neck, left (HCC)  Active Problems:    Essential hypertension    Dementia with agitation (HCC)    Acute respiratory failure with hypoxia (HCC)    Multifocal  pneumonia      Plan:    Physical therapy and Occupational Therapy evaluation  Patient is medically stable to go to short-term rehab when available       VTE Pharmacologic Prophylaxis:   Pharmacologic: Enoxaparin (Lovenox)  Mechanical VTE Prophylaxis in Place: Yes    AM-PAC Basic Mobility:  Basic Mobility Inpatient Raw Score: 9    -HLM Achieved: 4: Move to chair/commode  -HLM Goal: 3: Sit at edge of bed    HLM Goal listed above. Continue with multidisciplinary rounding and encourage appropriate mobility to improve upon HLM goals.         Patient Centered Rounds: Case discussed and reviewed with nursing    Discussions with Specialists or Other Care Team Provider: Case management    Education and Discussions with Family / Patient: Patient's son at bedside    Time Spent for Care: 80 minutes.  More than 50% of total time spent on counseling and coordination of care as described above.    Current Length of Stay: 6 day(s)    Current Patient Status: Inpatient   Certification Statement: The patient will continue to require additional inpatient hospital stay due to rehab placement    Discharge Plan / Estimated Discharge Date: Medically stable for discharge.  Case management aware.    Code Status: Level 1 - Full Code      Subjective:   And examined, no acute complaints.  Pain is controlled, no nausea no vomiting,    A complete and comprehensive 14 point organ system review has been performed and all other systems are negative other than stated above.    Objective:     Vitals:   Temp (24hrs), Av.6 °F (36.4 °C), Min:97.5 °F (36.4 °C), Max:97.8 °F (36.6 °C)    Temp:  [97.5 °F (36.4 °C)-97.8 °F (36.6 °C)] 97.8 °F (36.6 °C)  HR:  [64-69] 69  Resp:  [18] 18  BP: (113-160)/(68-79) 160/79  SpO2:  [92 %-97 %] 92 %  Body mass index is 25.12 kg/m².     Input and Output Summary (last 24 hours):       Intake/Output Summary (Last 24 hours) at 2024 1152  Last data filed at 2024 0401  Gross per 24 hour   Intake --   Output  1225 ml   Net -1225 ml       Physical Exam:     General: well appearing, no acute distress  HEENT: atraumatic, PERRLA, moist mucosa, normal pharynx, normal tonsils and adenoids, normal tongue, no fluid in sinuses  Neck: Trachea midline, no carotid bruit, no masses  Respiratory: normal chest wall expansion, CTA B, no r/r/w, no rubs  Cardiovascular: RRR, no m/r/g, Normal S1 and S2  Abdomen: Soft, non-tender, non-distended, normal bowel sounds in all quadrants, no hepatosplenomegaly, no tympany  Rectal: deferred  Musculoskeletal: normal ROM in upper and lower extremities  Integumentary: warm, dry, and pink, with no rash, purpura, or petechia  Heme/Lymph: no lymphadenopathy, no bruises  Neurological: Cranial Nerves II-XII grossly intact  Psychiatric: cooperative with normal mood, affect, and cognition      Additional Data:     Labs:    Results from last 7 days   Lab Units 05/23/24  0450 05/21/24  0513 05/20/24  0441   WBC Thousand/uL 8.67   < > 9.18   HEMOGLOBIN g/dL 9.5*   < > 9.8*   HEMATOCRIT % 29.0*   < > 30.3*   PLATELETS Thousands/uL 211   < > 194   SEGS PCT %  --   --  68   LYMPHO PCT %  --   --  14   MONO PCT %  --   --  13*   EOS PCT %  --   --  3    < > = values in this interval not displayed.     Results from last 7 days   Lab Units 05/23/24  0450 05/19/24  0450 05/18/24  0507   POTASSIUM mmol/L 4.3   < > 3.6   CHLORIDE mmol/L 107   < > 106   CO2 mmol/L 24   < > 26   BUN mg/dL 18   < > 25   CREATININE mg/dL 0.68   < > 1.25   CALCIUM mg/dL 7.7*   < > 9.4   ALK PHOS U/L  --   --  46   ALT U/L  --   --  116*   AST U/L  --   --  83*    < > = values in this interval not displayed.     Results from last 7 days   Lab Units 05/17/24  2248   INR  1.06       * I Have Reviewed All Lab Data Listed Above.  * Additional Pertinent Lab Tests Reviewed: All Labs For Current Hospital Admission Reviewed    Imaging:    Imaging Reports Reviewed Today Include: No new imaging  Imaging Personally Reviewed by Myself Includes: No new  imaging    Recent Cultures (last 7 days):     Results from last 7 days   Lab Units 05/20/24  1222 05/18/24  0547 05/18/24  0047 05/18/24  0026   BLOOD CULTURE   --   --  No Growth After 5 Days. No Growth After 5 Days.   LEGIONELLA URINARY ANTIGEN  Negative Negative  --   --        Last 24 Hours Medication List:   Current Facility-Administered Medications   Medication Dose Route Frequency Provider Last Rate    acetaminophen  650 mg Oral Q8H [facility] Edwin Jeong, DO      amLODIPine  2.5 mg Oral Daily Clement Singh, PA-C      benzonatate  100 mg Oral TID PRN Clement Singh, PA-C      divalproex sodium  125 mg Oral BID Clement Singh, PA-C      docusate sodium  100 mg Oral BID Clement Singh, PA-C      enoxaparin  30 mg Subcutaneous Q24H [facility] Clement Singh, PA-C      escitalopram  10 mg Oral Daily Clement Singh, PA-C      losartan  50 mg Oral Daily Clement Singh, PA-C      memantine  10 mg Oral BID Clement Singh, PA-C      mirtazapine  7.5 mg Oral HS Clement Singh, PA-C      OLANZapine  2.5 mg Intramuscular Q6H PRN Edwin Jeong, DO      oxyCODONE  5 mg Oral Q6H PRN Clement Singh, PA-C      oxyCODONE  2.5 mg Oral Q6H PRN Clement Singh, PA-C      pantoprazole  40 mg Oral Daily Before Breakfast Clement Singh, PA-C      polyethylene glycol  17 g Oral Daily Clement Singh, PA-C      pravastatin  80 mg Oral Daily With Dinner Clement Singh, PA-C      propranolol  60 mg Oral Daily Clement Singh, PA-C      QUEtiapine  25 mg Oral HS Clement Singh, PA-C      senna  2 tablet Oral HS Clement Singh, PA-C         AM-PAC Basic Mobility:  Basic Mobility Inpatient Raw Score: 9    -HLM Achieved: 4: Move to chair/commode  -HLM Goal: 3: Sit at edge of bed    HLM Goal listed above. Continue with multidisciplinary rounding and encourage appropriate mobility to improve upon HLM goals.     Today, Patient Was Seen By: Edwin Jeong,  DO    ** Please Note: This note was completed in part utilizing [employer] software dictation.  Grammatical errors, random word insertions, spelling mistakes, and incomplete sentences may be an occasional consequence of this system secondary to software limitations, ambient noise, and hardware issues.  If you have any questions or concerns about the content, text, or information contained within the body of this dictation, please contact the provider for clarification. **

## 2024-05-24 NOTE — ASSESSMENT & PLAN NOTE
Patient with PMHx of dementia, HTN, and HLD presented to the ED from her facility, [facility], after sustaining a mechanical fall  83 y.o. female POD 1 s/p cemented left hip hemiarthroplasty on 5/22 with Dr. Pereira   WBAT to LLE  Maintain mepilex dressing  Rehab evals

## 2024-05-24 NOTE — ASSESSMENT & PLAN NOTE
Possibly related to aspiration pneumonia versus viral pneumonia  Pulmonary consultation reviewed, question fat emboli from recent fracture  Wean oxygen as tolerated, currently on room air   Pulmonary consultation reviewed, patient with fat emboli from fractures because of acute hypoxemic respiratory failure  Discontinue antibiotics

## 2024-05-24 NOTE — PROGRESS NOTES
Progress Note - Pulmonary   Dangelo Gunter 83 y.o. female MRN: [id number]  Unit/Bed#: E2 -01 Encounter: [id number]    Assessment/Plan:    Acute hypoxic respiratory failure  -90% on RA, patient does not wear any supplemental O2 at home  -Continue to maintain saturation greater than 89%  -Pulmonary hygiene encouraged: Deep breathing with cough, OOB as tolerated, incentive spirometry and flutter valve hourly  -Home O2 assessment prior to discharge     Multifocal pneumonitis versus fat embolism syndrome of lungs  -CT chest completed 05/18/2024 shows extensive patchy bilateral upper and lower lobe infiltrates suspicious for multifocal bronchopneumonia.  -WBC 9.04-9.18-8.41-8.59  -Procalcitonin negative x 2  -RP 2 panel negative, Legionella antigen and strep pneumonia negative  -Azithromycin 5 days completed  -Cefepime 3 days completed  -Ceftriaxone switched to cefazolin day #3  -Urine microscopically highly suggestive of fat embolism  -Does not need to continue antibiotics from a pulmonary standpoint  -Conservative treatment with oxygen and pulmonary hygiene  -Follow-up outpatient imaging in 6 to 8 weeks     Abnormal CT of chest  -CT chest completed 05/18/2024 shows extensive patchy bilateral upper and lower lobe infiltrates suspicious for multifocal bronchopneumonia.    -Will need outpatient follow-up imaging in 6 to 8 weeks     Fracture of femoral neck  -Fell at memory care unit  -X-ray of left hip shows fracture of left femoral neck  -5 days of antibiotics complete for suspected pneumonia  -S/P cemented left hip hemiarthroplasty completed 05/22/2024 with Dr. Pereira    Chief Complaint:    I think I feel okay    Subjective:    Patient seen and examined at bedside.  Found sitting up comfortably in a chair.  Son present at bedside.  No overnight events reported.  Patient does not appear in any respiratory distress at this time.  Currently 90% on room air    Objective:    Vitals: Blood pressure 160/79, pulse 69,  "temperature 97.8 °F (36.6 °C), temperature source Temporal, resp. rate 18, height 5' 6\" (1.676 m), weight 70.6 kg (155 lb 10.3 oz), SpO2 92%.RA,Body mass index is 25.12 kg/m².      Intake/Output Summary (Last 24 hours) at 5/24/2024 1138  Last data filed at 5/24/2024 0401  Gross per 24 hour   Intake --   Output 1225 ml   Net -1225 ml       Invasive Devices       Peripheral Intravenous Line  Duration             Peripheral IV 05/21/24 Proximal;Right;Ventral (anterior) Forearm 3 days    Peripheral IV 05/22/24 Left Hand 1 day                    Physical Exam:     Physical Exam  Vitals reviewed.   Constitutional:       General: She is not in acute distress.     Appearance: She is not ill-appearing.   HENT:      Head: Normocephalic and atraumatic.      Right Ear: External ear normal.      Left Ear: External ear normal.      Nose: Nose normal.      Mouth/Throat:      Mouth: Mucous membranes are moist.      Pharynx: Oropharynx is clear.   Eyes:      Extraocular Movements: Extraocular movements intact.      Pupils: Pupils are equal, round, and reactive to light.   Cardiovascular:      Rate and Rhythm: Normal rate and regular rhythm.      Pulses: Normal pulses.      Heart sounds: Normal heart sounds.   Pulmonary:      Effort: Pulmonary effort is normal.      Comments: Decreased breath sounds bilaterally likely secondary to poor inspiratory effort  Abdominal:      General: Bowel sounds are normal.      Tenderness: There is no abdominal tenderness.   Musculoskeletal:         General: Normal range of motion.      Cervical back: Normal range of motion and neck supple.      Right lower leg: Edema present.   Skin:     General: Skin is warm and dry.   Neurological:      Mental Status: She is alert. Mental status is at baseline.   Psychiatric:         Mood and Affect: Mood normal.         Behavior: Behavior normal.         Labs: I have personally reviewed pertinent lab results., ABG: No results found for: \"PHART\", \"PYB8FNT\", " "\"PO2ART\", \"SOY8ORP\", \"I2IFEXIM\", \"BEART\", \"SOURCE\", BNP: No results found for: \"BNP\", CBC: No results found for: \"WBC\", \"HGB\", \"HCT\", \"MCV\", \"PLT\", \"ADJUSTEDWBC\", \"RBC\", \"MCH\", \"MCHC\", \"RDW\", \"MPV\", \"NRBC\", CMP: No results found for: \"SODIUM\", \"K\", \"CL\", \"CO2\", \"ANIONGAP\", \"BUN\", \"CREATININE\", \"GLUCOSE\", \"CALCIUM\", \"AST\", \"ALT\", \"ALKPHOS\", \"PROT\", \"BILITOT\", \"EGFR\"      Imaging and other studies: I have personally reviewed pertinent reports.    Chest x-ray completed 05/23/2024 shows persistent patchy bilateral airspace opacities.  Small left pleural effusion  "

## 2024-05-24 NOTE — PROGRESS NOTES
Orthopedics   Dangelo Gunter 83 y.o. female MRN: [id number]  Unit/Bed#: E2 -01      Subjective:  83 y.o.female post operative day 2 left hip hemiarthroplasty. Pt doing well. Pain controlled.    Labs:  0   Lab Value Date/Time    HCT 29.0 (L) 05/23/2024 0450    HCT 31.9 (L) 05/22/2024 0456    HCT 31.6 (L) 05/21/2024 0513    HGB 9.5 (L) 05/23/2024 0450    HGB 10.6 (L) 05/22/2024 0456    HGB 10.5 (L) 05/21/2024 0513    INR 1.06 05/17/2024 2248    WBC 8.67 05/23/2024 0450    WBC 8.59 05/22/2024 0456    WBC 8.41 05/21/2024 0513    ESR 41 (H) 02/29/2024 1642    CRP <3.0 11/05/2021 0748       Meds:    Current Facility-Administered Medications:     acetaminophen (TYLENOL) tablet 650 mg, 650 mg, Oral, Q8H JANETTE, Edwin Jeong DO, 650 mg at 05/24/24 0617    amLODIPine (NORVASC) tablet 2.5 mg, 2.5 mg, Oral, Daily, Clement Singh PA-C, 2.5 mg at 05/24/24 0817    benzonatate (TESSALON PERLES) capsule 100 mg, 100 mg, Oral, TID PRN, Clement Singh PA-C    divalproex sodium (DEPAKOTE) DR tablet 125 mg, 125 mg, Oral, BID, Clement Singh PA-C, 125 mg at 05/24/24 0817    docusate sodium (COLACE) capsule 100 mg, 100 mg, Oral, BID, Clement Singh PA-C, 100 mg at 05/24/24 0817    enoxaparin (LOVENOX) subcutaneous injection 30 mg, 30 mg, Subcutaneous, Q24H JANETTE, Clement Singh PA-C, 30 mg at 05/24/24 0817    escitalopram (LEXAPRO) tablet 10 mg, 10 mg, Oral, Daily, Clement Singh PA-C, 10 mg at 05/24/24 0817    losartan (COZAAR) tablet 50 mg, 50 mg, Oral, Daily, Clement Singh PA-C, 50 mg at 05/24/24 0817    memantine (NAMENDA) tablet 10 mg, 10 mg, Oral, BID, Clement Singh PA-C, 10 mg at 05/24/24 0817    mirtazapine (REMERON) tablet 7.5 mg, 7.5 mg, Oral, HS, Clement Singh PA-C, 7.5 mg at 05/23/24 2105    OLANZapine (ZyPREXA) IM injection 2.5 mg, 2.5 mg, Intramuscular, Q6H PRN, Edwin Jeong DO, 2.5 mg at 05/24/24 0143    oxyCODONE (ROXICODONE) IR tablet 5 mg, 5 mg,  "Oral, Q6H PRN, Clement Singh, PA-C, 5 mg at 05/20/24 2002    oxyCODONE (ROXICODONE) split tablet 2.5 mg, 2.5 mg, Oral, Q6H PRN, Clement Singh, PA-C, 2.5 mg at 05/24/24 1132    pantoprazole (PROTONIX) EC tablet 40 mg, 40 mg, Oral, Daily Before Breakfast, Clement Singh PA-C, 40 mg at 05/24/24 0617    polyethylene glycol (MIRALAX) packet 17 g, 17 g, Oral, Daily, Clement Singh, PA-C, 17 g at 05/24/24 0816    pravastatin (PRAVACHOL) tablet 80 mg, 80 mg, Oral, Daily With Dinner, Clement Singh, PA-C, 80 mg at 05/23/24 1721    propranolol (INDERAL LA) 24 hr capsule 60 mg, 60 mg, Oral, Daily, Clement Luong Singh, PA-C, 60 mg at 05/24/24 0901    QUEtiapine (SEROquel) tablet 25 mg, 25 mg, Oral, HS, Clement Singh, PA-C, 25 mg at 05/23/24 2105    senna (SENOKOT) tablet 17.2 mg, 2 tablet, Oral, HS, Clement Singh, PA-C, 17.2 mg at 05/23/24 2105    Blood Culture:   Lab Results   Component Value Date    BLOODCX No Growth After 5 Days. 05/18/2024       Wound Culture:   No results found for: \"WOUNDCULT\"    Ins and Outs:  I/O last 24 hours:  In: -   Out: 1225 [Urine:1225]          Physical:  Vitals:    05/24/24 0842   BP:    Pulse:    Resp:    Temp:    SpO2: 92%     left lower extremity  Dressing clean dry intact  Sensation intact L2-S1  Motor intact to knee flexion/extension, EHL/FHL  2+ DP pulse    Assessment: 83 y.o.female post operative day 2 left hip hemiarthroplasty.     Plan:  Up and out of bed  Weightbearing as tolerated left lower extremity  PT/OT  DVT prophylaxis  Analgesics  Dispo: Ok for discharge from ortho perspective  Pending rehab placement at this time  Follow up with Dr. Pereira outpatient  Patient noted to have acute blood loss anemia due to a drop in Hbg of > 2.0g from preop levels, will monitor vital signs and resuscitate with IV fluids as needed    Sid Andrews PA-C    "

## 2024-05-24 NOTE — CASE MANAGEMENT
Case Management Discharge Planning Note    Patient name Dangelo Gunter  [facility] /E2 -* MRN [id number]  : 1940 Date 2024       Current Admission Date: 2024  Current Admission Diagnosis:Fracture of femoral neck, left (HCC)   Patient Active Problem List    Diagnosis Date Noted Date Diagnosed    Acute respiratory failure with hypoxia ([facility]) 2024     Multifocal pneumonia 2024     Fracture of femoral neck, left ([facility]) 2024     Venous insufficiency of left lower extremity 2024     Dementia with agitation ([facility]) 2024     Balance problems 2023     Diastolic dysfunction without heart failure 2023     Nonrheumatic tricuspid valve regurgitation 2023     Other osteoporosis without current pathological fracture 2023     Other insomnia 2022     Encounter for medication review 2021     Bradycardia 2021     Other chest pain 2020     Anxiety disorder 2020     Chest pressure 2020     Hyponatremia 2020     Polymyalgia rheumatica ([facility]) 2020     Temporal arteritis ([facility]) 2020     Gastroesophageal reflux disease without esophagitis 2018     Essential hypertension 2018     Abnormal CT scan, head 2018     Goiter 10/05/2012     Osteoarthritis 10/05/2012       LOS (days): 6  Geometric Mean LOS (GMLOS) (days): 3.9  Days to GMLOS:-2.8     OBJECTIVE:  Risk of Unplanned Readmission Score: 22.06         Current admission status: Inpatient   Preferred Pharmacy:   [facility] - [place], [place] - [address]  [address] [facility] [address]  Phone: [phone] Fax: [phone]    [facility] - [place], [place] - [address] [place] [address]  [address] [place] [address]  [address] [place] [address]  Phone: [phone] Fax: [phone]    Primary Care Provider: Parag Meyers MD    Primary Insurance: [facility]  Secondary Insurance:     DISCHARGE  DETAILS:    Discharge planning discussed with:: Daughter  Freedom of Choice: Yes  Comments - Freedom of Choice: Reviewed choice list. Daughter decided on [facility]  CM contacted family/caregiver?: Yes  Were Treatment Team discharge recommendations reviewed with patient/caregiver?: Yes  Did patient/caregiver verbalize understanding of patient care needs?: N/A- going to facility       Contacts  Patient Contacts: Aida Domenicogunner (daughter)  Relationship to Patient:: Family  Contact Method: In Person  Reason/Outcome: Referral, Discharge Planning    Requested Home Health Care         Is the patient interested in HHC at discharge?: No    DME Referral Provided  Referral made for DME?: No    Other Referral/Resources/Interventions Provided:  Interventions: Short Term Rehab  Referral Comments: Referrals placed via aidin         Treatment Team Recommendation: Short Term Rehab  Discharge Destination Plan:: Short Term Rehab  Transport at Discharge : [facility]ulance                                      Additional Comments: CM met with pt's daughter at bedside to review choice list for short term rehab. After review of choice list, daughter decided on [facility] for short term rehab. Auth initiated for [facility]. CM will continue to follow.

## 2024-05-24 NOTE — PLAN OF CARE
Problem: PHYSICAL THERAPY ADULT  Goal: Performs mobility at highest level of function for planned discharge setting.  See evaluation for individualized goals.  Description: Treatment/Interventions: Functional transfer training, LE strengthening/ROM, Therapeutic exercise, Endurance training, Cognitive reorientation, Equipment eval/education, Patient/family training, Bed mobility, Gait training, Compensatory technique education, Spoke to nursing, OT, Family  Equipment Recommended: Walker       See flowsheet documentation for full assessment, interventions and recommendations.  Outcome: Progressing  Note: Prognosis: Good  Problem List: Decreased strength, Decreased range of motion, Decreased endurance, Impaired balance, Decreased mobility, Decreased cognition, Decreased safety awareness, Decreased skin integrity, Pain  Assessment: Pt seen for follow up tx session per PT POC. Demonstrating progress towards functional goals w/ dec assistance to complete transfers. Poor tolerance to session 2/2 pt reports of 10/10 L hip pain, fatigue, and weakness. Frequent simple instructions required throughout for safety. Increased pt frustration w/ cues noted. Dec safety awareness throughout session 2/2 cognition placing pt at inc risk for falls in addition to current post-operative deficits. Denied additional sxs throughout session. Pt performing below baseline and will continue to benefit from skilled PT per POC. Recommend d/c w/ level II (mod) rehab intensity resources.  Barriers to Discharge: None, Other (Comment) (Pending facility ability to provide necessary assistance)     Rehab Resource Intensity Level, PT: II (Moderate Resource Intensity)    See flowsheet documentation for full assessment.

## 2024-05-25 PROCEDURE — 99024 POSTOP FOLLOW-UP VISIT: CPT | Performed by: PHYSICIAN ASSISTANT

## 2024-05-25 PROCEDURE — 99232 SBSQ HOSP IP/OBS MODERATE 35: CPT | Performed by: INTERNAL MEDICINE

## 2024-05-25 RX ADMIN — MEMANTINE 10 MG: 5 TABLET ORAL at 08:39

## 2024-05-25 RX ADMIN — ESCITALOPRAM OXALATE 10 MG: 10 TABLET ORAL at 08:40

## 2024-05-25 RX ADMIN — PANTOPRAZOLE SODIUM 40 MG: 40 TABLET, DELAYED RELEASE ORAL at 05:22

## 2024-05-25 RX ADMIN — MEMANTINE 10 MG: 5 TABLET ORAL at 17:01

## 2024-05-25 RX ADMIN — ACETAMINOPHEN 325MG 650 MG: 325 TABLET ORAL at 05:23

## 2024-05-25 RX ADMIN — ACETAMINOPHEN 325MG 650 MG: 325 TABLET ORAL at 14:02

## 2024-05-25 RX ADMIN — MIRTAZAPINE 7.5 MG: 7.5 TABLET, FILM COATED ORAL at 21:25

## 2024-05-25 RX ADMIN — AMLODIPINE BESYLATE 2.5 MG: 2.5 TABLET ORAL at 08:40

## 2024-05-25 RX ADMIN — POLYETHYLENE GLYCOL 3350 17 G: 17 POWDER, FOR SOLUTION ORAL at 08:39

## 2024-05-25 RX ADMIN — LOSARTAN POTASSIUM 50 MG: 50 TABLET, FILM COATED ORAL at 08:40

## 2024-05-25 RX ADMIN — DOCUSATE SODIUM 100 MG: 100 CAPSULE, LIQUID FILLED ORAL at 17:01

## 2024-05-25 RX ADMIN — ACETAMINOPHEN 325MG 650 MG: 325 TABLET ORAL at 21:24

## 2024-05-25 RX ADMIN — PROPRANOLOL HYDROCHLORIDE 60 MG: 60 CAPSULE, EXTENDED RELEASE ORAL at 08:40

## 2024-05-25 RX ADMIN — PRAVASTATIN SODIUM 80 MG: 80 TABLET ORAL at 17:01

## 2024-05-25 RX ADMIN — QUETIAPINE FUMARATE 25 MG: 25 TABLET ORAL at 21:25

## 2024-05-25 RX ADMIN — DIVALPROEX SODIUM 125 MG: 125 TABLET, DELAYED RELEASE ORAL at 17:01

## 2024-05-25 RX ADMIN — ENOXAPARIN SODIUM 30 MG: 30 INJECTION SUBCUTANEOUS at 08:39

## 2024-05-25 RX ADMIN — DOCUSATE SODIUM 100 MG: 100 CAPSULE, LIQUID FILLED ORAL at 08:39

## 2024-05-25 RX ADMIN — OXYCODONE 5 MG: 5 TABLET ORAL at 21:25

## 2024-05-25 RX ADMIN — DIVALPROEX SODIUM 125 MG: 125 TABLET, DELAYED RELEASE ORAL at 08:39

## 2024-05-25 RX ADMIN — SENNOSIDES 17.2 MG: 8.6 TABLET, FILM COATED ORAL at 21:24

## 2024-05-25 NOTE — PLAN OF CARE
Problem: PAIN - ADULT  Goal: Verbalizes/displays adequate comfort level or baseline comfort level  Description: Interventions:  - Encourage patient to monitor pain and request assistance  - Assess pain using appropriate pain scale  - Administer analgesics based on type and severity of pain and evaluate response  - Implement non-pharmacological measures as appropriate and evaluate response  - Consider cultural and social influences on pain and pain management  - Notify physician/advanced practitioner if interventions unsuccessful or patient reports new pain  5/25/2024 0856 by Levi Mcmahon RN  Outcome: Progressing  5/25/2024 0856 by Levi Mcmahon RN  Outcome: Progressing     Problem: CONFUSION/THOUGHT DISTURBANCE  Goal: Thought disturbances (confusion, delirium, depression, dementia or psychosis) are managed to maintain or return to baseline mental status and functional level  Description: INTERVENTIONS:  - Assess for possible contributors to  thought disturbance, including but not limited to medications, infection, impaired vision or hearing, underlying metabolic abnormalities, dehydration, respiratory compromise,  psychiatric diagnoses and notify attending PHYSICAN/AP  - Monitor and intervene to maintain adequate nutrition, hydration, elimination, sleep and activity  - Decrease environmental stimuli, including noise as appropriate.  - Provide frequent contacts to provide refocusing, direction and reassurance as needed. Approach patient calmly with eye contact and at their level.  - [place] high risk fall precautions, aspiration precautions and other safety measures, as indicated  - If delirium suspected, notify physician/AP of change in condition and request immediate in-person evaluation  - Pursue consults as appropriate including Geriatric (campus dependent), OT for cognitive evaluation/activity planning, psychiatric, pastoral care, etc.  5/25/2024 0856 by Levi Mcmahon RN  Outcome:  Progressing  5/25/2024 0856 by Levi Mcmahon RN  Outcome: Progressing     Problem: RESPIRATORY - ADULT  Goal: Achieves optimal ventilation and oxygenation  Description: INTERVENTIONS:  - Assess for changes in respiratory status  - Assess for changes in mentation and behavior  - Position to facilitate oxygenation and minimize respiratory effort  - Oxygen administered by appropriate delivery if ordered  - Initiate smoking cessation education as indicated  - Encourage broncho-pulmonary hygiene including cough, deep breathe, Incentive Spirometry  - Assess the need for suctioning and aspirate as needed  - Assess and instruct to report SOB or any respiratory difficulty  - Respiratory Therapy support as indicated  5/25/2024 0856 by Levi Mcmahon RN  Outcome: Progressing  5/25/2024 0856 by Levi Mcmahon RN  Outcome: Progressing

## 2024-05-25 NOTE — RESTORATIVE TECHNICIAN NOTE
Restorative Technician Note      Patient Name: Dangelo Gunter     Restorative Tech Visit Date: 05/25/24  Note Type: Mobility  Patient Position Upon Consult: Bedside chair  Mobility / Activity Provided: assisted pt in transferring from BSC to commode and back to bed  Activity Performed: Transferred  Assistive Device: Roller walker  Patient Position at End of Consult: Bedside chair; All needs within reach; Bed/Chair alarm activated

## 2024-05-25 NOTE — RESTORATIVE TECHNICIAN NOTE
Restorative Technician Note      Patient Name: Dangelo Gunter     Restorative Tech Visit Date: 05/25/24  Note Type: Mobility  Patient Position Upon Consult: Supine  Mobility / Activity Provided: with assistance from PCA transferred pt from bed to commode and commode to BSC; (Ax2)  Activity Performed: Ambulated  Assistive Device: Roller walker  Patient Position at End of Consult: Bedside chair; All needs within reach; Bed/Chair alarm activated

## 2024-05-25 NOTE — PROGRESS NOTES
"Progress Note - Pulmonary   Dangelo Gunter 83 y.o. female MRN: [id number]  Unit/Bed#: E2 -01 Encounter: [id number]    Assessment:    Multifocal pneumonitis  Suspected fat embolism syndrome of the lung  Acute hypoxic respiratory failure-saturating 95% on 5 L nasal cannula  Abnormal CT of the chest  Dementia    Plan:    On room air  Continue incentive spirometry and DVT prophylaxis  Repeat CT chest in 6-8 weeks  Follow up with pulmonary as an outpatient  No further recommendations from pulmonary standpoint, will sign off    Chief Complaint:   \"I feel good\"    Subjective:   Patient seen and examined bedside. No acute events overnight    Objective:     Vitals: Blood pressure 133/78, pulse 78, temperature 97.6 °F (36.4 °C), temperature source Temporal, resp. rate 16, height 5' 6\" (1.676 m), weight 76 kg (167 lb 8.8 oz), SpO2 90%.,Body mass index is 27.04 kg/m².      Intake/Output Summary (Last 24 hours) at 5/25/2024 1431  Last data filed at 5/25/2024 0954  Gross per 24 hour   Intake 180 ml   Output 350 ml   Net -170 ml       Invasive Devices       Peripheral Intravenous Line  Duration             Peripheral IV 05/21/24 Proximal;Right;Ventral (anterior) Forearm 4 days                    Physical Exam: /78 (BP Location: Left arm)   Pulse 78   Temp 97.6 °F (36.4 °C) (Temporal)   Resp 16   Ht 5' 6\" (1.676 m)   Wt 76 kg (167 lb 8.8 oz)   SpO2 90%   BMI 27.04 kg/m²   General appearance: alert and oriented, in no acute distress  Lungs:  no wheezing, no rhonchi  Heart: regular rate and rhythm, S1, S2 normal, no murmur, click, rub or gallop  Abdomen: soft, non-tender; bowel sounds normal; no masses,  no organomegaly  Extremities: extremities normal, warm and well-perfused; no cyanosis, clubbing, or edema  Skin: Skin color, texture, turgor normal. No rashes or lesions     Labs: I have personally reviewed pertinent lab results.  Imaging and other studies: I have personally reviewed pertinent reports.   and I " have personally reviewed pertinent films in PACS

## 2024-05-25 NOTE — PROGRESS NOTES
"  Progress Note - Dangelo Gunter 83 y.o. female MRN: [id number]    Unit/Bed#: E2 -01 Encounter: [id number]    Assessment/Plan:    Left femoral neck fracture  left hemiarthroplasty May 22, weightbearing as tolerated continue PT, working towards rehab    Multifocal pneumonia   completed antibiotics now stable on room air, encourage incentive spirometer use    Acute respiratory failure  hypoxic now resolved again encourage incentive spirometer use    Dementia    continue cognitive support    Dyslipidemia    continue statin for LDL control    Hypertension    continue Norvasc losartan and Inderal    Subjective:   Denies hip pain, no chest pain no shortness of breath no nausea vomiting no fever chills appetite is poor      Objective:     Vitals: Blood pressure 133/78, pulse 78, temperature 97.6 °F (36.4 °C), temperature source Temporal, resp. rate 16, height 5' 6\" (1.676 m), weight 76 kg (167 lb 8.8 oz), SpO2 90%.,Body mass index is 27.04 kg/m².      Results from last 7 days   Lab Units 05/23/24  0450   WBC Thousand/uL 8.67   HEMOGLOBIN g/dL 9.5*   HEMATOCRIT % 29.0*   PLATELETS Thousands/uL 211     Results from last 7 days   Lab Units 05/23/24  0450   POTASSIUM mmol/L 4.3   CHLORIDE mmol/L 107   CO2 mmol/L 24   BUN mg/dL 18   CREATININE mg/dL 0.68   CALCIUM mg/dL 7.7*       Scheduled Meds:  Current Facility-Administered Medications   Medication Dose Route Frequency Provider Last Rate    acetaminophen  650 mg Oral Q8H [facility]ong DO      amLODIPine  2.5 mg Oral Daily Clement Singh PA-C      benzonatate  100 mg Oral TID PRN Clement Singh PA-C      divalproex sodium  125 mg Oral BID Clement Singh PA-C      docusate sodium  100 mg Oral BID Clement Singh PA-C      enoxaparin  30 mg Subcutaneous Q24H [facility] Clement Singh PA-C      escitalopram  10 mg Oral Daily Clement Singh PA-C      losartan  50 mg Oral Daily Clement Singh PA-C      memantine  10 mg Oral BID " Clement Singh PA-C      mirtazapine  7.5 mg Oral HS Clement Singh PA-C      oxyCODONE  5 mg Oral Q6H PRN Clement Singh PA-C      oxyCODONE  2.5 mg Oral Q6H PRN Clement Singh PA-C      pantoprazole  40 mg Oral Daily Before Breakfast Clement Singh PA-C      polyethylene glycol  17 g Oral Daily Clement Singh PA-C      pravastatin  80 mg Oral Daily With Dinner Clement Singh PA-C      propranolol  60 mg Oral Daily Clement Singh PA-C      QUEtiapine  25 mg Oral HS Clement Singh PA-C      senna  2 tablet Oral HS Clement Singh PA-C         Continuous Infusions:         Physical exam:  General appearance: Alert distress interaction poor  Head/Eyes: Nonicteric EOMI pale  Neck: Supple  Lungs: Decreased BS bilateral no wheezing rhonchi or rales  Heart: normal S1 S2 regular  Abdomen: Soft nontender with bowel sounds  Extremities: no edema  Skin: no rash    Invasive Devices       Peripheral Intravenous Line  Duration             Peripheral IV 05/21/24 Proximal;Right;Ventral (anterior) Forearm 4 days                      VTE Pharmacologic Prophylaxis: Lovenox        Counseling / Coordination of Care  Total floor / unit time spent today 30  minutes.  Greater than 50% of total time was spent with the patient and / or family counseling and / or coordination of care.  A description of the counseling / coordination of care: .

## 2024-05-25 NOTE — PROGRESS NOTES
Orthopedics     Dangelo Gunter 83 y.o. female MRN: [id number]  Unit/Bed#: E2 -01 Encounter: [id number]      Subjective:  Pt seen this am. States her left hip pain is well controlled and minimal at this time. No numbness/tingling. No fever/chills.    Labs:  0   Lab Value Date/Time    HCT 29.0 (L) 05/23/2024 0450    HCT 31.9 (L) 05/22/2024 0456    HCT 31.6 (L) 05/21/2024 0513    HGB 9.5 (L) 05/23/2024 0450    HGB 10.6 (L) 05/22/2024 0456    HGB 10.5 (L) 05/21/2024 0513    INR 1.06 05/17/2024 2248    WBC 8.67 05/23/2024 0450    WBC 8.59 05/22/2024 0456    WBC 8.41 05/21/2024 0513    ESR 41 (H) 02/29/2024 1642    CRP <3.0 11/05/2021 0748       Meds:    Current Facility-Administered Medications:     acetaminophen (TYLENOL) tablet 650 mg, 650 mg, Oral, Q8H JANETTE, Edwin Jeong DO, 650 mg at 05/25/24 0523    amLODIPine (NORVASC) tablet 2.5 mg, 2.5 mg, Oral, Daily, Clement Singh PA-C, 2.5 mg at 05/24/24 0817    benzonatate (TESSALON PERLES) capsule 100 mg, 100 mg, Oral, TID PRN, Clement Singh PA-C    divalproex sodium (DEPAKOTE) DR tablet 125 mg, 125 mg, Oral, BID, Clement Singh PA-C, 125 mg at 05/24/24 1744    docusate sodium (COLACE) capsule 100 mg, 100 mg, Oral, BID, Clement Singh PA-C, 100 mg at 05/24/24 1745    enoxaparin (LOVENOX) subcutaneous injection 30 mg, 30 mg, Subcutaneous, Q24H JANETTE, Clement Singh PA-C, 30 mg at 05/24/24 0817    escitalopram (LEXAPRO) tablet 10 mg, 10 mg, Oral, Daily, BESSIE Tucker-C, 10 mg at 05/24/24 0817    losartan (COZAAR) tablet 50 mg, 50 mg, Oral, Daily, BESSIE Tucker-C, 50 mg at 05/24/24 0817    memantine (NAMENDA) tablet 10 mg, 10 mg, Oral, BID, Clement Singh PA-C, 10 mg at 05/24/24 1744    mirtazapine (REMERON) tablet 7.5 mg, 7.5 mg, Oral, HS, BESSIE Tucker-LEVY, 7.5 mg at 05/24/24 2208    oxyCODONE (ROXICODONE) IR tablet 5 mg, 5 mg, Oral, Q6H PRN, Clement Singh PA-C, 5 mg at 05/20/24 2002     oxyCODONE (ROXICODONE) split tablet 2.5 mg, 2.5 mg, Oral, Q6H PRN, Clement Singh PA-C, 2.5 mg at 05/24/24 2208    pantoprazole (PROTONIX) EC tablet 40 mg, 40 mg, Oral, Daily Before Breakfast, BESSIE Tucker-C, 40 mg at 05/25/24 0522    polyethylene glycol (MIRALAX) packet 17 g, 17 g, Oral, Daily, BESSIE Tucker-C, 17 g at 05/24/24 0816    pravastatin (PRAVACHOL) tablet 80 mg, 80 mg, Oral, Daily With Dinner, Clement Singh PA-C, 80 mg at 05/24/24 1744    propranolol (INDERAL LA) 24 hr capsule 60 mg, 60 mg, Oral, Daily, BESSIE Tucker-C, 60 mg at 05/24/24 0901    QUEtiapine (SEROquel) tablet 25 mg, 25 mg, Oral, HS, BESSIE Tucker-C, 25 mg at 05/24/24 2208    senna (SENOKOT) tablet 17.2 mg, 2 tablet, Oral, HS, BESSIE Tucker-C, 17.2 mg at 05/24/24 2208    Physical exam:  Vitals:    05/24/24 2143   BP: 136/66   Pulse: 75   Resp: 17   Temp: 98.1 °F (36.7 °C)   SpO2: 91%     Left Hip:  Dressing C/D/I  Thigh compartments are soft and compressible. No excessive soft tissue swelling.  Motor/sensation intact LLE  Palpable DP pulse    Assessment: 83 y.o.female POD 3 s/p left hip hemiarthroplasty for femoral neck fracture.    Plan:  Pain Control  WBAT LLE  PT/OT  DVT prophylaxis, Lovenox.  Continue to monitor clinically for signs of ABLA  Stable for d/c from orthopedic standpoint. Follow up as outpt with Dr. Pereira.    Clement Singh PA-C

## 2024-05-26 PROCEDURE — 99024 POSTOP FOLLOW-UP VISIT: CPT | Performed by: PHYSICIAN ASSISTANT

## 2024-05-26 PROCEDURE — 99232 SBSQ HOSP IP/OBS MODERATE 35: CPT | Performed by: INTERNAL MEDICINE

## 2024-05-26 RX ADMIN — MIRTAZAPINE 7.5 MG: 7.5 TABLET, FILM COATED ORAL at 21:01

## 2024-05-26 RX ADMIN — DOCUSATE SODIUM 100 MG: 100 CAPSULE, LIQUID FILLED ORAL at 17:11

## 2024-05-26 RX ADMIN — ACETAMINOPHEN 325MG 650 MG: 325 TABLET ORAL at 13:14

## 2024-05-26 RX ADMIN — ENOXAPARIN SODIUM 30 MG: 30 INJECTION SUBCUTANEOUS at 09:41

## 2024-05-26 RX ADMIN — MEMANTINE 10 MG: 5 TABLET ORAL at 17:11

## 2024-05-26 RX ADMIN — PRAVASTATIN SODIUM 80 MG: 80 TABLET ORAL at 17:11

## 2024-05-26 RX ADMIN — DIVALPROEX SODIUM 125 MG: 125 TABLET, DELAYED RELEASE ORAL at 09:38

## 2024-05-26 RX ADMIN — POLYETHYLENE GLYCOL 3350 17 G: 17 POWDER, FOR SOLUTION ORAL at 09:35

## 2024-05-26 RX ADMIN — LOSARTAN POTASSIUM 50 MG: 50 TABLET, FILM COATED ORAL at 09:34

## 2024-05-26 RX ADMIN — DOCUSATE SODIUM 100 MG: 100 CAPSULE, LIQUID FILLED ORAL at 09:39

## 2024-05-26 RX ADMIN — PANTOPRAZOLE SODIUM 40 MG: 40 TABLET, DELAYED RELEASE ORAL at 05:35

## 2024-05-26 RX ADMIN — Medication 2.5 MG: at 05:35

## 2024-05-26 RX ADMIN — ESCITALOPRAM OXALATE 10 MG: 10 TABLET ORAL at 09:55

## 2024-05-26 RX ADMIN — QUETIAPINE FUMARATE 25 MG: 25 TABLET ORAL at 21:01

## 2024-05-26 RX ADMIN — MEMANTINE 10 MG: 5 TABLET ORAL at 09:37

## 2024-05-26 RX ADMIN — SENNOSIDES 17.2 MG: 8.6 TABLET, FILM COATED ORAL at 21:01

## 2024-05-26 RX ADMIN — ACETAMINOPHEN 325MG 650 MG: 325 TABLET ORAL at 05:35

## 2024-05-26 RX ADMIN — ACETAMINOPHEN 325MG 650 MG: 325 TABLET ORAL at 21:00

## 2024-05-26 RX ADMIN — OXYCODONE 5 MG: 5 TABLET ORAL at 12:00

## 2024-05-26 RX ADMIN — AMLODIPINE BESYLATE 2.5 MG: 2.5 TABLET ORAL at 09:35

## 2024-05-26 RX ADMIN — PROPRANOLOL HYDROCHLORIDE 60 MG: 60 CAPSULE, EXTENDED RELEASE ORAL at 09:55

## 2024-05-26 RX ADMIN — DIVALPROEX SODIUM 125 MG: 125 TABLET, DELAYED RELEASE ORAL at 17:11

## 2024-05-26 NOTE — PROGRESS NOTES
Orthopedics     Dangelo Gunter 83 y.o. female MRN: [id number]  Unit/Bed#: E2 -01 Encounter: [id number]      Subjective:  Patient seen and evaluated.  Notes minimal left hip pain at this time.  No numbness or tingling.  No fevers or chills.    Labs:  0   Lab Value Date/Time    HCT 29.0 (L) 05/23/2024 0450    HCT 31.9 (L) 05/22/2024 0456    HCT 31.6 (L) 05/21/2024 0513    HGB 9.5 (L) 05/23/2024 0450    HGB 10.6 (L) 05/22/2024 0456    HGB 10.5 (L) 05/21/2024 0513    INR 1.06 05/17/2024 2248    WBC 8.67 05/23/2024 0450    WBC 8.59 05/22/2024 0456    WBC 8.41 05/21/2024 0513    ESR 41 (H) 02/29/2024 1642    CRP <3.0 11/05/2021 0748       Meds:    Current Facility-Administered Medications:     acetaminophen (TYLENOL) tablet 650 mg, 650 mg, Oral, Q8H JANETTE, Edwin Jeong DO, 650 mg at 05/26/24 0535    amLODIPine (NORVASC) tablet 2.5 mg, 2.5 mg, Oral, Daily, Clement Singh PA-C, 2.5 mg at 05/26/24 0935    benzonatate (TESSALON PERLES) capsule 100 mg, 100 mg, Oral, TID PRN, Clement Singh PA-C    divalproex sodium (DEPAKOTE) DR tablet 125 mg, 125 mg, Oral, BID, Clement Singh PA-C, 125 mg at 05/26/24 0938    docusate sodium (COLACE) capsule 100 mg, 100 mg, Oral, BID, Clement Singh PA-C, 100 mg at 05/26/24 0939    enoxaparin (LOVENOX) subcutaneous injection 30 mg, 30 mg, Subcutaneous, Q24H JANETTE, Clement Singh PA-C, 30 mg at 05/26/24 0941    escitalopram (LEXAPRO) tablet 10 mg, 10 mg, Oral, Daily, BESSIE Tucker-C, 10 mg at 05/26/24 0955    losartan (COZAAR) tablet 50 mg, 50 mg, Oral, Daily, BESSIE Tucker-C, 50 mg at 05/26/24 0934    memantine (NAMENDA) tablet 10 mg, 10 mg, Oral, BID, BESSIE Tucker-LEVY, 10 mg at 05/26/24 0937    mirtazapine (REMERON) tablet 7.5 mg, 7.5 mg, Oral, HS, BESSIE Tucker-C, 7.5 mg at 05/25/24 2125    oxyCODONE (ROXICODONE) IR tablet 5 mg, 5 mg, Oral, Q6H PRN, BESSIE Tucker-C, 5 mg at 05/26/24 1200    oxyCODONE  (ROXICODONE) split tablet 2.5 mg, 2.5 mg, Oral, Q6H PRN, BESSIE Tucker-LEVY, 2.5 mg at 05/26/24 0535    pantoprazole (PROTONIX) EC tablet 40 mg, 40 mg, Oral, Daily Before Breakfast, BESSIE Tucker-C, 40 mg at 05/26/24 0535    polyethylene glycol (MIRALAX) packet 17 g, 17 g, Oral, Daily, BESSIE Tucker-C, 17 g at 05/26/24 0935    pravastatin (PRAVACHOL) tablet 80 mg, 80 mg, Oral, Daily With Dinner, BESSIE Tucker-LEVY, 80 mg at 05/25/24 1701    propranolol (INDERAL LA) 24 hr capsule 60 mg, 60 mg, Oral, Daily, BESSIE Tucker-C, 60 mg at 05/26/24 0955    QUEtiapine (SEROquel) tablet 25 mg, 25 mg, Oral, HS, BESSIE Tucker-C, 25 mg at 05/25/24 2125    senna (SENOKOT) tablet 17.2 mg, 2 tablet, Oral, HS, BESSIE Tucker-C, 17.2 mg at 05/25/24 2124    Physical exam:  Vitals:    05/26/24 0731   BP: 146/69   Pulse: 80   Resp: 18   Temp: 97.9 °F (36.6 °C)   SpO2: 90%     Left Hip:  Dressing C/D/I  Thigh compartments are soft and compressible.  No excessive soft tissue swelling.  Motor/sensation intact left lower extremity.  Palpable DP pulse    Assessment: 83 y.o.female POD 4 s/p left hip hemiarthroplasty for femoral neck fracture.    Plan:  Pain Control  WBAT LLE  PT/OT  DVT prophylaxis, Lovenox  Continue to monitor clinically for signs of ABLA.  Stable for discharge from orthopedic standpoint.  Follow-up as an outpatient with Dr. Pereira.    Clement Singh PA-C

## 2024-05-26 NOTE — NURSING NOTE
Patient has no IV cannula on her arms.   CLINTON is aware.  Patient has dementia and tends to pick on her IV to pull it out.  Not on any IV medications at this time. Waiting for rehab placement.

## 2024-05-26 NOTE — PLAN OF CARE
Problem: Prexisting or High Potential for Compromised Skin Integrity  Goal: Skin integrity is maintained or improved  Description: INTERVENTIONS:  - Identify patients at risk for skin breakdown  - Assess and monitor skin integrity  - Assess and monitor nutrition and hydration status  - Monitor labs   - Assess for incontinence   - Turn and reposition patient  - Assist with mobility/ambulation  - Relieve pressure over bony prominences  - Avoid friction and shearing  - Provide appropriate hygiene as needed including keeping skin clean and dry  - Evaluate need for skin moisturizer/barrier cream  - Collaborate with interdisciplinary team   - Patient/family teaching  - Consider wound care consult   Outcome: Progressing     Problem: PAIN - ADULT  Goal: Verbalizes/displays adequate comfort level or baseline comfort level  Description: Interventions:  - Encourage patient to monitor pain and request assistance  - Assess pain using appropriate pain scale  - Administer analgesics based on type and severity of pain and evaluate response  - Implement non-pharmacological measures as appropriate and evaluate response  - Consider cultural and social influences on pain and pain management  - Notify physician/advanced practitioner if interventions unsuccessful or patient reports new pain  Outcome: Progressing     Problem: SAFETY ADULT  Goal: Patient will remain free of falls  Description: INTERVENTIONS:  - Educate patient/family on patient safety including physical limitations  - Instruct patient to call for assistance with activity   - Consult OT/PT to assist with strengthening/mobility   - Keep Call bell within reach  - Keep bed low and locked with side rails adjusted as appropriate  - Keep care items and personal belongings within reach  - Initiate and maintain comfort rounds  - Make Fall Risk Sign visible to staff  - Offer Toileting every 2 Hours, in advance of need  - Initiate/Maintain bed alarm  - Obtain necessary fall risk  management equipment  - Apply yellow socks and bracelet for high fall risk patients  - Consider moving patient to room near nurses station  Outcome: Progressing     Problem: CONFUSION/THOUGHT DISTURBANCE  Goal: Thought disturbances (confusion, delirium, depression, dementia or psychosis) are managed to maintain or return to baseline mental status and functional level  Description: INTERVENTIONS:  - Assess for possible contributors to  thought disturbance, including but not limited to medications, infection, impaired vision or hearing, underlying metabolic abnormalities, dehydration, respiratory compromise,  psychiatric diagnoses and notify attending PHYSICAN/AP  - Monitor and intervene to maintain adequate nutrition, hydration, elimination, sleep and activity  - Decrease environmental stimuli, including noise as appropriate.  - Provide frequent contacts to provide refocusing, direction and reassurance as needed. Approach patient calmly with eye contact and at their level.  - [place] high risk fall precautions, aspiration precautions and other safety measures, as indicated  - If delirium suspected, notify physician/AP of change in condition and request immediate in-person evaluation  - Pursue consults as appropriate including Geriatric (campus dependent), OT for cognitive evaluation/activity planning, psychiatric, pastoral care, etc.  Outcome: Progressing     Problem: GENITOURINARY - ADULT  Goal: Urinary catheter remains patent  Description: INTERVENTIONS:  - Assess patency of urinary catheter  - If patient has a chronic carpenter, consider changing catheter if non-functioning  - Follow guidelines for intermittent irrigation of non-functioning urinary catheter  Outcome: Progressing     Problem: RESPIRATORY - ADULT  Goal: Achieves optimal ventilation and oxygenation  Description: INTERVENTIONS:  - Assess for changes in respiratory status  - Assess for changes in mentation and behavior  - Position to facilitate  oxygenation and minimize respiratory effort  - Oxygen administered by appropriate delivery if ordered  - Initiate smoking cessation education as indicated  - Encourage broncho-pulmonary hygiene including cough, deep breathe, Incentive Spirometry  - Assess the need for suctioning and aspirate as needed  - Assess and instruct to report SOB or any respiratory difficulty  - Respiratory Therapy support as indicated  Outcome: Progressing     Problem: Nutrition/Hydration-ADULT  Goal: Nutrient/Hydration intake appropriate for improving, restoring or maintaining nutritional needs  Description: Monitor and assess patient's nutrition/hydration status for malnutrition. Collaborate with interdisciplinary team and initiate plan and interventions as ordered.  Monitor patient's weight and dietary intake as ordered or per policy. Utilize nutrition screening tool and intervene as necessary. Determine patient's food preferences and provide high-protein, high-caloric foods as appropriate.     INTERVENTIONS:  - Monitor oral intake, urinary output, labs, and treatment plans  - Assess nutrition and hydration status and recommend course of action  - Evaluate amount of meals eaten  - Assist patient with eating if necessary   - Allow adequate time for meals  - Recommend/ encourage appropriate diets, oral nutritional supplements, and vitamin/mineral supplements  - Order, calculate, and assess calorie counts as needed  - Recommend, monitor, and adjust tube feedings and TPN/PPN based on assessed needs  - Assess need for intravenous fluids  - Provide specific nutrition/hydration education as appropriate  - Include patient/family/caregiver in decisions related to nutrition  Outcome: Progressing     Problem: INFECTION - ADULT  Goal: Absence or prevention of progression during hospitalization  Description: INTERVENTIONS:  - Assess and monitor for signs and symptoms of infection  - Monitor lab/diagnostic results  - Monitor all insertion sites, i.e.  indwelling lines, tubes, and drains  - Monitor endotracheal if appropriate and nasal secretions for changes in amount and color  - [place] appropriate cooling/warming therapies per order  - Administer medications as ordered  - Instruct and encourage patient and family to use good hand hygiene technique  - Identify and instruct in appropriate isolation precautions for identified infection/condition  Outcome: Progressing     Problem: DISCHARGE PLANNING  Goal: Discharge to home or other facility with appropriate resources  Description: INTERVENTIONS:  - Identify barriers to discharge w/patient and caregiver  - Arrange for needed discharge resources and transportation as appropriate  - Identify discharge learning needs (meds, wound care, etc.)  - Arrange for interpretive services to assist at discharge as needed  - Refer to Case Management Department for coordinating discharge planning if the patient needs post-hospital services based on physician/advanced practitioner order or complex needs related to functional status, cognitive ability, or social support system  Outcome: Progressing     Problem: SKIN/TISSUE INTEGRITY - ADULT  Goal: Skin Integrity remains intact(Skin Breakdown Prevention)  Description: Assess:  -Perform Vitor assessment every shift  -Clean and moisturize skin  -Inspect skin when repositioning, toileting, and assisting with ADLS  -Assess under medical devices every shift  -Assess extremities for adequate circulation and sensation     Bed Management:  -Have minimal linens on bed & keep smooth, unwrinkled  -Change linens as needed when moist or perspiring  -Avoid sitting or lying in one position for more than 2 hours while in bed  -Keep HOB at 45 degrees     Toileting:  -Offer bedside commode  -Assess for incontinence every hour  -Use incontinent care products after each incontinent episode     Activity:  -Mobilize patient 3 times a day  -Encourage activity and walks on unit  -Encourage or provide ROM  exercises   -Turn and reposition patient every 2 Hours  -Use appropriate equipment to lift or move patient in bed  -Instruct/ Assist with weight shifting every hour when out of bed in chair  -Consider limitation of chair time 2 hour intervals    Skin Care:  -Avoid use of baby powder, tape, friction and shearing, hot water or constrictive clothing  -Relieve pressure over bony prominences  -Do not massage red bony areas    Next Steps:  -Teach patient strategies to minimize risks   -Consider consults to  interdisciplinary teams  Outcome: Progressing     Problem: HEMATOLOGIC - ADULT  Goal: Maintains hematologic stability  Description: INTERVENTIONS  - Assess for signs and symptoms of bleeding or hemorrhage  - Monitor labs  - Administer supportive blood products/factors as ordered and appropriate  Outcome: Progressing     Problem: MUSCULOSKELETAL - ADULT  Goal: Maintain or return mobility to safest level of function  Description: INTERVENTIONS:  - Assess patient's ability to carry out ADLs; assess patient's baseline for ADL function and identify physical deficits which impact ability to perform ADLs (bathing, care of mouth/teeth, toileting, grooming, dressing, etc.)  - Assess/evaluate cause of self-care deficits   - Assess range of motion  - Assess patient's mobility  - Assess patient's need for assistive devices and provide as appropriate  - Encourage maximum independence but intervene and supervise when necessary  - Involve family in performance of ADLs  - Assess for home care needs following discharge   - Consider OT consult to assist with ADL evaluation and planning for discharge  - Provide patient education as appropriate  Outcome: Progressing  Goal: Maintain proper alignment of affected body part  Description: INTERVENTIONS:  - Support, maintain and protect limb and body alignment  - Provide patient/ family with appropriate education  Outcome: Progressing

## 2024-05-26 NOTE — PROGRESS NOTES
"  Progress Note - Dangelo Gunter 83 y.o. female MRN: [id number]    Unit/Bed#: E2 -01 Encounter: [id number]    Assessment/Plan:    Left femoral neck fracture  left hemiarthroplasty on May 22, weightbearing as tolerated, postop care by orthopedics, continue physical therapy plan to rehab    Multifocal pneumonia   pulmonary recommendation repeat CT chest in 6 to 8 weeks and follow-up with pulmonary as an outpatient, continue incentive spirometer usage currently stable on room air, completed 5 days of Rocephin    Acute respiratory failure  hypoxic failure now stable on room air    Dementia    continue cognitive support    Dyslipidemia    continue statin for LDL control    Hypertension    controlled with Norvasc, losartan and Inderal    Subjective:   Is good today no hip pain no chest pain no shortness of breath no fevers no nausea appetite is improving      Objective:     Vitals: Blood pressure 146/69, pulse 80, temperature 97.9 °F (36.6 °C), temperature source Temporal, resp. rate 18, height 5' 6\" (1.676 m), weight 76 kg (167 lb 8.8 oz), SpO2 90%.,Body mass index is 27.04 kg/m².      Results from last 7 days   Lab Units 05/23/24  0450   WBC Thousand/uL 8.67   HEMOGLOBIN g/dL 9.5*   HEMATOCRIT % 29.0*   PLATELETS Thousands/uL 211     Results from last 7 days   Lab Units 05/23/24  0450   POTASSIUM mmol/L 4.3   CHLORIDE mmol/L 107   CO2 mmol/L 24   BUN mg/dL 18   CREATININE mg/dL 0.68   CALCIUM mg/dL 7.7*       Scheduled Meds:  Current Facility-Administered Medications   Medication Dose Route Frequency Provider Last Rate    acetaminophen  650 mg Oral Q8H [facility] Edwin Jeong DO      amLODIPine  2.5 mg Oral Daily Clement Singh PA-C      benzonatate  100 mg Oral TID PRN Clement Singh PA-C      divalproex sodium  125 mg Oral BID Clement Singh PA-C      docusate sodium  100 mg Oral BID Clement Singh PA-C      enoxaparin  30 mg Subcutaneous Q24H [facility] Clement Singh PA-C      " escitalopram  10 mg Oral Daily Clement Singh, PA-C      losartan  50 mg Oral Daily Clement Singh, PA-C      memantine  10 mg Oral BID Clement Singh, PA-C      mirtazapine  7.5 mg Oral HS Clement Singh, PA-C      oxyCODONE  5 mg Oral Q6H PRN Clement Singh, PA-C      oxyCODONE  2.5 mg Oral Q6H PRN Clement Singh, PA-C      pantoprazole  40 mg Oral Daily Before Breakfast Clement Singh, PA-C      polyethylene glycol  17 g Oral Daily Clement Singh, PA-C      pravastatin  80 mg Oral Daily With Dinner Clementslim Singh, PA-C      propranolol  60 mg Oral Daily Clement Singh, PA-C      QUEtiapine  25 mg Oral HS Clement Singh, PA-C      senna  2 tablet Oral HS Clement Singh, PA-C         Continuous Infusions:         Physical exam:  General appearance: Alert no distress interaction improved  Head/Eyes: Nonicteric EOMI  Neck: Supple  Lungs: Decreased BS bilateral no wheezing rhonchi or rales  Heart: normal S1 S2 regular  Abdomen: Soft nontender with bowel sounds  Extremities: no edema  Skin: no rash    Invasive Devices       None                     VTE Pharmacologic Prophylaxis: Lovenox      Counseling / Coordination of Care  Total floor / unit time spent today 30  minutes.  Greater than 50% of total time was spent with the patient and / or family counseling and / or coordination of care.  A description of the counseling / coordination of care: .

## 2024-05-26 NOTE — CASE MANAGEMENT
Case Management Discharge Planning Note    Patient name Dangelo Gunter  [facility] /E2 -* MRN [id number]  : 1940 Date 2024         OBJECTIVE:  Risk of Unplanned Readmission Score: 18.13         Current admission status: Inpatient       DISCHARGE DETAILS:                                                    Treatment Team Recommendation: Short Term Rehab  Discharge Destination Plan:: Short Term Rehab, SNF ([facility]- auth initiated previously with CM calling [facility] to ascertain status- no auth currently on file with insurance company closed, reopening Tuesday.  SW to follow up on auth at that time.)

## 2024-05-26 NOTE — PLAN OF CARE
Problem: PAIN - ADULT  Goal: Verbalizes/displays adequate comfort level or baseline comfort level  Description: Interventions:  - Encourage patient to monitor pain and request assistance  - Assess pain using appropriate pain scale  - Administer analgesics based on type and severity of pain and evaluate response  - Implement non-pharmacological measures as appropriate and evaluate response  - Consider cultural and social influences on pain and pain management  - Notify physician/advanced practitioner if interventions unsuccessful or patient reports new pain  Outcome: Progressing     Problem: SAFETY ADULT  Goal: Patient will remain free of falls  Description: INTERVENTIONS:  - Educate patient/family on patient safety including physical limitations  - Instruct patient to call for assistance with activity   - Consult OT/PT to assist with strengthening/mobility   - Keep Call bell within reach  - Keep bed low and locked with side rails adjusted as appropriate  - Keep care items and personal belongings within reach  - Initiate and maintain comfort rounds  - Make Fall Risk Sign visible to staff  - Offer Toileting every 2 Hours, in advance of need  - Initiate/Maintain bed alarm  - Obtain necessary fall risk management equipment: walker  - Apply yellow socks and bracelet for high fall risk patients  - Consider moving patient to room near nurses station  Outcome: Progressing     Problem: CONFUSION/THOUGHT DISTURBANCE  Goal: Thought disturbances (confusion, delirium, depression, dementia or psychosis) are managed to maintain or return to baseline mental status and functional level  Description: INTERVENTIONS:  - Assess for possible contributors to  thought disturbance, including but not limited to medications, infection, impaired vision or hearing, underlying metabolic abnormalities, dehydration, respiratory compromise,  psychiatric diagnoses and notify attending PHYSICAN/AP  - Monitor and intervene to maintain adequate  nutrition, hydration, elimination, sleep and activity  - Decrease environmental stimuli, including noise as appropriate.  - Provide frequent contacts to provide refocusing, direction and reassurance as needed. Approach patient calmly with eye contact and at their level.  - [place] high risk fall precautions, aspiration precautions and other safety measures, as indicated  - If delirium suspected, notify physician/AP of change in condition and request immediate in-person evaluation  - Pursue consults as appropriate including Geriatric (campus dependent), OT for cognitive evaluation/activity planning, psychiatric, pastoral care, etc.  Outcome: Progressing     Problem: GENITOURINARY - ADULT  Goal: Absence of urinary retention  Description: INTERVENTIONS:  - Assess patient’s ability to void and empty bladder  - Monitor I/O  - Bladder scan as needed  - Discuss with physician/AP medications to alleviate retention as needed  - Discuss catheterization for long term situations as appropriate  Outcome: Progressing     Problem: GASTROINTESTINAL - ADULT  Goal: Maintains or returns to baseline bowel function  Description: INTERVENTIONS:  - Assess bowel function  - Encourage oral fluids to ensure adequate hydration  - Administer IV fluids if ordered to ensure adequate hydration  - Administer ordered medications as needed  - Encourage mobilization and activity  - Consider nutritional services referral to assist patient with adequate nutrition and appropriate food choices  Outcome: Progressing     Problem: Nutrition/Hydration-ADULT  Goal: Nutrient/Hydration intake appropriate for improving, restoring or maintaining nutritional needs  Description: Monitor and assess patient's nutrition/hydration status for malnutrition. Collaborate with interdisciplinary team and initiate plan and interventions as ordered.  Monitor patient's weight and dietary intake as ordered or per policy. Utilize nutrition screening tool and intervene as  necessary. Determine patient's food preferences and provide high-protein, high-caloric foods as appropriate.     INTERVENTIONS:  - Monitor oral intake, urinary output, labs, and treatment plans  - Assess nutrition and hydration status and recommend course of action  - Evaluate amount of meals eaten  - Assist patient with eating if necessary   - Allow adequate time for meals  - Recommend/ encourage appropriate diets, oral nutritional supplements, and vitamin/mineral supplements  - Order, calculate, and assess calorie counts as needed  - Recommend, monitor, and adjust tube feedings and TPN/PPN based on assessed needs  - Assess need for intravenous fluids  - Provide specific nutrition/hydration education as appropriate  - Include patient/family/caregiver in decisions related to nutrition  Outcome: Progressing     Problem: DISCHARGE PLANNING  Goal: Discharge to home or other facility with appropriate resources  Description: INTERVENTIONS:  - Identify barriers to discharge w/patient and caregiver  - Arrange for needed discharge resources and transportation as appropriate  - Identify discharge learning needs (meds, wound care, etc.)  - Arrange for interpretive services to assist at discharge as needed  - Refer to Case Management Department for coordinating discharge planning if the patient needs post-hospital services based on physician/advanced practitioner order or complex needs related to functional status, cognitive ability, or social support system  Outcome: Progressing

## 2024-05-27 PROCEDURE — 99024 POSTOP FOLLOW-UP VISIT: CPT | Performed by: PHYSICIAN ASSISTANT

## 2024-05-27 PROCEDURE — 99232 SBSQ HOSP IP/OBS MODERATE 35: CPT | Performed by: INTERNAL MEDICINE

## 2024-05-27 RX ADMIN — DOCUSATE SODIUM 100 MG: 100 CAPSULE, LIQUID FILLED ORAL at 18:19

## 2024-05-27 RX ADMIN — LOSARTAN POTASSIUM 50 MG: 50 TABLET, FILM COATED ORAL at 08:30

## 2024-05-27 RX ADMIN — OXYCODONE 5 MG: 5 TABLET ORAL at 20:44

## 2024-05-27 RX ADMIN — ENOXAPARIN SODIUM 30 MG: 30 INJECTION SUBCUTANEOUS at 08:29

## 2024-05-27 RX ADMIN — MIRTAZAPINE 7.5 MG: 7.5 TABLET, FILM COATED ORAL at 21:20

## 2024-05-27 RX ADMIN — OXYCODONE 5 MG: 5 TABLET ORAL at 12:04

## 2024-05-27 RX ADMIN — ESCITALOPRAM OXALATE 10 MG: 10 TABLET ORAL at 08:29

## 2024-05-27 RX ADMIN — QUETIAPINE FUMARATE 25 MG: 25 TABLET ORAL at 21:21

## 2024-05-27 RX ADMIN — AMLODIPINE BESYLATE 2.5 MG: 2.5 TABLET ORAL at 08:30

## 2024-05-27 RX ADMIN — MEMANTINE 10 MG: 5 TABLET ORAL at 18:19

## 2024-05-27 RX ADMIN — DOCUSATE SODIUM 100 MG: 100 CAPSULE, LIQUID FILLED ORAL at 08:29

## 2024-05-27 RX ADMIN — PRAVASTATIN SODIUM 80 MG: 80 TABLET ORAL at 18:19

## 2024-05-27 RX ADMIN — DIVALPROEX SODIUM 125 MG: 125 TABLET, DELAYED RELEASE ORAL at 08:29

## 2024-05-27 RX ADMIN — ACETAMINOPHEN 325MG 650 MG: 325 TABLET ORAL at 14:09

## 2024-05-27 RX ADMIN — ACETAMINOPHEN 325MG 650 MG: 325 TABLET ORAL at 21:21

## 2024-05-27 RX ADMIN — DIVALPROEX SODIUM 125 MG: 125 TABLET, DELAYED RELEASE ORAL at 18:19

## 2024-05-27 RX ADMIN — MEMANTINE 10 MG: 5 TABLET ORAL at 08:29

## 2024-05-27 RX ADMIN — PROPRANOLOL HYDROCHLORIDE 60 MG: 60 CAPSULE, EXTENDED RELEASE ORAL at 08:40

## 2024-05-27 RX ADMIN — PANTOPRAZOLE SODIUM 40 MG: 40 TABLET, DELAYED RELEASE ORAL at 06:22

## 2024-05-27 RX ADMIN — ACETAMINOPHEN 325MG 650 MG: 325 TABLET ORAL at 06:22

## 2024-05-27 NOTE — PROGRESS NOTES
Orthopedics     Dangelo Gunter 83 y.o. female MRN: [id number]  Unit/Bed#: E2 -01 Encounter: [id number]      Subjective:  Pt seen this am. She denies any significant left hip pain at this time. No numbness/tingling. No fever/chills.    Labs:  0   Lab Value Date/Time    HCT 29.0 (L) 05/23/2024 0450    HCT 31.9 (L) 05/22/2024 0456    HCT 31.6 (L) 05/21/2024 0513    HGB 9.5 (L) 05/23/2024 0450    HGB 10.6 (L) 05/22/2024 0456    HGB 10.5 (L) 05/21/2024 0513    INR 1.06 05/17/2024 2248    WBC 8.67 05/23/2024 0450    WBC 8.59 05/22/2024 0456    WBC 8.41 05/21/2024 0513    ESR 41 (H) 02/29/2024 1642    CRP <3.0 11/05/2021 0748       Meds:    Current Facility-Administered Medications:     acetaminophen (TYLENOL) tablet 650 mg, 650 mg, Oral, Q8H JANETTE, Edwin Jeong DO, 650 mg at 05/27/24 0622    amLODIPine (NORVASC) tablet 2.5 mg, 2.5 mg, Oral, Daily, Clement Singh PA-C, 2.5 mg at 05/26/24 0935    benzonatate (TESSALON PERLES) capsule 100 mg, 100 mg, Oral, TID PRN, Clement Singh PA-C    divalproex sodium (DEPAKOTE) DR tablet 125 mg, 125 mg, Oral, BID, Clement Singh PA-C, 125 mg at 05/26/24 1711    docusate sodium (COLACE) capsule 100 mg, 100 mg, Oral, BID, Clement Singh PA-C, 100 mg at 05/26/24 1711    enoxaparin (LOVENOX) subcutaneous injection 30 mg, 30 mg, Subcutaneous, Q24H JANETTE, Clement Singh PA-C, 30 mg at 05/26/24 0941    escitalopram (LEXAPRO) tablet 10 mg, 10 mg, Oral, Daily, BESSIE Tucker-C, 10 mg at 05/26/24 0955    losartan (COZAAR) tablet 50 mg, 50 mg, Oral, Daily, BESSIE Tucker-LEVY, 50 mg at 05/26/24 0934    memantine (NAMENDA) tablet 10 mg, 10 mg, Oral, BID, Clement Singh PA-C, 10 mg at 05/26/24 1711    mirtazapine (REMERON) tablet 7.5 mg, 7.5 mg, Oral, HS, BESSIE Tucker-LEVY, 7.5 mg at 05/26/24 2101    oxyCODONE (ROXICODONE) IR tablet 5 mg, 5 mg, Oral, Q6H PRN, Clement Singh PA-C, 5 mg at 05/26/24 1200    oxyCODONE  (ROXICODONE) split tablet 2.5 mg, 2.5 mg, Oral, Q6H PRN, BESSIE Tucker-LEVY, 2.5 mg at 05/26/24 0535    pantoprazole (PROTONIX) EC tablet 40 mg, 40 mg, Oral, Daily Before Breakfast, BESSIE Tucker-C, 40 mg at 05/27/24 0622    polyethylene glycol (MIRALAX) packet 17 g, 17 g, Oral, Daily, BESSIE Tucker-C, 17 g at 05/26/24 0935    pravastatin (PRAVACHOL) tablet 80 mg, 80 mg, Oral, Daily With Dinner, BESSIE Tucker-LEVY, 80 mg at 05/26/24 1711    propranolol (INDERAL LA) 24 hr capsule 60 mg, 60 mg, Oral, Daily, BESSIE Tucker-C, 60 mg at 05/26/24 0955    QUEtiapine (SEROquel) tablet 25 mg, 25 mg, Oral, HS, BESSIE Tucker-C, 25 mg at 05/26/24 2101    senna (SENOKOT) tablet 17.2 mg, 2 tablet, Oral, HS, BESSIE Tukcer-C, 17.2 mg at 05/26/24 2101    Physical exam:  Vitals:    05/27/24 0750   BP: 157/67   Pulse: 72   Resp: 16   Temp: 98.3 °F (36.8 °C)   SpO2: 93%     Left Hip:  Dressing C/D/I  Thigh compartments soft and compressible. No excessive soft tissue swelling.  Motor/sensation intact LLE  Palpable DP pulse    Assessment: 83 y.o.female POD 5 s/p left hip hemiarthroplasty for femoral neck fracture.     Plan:  Pain Control  WBAT LLE  PT/OT  DVT prophylaxis, Lovenox  Continue to monitor clinically for ABLA.  Stable for discharge from orthopedic standpoint. Follow-up as an outpatient with Dr. Pereira.     Clement Singh PA-C

## 2024-05-27 NOTE — ASSESSMENT & PLAN NOTE
Patient with PMHx of dementia, HTN, and HLD presented to the ED from her facility, [facility], after sustaining a mechanical fall  83 y.o. female POD 1 s/p cemented left hip hemiarthroplasty on 5/22 with Dr. Pereira   WBAT to LLE  Maintain mepilex dressing  Awaiting rehab placement

## 2024-05-27 NOTE — ASSESSMENT & PLAN NOTE
CT scan revealed extensive patchy bilateral upper and lower lobe infiltrates suspicious for multifocal bronchopneumonia   Pulmonary input appreciated suspected fat emboli from of the lung given CT findings and without any evidence of leukocytosis, fevers and normal procalcitonin  RP 2 panel, Legionella antigen and strep pneumo were negative  Patient completed antibiotic course  Will need repeat imaging in 6 to 8 weeks

## 2024-05-27 NOTE — ASSESSMENT & PLAN NOTE
Possibly related to aspiration pneumonia versus viral pneumonia  currently on room air   Pulmonary input appreciated--suspected fat emboli from fractures because of acute hypoxemic respiratory failure

## 2024-05-27 NOTE — PROGRESS NOTES
[facility]  Progress Note  Name: Dangelo Gunter I  MRN: [id number]  Unit/Bed#: E2 -01 I Date of Admission: 5/17/2024   Date of Service: 5/27/2024 I Hospital Day: 9    Assessment & Plan   * Fracture of femoral neck, left (HCC)  Assessment & Plan  Patient with PMHx of dementia, HTN, and HLD presented to the ED from her facility, [facility], after sustaining a mechanical fall  83 y.o. female POD 1 s/p cemented left hip hemiarthroplasty on 5/22 with Dr. Pereira   WBAT to LLE  Maintain mepilex dressing  Awaiting rehab placement    Multifocal pneumonia  Assessment & Plan  CT scan revealed extensive patchy bilateral upper and lower lobe infiltrates suspicious for multifocal bronchopneumonia   Pulmonary input appreciated suspected fat emboli from of the lung given CT findings and without any evidence of leukocytosis, fevers and normal procalcitonin  RP 2 panel, Legionella antigen and strep pneumo were negative  Patient completed antibiotic course  Will need repeat imaging in 6 to 8 weeks    Acute respiratory failure with hypoxia (HCC)  Assessment & Plan  Possibly related to aspiration pneumonia versus viral pneumonia  currently on room air   Pulmonary input appreciated--suspected fat emboli from fractures because of acute hypoxemic respiratory failure    Dementia with agitation (HCC)  Assessment & Plan  Pt only oriented to person and place  Delirium precautions  Continue home regimen of lexapro, namenda, remeron, depakote, and seroquel  Mood is stable    Essential hypertension  Assessment & Plan  Continue amlodipine, losartan, and propanolol             Mobility:  Basic Mobility Inpatient Raw Score: 13  JH-HLM Goal: 4: Move to chair/commode  JH-HLM Achieved: 6: Walk 10 steps or more  JH-HLM Goal achieved. Continue to encourage appropriate mobility.    VTE Pharmacologic Prophylaxis:   Pharmacologic: lovenox    Patient Centered Rounds: I have performed bedside rounds with  nursing staff today.    Education and Discussions with Family / Patient: updated daughter at bedside    Time Spent for Care:   More than 50% of total time spent on counseling and coordination of care as described above.    Current Length of Stay: 9 day(s)    Current Patient Status: Inpatient   Certification Statement: The patient will continue to require additional inpatient hospital stay due to awaiting rehab placement    Discharge Plan / Estimated Discharge Date: TBD    Code Status: Level 1 - Full Code      Subjective:   Patient seen and examined at bedside, denies any abdominal pain, nausea or vomiting.  Tolerating diet    Objective:     Vitals:   Temp (24hrs), Av.2 °F (36.8 °C), Min:97.4 °F (36.3 °C), Max:98.8 °F (37.1 °C)    Temp:  [97.4 °F (36.3 °C)-98.8 °F (37.1 °C)] 98.3 °F (36.8 °C)  HR:  [72-75] 72  Resp:  [16-18] 16  BP: (127-157)/(67-76) 157/67  SpO2:  [92 %-93 %] 93 %  Body mass index is 27.04 kg/m².     Input and Output Summary (last 24 hours):       Intake/Output Summary (Last 24 hours) at 2024 1325  Last data filed at 2024 1201  Gross per 24 hour   Intake 600 ml   Output 1153 ml   Net -553 ml       Physical Exam:    Constitutional: Patient is in no acute distress  HEENT:  Normocephalic, atraumatic  Cardiovascular: Normal S1S2, RRR, No murmurs/rubs/gallops appreciated.  Pulmonary:  Bilateral air entry, No rhonchi/rales/wheezing appreciated  Abdominal: Soft, Bowel sounds present, Non-tender, Non-distended  Extremities:  No cyanosis, clubbing or edema.   Neurological: awake, alert  Skin:  Warm, dry    Additional Data:     Labs:    Results from last 7 days   Lab Units 24  0450   WBC Thousand/uL 8.67   HEMOGLOBIN g/dL 9.5*   HEMATOCRIT % 29.0*   PLATELETS Thousands/uL 211     Results from last 7 days   Lab Units 24  0450   POTASSIUM mmol/L 4.3   CHLORIDE mmol/L 107   CO2 mmol/L 24   BUN mg/dL 18   CREATININE mg/dL 0.68   CALCIUM mg/dL 7.7*            I Have Reviewed All Lab Data  Listed Above.    Invasive Devices       None                        Recent Cultures (last 7 days):           Last 24 Hours Medication List:   Current Facility-Administered Medications   Medication Dose Route Frequency Provider Last Rate    acetaminophen  650 mg Oral Q8H [facility] Edwin Jeong DO      amLODIPine  2.5 mg Oral Daily Clement Singh, PA-C      benzonatate  100 mg Oral TID PRN Clement Singh, PA-C      divalproex sodium  125 mg Oral BID Clement Sinhg, PA-C      docusate sodium  100 mg Oral BID Clement Singh, PA-C      enoxaparin  30 mg Subcutaneous Q24H [facility] Clement Singh, PA-C      escitalopram  10 mg Oral Daily Clement Singh, PA-C      losartan  50 mg Oral Daily Clement Singh, PA-C      memantine  10 mg Oral BID Clement Singh, PA-C      mirtazapine  7.5 mg Oral HS Clement Singh, PA-C      oxyCODONE  5 mg Oral Q6H PRN Clement Singh, PA-C      oxyCODONE  2.5 mg Oral Q6H PRN Clement Singh, PA-C      pantoprazole  40 mg Oral Daily Before Breakfast Clement Singh, PA-C      polyethylene glycol  17 g Oral Daily Clement Singh, PA-C      pravastatin  80 mg Oral Daily With Dinner Clement Singh, PA-C      propranolol  60 mg Oral Daily Clement Singh, PA-C      QUEtiapine  25 mg Oral HS Clement Singh, PA-C      senna  2 tablet Oral HS Clement Singh, PA-C          Today, Patient Was Seen By: Dayana Dave MD

## 2024-05-27 NOTE — PLAN OF CARE
Problem: Prexisting or High Potential for Compromised Skin Integrity  Goal: Skin integrity is maintained or improved  Description: INTERVENTIONS:  - Identify patients at risk for skin breakdown  - Assess and monitor skin integrity  - Assess and monitor nutrition and hydration status  - Monitor labs   - Assess for incontinence   - Turn and reposition patient  - Assist with mobility/ambulation  - Relieve pressure over bony prominences  - Avoid friction and shearing  - Provide appropriate hygiene as needed including keeping skin clean and dry  - Evaluate need for skin moisturizer/barrier cream  - Collaborate with interdisciplinary team   - Patient/family teaching  - Consider wound care consult   Outcome: Progressing     Problem: PAIN - ADULT  Goal: Verbalizes/displays adequate comfort level or baseline comfort level  Description: Interventions:  - Encourage patient to monitor pain and request assistance  - Assess pain using appropriate pain scale  - Administer analgesics based on type and severity of pain and evaluate response  - Implement non-pharmacological measures as appropriate and evaluate response  - Consider cultural and social influences on pain and pain management  - Notify physician/advanced practitioner if interventions unsuccessful or patient reports new pain  Outcome: Progressing     Problem: SAFETY ADULT  Goal: Patient will remain free of falls  Description: INTERVENTIONS:  - Educate patient/family on patient safety including physical limitations  - Instruct patient to call for assistance with activity   - Consult OT/PT to assist with strengthening/mobility   - Keep Call bell within reach  - Keep bed low and locked with side rails adjusted as appropriate  - Keep care items and personal belongings within reach  - Initiate and maintain comfort rounds  - Make Fall Risk Sign visible to staff  - Offer Toileting every 2 Hours, in advance of need  - Initiate/Maintain bed/chair alarm  - Obtain necessary fall  risk management equipment: call bell, gripper socks, walker, bedside commode, bed/chair alarm  - Apply yellow socks and bracelet for high fall risk patients  - Consider moving patient to room near nurses station  Outcome: Progressing     Problem: CONFUSION/THOUGHT DISTURBANCE  Goal: Thought disturbances (confusion, delirium, depression, dementia or psychosis) are managed to maintain or return to baseline mental status and functional level  Description: INTERVENTIONS:  - Assess for possible contributors to  thought disturbance, including but not limited to medications, infection, impaired vision or hearing, underlying metabolic abnormalities, dehydration, respiratory compromise,  psychiatric diagnoses and notify attending PHYSICAN/AP  - Monitor and intervene to maintain adequate nutrition, hydration, elimination, sleep and activity  - Decrease environmental stimuli, including noise as appropriate.  - Provide frequent contacts to provide refocusing, direction and reassurance as needed. Approach patient calmly with eye contact and at their level.  - Axis high risk fall precautions, aspiration precautions and other safety measures, as indicated  - If delirium suspected, notify physician/AP of change in condition and request immediate in-person evaluation  - Pursue consults as appropriate including Geriatric (campus dependent), OT for cognitive evaluation/activity planning, psychiatric, pastoral care, etc.  Outcome: Progressing     Problem: GENITOURINARY - ADULT  Goal: Absence of urinary retention  Description: INTERVENTIONS:  - Assess patient's ability to void and empty bladder  - Offer toileting every 2 hours  - Monitor I/O, encourage pt to drink fluids  - Bladder scan as needed  - Follow urine retention protocol as ordered  Outcome: Progressing     Problem: RESPIRATORY - ADULT  Goal: Achieves optimal ventilation and oxygenation  Description: INTERVENTIONS:  - Assess for changes in respiratory status  - Assess for  changes in mentation and behavior  - Position to facilitate oxygenation and minimize respiratory effort  - Oxygen administered by appropriate delivery if ordered  - Encourage broncho-pulmonary hygiene including cough, deep breathe, Incentive Spirometry  - Assess the need for suctioning and aspirate as needed  - Assess and instruct to report SOB or any respiratory difficulty  - Respiratory Therapy support as indicated  Outcome: Progressing     Problem: Nutrition/Hydration-ADULT  Goal: Nutrient/Hydration intake appropriate for improving, restoring or maintaining nutritional needs  Description: Monitor and assess patient's nutrition/hydration status for malnutrition. Collaborate with interdisciplinary team and initiate plan and interventions as ordered.  Monitor patient's weight and dietary intake as ordered or per policy. Utilize nutrition screening tool and intervene as necessary. Determine patient's food preferences and provide high-protein, high-caloric foods as appropriate.     INTERVENTIONS:  - Monitor oral intake, urinary output, labs, and treatment plans  - Assess nutrition and hydration status and recommend course of action  - Set up meals as needed  - Evaluate amount of meals eaten  - Assist patient with eating if necessary   - Allow adequate time for meals  - Recommend/ encourage appropriate diets, oral nutritional supplements, and vitamin/mineral supplements  - Order, calculate, and assess calorie counts as needed  - Assess need for intravenous fluids  - Provide specific nutrition/hydration education as appropriate  - Include patient/family/caregiver in decisions related to nutrition  Outcome: Progressing     Problem: INFECTION - ADULT  Goal: Absence or prevention of progression during hospitalization  Description: INTERVENTIONS:  - Assess and monitor for signs and symptoms of infection  - Monitor lab/diagnostic results  - Monitor all insertion sites  - [place] appropriate cooling/warming therapies per  order  - Administer medications as ordered  - Instruct and encourage patient and family to use good hand hygiene technique  Outcome: Progressing     Problem: DISCHARGE PLANNING  Goal: Discharge to home or other facility with appropriate resources  Description: INTERVENTIONS:  - Identify barriers to discharge w/patient and caregiver  - Arrange for needed discharge resources and transportation as appropriate  - Identify discharge learning needs (meds, wound care, etc.)  - Refer to Case Management Department for coordinating discharge planning if the patient needs post-hospital services based on physician/advanced practitioner order or complex needs related to functional status, cognitive ability, or social support system  Outcome: Progressing     Problem: SKIN/TISSUE INTEGRITY - ADULT  Goal: Skin Integrity remains intact(Skin Breakdown Prevention)  Description: Assess:  -Perform [facility] mobility assessment every shift  -Clean and moisturize skin every day and prn  -Inspect skin when repositioning, toileting, and assisting with ADLS  -Assess under medical devices such as scds every 2 hours  -Assess extremities for adequate circulation and sensation     Bed Management:  -Have minimal linens on bed & keep smooth, unwrinkled  -Change linens as needed when moist or perspiring  -Avoid sitting or lying in one position for more than 2 hours while in bed  -Keep HOB at 30 degrees     Toileting:  -Offer bedside commode  -Assess for incontinence every 2 hours  -Use incontinent care products after each incontinent episode    Activity:  -Mobilize patient 3 times a day  -Encourage activity and walks on unit  -Encourage or provide ROM exercises   -Turn and reposition patient every 2 Hours  -Use appropriate equipment to lift or move patient in bed  -Instruct/ Assist with weight shifting every 15 when out of bed in chair  -Consider limitation of chair time 1 hour intervals    Skin Care:  -Avoid use of baby powder, tape, friction and  shearing, hot water or constrictive clothing  -Relieve pressure over bony prominences using waffle cushion when oob, offload when in bed  -Do not massage red bony areas  Outcome: Progressing     Problem: MUSCULOSKELETAL - ADULT  Goal: Maintain proper alignment of affected body part  Description: INTERVENTIONS:  - Support, maintain and protect limb and body alignment  - Provide patient/ family with appropriate education  Outcome: Progressing     Problem: GASTROINTESTINAL - ADULT  Goal: Maintains or returns to baseline bowel function  Description: INTERVENTIONS:  - Assess bowel function  - Encourage oral fluids to ensure adequate hydration  - Administer IV fluids if ordered to ensure adequate hydration  - Administer ordered medications as needed  - Encourage mobilization and activity  - Consider nutritional services referral to assist patient with adequate nutrition and appropriate food choices  Outcome: Progressing      Additional Progress Note...

## 2024-05-28 VITALS
TEMPERATURE: 98.2 F | OXYGEN SATURATION: 92 % | DIASTOLIC BLOOD PRESSURE: 75 MMHG | HEIGHT: 66 IN | HEART RATE: 77 BPM | RESPIRATION RATE: 16 BRPM | WEIGHT: 167.55 LBS | SYSTOLIC BLOOD PRESSURE: 137 MMHG | BODY MASS INDEX: 26.93 KG/M2

## 2024-05-28 DIAGNOSIS — S72.002D CLOSED FRACTURE OF NECK OF LEFT FEMUR WITH ROUTINE HEALING, SUBSEQUENT ENCOUNTER: Primary | ICD-10-CM

## 2024-05-28 PROCEDURE — 99239 HOSP IP/OBS DSCHRG MGMT >30: CPT | Performed by: INTERNAL MEDICINE

## 2024-05-28 PROCEDURE — 97110 THERAPEUTIC EXERCISES: CPT

## 2024-05-28 PROCEDURE — 97530 THERAPEUTIC ACTIVITIES: CPT

## 2024-05-28 PROCEDURE — 97116 GAIT TRAINING THERAPY: CPT

## 2024-05-28 RX ORDER — ENOXAPARIN SODIUM 300 MG/3ML
30 INJECTION INTRAVENOUS; SUBCUTANEOUS EVERY 24 HOURS
Status: DISCONTINUED | OUTPATIENT
Start: 2024-05-28 | End: 2024-05-28

## 2024-05-28 RX ORDER — ENOXAPARIN SODIUM 100 MG/ML
30 INJECTION SUBCUTANEOUS
Start: 2024-05-29 | End: 2024-06-22

## 2024-05-28 RX ORDER — ENOXAPARIN SODIUM 300 MG/3ML
30 INJECTION INTRAVENOUS; SUBCUTANEOUS EVERY 24 HOURS
Status: SHIPPED | OUTPATIENT
Start: 2024-05-29 | End: 2024-06-27

## 2024-05-28 RX ADMIN — PANTOPRAZOLE SODIUM 40 MG: 40 TABLET, DELAYED RELEASE ORAL at 06:40

## 2024-05-28 RX ADMIN — DOCUSATE SODIUM 100 MG: 100 CAPSULE, LIQUID FILLED ORAL at 08:39

## 2024-05-28 RX ADMIN — ESCITALOPRAM OXALATE 10 MG: 10 TABLET ORAL at 08:38

## 2024-05-28 RX ADMIN — ACETAMINOPHEN 325MG 650 MG: 325 TABLET ORAL at 06:40

## 2024-05-28 RX ADMIN — PROPRANOLOL HYDROCHLORIDE 60 MG: 60 CAPSULE, EXTENDED RELEASE ORAL at 08:41

## 2024-05-28 RX ADMIN — DIVALPROEX SODIUM 125 MG: 125 TABLET, DELAYED RELEASE ORAL at 08:40

## 2024-05-28 RX ADMIN — AMLODIPINE BESYLATE 2.5 MG: 2.5 TABLET ORAL at 08:39

## 2024-05-28 RX ADMIN — ENOXAPARIN SODIUM 30 MG: 30 INJECTION SUBCUTANEOUS at 08:40

## 2024-05-28 RX ADMIN — MEMANTINE 10 MG: 5 TABLET ORAL at 08:40

## 2024-05-28 RX ADMIN — LOSARTAN POTASSIUM 50 MG: 50 TABLET, FILM COATED ORAL at 08:39

## 2024-05-28 RX ADMIN — OXYCODONE 5 MG: 5 TABLET ORAL at 06:40

## 2024-05-28 NOTE — PROGRESS NOTES
Patient:    MRN:  [id number]    Arnul[place] Request ID:  [id number]    Level of care reserved:  Skilled Nursing Facility    Partner Reserved:  [facility], [place] [id number] [phone]    Clinical needs requested:    Geography searched:  10 miles around [id number]    Start of Service:  5/28/2024    Request sent:  8:18am EDT on 5/24/2024 by Janki Gamez    Partner reserved:  2:48pm EDT on 5/24/2024 by Janki Gamez    Choice list shared:  2:22pm EDT on 5/24/2024 by Janki Gamez

## 2024-05-28 NOTE — CASE MANAGEMENT
Case Management Discharge Planning Note    Patient name Dangelo Gunter  Location [facility] /E2 -* MRN [id number]  : 1940 Date 2024       Current Admission Date: 2024  Current Admission Diagnosis:Fracture of femoral neck, left (HCC)   Patient Active Problem List    Diagnosis Date Noted Date Diagnosed    Acute respiratory failure with hypoxia ([facility]) 2024     Multifocal pneumonia 2024     Fracture of femoral neck, left ([facility]) 2024     Venous insufficiency of left lower extremity 2024     Dementia with agitation ([facility]) 2024     Balance problems 2023     Diastolic dysfunction without heart failure 2023     Nonrheumatic tricuspid valve regurgitation 2023     Other osteoporosis without current pathological fracture 2023     Other insomnia 2022     Encounter for medication review 2021     Bradycardia 2021     Other chest pain 2020     Anxiety disorder 2020     Chest pressure 2020     Hyponatremia 2020     Polymyalgia rheumatica ([facility]) 2020     Temporal arteritis ([facility]) 2020     Gastroesophageal reflux disease without esophagitis 2018     Essential hypertension 2018     Abnormal CT scan, head 2018     Goiter 10/05/2012     Osteoarthritis 10/05/2012       LOS (days): 10  Geometric Mean LOS (GMLOS) (days): 3.9  Days to GMLOS:-6.4     OBJECTIVE:  Risk of Unplanned Readmission Score: 18.61         Current admission status: Inpatient   Preferred Pharmacy:   [facility] - [place], [place] - [address]  [address] [facility] [facility] [address]  Phone: [phone] Fax: [phone]    [facility] - [place], [place] - [address] [place] [address]  [address] [place] [address]  [address] [place] [address]  Phone: [phone] Fax: [phone]    Primary Care Provider: Parag Meyers MD    Primary Insurance: [facility]  Secondary Insurance:     DISCHARGE  DETAILS:                                                                                                               Facility Insurance Auth Number: [id number]

## 2024-05-28 NOTE — RESTORATIVE TECHNICIAN NOTE
Restorative Technician Note      Patient Name: Dangelo Gunter     Restorative Tech Visit Date: 05/28/24  Note Type: Mobility  Patient Position Upon Consult: Bedside chair  Activity Performed: Ambulated; Range of motion  Assistive Device: Roller walker  Patient Position at End of Consult: All needs within reach; Bedside chair

## 2024-05-28 NOTE — RESTORATIVE TECHNICIAN NOTE
Restorative Technician Note      Patient Name: Dangelo Gunter     Restorative Tech Visit Date: 05/28/24  Note Type: Mobility  Patient Position Upon Consult: Bedside chair  Activity Performed: Range of motion; Stood; Repositioned  Assistive Device: Roller walker  Patient Position at End of Consult: All needs within reach; Bedside chair

## 2024-05-28 NOTE — PLAN OF CARE
Problem: Prexisting or High Potential for Compromised Skin Integrity  Goal: Skin integrity is maintained or improved  Description: INTERVENTIONS:  - Identify patients at risk for skin breakdown  - Assess and monitor skin integrity  - Assess and monitor nutrition and hydration status  - Monitor labs   - Assess for incontinence   - Turn and reposition patient  - Assist with mobility/ambulation  - Relieve pressure over bony prominences  - Avoid friction and shearing  - Provide appropriate hygiene as needed including keeping skin clean and dry  - Evaluate need for skin moisturizer/barrier cream  - Collaborate with interdisciplinary team   - Patient/family teaching  - Consider wound care consult   Outcome: Progressing     Problem: Prexisting or High Potential for Compromised Skin Integrity  Goal: Skin integrity is maintained or improved  Description: INTERVENTIONS:  - Identify patients at risk for skin breakdown  - Assess and monitor skin integrity  - Assess and monitor nutrition and hydration status  - Monitor labs   - Assess for incontinence   - Turn and reposition patient  - Assist with mobility/ambulation  - Relieve pressure over bony prominences  - Avoid friction and shearing  - Provide appropriate hygiene as needed including keeping skin clean and dry  - Evaluate need for skin moisturizer/barrier cream  - Collaborate with interdisciplinary team   - Patient/family teaching  - Consider wound care consult   Outcome: Progressing     Problem: PAIN - ADULT  Goal: Verbalizes/displays adequate comfort level or baseline comfort level  Description: Interventions:  - Encourage patient to monitor pain and request assistance  - Assess pain using appropriate pain scale  - Administer analgesics based on type and severity of pain and evaluate response  - Implement non-pharmacological measures as appropriate and evaluate response  - Consider cultural and social influences on pain and pain management  - Notify physician/advanced  practitioner if interventions unsuccessful or patient reports new pain  Outcome: Progressing     Problem: SAFETY ADULT  Goal: Patient will remain free of falls  Description: INTERVENTIONS:  - Educate patient/family on patient safety including physical limitations  - Instruct patient to call for assistance with activity   - Consult OT/PT to assist with strengthening/mobility   - Keep Call bell within reach  - Keep bed low and locked with side rails adjusted as appropriate  - Keep care items and personal belongings within reach  - Initiate and maintain comfort rounds  - Make Fall Risk Sign visible to staff  - Offer Toileting every 2 Hours, in advance of need  - Initiate/Maintain bed/chair alarm  - Obtain necessary fall risk management equipment: call bell, gripper socks, walker, bedside commode, bed/chair alarm  - Apply yellow socks and bracelet for high fall risk patients  - Consider moving patient to room near nurses station  Outcome: Progressing     Problem: GENITOURINARY - ADULT  Goal: Absence of urinary retention  Description: INTERVENTIONS:  - Assess patient's ability to void and empty bladder  - Offer toileting every 2 hours  - Monitor I/O, encourage pt to drink fluids  - Bladder scan as needed  - Follow urine retention protocol as ordered  Outcome: Progressing     Problem: RESPIRATORY - ADULT  Goal: Achieves optimal ventilation and oxygenation  Description: INTERVENTIONS:  - Assess for changes in respiratory status  - Assess for changes in mentation and behavior  - Position to facilitate oxygenation and minimize respiratory effort  - Oxygen administered by appropriate delivery if ordered  - Encourage broncho-pulmonary hygiene including cough, deep breathe, Incentive Spirometry  - Assess the need for suctioning and aspirate as needed  - Assess and instruct to report SOB or any respiratory difficulty  - Respiratory Therapy support as indicated  Outcome: Progressing

## 2024-05-28 NOTE — ASSESSMENT & PLAN NOTE
Patient with PMHx of dementia, HTN, and HLD presented to the ED from her facility, [facility], after sustaining a mechanical fall  83 y.o. female POD 1 s/p cemented left hip hemiarthroplasty on 5/22 with Dr. Pereira   WBAT to LLE  Maintain mepilex dressing  Awaiting rehab placement

## 2024-05-28 NOTE — DISCHARGE SUMMARY
[facility]  Discharge- Dangelo Gunter 1940, 83 y.o. female MRN: [id number]  Unit/Bed#: E2 -01 Encounter: [id number]  Primary Care Provider: Parag Meyers MD   Date and time admitted to hospital: 5/17/2024  9:45 PM    * Fracture of femoral neck, left (HCC)  Assessment & Plan  Patient with PMHx of dementia, HTN, and HLD presented to the ED from her facility, [facility], after sustaining a mechanical fall  83 y.o. female POD 1 s/p cemented left hip hemiarthroplasty on 5/22 with Dr. Pereira   WBAT to LLE  Maintain mepilex dressing  We discharged to [facility] today  Continue with Lovenox DVT prophylaxis for 30 days.    Multifocal pneumonia  Assessment & Plan  CT scan revealed extensive patchy bilateral upper and lower lobe infiltrates suspicious for multifocal bronchopneumonia   Pulmonary input appreciated suspected fat emboli from of the lung given CT findings and without any evidence of leukocytosis, fevers and normal procalcitonin  RP 2 panel, Legionella antigen and strep pneumo were negative  Patient completed antibiotic course  Will need repeat imaging in 6 to 8 weeks    Acute respiratory failure with hypoxia (HCC)  Assessment & Plan  Possibly related to aspiration pneumonia versus viral pneumonia  currently on room air   Pulmonary input appreciated--suspected fat emboli from fractures because of acute hypoxemic respiratory failure    Dementia with agitation (HCC)  Assessment & Plan  Pt only oriented to person and place  Delirium precautions  Continue home regimen of lexapro, namenda, remeron, depakote, and seroquel  Mood is stable    Essential hypertension  Assessment & Plan  Continue amlodipine, losartan, and propanolol        Transition of Care Discharge Summary - [facility]    Patient Information: Dangelo Gunter 83 y.o. female MRN: [id number]  Unit/Bed#: E2 -01 Encounter: [id number]    Discharging Physician / Practitioner:  Dayana Dave MD  PCP: Parag Meyers MD  Admission Date: 5/17/2024  Discharge Date: 05/28/24    Disposition:      Other: [facility]      Reason for Admission: Mechanical fall, multifocal pneumonia    Discharge Diagnoses:     Principal Problem:    Fracture of femoral neck, left (HCC)  Active Problems:    Essential hypertension    Dementia with agitation (HCC)    Acute respiratory failure with hypoxia (HCC)    Multifocal pneumonia  Resolved Problems:    Hyperlipidemia    Supplemental oxygen dependent      Consultations During Hospital Stay:  IP CONSULT TO ORTHOPEDIC SURGERY  IP CONSULT TO PULMONOLOGY      Procedures Performed:     Left hip hemiarthroplasty 5/22    Medication Adjustments and Discharge Medications:  Medication Dosing Tapers - Please refer to Discharge Medication List for details on any medication dosing tapers (if applicable to patient).  Discharge Medication List: See after visit summary for reconciled discharge medications.     Wound Care Recommendations:  When applicable, please see wound care section of After Visit Summary.    Diet Recommendations at Discharge:  Diet -        Diet Orders   (From admission, onward)                 Start     Ordered    05/22/24 2148  Diet Regular; Regular House  Diet effective now        References:    Adult Nutrition Support Algorithm    RD Therapeutic Diet Order Protocol   Question Answer Comment   Diet Type Regular    Regular Regular House    RD to adjust diet per protocol? Yes        05/22/24 2147                  Fluid Restriction - No Fluid Restriction at Discharge.      Significant Findings / Test Results:     XR pelvis ap only 1 or 2 vw    Result Date: 5/23/2024  Impression: Uncomplicated left hip hemiarthroplasty. Workstation performed: [id number]     XR hip/pelv 2-3 vws left    Result Date: 5/18/2024  Impression: Left femoral neck impacted fracture. Findings concur with ED results as provided in PACS viewer/EPIC at the time of interpretation. Workstation  "performed: [id number]     XR chest 1 view portable    Result Date: 5/18/2024  Impression: Multifocal bilateral lung opacity, new from February 2024, likely infectious or inflammatory. See subsequent chest CT. Workstation performed: [id number]     CT chest without contrast    Result Date: 5/18/2024  Impression: Extensive patchy bilateral upper and lower lobe infiltrates suspicious for multifocal bronchopneumonia. Correlation with the patient's symptoms and laboratory studies is recommended. A short-term follow-up CT chest in 6 to 8 weeks following treatment is recommended to assess for improvement/resolution and exclude an underlying lesion. Workstation performed: [id number]        Hospital Course:     Dangelo Gunter is a 83 y.o. female patient who originally presented to the hospital on 5/17/2024 due to mechanical fall suffering a left femoral neck fracture and also had multifocal pneumonia.  Patient was treated with antibiotics will need repeat imaging in 6 to 8 weeks to monitor for resolution.  She also underwent cemented left hip hemiarthroplasty 5/22 will need to be on Lovenox for 4 weeks for DVT prophylaxis.  She is otherwise stable and will be discharged to [facility] today    Please see above problem list for further details.      Condition at Discharge: good     Discharge Day Visit / Exam:     Subjective: Patient denies any abdominal pain, nausea, vomiting, resting comfortably    Vitals: Blood Pressure: 137/75 (05/28/24 0724)  Pulse: 77 (05/28/24 0724)  Temperature: 98.2 °F (36.8 °C) (05/28/24 0724)  Temp Source: Temporal (05/28/24 0724)  Respirations: 16 (05/28/24 0724)  Height: 5' 6\" (167.6 cm) (05/21/24 0929)  Weight - Scale: 76 kg (167 lb 8.8 oz) (05/25/24 0600)  SpO2: 92 % (05/28/24 0724)    Physical Exam:    Constitutional: Patient is in no acute distress  HEENT:  Normocephalic, atraumatic  Cardiovascular: Normal S1S2, RRR, No murmurs/rubs/gallops appreciated.  Pulmonary:  Bilateral air entry, No " rhonchi/rales/wheezing appreciated  Abdominal: Soft, Bowel sounds present, Non-tender, Non-distended  Extremities:  No cyanosis, clubbing or edema.   Neurological: awake, alert    Discharge instructions/Information to patient and family:   See after visit summary section titled Discharge Instructions for information provided to patient and family.      Planned Readmission: no      Discharge Statement:  I spent 35 minutes discharging the patient. This time was spent on the day of discharge. I had direct contact with the patient on the day of discharge. Greater than 50% of the total time was spent examining patient, answering all patient questions, arranging and discussing plan of care with patient as well as directly providing post-discharge instructions.  Additional time then spent on discharge activities.    ** Please Note: This note has been constructed using a voice recognition system **

## 2024-05-28 NOTE — PLAN OF CARE
Problem: Prexisting or High Potential for Compromised Skin Integrity  Goal: Skin integrity is maintained or improved  Description: INTERVENTIONS:  - Identify patients at risk for skin breakdown  - Assess and monitor skin integrity  - Assess and monitor nutrition and hydration status  - Monitor labs   - Assess for incontinence   - Turn and reposition patient  - Assist with mobility/ambulation  - Relieve pressure over bony prominences  - Avoid friction and shearing  - Provide appropriate hygiene as needed including keeping skin clean and dry  - Evaluate need for skin moisturizer/barrier cream  - Collaborate with interdisciplinary team   - Patient/family teaching  - Consider wound care consult   Outcome: Progressing     Problem: PAIN - ADULT  Goal: Verbalizes/displays adequate comfort level or baseline comfort level  Description: Interventions:  - Encourage patient to monitor pain and request assistance  - Assess pain using appropriate pain scale  - Administer analgesics based on type and severity of pain and evaluate response  - Implement non-pharmacological measures as appropriate and evaluate response  - Consider cultural and social influences on pain and pain management  - Notify physician/advanced practitioner if interventions unsuccessful or patient reports new pain  Outcome: Progressing     Problem: SAFETY ADULT  Goal: Patient will remain free of falls  Description: INTERVENTIONS:  - Educate patient/family on patient safety including physical limitations  - Instruct patient to call for assistance with activity   - Consult OT/PT to assist with strengthening/mobility   - Keep Call bell within reach  - Keep bed low and locked with side rails adjusted as appropriate  - Keep care items and personal belongings within reach  - Initiate and maintain comfort rounds  - Make Fall Risk Sign visible to staff  - Offer Toileting every 2 Hours, in advance of need  - Initiate/Maintain bed/chair alarm  - Obtain necessary fall  risk management equipment: call bell, gripper socks, walker, bedside commode, bed/chair alarm  - Apply yellow socks and bracelet for high fall risk patients  - Consider moving patient to room near nurses station  Outcome: Progressing     Problem: CONFUSION/THOUGHT DISTURBANCE  Goal: Thought disturbances (confusion, delirium, depression, dementia or psychosis) are managed to maintain or return to baseline mental status and functional level  Description: INTERVENTIONS:  - Assess for possible contributors to  thought disturbance, including but not limited to medications, infection, impaired vision or hearing, underlying metabolic abnormalities, dehydration, respiratory compromise,  psychiatric diagnoses and notify attending PHYSICAN/AP  - Monitor and intervene to maintain adequate nutrition, hydration, elimination, sleep and activity  - Decrease environmental stimuli, including noise as appropriate.  - Provide frequent contacts to provide refocusing, direction and reassurance as needed. Approach patient calmly with eye contact and at their level.  - [place] high risk fall precautions, aspiration precautions and other safety measures, as indicated  - If delirium suspected, notify physician/AP of change in condition and request immediate in-person evaluation  - Pursue consults as appropriate including Geriatric (campus dependent), OT for cognitive evaluation/activity planning, psychiatric, pastoral care, etc.  Outcome: Progressing     Problem: GENITOURINARY - ADULT  Goal: Absence of urinary retention  Description: INTERVENTIONS:  - Assess patient's ability to void and empty bladder  - Offer toileting every 2 hours  - Monitor I/O, encourage pt to drink fluids  - Bladder scan as needed  - Follow urine retention protocol as ordered  Outcome: Progressing     Problem: RESPIRATORY - ADULT  Goal: Achieves optimal ventilation and oxygenation  Description: INTERVENTIONS:  - Assess for changes in respiratory status  - Assess for  changes in mentation and behavior  - Position to facilitate oxygenation and minimize respiratory effort  - Oxygen administered by appropriate delivery if ordered  - Encourage broncho-pulmonary hygiene including cough, deep breathe, Incentive Spirometry  - Assess the need for suctioning and aspirate as needed  - Assess and instruct to report SOB or any respiratory difficulty  - Respiratory Therapy support as indicated  Outcome: Progressing     Problem: Nutrition/Hydration-ADULT  Goal: Nutrient/Hydration intake appropriate for improving, restoring or maintaining nutritional needs  Description: Monitor and assess patient's nutrition/hydration status for malnutrition. Collaborate with interdisciplinary team and initiate plan and interventions as ordered.  Monitor patient's weight and dietary intake as ordered or per policy. Utilize nutrition screening tool and intervene as necessary. Determine patient's food preferences and provide high-protein, high-caloric foods as appropriate.     INTERVENTIONS:  - Monitor oral intake, urinary output, labs, and treatment plans  - Assess nutrition and hydration status and recommend course of action  - Set up meals as needed  - Evaluate amount of meals eaten  - Assist patient with eating if necessary   - Allow adequate time for meals  - Recommend/ encourage appropriate diets, oral nutritional supplements, and vitamin/mineral supplements  - Order, calculate, and assess calorie counts as needed  - Assess need for intravenous fluids  - Provide specific nutrition/hydration education as appropriate  - Include patient/family/caregiver in decisions related to nutrition  Outcome: Progressing     Problem: INFECTION - ADULT  Goal: Absence or prevention of progression during hospitalization  Description: INTERVENTIONS:  - Assess and monitor for signs and symptoms of infection  - Monitor lab/diagnostic results  - Monitor all insertion sites  - [place] appropriate cooling/warming therapies per  order  - Administer medications as ordered  - Instruct and encourage patient and family to use good hand hygiene technique  Outcome: Progressing     Problem: DISCHARGE PLANNING  Goal: Discharge to home or other facility with appropriate resources  Description: INTERVENTIONS:  - Identify barriers to discharge w/patient and caregiver  - Arrange for needed discharge resources and transportation as appropriate  - Identify discharge learning needs (meds, wound care, etc.)  - Refer to Case Management Department for coordinating discharge planning if the patient needs post-hospital services based on physician/advanced practitioner order or complex needs related to functional status, cognitive ability, or social support system  Outcome: Progressing     Problem: SKIN/TISSUE INTEGRITY - ADULT  Goal: Skin Integrity remains intact(Skin Breakdown Prevention)  Description: Assess:  -Perform [facility] mobility assessment every shift  -Clean and moisturize skin every day and prn  -Inspect skin when repositioning, toileting, and assisting with ADLS  -Assess under medical devices such as scds every 2 hours  -Assess extremities for adequate circulation and sensation     Bed Management:  -Have minimal linens on bed & keep smooth, unwrinkled  -Change linens as needed when moist or perspiring  -Avoid sitting or lying in one position for more than 2 hours while in bed  -Keep HOB at 30 degrees     Toileting:  -Offer bedside commode  -Assess for incontinence every 2 hours  -Use incontinent care products after each incontinent episode    Activity:  -Mobilize patient 3 times a day  -Encourage activity and walks on unit  -Encourage or provide ROM exercises   -Turn and reposition patient every 2 Hours  -Use appropriate equipment to lift or move patient in bed  -Instruct/ Assist with weight shifting every 15 when out of bed in chair  -Consider limitation of chair time 1 hour intervals    Skin Care:  -Avoid use of baby powder, tape, friction and  shearing, hot water or constrictive clothing  -Relieve pressure over bony prominences using waffle cushion when oob, offload when in bed  -Do not massage red bony areas  Outcome: Progressing     Problem: HEMATOLOGIC - ADULT  Goal: Maintains hematologic stability  Description: INTERVENTIONS  - Assess for signs and symptoms of bleeding or hemorrhage  - Monitor labs  - Administer supportive blood products/factors as ordered and appropriate  Outcome: Progressing     Problem: MUSCULOSKELETAL - ADULT  Goal: Maintain or return mobility to safest level of function  Description: INTERVENTIONS:  - Assess patient's ability to carry out ADLs; assess patient's baseline for ADL function and identify physical deficits which impact ability to perform ADLs (bathing, care of mouth/teeth, toileting, grooming, dressing, etc.)  - Assess/evaluate cause of self-care deficits   - Assess range of motion  - Assess patient's mobility  - Assess patient's need for assistive devices and provide as appropriate  - Encourage maximum independence but intervene and supervise when necessary  - Involve family in performance of ADLs  - Assess for home care needs following discharge   - Consider OT consult to assist with ADL evaluation and planning for discharge  - Provide patient education as appropriate  Outcome: Progressing  Goal: Maintain proper alignment of affected body part  Description: INTERVENTIONS:  - Support, maintain and protect limb and body alignment  - Provide patient/ family with appropriate education  Outcome: Progressing     Problem: GASTROINTESTINAL - ADULT  Goal: Maintains or returns to baseline bowel function  Description: INTERVENTIONS:  - Assess bowel function  - Encourage oral fluids to ensure adequate hydration  - Administer IV fluids if ordered to ensure adequate hydration  - Administer ordered medications as needed  - Encourage mobilization and activity  - Consider nutritional services referral to assist patient with adequate  nutrition and appropriate food choices  Outcome: Progressing

## 2024-05-28 NOTE — PHYSICAL THERAPY NOTE
PHYSICAL THERAPY NOTE          Patient Name: Dangelo Gunter  Today's Date: 5/28/2024 05/28/24 1102   Note Type   Note Type Treatment for insurance authorization   Pain Assessment   Pain Assessment Tool 0-10   Pain Score No Pain   Pain Location/Orientation Orientation: Left;Location: Hip   Restrictions/Precautions   LLE Weight Bearing Per Order WBAT   Other Precautions Cognitive;Chair Alarm;Bed Alarm;Pain;Fall Risk   General   Chart Reviewed Yes   Family/Caregiver Present Yes  (daughter.)   Cognition   Overall Cognitive Status Impaired   Arousal/Participation Alert;Responsive   Attention Attends with cues to redirect   Orientation Level Oriented to person   Memory Decreased recall of precautions;Decreased recall of recent events;Decreased short term memory;Decreased long term memory   Following Commands Follows one step commands with increased time or repetition   Subjective   Subjective Okay, i really do not have any pain.   Bed Mobility   Additional Comments pt  out of bed upon arrival.   Transfers   Sit to Stand 2  Maximal assistance  (max assist x2, pt unable to come to a complet upright standing postition, 2nd attempt mod assist x3 with use of be pad pt ablet o stand upright.)   Additional items Assist x 2;Assist x 3;Increased time required;Verbal cues;Armrests   Stand to Sit 4  Minimal assistance   Additional items Assist x 1;Armrests;Increased time required;Verbal cues   Additional Comments verbal cues for hand placement and cues to puish through ue's and le's to stand.   Ambulation/Elevation   Gait pattern Improper Weight shift;Poor UE support;Antalgic;Forward Flexion;Decreased foot clearance;Decreased L stance;Short stride;Step to;Excessively slow;Redundant gait at times;Decreased toe off;Decreased heel strike   Gait Assistance 3  Moderate assist   Additional items Assist x 2;Verbal cues   Assistive Device Rolling  walker   Distance 4' x1   Ambulation/Elevation Additional Comments step by step cues for le sequencing, walker advancement and cues to take larger steps.   Balance   Static Sitting Fair +   Dynamic Sitting Fair   Static Standing Poor +   Dynamic Standing Poor   Ambulatory Poor   Endurance Deficit   Endurance Deficit Description fatigue, weakness   Activity Tolerance   Activity Tolerance Patient limited by pain;Patient limited by fatigue   Medical Staff Made Aware restorative therapistLon   Nurse Made Aware yes   Exercises   THR Supine;AROM;AAROM;Bilateral  (ap, qs, a/a hs Lle, a/a hip abd./add  b/l  le's  a/a slr  b/l le's,)   Assessment   Prognosis Good   Problem List Decreased strength;Decreased range of motion;Decreased endurance;Impaired balance;Decreased mobility;Decreased cognition;Decreased safety awareness;Impaired judgement   Assessment Pt seen for PT treatment session this date with interventions consisting of transfer training, gait training, and HEP, and education provided as needed for safety and direction to improve functional mobility, safety awareness, and activity tolerance. Pt agreeable to PT treatment session upon arrival, pt found seated out of bed in recliner. At end of session, pt left seated out of bed in recliner eating lunch with all needs in reach. In comparison to previous session, pt with improvement in activity tolerance, endurance, and ambulation distances. Please refer to endurance deficit section of flowsheet for vitals. Pt is demonstrating difficulty with sit to stand transfers from recliner requiring increased assistance to  perform and complete. Pt requires verbal cues for hand placement and transfer techniques, assistance to guide hands to reach back for arm rests of chair with stand to sit transfers. Pt progressed with ambulation distances to 4' with mod assist x2 with constant verbal cues for proper le sequencing, advancement of Rw, ue weightbearing techniques and to  increased stride on R.  Pt  performs supine b/l le aa-arom x 12 reps. Verbal and tactile cues for proper techniques and performance.  Pt  limited by decreased endurance, activity tolerance, balance, gait instability and pain R hip with weightbearing.  Continue to recommend  level II moderated rehab resource intensity  at time of d/c in order to maximize pt's functional independence and safety w/ mobility. Pt continues to be functioning below baseline level. PT will continue to see pt while here in order to address the deficits listed above and provide interventions consistent w/ POC in effort to achieve STGs.    The patient's AM-[facility] Basic Mobility Inpatient Short Form Raw Score is 8. A raw score less than 16 suggests the patient may benefit from discharge to post-acute rehabilitation services. Please also refer to the recommendation of the Physical Therapist for safe discharge planning.   Goals   Patient Goals getting back to walking and normal activity.   STG Expiration Date 06/06/24   PT Treatment Day 2   Plan   Treatment/Interventions LE strengthening/ROM;Functional transfer training;Therapeutic exercise;Endurance training;Cognitive reorientation;Patient/family training;Equipment eval/education;Gait training;Spoke to nursing   Progress Slow progress, decreased activity tolerance   PT Frequency 3-5x/wk   Discharge Recommendation   Rehab Resource Intensity Level, PT II (Moderate Resource Intensity)   AM-PAC Basic Mobility Inpatient   Turning in Flat Bed Without Bedrails 2   Lying on Back to Sitting on Edge of Flat Bed Without Bedrails 2   Moving Bed to Chair 1   Standing Up From Chair Using Arms 1   Walk in Room 1   Climb 3-5 Stairs With Railing 1   Basic Mobility Inpatient Raw Score 8   Turning Head Towards Sound 4   Follow Simple Instructions 3   Low Function Basic Mobility Raw Score  15   Low Function Basic Mobility Standardized Score  23.9   [facility] Level Of Mobility   [facility] Goal 3: Sit at edge of  bed   -HLM Achieved 5: Stand (1 or more minutes)   Education   Education Provided Mobility training;Home exercise program;Assistive device   Patient Reinforcement needed   End of Consult   Patient Position at End of Consult Bedside chair;Bed/Chair alarm activated;All needs within reach   End of Consult Comments ice to L hip and anterior thigh.   Cami Zamudio, PTA

## 2024-05-28 NOTE — PLAN OF CARE
Problem: PHYSICAL THERAPY ADULT  Goal: Performs mobility at highest level of function for planned discharge setting.  See evaluation for individualized goals.  Description: Treatment/Interventions: Functional transfer training, LE strengthening/ROM, Therapeutic exercise, Endurance training, Cognitive reorientation, Equipment eval/education, Patient/family training, Bed mobility, Gait training, Compensatory technique education, Spoke to nursing, OT, Family  Equipment Recommended: Walker       See flowsheet documentation for full assessment, interventions and recommendations.  Outcome: Progressing  Note: Prognosis: Good  Problem List: Decreased strength, Decreased range of motion, Decreased endurance, Impaired balance, Decreased mobility, Decreased cognition, Decreased safety awareness, Impaired judgement  Assessment: Pt seen for PT treatment session this date with interventions consisting of transfer training, gait training, and HEP, and education provided as needed for safety and direction to improve functional mobility, safety awareness, and activity tolerance. Pt agreeable to PT treatment session upon arrival, pt found seated out of bed in recliner. At end of session, pt left seated out of bed in recliner eating lunch with all needs in reach. In comparison to previous session, pt with improvement in activity tolerance, endurance, and ambulation distances. Please refer to endurance deficit section of flowsheet for vitals. Pt is demonstrating difficulty with sit to stand transfers from recliner requiring increased assistance to  perform and complete. Pt requires verbal cues for hand placement and transfer techniques, assistance to guide hands to reach back for arm rests of chair with stand to sit transfers. Pt progressed with ambulation distances to 4' with mod assist x2 with constant verbal cues for proper le sequencing, advancement of Rw, ue weightbearing techniques and to increased stride on R.  Pt  performs  supine b/l le aa-arom x 12 reps. Verbal and tactile cues for proper techniques and performance.  Pt  limited by decreased endurance, activity tolerance, balance, gait instability and pain R hip with weightbearing.  Continue to recommend  level II moderated rehab resource intensity  at time of d/c in order to maximize pt's functional independence and safety w/ mobility. Pt continues to be functioning below baseline level. PT will continue to see pt while here in order to address the deficits listed above and provide interventions consistent w/ POC in effort to achieve STGs.    The patient's AM-PAC Basic Mobility Inpatient Short Form Raw Score is 8. A raw score less than 16 suggests the patient may benefit from discharge to post-acute rehabilitation services. Please also refer to the recommendation of the Physical Therapist for safe discharge planning.  Barriers to Discharge: None, Other (Comment) (Pending facility ability to provide necessary assistance)     Rehab Resource Intensity Level, PT: II (Moderate Resource Intensity)    See flowsheet documentation for full assessment.

## 2024-05-28 NOTE — CASE MANAGEMENT
[facility] has received APPROVED authorization.  Insurance: [facility] [employer]  Called in by Rep: Adelaida LYNN# [phone]  Authorization received for: [facility]  Facility: [facility]   Authorization #: [id number]   Start of Care: 5/26  Next Review Date: 5/29  Continued Stay Care Coordinator: Jonathan Elkins   Submit next review to: Fax# [phone]    Care Manager notified: Janki SCHMIDT     Please reach out to CM for updates on any clinical information.

## 2024-05-28 NOTE — CASE MANAGEMENT
Case Management Discharge Planning Note    Patient name Dangelo Gunter  Location [facility] /E2 -* MRN [id number]  : 1940 Date 2024       Current Admission Date: 2024  Current Admission Diagnosis:Fracture of femoral neck, left (HCC)   Patient Active Problem List    Diagnosis Date Noted Date Diagnosed    Acute respiratory failure with hypoxia ([facility]) 2024     Multifocal pneumonia 2024     Fracture of femoral neck, left ([facility]) 2024     Venous insufficiency of left lower extremity 2024     Dementia with agitation ([facility]) 2024     Balance problems 2023     Diastolic dysfunction without heart failure 2023     Nonrheumatic tricuspid valve regurgitation 2023     Other osteoporosis without current pathological fracture 2023     Other insomnia 2022     Encounter for medication review 2021     Bradycardia 2021     Other chest pain 2020     Anxiety disorder 2020     Chest pressure 2020     Hyponatremia 2020     Polymyalgia rheumatica ([facility]) 2020     Temporal arteritis ([facility]) 2020     Gastroesophageal reflux disease without esophagitis 2018     Essential hypertension 2018     Abnormal CT scan, head 2018     Goiter 10/05/2012     Osteoarthritis 10/05/2012       LOS (days): 10  Geometric Mean LOS (GMLOS) (days): 3.9  Days to GMLOS:-6.4     OBJECTIVE:  Risk of Unplanned Readmission Score: 18.65         Current admission status: Inpatient   Preferred Pharmacy:   [facility] - [place], [place] - [address]  [address] [address]  Phone: [phone] Fax: [phone]    [facility] - [place], [place] - [address] [place] [address]  [address] [place] [address]  [address] [place] [address]  Phone: [phone] Fax: [phone]    Primary Care Provider: Parag Meyers MD    Primary Insurance: [facility]  Secondary Insurance:     DISCHARGE  DETAILS:    Discharge planning discussed with:: Daughter  Freedom of Choice: Yes  Comments - Freedom of Choice: Agreeable to dc to [facility]  CM contacted family/caregiver?: Yes  Were Treatment Team discharge recommendations reviewed with patient/caregiver?: Yes  Did patient/caregiver verbalize understanding of patient care needs?: N/A- going to facility       Contacts  Patient Contacts: Aida Alex (daughter)  Relationship to Patient:: Family  Contact Method: In Person  Reason/Outcome: Discharge Planning    Requested Home Health Care         Is the patient interested in HHC at discharge?: No    DME Referral Provided  Referral made for DME?: No    Other Referral/Resources/Interventions Provided:  Interventions: Short Term Rehab         Treatment Team Recommendation: Short Term Rehab  Discharge Destination Plan:: Short Term Rehab  Transport at Discharge : Wheelchair van                    Transfer Mode: Wheelchair        IMM Given (Date):: 05/28/24  IMM Given to:: Family  Family notified:: Aida Alex  IMM was reviewed with the pt's daughter, Aida, due to pt dementia diagnosis. Reports understanding of the ability to appeal discharge if feeling as though not medically stable for discharge. IMM was signed and placed in the scan bin.

## 2024-05-28 NOTE — NURSING NOTE
Patient is discharged to [facility].  In the AVS, noticed no anticoagulant,  Reached out to ortho BESSIE Singh, and Alejandra Briggs.   Patient is for 30 days lovenox 30mg s/c.  Reached out to the RN who is going to receive this patient in [facility].  Also gave patient name, details and [facility] contact number to ortho PA.

## 2024-05-29 ENCOUNTER — TRANSITIONAL CARE MANAGEMENT (OUTPATIENT)
Dept: FAMILY MEDICINE CLINIC | Facility: CLINIC | Age: 84
End: 2024-05-29

## 2024-05-29 ENCOUNTER — TELEPHONE (OUTPATIENT)
Dept: OBGYN CLINIC | Facility: MEDICAL CENTER | Age: 84
End: 2024-05-29

## 2024-05-29 NOTE — TELEPHONE ENCOUNTER
Hello,  Please advise if the following patient can be forced onto the schedule:    Patient: Dangelo     : 38    MRN: MRN: [id number]     Call back #: Zeeshan Saavedra [phone]- 2wk follow up     Insurance: [facility] medicare    Reason for appointment: 2 week PO HEMIARTHROPLASTY HIP (BIPOLAR)     Requested doctor and/or location: Dr Pereira /Vio[place]      Thank you.

## 2024-05-31 NOTE — UTILIZATION REVIEW
NOTIFICATION OF ADMISSION DISCHARGE   This is a Notification of Discharge from [facility]. Please be advised that this patient has been discharge from our facility. Below you will find the admission and discharge date and time including the patient’s disposition.   UTILIZATION REVIEW CONTACT:  Inessa Barnes MA  Utilization   Network Utilization Review Department  Phone: [phone] x carefully listen to the prompts. All voicemails are confidential.  Email: [email or web address]     ADMISSION INFORMATION  PRESENTATION DATE: 5/17/2024  9:45 PM  OBERVATION ADMISSION DATE:   INPATIENT ADMISSION DATE: 5/18/24  1:36 AM   DISCHARGE DATE: 5/28/2024  2:03 PM   DISPOSITION:Non [facility] SNF/TCU/SNU    Network Utilization Review Department  ATTENTION: Please call with any questions or concerns to [phone] and carefully listen to the prompts so that you are directed to the right person. All voicemails are confidential.   For Discharge needs, contact Care Management DC Support Team at [phone] opt. 2  Send all requests for admission clinical reviews, approved or denied determinations and any other requests to dedicated fax number below belonging to the campus where the patient is receiving treatment. List of dedicated fax numbers for the Facilities:  FACILITY NAME UR FAX NUMBER   ADMISSION DENIALS (Administrative/Medical Necessity) [phone]   DISCHARGE SUPPORT TEAM ([facility]) [phone]   PARENT CHILD HEALTH (Maternity/NICU/Pediatrics) [phone]   [facility] [phone]   General acute hospital [phone]   [facility] [phone]   [facility] [phone]   [facility] [phone]   [facility] [phone]   [facility] [phone]   [facility]  [phone]   [facility] [phone]   [facility] [phone]   [facility] [phone]   [facility] [phone]

## 2024-06-13 ENCOUNTER — OFFICE VISIT (OUTPATIENT)
Dept: OBGYN CLINIC | Facility: MEDICAL CENTER | Age: 84
End: 2024-06-13

## 2024-06-13 VITALS
BODY MASS INDEX: 26.84 KG/M2 | HEART RATE: 63 BPM | SYSTOLIC BLOOD PRESSURE: 152 MMHG | DIASTOLIC BLOOD PRESSURE: 73 MMHG | HEIGHT: 66 IN | WEIGHT: 167 LBS

## 2024-06-13 DIAGNOSIS — S72.002D CLOSED FRACTURE OF NECK OF LEFT FEMUR WITH ROUTINE HEALING, SUBSEQUENT ENCOUNTER: ICD-10-CM

## 2024-06-13 DIAGNOSIS — Z96.649 STATUS POST HIP HEMIARTHROPLASTY: Primary | ICD-10-CM

## 2024-06-13 PROCEDURE — 99024 POSTOP FOLLOW-UP VISIT: CPT | Performed by: STUDENT IN AN ORGANIZED HEALTH CARE EDUCATION/TRAINING PROGRAM

## 2024-06-13 RX ORDER — TRAZODONE HYDROCHLORIDE 50 MG/1
TABLET ORAL
COMMUNITY
Start: 2024-05-07

## 2024-06-13 NOTE — PROGRESS NOTES
Addended by: SUGAR SANDY on: 8/18/2022 12:23 PM     Modules accepted: Orders     Post-Operative Note: Hip Hemiarthroplasty  Dangelo Gunter (83 y.o. female)  : 1940 Encounter Date: 2024  Dr. Delfin Pereira DO, Orthopedic Surgeon  Orthopedic Oncology & Sarcoma Surgery     Assessment/Plan:  83 y.o. female 3 week follow up status post left lateral Hardinge approach hip hemiarthroplasty     OK to shower., Dab dry with towel., No soaking or bathing for another 2 weeks.  Incision is clean, dry and intact  Pain control: PRN  Continue ice packs/elevation  Can continue compression   Continue with physical therapy  Continue activities as tolerated  Maintain posterior hip precautions  Continue DVT ppx   Return in about 3 weeks (around 2024) for Recheck and xray of left hip.      Subjective:  83 y.o. female  presenting to the office for 3 week follow up status post left lateral Hardinge approach hip hemiarthroplasty   DOS: 2024    Current concerns: none  Patient is controlling pain with tylenol  Numbness/weakness extremity: None Reported   Physical Therapy Progress: Progressing well  DVT ppx: Lovenox SQ daily   Eating/Drinking improving. Bowel/Bladder: WNL  Patient denies symptoms of an infection.   Has been able to reach goals to progress out of rehab center with weightbearing and walking    Physical Exam:   Exam: left hip  Incision: healing well no significant drainage no dehiscence no significant erythema  ROM: WNL  Sensation: normal   Brisk Capillary Refill    Imaging:  No new imaging reviewed today    Scribe Attestation      I,:  Carson Correia am acting as a scribe while in the presence of the attending physician.:       I,:  Delfin Pereira DO personally performed the services described in this documentation    as scribed in my presence.:

## 2024-06-17 PROBLEM — J18.9 MULTIFOCAL PNEUMONIA: Status: RESOLVED | Noted: 2024-05-18 | Resolved: 2024-06-17

## 2024-06-18 ENCOUNTER — TELEPHONE (OUTPATIENT)
Dept: FAMILY MEDICINE CLINIC | Facility: CLINIC | Age: 84
End: 2024-06-18

## 2024-06-18 NOTE — TELEPHONE ENCOUNTER
Spoke to daughter.  Patient is in memory care facility, and their doctor will be taking care of Dangelo.    Please remove Dr Meyers as her PCP.

## 2024-06-25 NOTE — TELEPHONE ENCOUNTER
06/25/24 3:51 PM        The office's request has been received, reviewed, and the patient chart updated. The PCP has successfully been removed with a patient attribution note. This message will now be completed.        Thank you  Jesse Jauregui

## 2024-07-17 ENCOUNTER — APPOINTMENT (OUTPATIENT)
Dept: RADIOLOGY | Facility: MEDICAL CENTER | Age: 84
End: 2024-07-17
Payer: COMMERCIAL

## 2024-07-17 ENCOUNTER — OFFICE VISIT (OUTPATIENT)
Dept: OBGYN CLINIC | Facility: MEDICAL CENTER | Age: 84
End: 2024-07-17

## 2024-07-17 VITALS
HEIGHT: 66 IN | BODY MASS INDEX: 26.84 KG/M2 | DIASTOLIC BLOOD PRESSURE: 75 MMHG | SYSTOLIC BLOOD PRESSURE: 130 MMHG | HEART RATE: 69 BPM | WEIGHT: 167 LBS

## 2024-07-17 DIAGNOSIS — Z47.89 AFTERCARE FOLLOWING SURGERY OF THE MUSCULOSKELETAL SYSTEM: ICD-10-CM

## 2024-07-17 DIAGNOSIS — Z47.89 AFTERCARE FOLLOWING SURGERY OF THE MUSCULOSKELETAL SYSTEM: Primary | ICD-10-CM

## 2024-07-17 PROCEDURE — 73502 X-RAY EXAM HIP UNI 2-3 VIEWS: CPT

## 2024-07-17 PROCEDURE — 99024 POSTOP FOLLOW-UP VISIT: CPT

## 2024-07-17 RX ORDER — NITROFURANTOIN MACROCRYSTALS 100 MG/1
CAPSULE ORAL
COMMUNITY
Start: 2024-06-13

## 2024-07-17 RX ORDER — OXYCODONE HYDROCHLORIDE 5 MG/1
2.5 TABLET ORAL EVERY 6 HOURS PRN
Qty: 20 TABLET | Refills: 0 | Status: SHIPPED | OUTPATIENT
Start: 2024-07-17 | End: 2024-07-27

## 2024-07-17 RX ORDER — OXYCODONE HYDROCHLORIDE 5 MG/1
TABLET ORAL
COMMUNITY
Start: 2024-06-13 | End: 2024-07-17 | Stop reason: SDUPTHER

## 2024-07-17 RX ORDER — OXYCODONE HYDROCHLORIDE 5 MG/1
TABLET ORAL
Status: CANCELLED | OUTPATIENT
Start: 2024-07-17

## 2024-07-18 NOTE — PROGRESS NOTES
Post-Operative Note: Hip Hemiarthroplasty  Dangelo Gunter (83 y.o. female)  : 1940 Encounter Date: 2024  Dr. Delfin Pereira, DO, Orthopedic Surgeon  Orthopedic Oncology & Sarcoma Surgery     Assessment/Plan:  83 y.o. female 6 week follow up status post left lateral Hardinge approach hip hemiarthroplasty     Wound care: continue current care  Incision is clean, dry and intact  Pain control: PRN  Continue ice packs/elevation  Continue with physical therapy/ambulating with nursing staff  Continue activities as tolerated  No formal hip precautions  Continue DVT ppx   Rewrote prescription for postoperative oxycodone; nurse wrote note that patient has only a few days left; given PRN when they notice her in pain after PT or before bed. Transitioned to 2.5mg q6h PRN and educated daughter on transition for further chronic pain management per primary, due to physician management at facility to better evaluate her symptoms  Return in about 6 weeks (around 2024).  With XR      Subjective:  83 y.o. female  presenting to the office for 6 week follow up status post left lateral Hardinge approach hip hemiarthroplasty   DOS: 2024    History provided by daughter today, due to history of dementia    Walking with walker at facility, back to baseline prior to fracture    Current concerns: none  Patient is controlling pain with tylenol  Numbness/weakness extremity: None Reported   Physical Therapy Progress: Progressing well, completed formal  DVT ppx: Lovenox SQ daily   Eating/Drinking improving. Bowel/Bladder: WNL  Patient denies symptoms of an infection.   Has been able to reach goals to progress out of rehab center with weightbearing and walking    Physical Exam:   Exam: left hip  Incision: healing well   Motor: EHL/FHL/DF/PF intact; able to stand with my support  ROM: WNL  Sensation: normal   Brisk Capillary Refill    Imaging:  XR left hip from today,  24  I have reviewed these images in PACS. I have  read and agree with the radiology report that has since resulted.   Status post right hip ashley arthroplasty, with hardware unchanged from prior.  Anatomic alignment. No loosening. No signs of osteolysis. No fracture or dislocation.   Increased heterotopic bone formation along the inferior and superior aspect of the greater trochanter, with the superior bone formation bridging along the superior aspect of the hip joint towards the superior acetabular margin.    Jayy Salinas, PA-C

## 2024-08-07 ENCOUNTER — HOSPITAL ENCOUNTER (OUTPATIENT)
Dept: CT IMAGING | Facility: HOSPITAL | Age: 84
Discharge: HOME | End: 2024-08-07
Payer: COMMERCIAL

## 2024-08-07 DIAGNOSIS — R91.8 OTHER NONSPECIFIC ABNORMAL FINDING OF LUNG FIELD: ICD-10-CM

## 2024-08-07 PROCEDURE — 71250 CT THORAX DX C-: CPT

## 2024-08-29 ENCOUNTER — OFFICE VISIT (OUTPATIENT)
Dept: OBGYN CLINIC | Facility: MEDICAL CENTER | Age: 84
End: 2024-08-29

## 2024-08-29 ENCOUNTER — APPOINTMENT (OUTPATIENT)
Dept: RADIOLOGY | Facility: MEDICAL CENTER | Age: 84
End: 2024-08-29
Payer: COMMERCIAL

## 2024-08-29 VITALS
BODY MASS INDEX: 26.84 KG/M2 | DIASTOLIC BLOOD PRESSURE: 94 MMHG | SYSTOLIC BLOOD PRESSURE: 155 MMHG | HEIGHT: 66 IN | HEART RATE: 69 BPM | WEIGHT: 167 LBS | RESPIRATION RATE: 18 BRPM

## 2024-08-29 DIAGNOSIS — Z47.89 AFTERCARE FOLLOWING SURGERY OF THE MUSCULOSKELETAL SYSTEM: Primary | ICD-10-CM

## 2024-08-29 DIAGNOSIS — Z47.89 AFTERCARE FOLLOWING SURGERY OF THE MUSCULOSKELETAL SYSTEM: ICD-10-CM

## 2024-08-29 PROCEDURE — 73502 X-RAY EXAM HIP UNI 2-3 VIEWS: CPT

## 2024-08-29 PROCEDURE — 99024 POSTOP FOLLOW-UP VISIT: CPT | Performed by: STUDENT IN AN ORGANIZED HEALTH CARE EDUCATION/TRAINING PROGRAM

## 2024-08-29 NOTE — PROGRESS NOTES
Post-Operative Note: Hip Hemiarthroplasty  Dangelo Gunter (83 y.o. female)  : 1940 Encounter Date: 2024  Dr. Delfin Pereira, DO, Orthopedic Surgeon  Orthopedic Oncology & Sarcoma Surgery     Assessment/Plan:  83 y.o. female 12 week follow up status post left lateral Hardinge approach hip hemiarthroplasty     Wound care: continue care for scar tissue  Incision is well healed  Pain control: PRN  Continue ice packs/elevation  Continue with physical therapy to work on ambulating with a walker with nursing staff  Continue activities as tolerated  No formal hip precautions  Continue DVT ppx     Return in about 3 months (around 2024) for Recheck, Repeat X-ray.  With XR      Subjective:  83 y.o. female  presenting to the office for 12 week follow up status post left lateral Hardinge approach hip hemiarthroplasty   DOS: 2024    History provided by daughter today, due to dementia. Patient was unable to answer questions correctly due to demented state.    Patient is no longer ambulating with walker at the facility, she is wheel chair bound since being placed on hospice to help with other care.    Current concerns: none  Patient is controlling pain with tylenol  Numbness/weakness extremity: None Reported   Physical Therapy Progress: Completed formal physical therapy, has since been placed on hospice for other medical concerns. Since then, she has not been completing physical therapy  DVT ppx: Lovenox SQ daily   Eating/Drinking improving. Bowel/Bladder: WNL  Patient denies symptoms of an infection.       Physical Exam:   Exam: left hip  Incision: healing well   Motor: EHL/FHL/DF/PF intact; able to stand with my support  ROM: WNL  Sensation: normal   Brisk Capillary Refill    Imaging:    Study: 24  Date: XR left hip  Report: No radiologist report was available at this time.  and I have personally reviewed the imaging in PACS and my impression is as follows:  I have personally reviewed imaging  and my impression is as follows:   Well-placed hemiarthroplasty without any evidence of fracture dislocation or loosening    XR left hip from today,  7/17/24  I have reviewed these images in PACS. I have read and agree with the radiology report that has since resulted.   Status post right hip ashley arthroplasty, with hardware unchanged from prior.  Anatomic alignment. No loosening. No signs of osteolysis. No fracture or dislocation.   Increased heterotopic bone formation along the inferior and superior aspect of the greater trochanter, with the superior bone formation bridging along the superior aspect of the hip joint towards the superior acetabular margin.        Scribe Attestation      I,:  Malina Adhikari am acting as a scribe while in the presence of the attending physician.:       I,:  Delfin Pereira, DO personally performed the services described in this documentation    as scribed in my presence.:

## 2024-10-02 ENCOUNTER — TELEPHONE (OUTPATIENT)
Age: 84
End: 2024-10-02

## 2025-01-07 ENCOUNTER — TELEPHONE (OUTPATIENT)
Age: 85
End: 2025-01-07

## 2025-01-10 NOTE — TELEPHONE ENCOUNTER
Contacted Pt. in regards to [facility] Wait List. Spoke with daughter who stated that patient is currently on hospice and she would like to confirm with the hospice team first before making an appointment.     Daughter states she will call us back if she is wanting to schedule an appointment for patient.

## (undated) DEVICE — DRAPE SHEET THREE QUARTER

## (undated) DEVICE — GLOVE INDICATOR PI UNDERGLOVE SZ 7 BLUE

## (undated) DEVICE — CAPIT HIP STEM CEMENTED

## (undated) DEVICE — ABDUCTION PILLOW FOAM POSITIONER: Brand: CARDINAL HEALTH

## (undated) DEVICE — MEDI-VAC TUBING CONNECTOR 6-IN-1 STRAIGHT: Brand: CARDINAL HEALTH

## (undated) DEVICE — PENCIL ELECTROSURG E-Z CLEAN -0035H

## (undated) DEVICE — PACKING VAGINAL 2 IN

## (undated) DEVICE — SYRINGE 20ML LL

## (undated) DEVICE — PROXIMATE SKIN STAPLERS (35 WIDE) CONTAINS 35 STAINLESS STEEL STAPLES (FIXED HEAD): Brand: PROXIMATE

## (undated) DEVICE — HANDPIECE SET WITH RETRACTABLE COAXIAL FAN SPRAY TIP AND SUCTION TUBE: Brand: INTERPULSE

## (undated) DEVICE — IMPERVIOUS STOCKINETTE: Brand: DEROYAL

## (undated) DEVICE — CEMENT MIXING SYSTEM WITH FEMORAL BREAKWAY NOZZLE: Brand: REVOLUTION

## (undated) DEVICE — SURGICAL GOWN, XL SMARTSLEEVE: Brand: CONVERTORS

## (undated) DEVICE — ELECTRODE BLADE E-Z CLEAN 6.5IN -0014

## (undated) DEVICE — 3M™ STERI-DRAPE™ U-DRAPE 1015: Brand: STERI-DRAPE™

## (undated) DEVICE — DRESSING MEPILEX AG BORDER POST-OP 4 X 8 IN

## (undated) DEVICE — GLOVE SRG BIOGEL ECLIPSE 6.5

## (undated) DEVICE — 4-PORT MANIFOLD: Brand: NEPTUNE 2

## (undated) DEVICE — SUT VICRYL 1 CT-1 27 IN J261H

## (undated) DEVICE — INTENDED FOR TISSUE SEPARATION, AND OTHER PROCEDURES THAT REQUIRE A SHARP SURGICAL BLADE TO PUNCTURE OR CUT.: Brand: BARD-PARKER ® CARBON RIB-BACK BLADES

## (undated) DEVICE — ACE WRAP 6 IN UNSTERILE

## (undated) DEVICE — SUT ETHIBOND 2 V-37 30 IN MX69G

## (undated) DEVICE — EXOFIN PRECISION PEN HIGH VISCOSITY TOPICAL SKIN ADHESIVE: Brand: EXOFIN PRECISION PEN, 1G

## (undated) DEVICE — CAPIT HIP BIPOLAR HEAD POR PRIMARY

## (undated) DEVICE — GLOVE SRG BIOGEL 8

## (undated) DEVICE — COBAN 6 IN STERILE

## (undated) DEVICE — BETHLEHEM TOTAL HIP, KIT: Brand: CARDINAL HEALTH

## (undated) DEVICE — SUT STRATAFIX SPIRAL PDS PLUS 1 CTX 18 IN SXPP1A400

## (undated) DEVICE — TIBURON HIP DRAPE WITH POUCHES: Brand: CONVERTORS

## (undated) DEVICE — GLOVE SRG BIOGEL 7.5

## (undated) DEVICE — SAW BLADE OSCILLATING BRAZOL 167

## (undated) DEVICE — TUBING SUCTION 5MM X 12 FT

## (undated) DEVICE — NEEDLE HYPO 22G X 1-1/2 IN

## (undated) DEVICE — DRESSING MEPILEX AG BORDER POST-OP 4 X 10 IN

## (undated) DEVICE — SCD SEQUENTIAL COMPRESSION COMFORT SLEEVE MEDIUM KNEE LENGTH: Brand: KENDALL SCD

## (undated) DEVICE — SUT MONOCRYL 2-0 CT-1 36 IN Y945H

## (undated) DEVICE — TRAY FOLEY 16FR URIMETER SURESTEP